# Patient Record
Sex: FEMALE | Race: WHITE | HISPANIC OR LATINO | Employment: OTHER | URBAN - METROPOLITAN AREA
[De-identification: names, ages, dates, MRNs, and addresses within clinical notes are randomized per-mention and may not be internally consistent; named-entity substitution may affect disease eponyms.]

---

## 2019-11-08 ENCOUNTER — OFFICE VISIT (OUTPATIENT)
Dept: CARDIOLOGY | Facility: MEDICAL CENTER | Age: 84
End: 2019-11-08
Payer: MEDICARE

## 2019-11-08 VITALS
HEIGHT: 60 IN | WEIGHT: 142.75 LBS | HEART RATE: 50 BPM | SYSTOLIC BLOOD PRESSURE: 140 MMHG | DIASTOLIC BLOOD PRESSURE: 80 MMHG | BODY MASS INDEX: 28.03 KG/M2 | OXYGEN SATURATION: 94 %

## 2019-11-08 DIAGNOSIS — R07.2 PRECORDIAL PAIN: ICD-10-CM

## 2019-11-08 DIAGNOSIS — R01.1 UNDIAGNOSED CARDIAC MURMURS: ICD-10-CM

## 2019-11-08 DIAGNOSIS — I10 HYPERTENSION, UNSPECIFIED TYPE: ICD-10-CM

## 2019-11-08 LAB — EKG IMPRESSION: NORMAL

## 2019-11-08 PROCEDURE — 99214 OFFICE O/P EST MOD 30 MIN: CPT | Performed by: INTERNAL MEDICINE

## 2019-11-08 PROCEDURE — 93000 ELECTROCARDIOGRAM COMPLETE: CPT | Performed by: INTERNAL MEDICINE

## 2019-11-08 NOTE — PROGRESS NOTES
CARDIOLOGY NEW PATIENT CONSULTATION    PCP: Clayton Kohli M.D.    1. Precordial pain  Suggestive of arrhythmic versus noncardiac etiology  -ZIO Patch    2. Undiagnosed cardiac murmurs  Likely aortic sclerosis  -Echocardiogram    3. Hypertension, unspecified type  Poorly controlled, she is not a good historian.  I advised tracking the blood pressure daily and following up with her primary care doctor in the coming weeks.      Follow up with Tino Marcial M.D. to be determined after testing is complete       Chief Complaint   Patient presents with   • Hypertension       History: Sushma Bowden is a 84 y.o. female with a past medical history of hypertension presenting for consultation regarding chest pain.  She gets discomfort in the chest which will last several minutes at a time before subsiding.  Symptoms occur approximately once per week.  They occur at both rest and with exertion though they are not reliable with exertion.  Her symptoms seem more bothersome in the morning.  She does describe herself is a very active 84-year-old woman and dissipates and many household chores including cleaning and cooking all without reliable reproduction of symptoms.  She monitors her blood pressure at home but does not know the average readings, her son keeps track of these.  She believes she is taking enalapril but does not know the dosage.  She does not get orthopnea or near syncope.  She did have edema in the summer months but this has resolved.    ROS:  All other systems reviewed and negative except as per the HPI    PE:  /80 (BP Location: Left arm, Patient Position: Sitting, BP Cuff Size: Adult)   Pulse (!) 50   Ht 1.524 m (5')   Wt 64.8 kg (142 lb 12 oz)   SpO2 94%   BMI 27.88 kg/m²   GEN: Well appearing  HEENT: Symmetric face. Anicteric sclerae. Moist mucus membranes  NECK: No JVD. No lymphadenopathy  CARDIAC: Normal PMI, regular, normal S1, S2.  1 out of 6 systolic ejection murmur at the upper right sternal  border  VASCULATURE: Normal carotid amplitude without bruit.   RESP: Clear to auscultation bilaterally  ABD: Soft, non-tender, non-distended  EXT: No edema, no clubbing or cyanosis  SKIN: Warm and dry  NEURO: No gross deficits  PSYCH: Appropriate affect, participates in conversation    Past Medical History:   Diagnosis Date   • Hyperlipidemia    • Hypertension      History reviewed. No pertinent surgical history.  Allergies   Allergen Reactions   • Penicillins      Outpatient Encounter Medications as of 11/8/2019   Medication Sig Dispense Refill   • aspirin (ASA) 81 MG CHEW Take 1 Tab by mouth every day. 100 Tab 11   • enalapril (VASOTEC) 10 MG Tab Take 1 Tab by mouth every day. 30 Tab 0   • tramadol-acetaminophen (ULTRACET) 37.5-325 MG per tablet Take 1 Tab by mouth every 6 hours as needed for Mild Pain. 60 Tab 1   • Linaclotide 145 MCG CAPS Take 1 Cap by mouth every day. 30 Cap 5   • simvastatin (ZOCOR) 40 MG TABS Take 1 Tab by mouth every evening. 30 Tab 1   • docusate calcium (SURFAK) 240 MG CAPS Take 1 Cap by mouth every day. 60 Cap 0   • [DISCONTINUED] lisinopril (PRINIVIL) 10 MG TABS Take 1 Tab by mouth every day. 30 Tab 11   • omeprazole (PRILOSEC) 20 MG CPDR Take 1 Cap by mouth every day. for gastritis 30 Cap 11     No facility-administered encounter medications on file as of 11/8/2019.      History reviewed. No pertinent family history.  Social History     Socioeconomic History   • Marital status:      Spouse name: Not on file   • Number of children: Not on file   • Years of education: Not on file   • Highest education level: Not on file   Occupational History   • Not on file   Social Needs   • Financial resource strain: Not on file   • Food insecurity:     Worry: Not on file     Inability: Not on file   • Transportation needs:     Medical: Not on file     Non-medical: Not on file   Tobacco Use   • Smoking status: Never Smoker   • Smokeless tobacco: Never Used   Substance and Sexual Activity   •  Alcohol use: No   • Drug use: No   • Sexual activity: Not on file   Lifestyle   • Physical activity:     Days per week: Not on file     Minutes per session: Not on file   • Stress: Not on file   Relationships   • Social connections:     Talks on phone: Not on file     Gets together: Not on file     Attends Christianity service: Not on file     Active member of club or organization: Not on file     Attends meetings of clubs or organizations: Not on file     Relationship status: Not on file   • Intimate partner violence:     Fear of current or ex partner: Not on file     Emotionally abused: Not on file     Physically abused: Not on file     Forced sexual activity: Not on file   Other Topics Concern   • Not on file   Social History Narrative   • Not on file       Studies  Lab Results   Component Value Date/Time    CHOLSTRLTOT 145 10/14/2013 04:01 AM    LDL 80 10/14/2013 04:01 AM    HDL 52 10/14/2013 04:01 AM    TRIGLYCERIDE 64 10/14/2013 04:01 AM       Lab Results   Component Value Date/Time    SODIUM 141 07/28/2014 09:06 AM    POTASSIUM 3.9 07/28/2014 09:06 AM    CHLORIDE 104 07/28/2014 09:06 AM    CO2 30 07/28/2014 09:06 AM    GLUCOSE 87 07/28/2014 09:06 AM    BUN 13 07/28/2014 09:06 AM    CREATININE 0.72 07/28/2014 09:06 AM     Lab Results   Component Value Date/Time    ALKPHOSPHAT 98 05/10/2014 12:16 AM    ASTSGOT 24 05/10/2014 12:16 AM    ALTSGPT 17 05/10/2014 12:16 AM    TBILIRUBIN 0.7 05/10/2014 12:16 AM        For this encounter I directly reviewed ECG tracings and medical records I agree with the interpretations in the electronic health record

## 2019-11-21 ENCOUNTER — OFFICE VISIT (OUTPATIENT)
Dept: NEUROLOGY | Facility: MEDICAL CENTER | Age: 84
End: 2019-11-21
Payer: MEDICARE

## 2019-11-21 VITALS
OXYGEN SATURATION: 94 % | HEART RATE: 59 BPM | WEIGHT: 142.8 LBS | RESPIRATION RATE: 14 BRPM | DIASTOLIC BLOOD PRESSURE: 78 MMHG | TEMPERATURE: 97.2 F | HEIGHT: 60 IN | BODY MASS INDEX: 28.03 KG/M2 | SYSTOLIC BLOOD PRESSURE: 144 MMHG

## 2019-11-21 DIAGNOSIS — I63.9 CEREBROVASCULAR ACCIDENT (CVA), UNSPECIFIED MECHANISM (HCC): ICD-10-CM

## 2019-11-21 PROCEDURE — 99202 OFFICE O/P NEW SF 15 MIN: CPT

## 2019-11-21 RX ORDER — TIZANIDINE HYDROCHLORIDE 2 MG/1
2 CAPSULE, GELATIN COATED ORAL
Qty: 30 CAP | Refills: 3 | Status: ON HOLD | OUTPATIENT
Start: 2019-11-21 | End: 2020-11-16

## 2019-11-21 RX ORDER — ATENOLOL 50 MG/1
50 TABLET ORAL DAILY
Status: ON HOLD | COMMUNITY
End: 2020-11-16

## 2019-11-21 RX ORDER — ATORVASTATIN CALCIUM 20 MG/1
40 TABLET, FILM COATED ORAL NIGHTLY
COMMUNITY
End: 2020-11-12

## 2019-12-01 DIAGNOSIS — I63.9 CEREBROVASCULAR ACCIDENT (CVA), UNSPECIFIED MECHANISM (HCC): ICD-10-CM

## 2019-12-01 DIAGNOSIS — R01.1 UNDIAGNOSED CARDIAC MURMURS: ICD-10-CM

## 2019-12-01 DIAGNOSIS — I10 HYPERTENSION, UNSPECIFIED TYPE: ICD-10-CM

## 2019-12-01 DIAGNOSIS — R07.2 PRECORDIAL PAIN: ICD-10-CM

## 2019-12-01 NOTE — PROGRESS NOTES
Neurological Consultation     Referred by Caitlyn LANCE for abnormal MRI brain with prior strokes and memory loss.    HPI  84 yo female with past medical history of hypertension,hyperlipidemia presents with her daughter after recently being discharged from the hospital for altered mental status and chest pain.  She is here for abnormal MRI brain and to discuss further management.  Her memory is not very good and she has trouble recalling recent events.  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  Had numerous TIAs with difficulty speaking,confusion and shaking of her left leg.  Her MRI brain revealed moderate microvascular changes bilaterally and lacunar infarcts in brainstem and thalamus which were chronic. She was treated for dehydration too and had improved with mentation and overall.        Review of Systems   Constitutional: Negative.    HENT: Negative.    Eyes: Negative.    Respiratory: Negative.    Cardiovascular: Negative.    Gastrointestinal: Negative.    Musculoskeletal: Positive for neck pain.   Skin: Negative.    Neurological: Negative.    Endo/Heme/Allergies: Negative.    Psychiatric/Behavioral: Negative for memory loss.     Past Medical History:   Diagnosis Date   • Hyperlipidemia    • Hypertension    No past surgical history on file.     No family history on file.     Social History     Socioeconomic History   • Marital status:      Spouse name: Not on file   • Number of children: Not on file   • Years of education: Not on file   • Highest education level: Not on file   Occupational History   • Not on file   Social Needs   • Financial resource strain: Not on file   • Food insecurity     Worry: Not on file     Inability: Not on file   • Transportation needs     Medical: Not on file     Non-medical: Not on file   Tobacco Use   • Smoking status: Never Smoker   • Smokeless tobacco: Never Used   Substance and Sexual Activity   • Alcohol use: No   • Drug use: No   • Sexual  activity: Not on file   Lifestyle   • Physical activity     Days per week: Not on file     Minutes per session: Not on file   • Stress: Not on file   Relationships   • Social connections     Talks on phone: Not on file     Gets together: Not on file     Attends Faith service: Not on file     Active member of club or organization: Not on file     Attends meetings of clubs or organizations: Not on file     Relationship status: Not on file   • Intimate partner violence     Fear of current or ex partner: Not on file     Emotionally abused: Not on file     Physically abused: Not on file     Forced sexual activity: Not on file   Other Topics Concern   • Not on file   Social History Narrative   • Not on file     GEN: Well appearing  HEENT: Symmetric face. Anicteric sclerae. Moist mucus membranes  NECK: No JVD. No lymphadenopathy  CARDIAC: Normal PMI, regular, normal S1, S2.  1 out of 6 systolic ejection murmur at the upper right sternal border  VASCULATURE: Normal carotid amplitude without bruit.   RESP: Clear to auscultation bilaterally  ABD: Soft, non-tender, non-distended  EXT: No edema, no clubbing or cyanosis  SKIN: Warm and dry  NEURO: No gross deficits  PSYCH: Appropriate affect, participates in conversation    Neurological exam   Awake and alert  Slovenian speaking daughter translates and she speaks english to a certain extent  Cn 2-12 intact   Speech fluent   Memory MOCA 24/30   Motor normal   Sensory normal dtrs 2+ symm  No ataxia   Gait slightly slower  Can ambulate independently  Turns well  Tone normal  No tremor     Lab Results   Component Value Date/Time    SODIUM 141 07/28/2014 09:06 AM    POTASSIUM 3.9 07/28/2014 09:06 AM    CHLORIDE 104 07/28/2014 09:06 AM    CO2 30 07/28/2014 09:06 AM    GLUCOSE 87 07/28/2014 09:06 AM    BUN 13 07/28/2014 09:06 AM    CREATININE 0.72 07/28/2014 09:06 AM      Lab Results   Component Value Date/Time    WBC 6.0 05/10/2014 12:16 AM    RBC 4.33 05/10/2014 12:16 AM     HEMOGLOBIN 14.9 05/10/2014 12:16 AM    HEMATOCRIT 43.0 05/10/2014 12:16 AM    MCV 99.3 (H) 05/10/2014 12:16 AM    MCH 34.4 (H) 05/10/2014 12:16 AM    MCHC 34.6 05/10/2014 12:16 AM    MPV 9.5 05/10/2014 12:16 AM    NEUTSPOLYS 68.0 05/10/2014 12:16 AM    LYMPHOCYTES 20.9 (L) 05/10/2014 12:16 AM    MONOCYTES 8.9 05/10/2014 12:16 AM    EOSINOPHILS 1.9 05/10/2014 12:16 AM    BASOPHILS 0.4 05/10/2014 12:16 AM        .imaging reviewed personally     FINDINGS: There is no acute infarct. There is no acute or chronic parenchymal hemorrhage. Nonspecific T2 hyperintensities are noted in the subcortical and periventricular white matter and brainstem. There is chronic lacunar infarcts in the brainstem and right thalamus. There is no intra-axial mass.     The ventricles, cortical sulci and the basal cisterns appear prominent consistent with mild cerebral atrophy. There is no sellar or juxtasellar mass. There is no extra-axial fluid collection, hemorrhage or mass. The visualized flow voids of the cerebral vasculature appear normal.  There is no large lesion identified in the expected course of the intracranial portions of the cranial nerves.   The skull bones appear normal. The extracranial soft tissue including orbits appear grossly normal. There are tiny mucus retention cyst in the left maxillary sinus.       Impression        1.  No acute abnormality.   2.  Moderate chronic microvascular ischemic disease.   3.  Lacunar infarcts in the brainstem and right thalamus.   4.  Moderate cerebral atrophy.   5.  There has been no significant interval change.      Current Outpatient Medications on File Prior to Visit   Medication Sig Dispense Refill   • atorvastatin (LIPITOR) 20 MG Tab Take 40 mg by mouth every evening.     • atenolol (TENORMIN) 50 MG Tab Take 50 mg by mouth every day.     • aspirin (ASA) 81 MG CHEW Take 1 Tab by mouth every day. 100 Tab 11   • omeprazole (PRILOSEC) 20 MG CPDR Take 1 Cap by mouth every day. for gastritis  30 Cap 11   • enalapril (VASOTEC) 10 MG Tab Take 1 Tab by mouth every day. 30 Tab 0   • tramadol-acetaminophen (ULTRACET) 37.5-325 MG per tablet Take 1 Tab by mouth every 6 hours as needed for Mild Pain. 60 Tab 1   • Linaclotide 145 MCG CAPS Take 1 Cap by mouth every day. 30 Cap 5   • simvastatin (ZOCOR) 40 MG TABS Take 1 Tab by mouth every evening. 30 Tab 1   • docusate calcium (SURFAK) 240 MG CAPS Take 1 Cap by mouth every day. 60 Cap 0     No current facility-administered medications on file prior to visit.      Impression and plan  86 yo female with chronic lacunar infarcts, vascular dementia presenting to [facility] care.  Strokes likely 2/2 NTH HL however other etiology including small emboli or artery to artery can not be excluded.  Plan  MRA head and neck   Tizanidine prn for cervicalgia limit to 1/2 tab (2mg) 1-2 times per week if any SE given her age I told daughter to discontinue  TTE with bubble   Telemetry   A1C, tsh, lipid panel and B12 and fo;late labs   BP goals < 130/90  A1C < 6.5  Neuropsychological testing   C/w asa and simvastatin   Manage stroke risk factors with PCP

## 2019-12-05 ENCOUNTER — HOSPITAL ENCOUNTER (OUTPATIENT)
Dept: CARDIOLOGY | Facility: MEDICAL CENTER | Age: 84
End: 2019-12-05
Attending: INTERNAL MEDICINE
Payer: MEDICARE

## 2019-12-05 ENCOUNTER — TELEPHONE (OUTPATIENT)
Dept: CARDIOLOGY | Facility: MEDICAL CENTER | Age: 84
End: 2019-12-05

## 2019-12-05 DIAGNOSIS — I47.19 ATRIAL TACHYCARDIA: ICD-10-CM

## 2019-12-05 DIAGNOSIS — I10 HYPERTENSION, UNSPECIFIED TYPE: ICD-10-CM

## 2019-12-05 DIAGNOSIS — R07.2 PRECORDIAL PAIN: ICD-10-CM

## 2019-12-05 DIAGNOSIS — R01.1 UNDIAGNOSED CARDIAC MURMURS: ICD-10-CM

## 2019-12-05 LAB
LV EJECT FRACT  99904: 60
LV EJECT FRACT MOD 2C 99903: 65.59
LV EJECT FRACT MOD 4C 99902: 57.56
LV EJECT FRACT MOD BP 99901: 63.71

## 2019-12-05 PROCEDURE — 93306 TTE W/DOPPLER COMPLETE: CPT

## 2019-12-05 PROCEDURE — 93306 TTE W/DOPPLER COMPLETE: CPT | Mod: 26 | Performed by: INTERNAL MEDICINE

## 2019-12-06 ENCOUNTER — TELEPHONE (OUTPATIENT)
Dept: CARDIOLOGY | Facility: MEDICAL CENTER | Age: 84
End: 2019-12-06

## 2019-12-06 NOTE — TELEPHONE ENCOUNTER
ANDREAS Brito RN @ [facility] called for medication clarification. #[phone]

## 2019-12-07 NOTE — TELEPHONE ENCOUNTER
JAVIERM with Alisha at [phone] requesting call back to discuss her med clarification questions.

## 2019-12-10 NOTE — TELEPHONE ENCOUNTER
Catarina returned call. She would like pt's most recent med list faxed to her at [phone]. Faxed med list from 11/8/19 [facility] to her at this number. Receipt confirmed.

## 2019-12-26 PROCEDURE — 0296T PR EXT ECG > 48HR TO 21 DAY RCRD W/CONECT INTL RCRD: CPT | Performed by: INTERNAL MEDICINE

## 2019-12-26 PROCEDURE — 0298T PR EXT ECG > 48HR TO 21 DAY REVIEW AND INTERPRETATN: CPT | Performed by: INTERNAL MEDICINE

## 2019-12-31 ENCOUNTER — TELEPHONE (OUTPATIENT)
Dept: CARDIOLOGY | Facility: MEDICAL CENTER | Age: 84
End: 2019-12-31

## 2019-12-31 DIAGNOSIS — R01.1 UNDIAGNOSED CARDIAC MURMURS: ICD-10-CM

## 2019-12-31 NOTE — TELEPHONE ENCOUNTER
Result Notes for ZIO PATCH MONITOR   Notes recorded by Tino Marcial M.D. on 12/28/2019 at 10:28 AM PST  ZioPatch is overall reassuring. No major arrhythmias identified. Plan follow up with myself in one year with echo at that time (already requested). She should call back if symptoms become more bothersome     LVM with pt at [phone] notifying of reassuring Zio results and recommendations to complete echo and f/u appt in 1 year. Echo order placed and mailed to pt as reminder to schedule in a year's time. Encouraged her to call back with any questions.

## 2020-06-18 ASSESSMENT — ENCOUNTER SYMPTOMS
RESPIRATORY NEGATIVE: 1
EYES NEGATIVE: 1
MEMORY LOSS: 0
NEUROLOGICAL NEGATIVE: 1
NECK PAIN: 1
CARDIOVASCULAR NEGATIVE: 1
CONSTITUTIONAL NEGATIVE: 1
GASTROINTESTINAL NEGATIVE: 1

## 2020-11-12 ENCOUNTER — HOSPITAL ENCOUNTER (INPATIENT)
Facility: MEDICAL CENTER | Age: 85
LOS: 3 days | DRG: 064 | End: 2020-11-16
Attending: EMERGENCY MEDICINE | Admitting: FAMILY MEDICINE
Payer: MEDICARE

## 2020-11-12 ENCOUNTER — APPOINTMENT (OUTPATIENT)
Dept: RADIOLOGY | Facility: MEDICAL CENTER | Age: 85
DRG: 064 | End: 2020-11-12
Attending: EMERGENCY MEDICINE
Payer: MEDICARE

## 2020-11-12 DIAGNOSIS — R41.0 CONFUSION: ICD-10-CM

## 2020-11-12 DIAGNOSIS — N39.0 ACUTE UTI: ICD-10-CM

## 2020-11-12 DIAGNOSIS — I63.9 CEREBROVASCULAR ACCIDENT (CVA), UNSPECIFIED MECHANISM (HCC): ICD-10-CM

## 2020-11-12 DIAGNOSIS — I63.9 CEREBRAL INFARCTION, UNSPECIFIED MECHANISM (HCC): ICD-10-CM

## 2020-11-12 PROBLEM — N30.00 ACUTE CYSTITIS WITHOUT HEMATURIA: Status: ACTIVE | Noted: 2020-11-12

## 2020-11-12 LAB
ALBUMIN SERPL BCP-MCNC: 4 G/DL (ref 3.2–4.9)
ALBUMIN/GLOB SERPL: 1.4 G/DL
ALP SERPL-CCNC: 97 U/L (ref 30–99)
ALT SERPL-CCNC: 13 U/L (ref 2–50)
AMORPH CRY #/AREA URNS HPF: PRESENT /HPF
ANION GAP SERPL CALC-SCNC: 12 MMOL/L (ref 7–16)
APPEARANCE UR: ABNORMAL
AST SERPL-CCNC: 20 U/L (ref 12–45)
BACTERIA #/AREA URNS HPF: ABNORMAL /HPF
BASOPHILS # BLD AUTO: 0.4 % (ref 0–1.8)
BASOPHILS # BLD: 0.03 K/UL (ref 0–0.12)
BILIRUB SERPL-MCNC: 0.7 MG/DL (ref 0.1–1.5)
BILIRUB UR QL STRIP.AUTO: NEGATIVE
BLOOD CULTURE HOLD CXBCH: NORMAL
BUN SERPL-MCNC: 15 MG/DL (ref 8–22)
CALCIUM SERPL-MCNC: 9.3 MG/DL (ref 8.5–10.5)
CHLORIDE SERPL-SCNC: 101 MMOL/L (ref 96–112)
CO2 SERPL-SCNC: 25 MMOL/L (ref 20–33)
COLOR UR: YELLOW
COVID ORDER STATUS COVID19: NORMAL
CREAT SERPL-MCNC: 0.51 MG/DL (ref 0.5–1.4)
EKG IMPRESSION: NORMAL
EOSINOPHIL # BLD AUTO: 0.07 K/UL (ref 0–0.51)
EOSINOPHIL NFR BLD: 1 % (ref 0–6.9)
EPI CELLS #/AREA URNS HPF: NEGATIVE /HPF
ERYTHROCYTE [DISTWIDTH] IN BLOOD BY AUTOMATED COUNT: 49.1 FL (ref 35.9–50)
EST. AVERAGE GLUCOSE BLD GHB EST-MCNC: 105 MG/DL
GLOBULIN SER CALC-MCNC: 2.8 G/DL (ref 1.9–3.5)
GLUCOSE SERPL-MCNC: 91 MG/DL (ref 65–99)
GLUCOSE UR STRIP.AUTO-MCNC: NEGATIVE MG/DL
HBA1C MFR BLD: 5.3 % (ref 0–5.6)
HCT VFR BLD AUTO: 42 % (ref 37–47)
HGB BLD-MCNC: 13.8 G/DL (ref 12–16)
HYALINE CASTS #/AREA URNS LPF: ABNORMAL /LPF
IMM GRANULOCYTES # BLD AUTO: 0.05 K/UL (ref 0–0.11)
IMM GRANULOCYTES NFR BLD AUTO: 0.7 % (ref 0–0.9)
KETONES UR STRIP.AUTO-MCNC: 15 MG/DL
LEUKOCYTE ESTERASE UR QL STRIP.AUTO: ABNORMAL
LYMPHOCYTES # BLD AUTO: 1.3 K/UL (ref 1–4.8)
LYMPHOCYTES NFR BLD: 18.3 % (ref 22–41)
MCH RBC QN AUTO: 33.5 PG (ref 27–33)
MCHC RBC AUTO-ENTMCNC: 32.9 G/DL (ref 33.6–35)
MCV RBC AUTO: 101.9 FL (ref 81.4–97.8)
MICRO URNS: ABNORMAL
MONOCYTES # BLD AUTO: 0.59 K/UL (ref 0–0.85)
MONOCYTES NFR BLD AUTO: 8.3 % (ref 0–13.4)
NEUTROPHILS # BLD AUTO: 5.08 K/UL (ref 2–7.15)
NEUTROPHILS NFR BLD: 71.3 % (ref 44–72)
NITRITE UR QL STRIP.AUTO: NEGATIVE
NRBC # BLD AUTO: 0 K/UL
NRBC BLD-RTO: 0 /100 WBC
PH UR STRIP.AUTO: 8.5 [PH] (ref 5–8)
PLATELET # BLD AUTO: 186 K/UL (ref 164–446)
PMV BLD AUTO: 12.2 FL (ref 9–12.9)
POTASSIUM SERPL-SCNC: 3.6 MMOL/L (ref 3.6–5.5)
PROT SERPL-MCNC: 6.8 G/DL (ref 6–8.2)
PROT UR QL STRIP: NEGATIVE MG/DL
RBC # BLD AUTO: 4.12 M/UL (ref 4.2–5.4)
RBC # URNS HPF: ABNORMAL /HPF
RBC UR QL AUTO: ABNORMAL
SODIUM SERPL-SCNC: 138 MMOL/L (ref 135–145)
SP GR UR STRIP.AUTO: 1.01
TROPONIN T SERPL-MCNC: 17 NG/L (ref 6–19)
TROPONIN T SERPL-MCNC: 18 NG/L (ref 6–19)
TSH SERPL DL<=0.005 MIU/L-ACNC: 2.3 UIU/ML (ref 0.38–5.33)
UROBILINOGEN UR STRIP.AUTO-MCNC: 0.2 MG/DL
WBC # BLD AUTO: 7.1 K/UL (ref 4.8–10.8)
WBC #/AREA URNS HPF: ABNORMAL /HPF

## 2020-11-12 PROCEDURE — 96367 TX/PROPH/DG ADDL SEQ IV INF: CPT

## 2020-11-12 PROCEDURE — 700117 HCHG RX CONTRAST REV CODE 255: Performed by: EMERGENCY MEDICINE

## 2020-11-12 PROCEDURE — G0378 HOSPITAL OBSERVATION PER HR: HCPCS

## 2020-11-12 PROCEDURE — 81001 URINALYSIS AUTO W/SCOPE: CPT

## 2020-11-12 PROCEDURE — 70496 CT ANGIOGRAPHY HEAD: CPT

## 2020-11-12 PROCEDURE — 99218 PR INITIAL OBSERVATION CARE,LEVL I: CPT | Performed by: FAMILY MEDICINE

## 2020-11-12 PROCEDURE — 87086 URINE CULTURE/COLONY COUNT: CPT

## 2020-11-12 PROCEDURE — 700105 HCHG RX REV CODE 258: Performed by: EMERGENCY MEDICINE

## 2020-11-12 PROCEDURE — 84443 ASSAY THYROID STIM HORMONE: CPT

## 2020-11-12 PROCEDURE — 80053 COMPREHEN METABOLIC PANEL: CPT

## 2020-11-12 PROCEDURE — 87040 BLOOD CULTURE FOR BACTERIA: CPT

## 2020-11-12 PROCEDURE — 93005 ELECTROCARDIOGRAM TRACING: CPT | Performed by: EMERGENCY MEDICINE

## 2020-11-12 PROCEDURE — 700111 HCHG RX REV CODE 636 W/ 250 OVERRIDE (IP): Performed by: EMERGENCY MEDICINE

## 2020-11-12 PROCEDURE — 70498 CT ANGIOGRAPHY NECK: CPT

## 2020-11-12 PROCEDURE — 70450 CT HEAD/BRAIN W/O DYE: CPT

## 2020-11-12 PROCEDURE — U0003 INFECTIOUS AGENT DETECTION BY NUCLEIC ACID (DNA OR RNA); SEVERE ACUTE RESPIRATORY SYNDROME CORONAVIRUS 2 (SARS-COV-2) (CORONAVIRUS DISEASE [COVID-19]), AMPLIFIED PROBE TECHNIQUE, MAKING USE OF HIGH THROUGHPUT TECHNOLOGIES AS DESCRIBED BY CMS-2020-01-R: HCPCS

## 2020-11-12 PROCEDURE — 83036 HEMOGLOBIN GLYCOSYLATED A1C: CPT

## 2020-11-12 PROCEDURE — 36415 COLL VENOUS BLD VENIPUNCTURE: CPT

## 2020-11-12 PROCEDURE — 96365 THER/PROPH/DIAG IV INF INIT: CPT

## 2020-11-12 PROCEDURE — 99285 EMERGENCY DEPT VISIT HI MDM: CPT

## 2020-11-12 PROCEDURE — 85025 COMPLETE CBC W/AUTO DIFF WBC: CPT

## 2020-11-12 PROCEDURE — 700105 HCHG RX REV CODE 258: Performed by: FAMILY MEDICINE

## 2020-11-12 PROCEDURE — 99215 OFFICE O/P EST HI 40 MIN: CPT | Performed by: PSYCHIATRY & NEUROLOGY

## 2020-11-12 PROCEDURE — 84484 ASSAY OF TROPONIN QUANT: CPT

## 2020-11-12 RX ORDER — SODIUM CHLORIDE 9 MG/ML
INJECTION, SOLUTION INTRAVENOUS CONTINUOUS
Status: ACTIVE | OUTPATIENT
Start: 2020-11-12 | End: 2020-11-13

## 2020-11-12 RX ORDER — CLOPIDOGREL BISULFATE 75 MG/1
75 TABLET ORAL DAILY
Status: DISCONTINUED | OUTPATIENT
Start: 2020-11-12 | End: 2020-11-16 | Stop reason: HOSPADM

## 2020-11-12 RX ORDER — SODIUM CHLORIDE, SODIUM LACTATE, POTASSIUM CHLORIDE, CALCIUM CHLORIDE 600; 310; 30; 20 MG/100ML; MG/100ML; MG/100ML; MG/100ML
1000 INJECTION, SOLUTION INTRAVENOUS ONCE
Status: COMPLETED | OUTPATIENT
Start: 2020-11-12 | End: 2020-11-12

## 2020-11-12 RX ORDER — ESCITALOPRAM OXALATE 10 MG/1
10 TABLET ORAL DAILY
Status: DISCONTINUED | OUTPATIENT
Start: 2020-11-13 | End: 2020-11-16 | Stop reason: HOSPADM

## 2020-11-12 RX ORDER — ESCITALOPRAM OXALATE 10 MG/1
10 TABLET ORAL DAILY
Status: ON HOLD | COMMUNITY
End: 2020-12-04

## 2020-11-12 RX ORDER — BISACODYL 10 MG
10 SUPPOSITORY, RECTAL RECTAL
Status: DISCONTINUED | OUTPATIENT
Start: 2020-11-12 | End: 2020-11-16 | Stop reason: HOSPADM

## 2020-11-12 RX ORDER — ONDANSETRON 4 MG/1
4 TABLET, ORALLY DISINTEGRATING ORAL EVERY 4 HOURS PRN
Status: DISCONTINUED | OUTPATIENT
Start: 2020-11-12 | End: 2020-11-16 | Stop reason: HOSPADM

## 2020-11-12 RX ORDER — ASPIRIN 325 MG
325 TABLET ORAL DAILY
Status: DISCONTINUED | OUTPATIENT
Start: 2020-11-12 | End: 2020-11-12

## 2020-11-12 RX ORDER — ONDANSETRON 2 MG/ML
4 INJECTION INTRAMUSCULAR; INTRAVENOUS EVERY 4 HOURS PRN
Status: DISCONTINUED | OUTPATIENT
Start: 2020-11-12 | End: 2020-11-16 | Stop reason: HOSPADM

## 2020-11-12 RX ORDER — AMOXICILLIN 250 MG
2 CAPSULE ORAL 2 TIMES DAILY
Status: DISCONTINUED | OUTPATIENT
Start: 2020-11-12 | End: 2020-11-16 | Stop reason: HOSPADM

## 2020-11-12 RX ORDER — ASPIRIN 300 MG/1
300 SUPPOSITORY RECTAL DAILY
Status: DISCONTINUED | OUTPATIENT
Start: 2020-11-12 | End: 2020-11-12

## 2020-11-12 RX ORDER — ATORVASTATIN CALCIUM 40 MG/1
40 TABLET, FILM COATED ORAL EVERY EVENING
Status: DISCONTINUED | OUTPATIENT
Start: 2020-11-12 | End: 2020-11-16 | Stop reason: HOSPADM

## 2020-11-12 RX ORDER — HYDRALAZINE HYDROCHLORIDE 25 MG/1
25 TABLET, FILM COATED ORAL EVERY 8 HOURS PRN
Status: DISCONTINUED | OUTPATIENT
Start: 2020-11-12 | End: 2020-11-16 | Stop reason: HOSPADM

## 2020-11-12 RX ORDER — HYDRALAZINE HYDROCHLORIDE 20 MG/ML
5 INJECTION INTRAMUSCULAR; INTRAVENOUS
Status: DISCONTINUED | OUTPATIENT
Start: 2020-11-12 | End: 2020-11-12

## 2020-11-12 RX ORDER — ACETAMINOPHEN 500 MG
500-1000 TABLET ORAL 2 TIMES DAILY PRN
Status: ON HOLD | COMMUNITY
End: 2020-11-24

## 2020-11-12 RX ORDER — ENALAPRIL MALEATE 20 MG/1
20 TABLET ORAL DAILY
Status: ON HOLD | COMMUNITY
End: 2020-11-16

## 2020-11-12 RX ORDER — GABAPENTIN 300 MG/1
300 CAPSULE ORAL
Status: DISCONTINUED | OUTPATIENT
Start: 2020-11-12 | End: 2020-11-16 | Stop reason: HOSPADM

## 2020-11-12 RX ORDER — GABAPENTIN 300 MG/1
300 CAPSULE ORAL
Status: ON HOLD | COMMUNITY
End: 2020-12-04

## 2020-11-12 RX ORDER — POLYETHYLENE GLYCOL 3350 17 G/17G
1 POWDER, FOR SOLUTION ORAL
Status: DISCONTINUED | OUTPATIENT
Start: 2020-11-12 | End: 2020-11-16 | Stop reason: HOSPADM

## 2020-11-12 RX ORDER — ASPIRIN 81 MG/1
324 TABLET, CHEWABLE ORAL DAILY
Status: DISCONTINUED | OUTPATIENT
Start: 2020-11-12 | End: 2020-11-12

## 2020-11-12 RX ORDER — LABETALOL HYDROCHLORIDE 5 MG/ML
10 INJECTION, SOLUTION INTRAVENOUS EVERY 4 HOURS PRN
Status: DISCONTINUED | OUTPATIENT
Start: 2020-11-12 | End: 2020-11-16 | Stop reason: HOSPADM

## 2020-11-12 RX ADMIN — SODIUM CHLORIDE: 9 INJECTION, SOLUTION INTRAVENOUS at 19:05

## 2020-11-12 RX ADMIN — SODIUM CHLORIDE, POTASSIUM CHLORIDE, SODIUM LACTATE AND CALCIUM CHLORIDE 1000 ML: 600; 310; 30; 20 INJECTION, SOLUTION INTRAVENOUS at 15:25

## 2020-11-12 RX ADMIN — IOHEXOL 100 ML: 350 INJECTION, SOLUTION INTRAVENOUS at 15:21

## 2020-11-12 RX ADMIN — CEFTRIAXONE SODIUM 1 G: 1 INJECTION, POWDER, FOR SOLUTION INTRAMUSCULAR; INTRAVENOUS at 16:17

## 2020-11-12 ASSESSMENT — ENCOUNTER SYMPTOMS
CHILLS: 0
SEIZURES: 0
SENSORY CHANGE: 0
MEMORY LOSS: 0
TINGLING: 0
MYALGIAS: 0
NECK PAIN: 0
SHORTNESS OF BREATH: 0
LOSS OF CONSCIOUSNESS: 0
HEADACHES: 0
TREMORS: 0
VOMITING: 0
BLURRED VISION: 0
WEAKNESS: 0
NAUSEA: 0
FOCAL WEAKNESS: 0
FALLS: 0
DOUBLE VISION: 0
PHOTOPHOBIA: 0
DIZZINESS: 0
BACK PAIN: 0
SPEECH CHANGE: 0
FEVER: 0

## 2020-11-12 NOTE — ED TRIAGE NOTES
Pt family member came in and noticed that pt won't say complete sentences and was confused. Pt nephew went in to check on pt d/t she didn't come out this morning like she normally does.  Pt was just sitting on her bed.  Pt unable to follow much direction.  Pt Bulgarian speaking.  Pt reports pain when asked were was able to get pain on left side of head and face.  Pt not following direction for neuro assessment.  Pt SEWELL.  Charge RN informed of pt.  Pt to [facility].

## 2020-11-12 NOTE — ED TRIAGE NOTES
".  Chief Complaint   Patient presents with   • Altered Mental Status   • Headache   Agree with triage assessment.  Per family, Pt \" usually she talks a lot, asks us questions, today a change. \"  Equal , weak push/pulls.  No facial droop noted.  No chest pain.  + headache.  Family member states \" we were here years ago for the same thing. \"     "

## 2020-11-12 NOTE — ED NOTES
Pharmacy Medication Reconciliation      Medication reconciliation updated and complete per rx bottles at bedside. Reviewed rx bottles and returned to pt family at bedside  Allergies have been verified and updated   No oral ABX within the last 14 days  Patient home pharmacy:[facility]

## 2020-11-12 NOTE — ED PROVIDER NOTES
ED Provider Note    This patient was cared for during the COVID-19 pandemic. History and physical exam may be limited/truncated by the inherent challenges of PPE and the need to decrease staff exposure to novel coronavirus. Some aspects of disease management may be different to protect staff and help slow the spread of disease. I verified that, if possible, the patient was wearing a mask and I was wearing appropriate PPE every time I encountered the patient.       CHIEF COMPLAINT  Chief Complaint   Patient presents with   • Altered Mental Status   • Headache       HPI  Sushma Bowden is a 85 y.o. female who presents with altered mental status.  She is here with her granddaughter.  Her granddaughter is providing most of the history.  The patient lives with her son the granddaughter's uncle he said that normally the patient wakes up before him and is awake and doing about her business by the time he wakes up.  This morning he went into her room because she was not awake or outside in the house and found her sitting on the side of the bed very confused.  She has had this happen before and been in the hospital many years ago for this.  She was totally normal yesterday.  Her granddaughter says usually her doctors appointments she is very alert and talkative and telling the doctor what is going on and that is not happening today.  Patient has been reporting a headache and is from rubbing the left side of her head.      REVIEW OF SYSTEMS  positive for altered mental status, negative for fevers chills abdominal pain chest pain. All other systems are negative.     PAST MEDICAL HISTORY   has a past medical history of Hyperlipidemia and Hypertension.    SOCIAL HISTORY  Social History     Tobacco Use   • Smoking status: Never Smoker   • Smokeless tobacco: Never Used   Substance and Sexual Activity   • Alcohol use: No   • Drug use: No   • Sexual activity: Not on file       SURGICAL HISTORY  patient denies any surgical  "history    CURRENT MEDICATIONS  Home Medications     Reviewed by Darlin Lao (Pharmacy Tech) on 11/12/20 at 1529  Med List Status: Complete   Medication Last Dose Status   acetaminophen (TYLENOL) 500 MG Tab PRN Active   aspirin (ASA) 81 MG CHEW 11/11/2020 Active   atenolol (TENORMIN) 50 MG Tab 11/12/2020 Active   docusate calcium (SURFAK) 240 MG CAPS Not Taking Active   enalapril (VASOTEC) 10 MG Tab 11/11/2020 Active   enalapril (VASOTEC) 20 MG tablet 11/12/2020 Active   escitalopram (LEXAPRO) 10 MG Tab 11/11/2020 Active   gabapentin (NEURONTIN) 300 MG Cap 11/11/2020 Active   Linaclotide 145 MCG CAPS Not Taking Active   magnesium oxide (MAG-OX) 400 (241.3 Mg) MG Tab tablet Not Taking Active   omeprazole (PRILOSEC) 20 MG CPDR Not Taking Active   simvastatin (ZOCOR) 40 MG TABS Not Taking Active   tizanidine (ZANAFLEX) 2 MG capsule Not Taking Active   tramadol-acetaminophen (ULTRACET) 37.5-325 MG per tablet Not Taking Active                ALLERGIES  Allergies   Allergen Reactions   • Penicillins Unspecified     Unknown reaction  Tolerates cephalosporins       PHYSICAL EXAM  VITAL SIGNS: BP (!) 194/85   Pulse 67   Temp 36.8 °C (98.2 °F) (Temporal)   Resp (!) 22   Ht 1.499 m (4' 11\")   Wt 62.8 kg (138 lb 7.2 oz)   SpO2 95%   BMI 27.96 kg/m² .  Constitutional: Alert in no apparent distress.  HENT: No signs of trauma, Bilateral external ears normal, Nose normal.   Eyes: Pupils are equal and reactive, Conjunctiva normal, Non-icteric.   Neck: Normal range of motion, No tenderness, Supple, No stridor.   Cardiovascular: Regular rate and rhythm, no murmurs.   Thorax & Lungs: Normal breath sounds, No respiratory distress, No wheezing, No chest tenderness.   Abdomen: Bowel sounds normal, Soft, No tenderness, No masses, No peritoneal signs.  Skin: Warm, Dry, No erythema, No rash.   Musculoskeletal:  no major deformities noted.   Neurologic: Alert,  No focal deficits noted.  Cranial nerves appear intact moving all " extremities without difficulty.  Unable to answer questions and say what year this is or where she is  Psychiatric: Affect normal      DIAGNOSTIC STUDIES / PROCEDURES      EKG  Results for orders placed or performed during the hospital encounter of 20   EKG (NOW)   Result Value Ref Range    Report       [facility] Emergency Dept.    Test Date:  2020  Pt Name:    FABIANA LEMON               Department: ER  MRN:        [id number]                      Room:        09  Gender:     Female                       Technician: [id number]  :        [id number]                   Requested By:ELSA GOEL  Order #:    [id number]                    Reading MD: ELSA GOEL    Measurements  Intervals                                Axis  Rate:       59                           P:          20  MA:         196                          QRS:        -36  QRSD:       94                           T:          29  QT:         464  QTc:        460    Interpretive Statements  SINUS RHYTHM  MULTIPLE ATRIAL PREMATURE COMPLEXES  LEFT AXIS DEVIATION  LVH WITH SECONDARY REPOLARIZATION ABNORMALITY  Compared to ECG 2019 15:14:18  Atrial premature complex(es) now present  Early repolarization now present  Sinus bradycardia no longer present  Impression: Sinus rhythm with multiple PACs in verted T waves lead III  unchanged  from previous no evidence of acute ischemia.  Electronically Signed On 2020 15:19:04 PST by ELSA GOEL           LABS  Labs Reviewed   CBC WITH DIFFERENTIAL - Abnormal; Notable for the following components:       Result Value    RBC 4.12 (*)     .9 (*)     MCH 33.5 (*)     MCHC 32.9 (*)     Lymphocytes 18.30 (*)     All other components within normal limits   URINALYSIS,CULTURE IF INDICATED - Abnormal; Notable for the following components:    Character Turbid (*)     Ph 8.5 (*)     Ketones 15 (*)     Leukocyte Esterase Large (*)     Occult Blood Trace (*)     All other  "components within normal limits    Narrative:     Indication for culture:->Acute unexplained altered mental  status ONLY after ruling out other recognized cause   URINE MICROSCOPIC (W/UA) - Abnormal; Notable for the following components:    WBC Packed (*)     Bacteria Moderate (*)     All other components within normal limits    Narrative:     Indication for culture:->Acute unexplained altered mental  status ONLY after ruling out other recognized cause   COMP METABOLIC PANEL   TROPONIN   ESTIMATED GFR   COVID/SARS COV-2    Narrative:     Is patient being admitted?->Yes  Does this patient meet criteria for Rush/Cepheid per [facility]  Inpatient Workflow? (See workflow link below)->No  Expected turn around time?->Routine (In-House PCR up to 24  hours)  Is this the patients First SARS CoV-2 test?->Yes  Is this patient employed in healthcare?->No  Is the patient symptomatic as defined by the CDC?->No  Is the patient hospitalized?->Yes  Is the patient in the ICU?->No  Is the patient a resident in a congregate care setting?->No  Is the patient pregnant?->No   URINE CULTURE(NEW)    Narrative:     Indication for culture:->Acute unexplained altered mental  status ONLY after ruling out other recognized cause   HEMOGLOBIN A1C   SARS-COV-2, PCR (IN-HOUSE)    Narrative:     Is patient being admitted?->Yes  Does this patient meet criteria for Rush/Cepheid per [facility]  Inpatient Workflow? (See workflow link below)->No  Expected turn around time?->Routine (In-House PCR up to 24  hours)  Is this the patients First SARS CoV-2 test?->Yes  Is this patient employed in healthcare?->No  Is the patient symptomatic as defined by the CDC?->No  Is the patient hospitalized?->Yes  Is the patient in the ICU?->No  Is the patient a resident in a congregate care setting?->No  Is the patient pregnant?->No   BLOOD CULTURE    Narrative:     Per Hospital Policy: Only change Specimen Src: to \"Line\" if  specified by physician order.   TSH   BLOOD CULTURE,HOLD "   TROPONIN   URINE CULTURE(NEW)       RADIOLOGY  CT-CTA HEAD WITH & W/O-POST PROCESS   Final Result      No occlusion or hemodynamically significant stenosis of the intracranial arteries.      CT-CTA NECK WITH & W/O-POST PROCESSING   Final Result      1.  No high-grade stenosis, large vessel occlusion, aneurysm or dissection.   2.  Enlarged, nodular right thyroid lobe relative to the left. This can be followed with outpatient ultrasound.      CT-HEAD W/O   Final Result      1.  Hypodensity in the posterior left cerebral hemisphere suggesting a moderate acute infarct.   2.  No acute intracranial hemorrhage.   3.  Chronic microvascular ischemic type changes and multiple chronic lacunar infarcts.      These findings were discussed with ELSA GOEL on 11/12/2020 2:04 PM.      EC-ECHOCARDIOGRAM COMPLETE W/O CONT    (Results Pending)   MR-BRAIN-W/O    (Results Pending)       Medical records reviewed for continuity of care.  Records from last November were reviewed she has a history of hypertension hyperlipidemia she had been admitted to the hospital for altered mental status and chest pain.  She had an abnormal MRI she had lacunar infarcts    Differential Diagnosis includes but is not limited to: CVA, UTI, electrolyte abnormality, intracranial bleed    COURSE & MEDICAL DECISION MAKING  Pertinent Labs & Imaging studies reviewed. (See chart for details)    This is an 85-year-old female that presents with altered mental status.  She is not localizing on exam and therefore I do not initially start the stroke protocol.  However I did obtain head CT which showed evidence of an acute infarct.  Therefore the CTAs were then performed.    There does not appear to be any large vessel occlusion.  She does have evidence of UTI as well on urinalysis and she will be given antibiotics for this.    I did speak with Dr. Nash, neurology will consult.  Patient will be hospitalized.  I spoke with Dr. Joe hospitalist who agrees to  consult for hospitalization.  Patient will be hospitalized in guarded condition.      FINAL IMPRESSION  1. Confusion     2. Acute UTI     3. Cerebrovascular accident (CVA), unspecified mechanism (HCC)         2.   3.    This dictation has been creating using voice recognition software. The accuracy of the dictation is limited the abilities of the software.  I expect there may be some errors of grammar and possibly content. I made every attempt to manually correct the errors within my dictation. However errors related to this voice recognition software may still exist and should be interpreted within the appropriate context.      The note accurately reflects work and decisions made by me.  Rhonda Mcdaniel M.D.  11/12/2020  6:52 PM

## 2020-11-12 NOTE — ED NOTES
Pt urinated a specimen was collected and sent to lab, pt clean and changed. She and family are aware of further plan of care.

## 2020-11-13 ENCOUNTER — APPOINTMENT (OUTPATIENT)
Dept: CARDIOLOGY | Facility: MEDICAL CENTER | Age: 85
DRG: 064 | End: 2020-11-13
Attending: FAMILY MEDICINE
Payer: MEDICARE

## 2020-11-13 LAB
ALBUMIN SERPL BCP-MCNC: 3.8 G/DL (ref 3.2–4.9)
ALBUMIN/GLOB SERPL: 1.5 G/DL
ALP SERPL-CCNC: 93 U/L (ref 30–99)
ALT SERPL-CCNC: 13 U/L (ref 2–50)
ANION GAP SERPL CALC-SCNC: 11 MMOL/L (ref 7–16)
ANION GAP SERPL CALC-SCNC: 15 MMOL/L (ref 7–16)
AST SERPL-CCNC: 17 U/L (ref 12–45)
BASOPHILS # BLD AUTO: 0.4 % (ref 0–1.8)
BASOPHILS # BLD: 0.03 K/UL (ref 0–0.12)
BILIRUB SERPL-MCNC: 0.6 MG/DL (ref 0.1–1.5)
BUN SERPL-MCNC: 8 MG/DL (ref 8–22)
BUN SERPL-MCNC: 9 MG/DL (ref 8–22)
CALCIUM SERPL-MCNC: 9 MG/DL (ref 8.5–10.5)
CALCIUM SERPL-MCNC: 9.4 MG/DL (ref 8.5–10.5)
CHLORIDE SERPL-SCNC: 102 MMOL/L (ref 96–112)
CHLORIDE SERPL-SCNC: 99 MMOL/L (ref 96–112)
CHOLEST SERPL-MCNC: 165 MG/DL (ref 100–199)
CO2 SERPL-SCNC: 22 MMOL/L (ref 20–33)
CO2 SERPL-SCNC: 23 MMOL/L (ref 20–33)
CREAT SERPL-MCNC: 0.47 MG/DL (ref 0.5–1.4)
CREAT SERPL-MCNC: 0.5 MG/DL (ref 0.5–1.4)
EOSINOPHIL # BLD AUTO: 0.05 K/UL (ref 0–0.51)
EOSINOPHIL NFR BLD: 0.7 % (ref 0–6.9)
ERYTHROCYTE [DISTWIDTH] IN BLOOD BY AUTOMATED COUNT: 49.1 FL (ref 35.9–50)
GLOBULIN SER CALC-MCNC: 2.6 G/DL (ref 1.9–3.5)
GLUCOSE SERPL-MCNC: 95 MG/DL (ref 65–99)
GLUCOSE SERPL-MCNC: 98 MG/DL (ref 65–99)
HCT VFR BLD AUTO: 42.3 % (ref 37–47)
HDLC SERPL-MCNC: 65 MG/DL
HGB BLD-MCNC: 14.2 G/DL (ref 12–16)
IMM GRANULOCYTES # BLD AUTO: 0.04 K/UL (ref 0–0.11)
IMM GRANULOCYTES NFR BLD AUTO: 0.6 % (ref 0–0.9)
LDLC SERPL CALC-MCNC: 88 MG/DL
LV EJECT FRACT  99904: 65
LV EJECT FRACT MOD 2C 99903: 60.36
LV EJECT FRACT MOD 4C 99902: 72.19
LV EJECT FRACT MOD BP 99901: 66.99
LYMPHOCYTES # BLD AUTO: 1.2 K/UL (ref 1–4.8)
LYMPHOCYTES NFR BLD: 17.3 % (ref 22–41)
MAGNESIUM SERPL-MCNC: 1.9 MG/DL (ref 1.5–2.5)
MCH RBC QN AUTO: 34.2 PG (ref 27–33)
MCHC RBC AUTO-ENTMCNC: 33.6 G/DL (ref 33.6–35)
MCV RBC AUTO: 101.9 FL (ref 81.4–97.8)
MONOCYTES # BLD AUTO: 0.6 K/UL (ref 0–0.85)
MONOCYTES NFR BLD AUTO: 8.6 % (ref 0–13.4)
NEUTROPHILS # BLD AUTO: 5.02 K/UL (ref 2–7.15)
NEUTROPHILS NFR BLD: 72.4 % (ref 44–72)
NRBC # BLD AUTO: 0 K/UL
NRBC BLD-RTO: 0 /100 WBC
PLATELET # BLD AUTO: 171 K/UL (ref 164–446)
PMV BLD AUTO: 11.4 FL (ref 9–12.9)
POTASSIUM SERPL-SCNC: 2.8 MMOL/L (ref 3.6–5.5)
POTASSIUM SERPL-SCNC: 3.7 MMOL/L (ref 3.6–5.5)
PROT SERPL-MCNC: 6.4 G/DL (ref 6–8.2)
RBC # BLD AUTO: 4.15 M/UL (ref 4.2–5.4)
SARS-COV-2 RNA RESP QL NAA+PROBE: NOTDETECTED
SODIUM SERPL-SCNC: 136 MMOL/L (ref 135–145)
SODIUM SERPL-SCNC: 136 MMOL/L (ref 135–145)
SPECIMEN SOURCE: NORMAL
TRIGL SERPL-MCNC: 62 MG/DL (ref 0–149)
WBC # BLD AUTO: 6.9 K/UL (ref 4.8–10.8)

## 2020-11-13 PROCEDURE — 700111 HCHG RX REV CODE 636 W/ 250 OVERRIDE (IP): Performed by: HOSPITALIST

## 2020-11-13 PROCEDURE — 83735 ASSAY OF MAGNESIUM: CPT

## 2020-11-13 PROCEDURE — 97165 OT EVAL LOW COMPLEX 30 MIN: CPT

## 2020-11-13 PROCEDURE — 93306 TTE W/DOPPLER COMPLETE: CPT | Mod: 26 | Performed by: INTERNAL MEDICINE

## 2020-11-13 PROCEDURE — 93306 TTE W/DOPPLER COMPLETE: CPT

## 2020-11-13 PROCEDURE — 80048 BASIC METABOLIC PNL TOTAL CA: CPT

## 2020-11-13 PROCEDURE — A9270 NON-COVERED ITEM OR SERVICE: HCPCS | Performed by: FAMILY MEDICINE

## 2020-11-13 PROCEDURE — 700102 HCHG RX REV CODE 250 W/ 637 OVERRIDE(OP): Performed by: HOSPITALIST

## 2020-11-13 PROCEDURE — A9270 NON-COVERED ITEM OR SERVICE: HCPCS | Performed by: HOSPITALIST

## 2020-11-13 PROCEDURE — 700105 HCHG RX REV CODE 258: Performed by: FAMILY MEDICINE

## 2020-11-13 PROCEDURE — 770020 HCHG ROOM/CARE - TELE (206)

## 2020-11-13 PROCEDURE — 700111 HCHG RX REV CODE 636 W/ 250 OVERRIDE (IP): Performed by: FAMILY MEDICINE

## 2020-11-13 PROCEDURE — 36415 COLL VENOUS BLD VENIPUNCTURE: CPT

## 2020-11-13 PROCEDURE — 80053 COMPREHEN METABOLIC PANEL: CPT

## 2020-11-13 PROCEDURE — 99233 SBSQ HOSP IP/OBS HIGH 50: CPT | Performed by: HOSPITALIST

## 2020-11-13 PROCEDURE — 99233 SBSQ HOSP IP/OBS HIGH 50: CPT | Performed by: PSYCHIATRY & NEUROLOGY

## 2020-11-13 PROCEDURE — 80061 LIPID PANEL: CPT

## 2020-11-13 PROCEDURE — 97161 PT EVAL LOW COMPLEX 20 MIN: CPT

## 2020-11-13 PROCEDURE — 85025 COMPLETE CBC W/AUTO DIFF WBC: CPT

## 2020-11-13 PROCEDURE — 700102 HCHG RX REV CODE 250 W/ 637 OVERRIDE(OP): Performed by: FAMILY MEDICINE

## 2020-11-13 PROCEDURE — 92610 EVALUATE SWALLOWING FUNCTION: CPT

## 2020-11-13 RX ORDER — POTASSIUM CHLORIDE 7.45 MG/ML
10 INJECTION INTRAVENOUS
Status: COMPLETED | OUTPATIENT
Start: 2020-11-13 | End: 2020-11-13

## 2020-11-13 RX ORDER — POTASSIUM CHLORIDE 20 MEQ/1
40 TABLET, EXTENDED RELEASE ORAL ONCE
Status: COMPLETED | OUTPATIENT
Start: 2020-11-13 | End: 2020-11-13

## 2020-11-13 RX ADMIN — POTASSIUM CHLORIDE 40 MEQ: 1500 TABLET, EXTENDED RELEASE ORAL at 16:13

## 2020-11-13 RX ADMIN — GABAPENTIN 300 MG: 300 CAPSULE ORAL at 20:10

## 2020-11-13 RX ADMIN — CEFTRIAXONE SODIUM 1 G: 1 INJECTION, POWDER, FOR SOLUTION INTRAMUSCULAR; INTRAVENOUS at 04:34

## 2020-11-13 RX ADMIN — POTASSIUM CHLORIDE 10 MEQ: 7.46 INJECTION, SOLUTION INTRAVENOUS at 11:53

## 2020-11-13 RX ADMIN — POTASSIUM CHLORIDE 10 MEQ: 7.46 INJECTION, SOLUTION INTRAVENOUS at 10:40

## 2020-11-13 RX ADMIN — ATORVASTATIN CALCIUM 40 MG: 40 TABLET, FILM COATED ORAL at 16:12

## 2020-11-13 RX ADMIN — POTASSIUM CHLORIDE 10 MEQ: 7.46 INJECTION, SOLUTION INTRAVENOUS at 08:36

## 2020-11-13 RX ADMIN — DOCUSATE SODIUM 50 MG AND SENNOSIDES 8.6 MG 2 TABLET: 8.6; 5 TABLET, FILM COATED ORAL at 16:13

## 2020-11-13 RX ADMIN — POTASSIUM CHLORIDE 10 MEQ: 7.46 INJECTION, SOLUTION INTRAVENOUS at 13:58

## 2020-11-13 ASSESSMENT — COGNITIVE AND FUNCTIONAL STATUS - GENERAL
MOVING FROM LYING ON BACK TO SITTING ON SIDE OF FLAT BED: A LITTLE
MOBILITY SCORE: 19
SUGGESTED CMS G CODE MODIFIER MOBILITY: CK
CLIMB 3 TO 5 STEPS WITH RAILING: A LITTLE
WALKING IN HOSPITAL ROOM: A LITTLE
DRESSING REGULAR LOWER BODY CLOTHING: A LITTLE
TOILETING: A LITTLE
DAILY ACTIVITIY SCORE: 20
SUGGESTED CMS G CODE MODIFIER DAILY ACTIVITY: CJ
STANDING UP FROM CHAIR USING ARMS: A LITTLE
HELP NEEDED FOR BATHING: A LITTLE
PERSONAL GROOMING: A LITTLE
MOVING TO AND FROM BED TO CHAIR: A LITTLE

## 2020-11-13 ASSESSMENT — GAIT ASSESSMENTS
ASSISTIVE DEVICE: HAND HELD ASSIST;SINGLE POINT CANE
DISTANCE (FEET): 150
DEVIATION: BRADYKINETIC
GAIT LEVEL OF ASSIST: MINIMAL ASSIST

## 2020-11-13 ASSESSMENT — ACTIVITIES OF DAILY LIVING (ADL): TOILETING: INDEPENDENT

## 2020-11-13 NOTE — CONSULTS
Referring Physician: Dr. Rhonda Mcdaniel    Referral Reason: Stroke    HPI:  Ms. Sushma Bowden is a 85 y.o. right-handed  female with history of hypertension and hyperlipidemia who was brought to emergency room for evaluation of alteration of mental status.  She is here with her granddaughter who translated as patient does not speak English.  Patient has hard time following commands even in [place].  According to her grandson daughter she was last seen normal the night before and this morning her son went to check on her and she was sitting on the side of her bed very confused.  Apparently this happened once several years ago for which she was hospitalized.  Usually she is very independent and active.  She underwent a brain CT which revealed hypodensities in posterior left parietal lobe concerning for subacute infarct.  She underwent CT angiogram of the head and neck which did not reveal acute abnormalities.      ROS:   Review of system is difficult to obtain as patient appears to have significant receptive and partial expressive aphasia.  Per granddaughter:  Review of Systems   Constitutional: Negative for chills, fever and malaise/fatigue.   HENT: Negative for hearing loss and tinnitus.    Eyes: Negative for blurred vision, double vision and photophobia.   Respiratory: Negative for shortness of breath.    Cardiovascular: Negative for chest pain.   Gastrointestinal: Negative for nausea and vomiting.   Genitourinary: Negative for hematuria.   Musculoskeletal: Negative for back pain, falls, myalgias and neck pain.   Skin: Negative for rash.   Neurological: Negative for dizziness, tingling, tremors, sensory change, speech change, focal weakness, seizures, loss of consciousness, weakness and headaches.   Psychiatric/Behavioral: Negative for memory loss.       Past Medical History:   Past Medical History:   Diagnosis Date   • Hyperlipidemia    • Hypertension        Past Surgical History: History reviewed. No  pertinent surgical history.    Social History:   Social History     Socioeconomic History   • Marital status:      Spouse name: Not on file   • Number of children: Not on file   • Years of education: Not on file   • Highest education level: Not on file   Occupational History   • Not on file   Social Needs   • Financial resource strain: Not on file   • Food insecurity     Worry: Not on file     Inability: Not on file   • Transportation needs     Medical: Not on file     Non-medical: Not on file   Tobacco Use   • Smoking status: Never Smoker   • Smokeless tobacco: Never Used   Substance and Sexual Activity   • Alcohol use: No   • Drug use: No   • Sexual activity: Not on file   Lifestyle   • Physical activity     Days per week: Not on file     Minutes per session: Not on file   • Stress: Not on file   Relationships   • Social connections     Talks on phone: Not on file     Gets together: Not on file     Attends Sikh service: Not on file     Active member of club or organization: Not on file     Attends meetings of clubs or organizations: Not on file     Relationship status: Not on file   • Intimate partner violence     Fear of current or ex partner: Not on file     Emotionally abused: Not on file     Physically abused: Not on file     Forced sexual activity: Not on file   Other Topics Concern   • Not on file   Social History Narrative   • Not on file       Family Hx: No family history on file.    Current Medications:   Current Facility-Administered Medications   Medication Dose Route Frequency Provider Last Rate Last Admin   • Pharmacy consult request - Allow for permissive hypertension: SBP up to 220 mmHg/DBP up to 120 mmHg x 48 hours   Other PHARMACY TO DOSE Brenda Saleem M.D.       • labetalol (NORMODYNE/TRANDATE) injection 10 mg  10 mg Intravenous Q4HRS PRN Brenda Saleem M.D.       • atorvastatin (LIPITOR) tablet 40 mg  40 mg Oral Q EVENING Brenda Saleem M.D.       • aspirin (ASA) tablet  325 mg  325 mg Oral DAILY Brenda Saleem M.D.        Or   • aspirin (ASA) chewable tab 324 mg  324 mg Oral DAILY Brenda Saleem M.D.        Or   • aspirin (ASA) suppository 300 mg  300 mg Rectal DAILY Brenda Saleem M.D.       • senna-docusate (PERICOLACE or SENOKOT S) 8.6-50 MG per tablet 2 Tab  2 Tab Oral BID Brenda Saleem M.D.        And   • polyethylene glycol/lytes (MIRALAX) PACKET 1 Packet  1 Packet Oral QDAY PRN Brenda Saleem M.D.        And   • magnesium hydroxide (MILK OF MAGNESIA) suspension 30 mL  30 mL Oral QDAY PRN Brenda Saleem M.D.        And   • bisacodyl (DULCOLAX) suppository 10 mg  10 mg Rectal QDAY PRN Brenda Saleem M.D.       • NS infusion   Intravenous Continuous Brenda Saleem M.D.       • ondansetron (ZOFRAN) syringe/vial injection 4 mg  4 mg Intravenous Q4HRS PRN Brenda Saleem M.D.       • ondansetron (ZOFRAN ODT) dispertab 4 mg  4 mg Oral Q4HRS PRN Brenda Saleem M.D.       • [START ON 11/13/2020] cefTRIAXone (ROCEPHIN) 1 g in  mL IVPB  1 g Intravenous Q24HRS Brenda Saleem M.D.       • [START ON 11/13/2020] escitalopram (Lexapro) tablet 10 mg  10 mg Oral DAILY Brenda Saleem M.D.       • gabapentin (NEURONTIN) capsule 300 mg  300 mg Oral QHS Brenda Saleem M.D.       • hydrALAZINE (APRESOLINE) tablet 25 mg  25 mg Oral Q8HRS PRN Brenda Saleme M.D.         Current Outpatient Medications   Medication Sig Dispense Refill   • gabapentin (NEURONTIN) 300 MG Cap Take 300 mg by mouth every bedtime.     • acetaminophen (TYLENOL) 500 MG Tab Take 500-1,000 mg by mouth 2 times a day as needed for Moderate Pain.     • enalapril (VASOTEC) 20 MG tablet Take 20 mg by mouth every day.     • escitalopram (LEXAPRO) 10 MG Tab Take 10 mg by mouth every day.     • atenolol (TENORMIN) 50 MG Tab Take 50 mg by mouth every day.     • magnesium oxide (MAG-OX) 400 (241.3 Mg) MG Tab tablet Take 1 Tab by mouth every day. (Patient not taking: Reported on  "11/12/2020) 90 Tab 3   • tizanidine (ZANAFLEX) 2 MG capsule Take 1 Cap by mouth 1 time daily as needed. (Patient not taking: Reported on 11/12/2020) 30 Cap 3   • enalapril (VASOTEC) 10 MG Tab Take 1 Tab by mouth every day. (Patient not taking: Reported on 11/12/2020) 30 Tab 0   • tramadol-acetaminophen (ULTRACET) 37.5-325 MG per tablet Take 1 Tab by mouth every 6 hours as needed for Mild Pain. (Patient not taking: Reported on 11/12/2020) 60 Tab 1   • Linaclotide 145 MCG CAPS Take 1 Cap by mouth every day. (Patient not taking: Reported on 11/12/2020) 30 Cap 5   • simvastatin (ZOCOR) 40 MG TABS Take 1 Tab by mouth every evening. (Patient not taking: Reported on 11/12/2020) 30 Tab 1   • docusate calcium (SURFAK) 240 MG CAPS Take 1 Cap by mouth every day. (Patient not taking: Reported on 11/12/2020) 60 Cap 0   • aspirin (ASA) 81 MG CHEW Take 1 Tab by mouth every day. 100 Tab 11   • omeprazole (PRILOSEC) 20 MG CPDR Take 1 Cap by mouth every day. for gastritis (Patient not taking: Reported on 11/12/2020) 30 Cap 11       Allergies:   Allergies   Allergen Reactions   • Penicillins Unspecified     Unknown reaction  Tolerates cephalosporins       Physical Exam:   Vitals:    11/12/20 1214 11/12/20 1250 11/12/20 1531 11/12/20 1614   BP: (!) 195/80  (!) 178/83 (!) 194/85   Pulse: (!) 54  65 67   Resp: 16  18 (!) 22   Temp: 36.8 °C (98.2 °F)      TempSrc: Temporal      SpO2: 98%  97% 95%   Weight:  62.8 kg (138 lb 7.2 oz)     Height: 1.499 m (4' 11\")          Physical Exam   GENERAL:  Lying in the hospital bed in no apparent distress.  Head: Normocephalic and atraumatic.   Eyes: Pupils are equal, round, and reactive to light. EOM are normal.   Cardiovascular: Normal rate and regular rhythm.    Pulmonary/Chest: Breath sounds normal.   Abdominal: Soft. Bowel sounds are normal. He exhibits no distension. There is no tenderness.   Skin: Skin is warm and dry. No rash noted. No erythema.  Neuro Exam  MENTAL STATUS:  Awake, alert, but " appears very confused.  She cannot tell us her name or where she is or why she is here but appear to have significant receptive aphasia..  Speech with significant receptive and partial expressive aphasia.  CRANIAL NERVES:  PERRL, EOMI with no nystagmus, face is symmetric, facial sensation is intact, tongue is in the midline, palate is symmetric. Hearing is intact to finger rub bilaterally. Shoulder shrugs are normal.  MOTOR:  Motor examination: She moves all 4 extremity but due to lack of cooperation and following commands heart to assess individual muscle strength.  SENSATION: Cannot be tested.  REFLEXES:  2+ and symmetric, toes are downgoing bilaterally  COORDINATION:  Normal finger to nose testing she was not able to perform heel to shin testing.  GAIT:  Deferred       NIH Stroke Scale:    1a. Level of Consciousness (Alert, drowsy, etc): 0= Alert    1b. LOC Questions (Month, age): 2= Incorrect    1c. LOC Commands (Open/close eyes make fist/let go): 1= Obeys one correctly    2.   Best Gaze (Eyes open - patient follows examiner's finger on face): 0= Normal    3.   Visual Fields (introduce visual stimulus/threat to patient's field quadrants): 1= Partial Hemiania    4.   Facial Paresis (Show teeth, raise eyebrows and squeeze eyes shut): 0= Normal     5a. Motor Arm - Left (Elevate arm to 90 degrees if patient is sitting, 45 degrees if  supine): 0= No drift    5b. Motor Arm - Right (Elevate arm to 90 degrees if patient is sitting, 45 degrees if supine): 0= No drift    6a. Motor Leg - Left (Elevate leg 30 degrees with patient supine): 0= No drift    6b. Motor Leg - Right  (Elevate leg 30 degrees with patient supine): 0= No drift    7.   Limb Ataxia (Finger-nose, heel down shin): 0= No ataxia    8.   Sensory (Pin prick to face, arm, trunk and leg - compare side to side): 0= Normal    9.  Best Language (Name item, describe a picture and read sentences): 2= Severe aphasia    10. Dysarthria (Evaluate speech clarity by  patient repeating listed words): 0= Normal articulation    11. Extinction and Inattention (Use information from prior testing to identify neglect or  double simultaneous stimuli testing): 0= No neglect    Total NIH Score: 6     Modified Nydia Scale (MRS): 3 = Moderate disability; requires some help, but able to walk without assistance      Labs:  Recent Labs     11/12/20  1313   WBC 7.1   RBC 4.12*   HEMOGLOBIN 13.8   HEMATOCRIT 42.0   .9*   MCH 33.5*   MCHC 32.9*   RDW 49.1   PLATELETCT 186   MPV 12.2     Recent Labs     11/12/20  1313   SODIUM 138   POTASSIUM 3.6   CHLORIDE 101   CO2 25   GLUCOSE 91   BUN 15   CREATININE 0.51   CALCIUM 9.3                     Recent Labs     11/12/20  1313   SODIUM 138   POTASSIUM 3.6   CHLORIDE 101   CO2 25   GLUCOSE 91   BUN 15     Recent Labs     11/12/20  1313   SODIUM 138   POTASSIUM 3.6   CHLORIDE 101   CO2 25   BUN 15   CREATININE 0.51   CALCIUM 9.3         No results found for this or any previous visit.      Imaging reviewed:    CT-CTA HEAD WITH & W/O-POST PROCESS   Final Result      No occlusion or hemodynamically significant stenosis of the intracranial arteries.      CT-CTA NECK WITH & W/O-POST PROCESSING   Final Result      1.  No high-grade stenosis, large vessel occlusion, aneurysm or dissection.   2.  Enlarged, nodular right thyroid lobe relative to the left. This can be followed with outpatient ultrasound.      CT-HEAD W/O   Final Result      1.  Hypodensity in the posterior left cerebral hemisphere suggesting a moderate acute infarct.   2.  No acute intracranial hemorrhage.   3.  Chronic microvascular ischemic type changes and multiple chronic lacunar infarcts.      These findings were discussed with ELSA GOEL on 11/12/2020 2:04 PM.      EC-ECHOCARDIOGRAM COMPLETE W/O CONT    (Results Pending)   MR-BRAIN-W/O    (Results Pending)          Assessment/Plan:  85 y.o. female with history of hypertension and hyperlipidemia who was brought to emergency room  for evaluation of confusion and alteration of mental status.  It appears she has mixed aphasia but more prominent receptive aphasia.  She has evidence of left middle cerebral artery infarct on CT.  She is not at a candidate for administration of TPA as she was last seen normal the night before.  She is not a thrombectomy candidate as there is no evidence of large vessel occlusion.  She will be admitted for complete stroke work-up to include brain MRI, echocardiogram with bubble study and lipid profile.  Obtain TSH and hemoglobin A1c.  She was on aspirin at home prior to this event, will suggest to DC aspirin and start Plavix 75 mg daily.  She will continue with statin with a goal of LDL less than 70.  Allow permissive blood pressure for the first 48 hours.  She will be evaluated by physical therapy, occupational therapy and speech therapy.

## 2020-11-13 NOTE — H&P
Hospital Medicine History & Physical Note    Date of Service  11/12/2020    Primary Care Physician  Clayton Kohli M.D.    Consultants  Neurology    Code Status  Full Code    Chief Complaint  Chief Complaint   Patient presents with   • Altered Mental Status   • Headache       History of Presenting Illness  85 y.o. female who presented 11/12/2020 with altered mental status and dysarthria.  History, review of systems, and physical exam are all limited due to altered mental status of the patient.  Information were obtained from medical records.  Per records patient lives with her family.  On usual basis she is active and independent.  On the morning of admission day family noted that patient is confused and have mild dysarthria.  She was brought to ER for further evaluation.  In ER noted to be hypertensive.  UA was positive for UTI.  Head CT showed hypodensity in the posterior left cerebral hemisphere suggesting moderate acute infarct.  CTA head and neck were unremarkable.  Patient was out of window for TPA.  No large vessel occlusion seen on CTA.  Neurology consulted.  Recommended stroke work-up.  Has been afebrile.  No known COVID-19 positive exposure.    Review of Systems  Review of Systems   Unable to perform ROS: Mental acuity       Past Medical History   has a past medical history of Hyperlipidemia and Hypertension.    Surgical History   has no past surgical history on file.     Family History  family history is not on file.     Social History   reports that she has never smoked. She has never used smokeless tobacco. She reports that she does not drink alcohol or use drugs.    Allergies  Allergies   Allergen Reactions   • Penicillins Unspecified     Unknown reaction  Tolerates cephalosporins       Medications  Prior to Admission Medications   Prescriptions Last Dose Informant Patient Reported? Taking?   Linaclotide 145 MCG CAPS Not Taking at Unknown time Rx Bottle (For Med Information) No No   Sig: Take 1 Cap by  mouth every day.   Patient not taking: Reported on 11/12/2020   acetaminophen (TYLENOL) 500 MG Tab PRN at PRN Rx Bottle (For Med Information) Yes Yes   Sig: Take 500-1,000 mg by mouth 2 times a day as needed for Moderate Pain.   aspirin (ASA) 81 MG CHEW 11/11/2020 at AM Rx Bottle (For Med Information) No No   Sig: Take 1 Tab by mouth every day.   atenolol (TENORMIN) 50 MG Tab 11/12/2020 at AM Rx Bottle (For Med Information) Yes No   Sig: Take 50 mg by mouth every day.   docusate calcium (SURFAK) 240 MG CAPS Not Taking at Unknown time Rx Bottle (For Med Information) No No   Sig: Take 1 Cap by mouth every day.   Patient not taking: Reported on 11/12/2020   enalapril (VASOTEC) 10 MG Tab 11/11/2020 at AM Rx Bottle (For Med Information) No No   Sig: Take 1 Tab by mouth every day.   Patient not taking: Reported on 11/12/2020   enalapril (VASOTEC) 20 MG tablet 11/12/2020 at AM Rx Bottle (For Med Information) Yes Yes   Sig: Take 20 mg by mouth every day.   escitalopram (LEXAPRO) 10 MG Tab 11/11/2020 at AM Rx Bottle (For Med Information) Yes Yes   Sig: Take 10 mg by mouth every day.   gabapentin (NEURONTIN) 300 MG Cap 11/11/2020 at HS Rx Bottle (For Med Information) Yes Yes   Sig: Take 300 mg by mouth every bedtime.   magnesium oxide (MAG-OX) 400 (241.3 Mg) MG Tab tablet Not Taking at Unknown time Rx Bottle (For Med Information) No No   Sig: Take 1 Tab by mouth every day.   Patient not taking: Reported on 11/12/2020   omeprazole (PRILOSEC) 20 MG CPDR Not Taking at Unknown time Rx Bottle (For Med Information) No No   Sig: Take 1 Cap by mouth every day. for gastritis   Patient not taking: Reported on 11/12/2020   simvastatin (ZOCOR) 40 MG TABS Not Taking at Unknown time Rx Bottle (For Med Information) No No   Sig: Take 1 Tab by mouth every evening.   Patient not taking: Reported on 11/12/2020   tizanidine (ZANAFLEX) 2 MG capsule Not Taking at Unknown time Rx Bottle (For Med Information) No No   Sig: Take 1 Cap by mouth 1 time  daily as needed.   Patient not taking: Reported on 11/12/2020   tramadol-acetaminophen (ULTRACET) 37.5-325 MG per tablet Not Taking at Unknown time Rx Bottle (For Med Information) No No   Sig: Take 1 Tab by mouth every 6 hours as needed for Mild Pain.   Patient not taking: Reported on 11/12/2020      Facility-Administered Medications: None       Physical Exam  Temp:  [36.8 °C (98.2 °F)] 36.8 °C (98.2 °F)  Pulse:  [54-67] 67  Resp:  [16-22] 22  BP: (178-195)/(80-85) 194/85  SpO2:  [95 %-98 %] 95 %    Physical Exam  Constitutional:       General: She is not in acute distress.  HENT:      Head: Normocephalic and atraumatic.      Mouth/Throat:      Mouth: Mucous membranes are dry.   Eyes:      Extraocular Movements: Extraocular movements intact.   Cardiovascular:      Rate and Rhythm: Normal rate and regular rhythm.      Heart sounds: No friction rub. No gallop.    Pulmonary:      Effort: Pulmonary effort is normal. No respiratory distress.   Abdominal:      General: Abdomen is flat. There is no distension.      Palpations: Abdomen is soft.   Musculoskeletal:      Right lower leg: No edema.      Left lower leg: No edema.   Skin:     General: Skin is dry.      Coloration: Skin is not jaundiced or pale.   Neurological:      General: No focal deficit present.      Mental Status: She is alert.      Comments: Mild dysarthria    Psychiatric:      Comments: Unable to assess.         Laboratory:  Recent Labs     11/12/20  1313   WBC 7.1   RBC 4.12*   HEMOGLOBIN 13.8   HEMATOCRIT 42.0   .9*   MCH 33.5*   MCHC 32.9*   RDW 49.1   PLATELETCT 186   MPV 12.2     Recent Labs     11/12/20  1313   SODIUM 138   POTASSIUM 3.6   CHLORIDE 101   CO2 25   GLUCOSE 91   BUN 15   CREATININE 0.51   CALCIUM 9.3     Recent Labs     11/12/20  1313   ALTSGPT 13   ASTSGOT 20   ALKPHOSPHAT 97   TBILIRUBIN 0.7   GLUCOSE 91         No results for input(s): NTPROBNP in the last 72 hours.      Recent Labs     11/12/20  1313   TROPONINT 18        Imaging:  CT-CTA HEAD WITH & W/O-POST PROCESS   Final Result      No occlusion or hemodynamically significant stenosis of the intracranial arteries.      CT-CTA NECK WITH & W/O-POST PROCESSING   Final Result      1.  No high-grade stenosis, large vessel occlusion, aneurysm or dissection.   2.  Enlarged, nodular right thyroid lobe relative to the left. This can be followed with outpatient ultrasound.      CT-HEAD W/O   Final Result      1.  Hypodensity in the posterior left cerebral hemisphere suggesting a moderate acute infarct.   2.  No acute intracranial hemorrhage.   3.  Chronic microvascular ischemic type changes and multiple chronic lacunar infarcts.      These findings were discussed with ELSA GOEL on 11/12/2020 2:04 PM.      EC-ECHOCARDIOGRAM COMPLETE W/O CONT    (Results Pending)   MR-BRAIN-W/O    (Results Pending)         Assessment/Plan:  I anticipate this patient is appropriate for observation status at this time.    Cerebral infarction (HCC)- (present on admission)  Assessment & Plan  CT scan of the head showed hypodensity in the posterior left cerebral hemisphere suggesting a moderate acute infarction.  Not candidate for TPA as patient is out of window.  Neck candidate for thrombectomy as no large vessel occlusion noted on CTA head and neck.  Neurology consulted.  Pending MRI of the brain.  Echocardiogram with bubble study.  Check TSH.  Check hemoglobin A1c.  Neurology recommending to switch aspirin to Plavix as patient was already on aspirin at home.  PT/OT/SLP evaluation.  Allow permissive hypertension for 24 to 48 hours.    Acute cystitis without hematuria- (present on admission)  Assessment & Plan  Started on Rocephin.  Follow-up with urine culture results.    Altered mental state- (present on admission)  Assessment & Plan  Altered mental status and mild receptive aphasia.  There is concern of acute left cerebral hemisphere infarct on CT scan.  Evidence of UTI.  Altered mental status is  likely combination of the above.  Started on antibiotics for UTI.  Stroke work-up is pending.

## 2020-11-13 NOTE — PROGRESS NOTES
Monitor room notified RN of pt experiencing 4 beats of VT. VSS. Pt asymptomatic. MD updated, no new orders.

## 2020-11-13 NOTE — CARE PLAN
Problem: Communication  Goal: The ability to communicate needs accurately and effectively will improve  Outcome: NOT MET     Problem: Safety  Goal: Will remain free from injury  Outcome: PROGRESSING AS EXPECTED

## 2020-11-13 NOTE — ASSESSMENT & PLAN NOTE
CT scan of the head showed hypodensity in the posterior left cerebral hemisphere suggesting a moderate acute infarction.  Not candidate for TPA and thrombectomy as no large vessel occlusion noted on CTA head and neck.  Neurology following  , will continue Plavix plus statin   MRI of brain showed small area of hemorrhagic conversion, neurology okay to continue Plavix plus statin.   --- Echo showed LVH, AAA of 4.2 cm   --Glycol normal lipid panel normal  OT/physical recommend postacute, will place referral to PMR and SNF

## 2020-11-13 NOTE — PROGRESS NOTES
Hospital Medicine Daily Progress Note    Date of Service  11/13/2020    Chief Complaint  85 y.o. female admitted 11/12/2020 with possible parietal lobe CVA, UTI    Hospital Course  84 y/o U[place] speaking female w possible CVA. Not a TPA candidate. No LVO. MRI and echo pending. U/A c/w infection. Started on Rocephin. A1C 5.3. COVID pending.    Interval Problem Update  No acute events overnight  Tmax 98.2, p58, 18, 95% on RA,   Trops neg x 2  Echo w severe LVH - mod aortic valve insuff; 4.2 cm ascending aorta; no change compared to images from 2019  MRI B pending  Expressive aphasia    Consultants/Specialty  Neuro    Code Status  Full Code    Disposition  TBD    Review of Systems  Review of Systems   Unable to perform ROS: Medical condition        Physical Exam  Temp:  [36.6 °C (97.8 °F)-36.8 °C (98.2 °F)] 36.6 °C (97.9 °F)  Pulse:  [54-67] 58  Resp:  [16-22] 18  BP: (104-195)/(70-85) 104/70  SpO2:  [93 %-98 %] 95 %    Physical Exam  Vitals signs and nursing note reviewed.   Constitutional:       General: She is not in acute distress.  HENT:      Head: Normocephalic and atraumatic.      Nose: Nose normal.   Eyes:      Pupils: Pupils are equal, round, and reactive to light.   Neck:      Musculoskeletal: Neck supple.   Cardiovascular:      Rate and Rhythm: Normal rate and regular rhythm.      Heart sounds: Normal heart sounds.   Pulmonary:      Effort: Pulmonary effort is normal.      Breath sounds: Normal breath sounds.   Abdominal:      General: There is no distension.      Palpations: Abdomen is soft.   Neurological:      Mental Status: She is alert.      Comments: Expressive aphasia  Follows x 4    Psychiatric:         Mood and Affect: Mood normal.         Fluids  No intake or output data in the 24 hours ending 11/13/20 0637    Laboratory  Recent Labs     11/12/20  1313   WBC 7.1   RBC 4.12*   HEMOGLOBIN 13.8   HEMATOCRIT 42.0   .9*   MCH 33.5*   MCHC 32.9*   RDW 49.1   PLATELETCT 186   MPV 12.2      Recent Labs     11/12/20  1313   SODIUM 138   POTASSIUM 3.6   CHLORIDE 101   CO2 25   GLUCOSE 91   BUN 15   CREATININE 0.51   CALCIUM 9.3                   Imaging  EC-ECHOCARDIOGRAM COMPLETE W/O CONT   Final Result      CT-CTA HEAD WITH & W/O-POST PROCESS   Final Result      No occlusion or hemodynamically significant stenosis of the intracranial arteries.      CT-CTA NECK WITH & W/O-POST PROCESSING   Final Result      1.  No high-grade stenosis, large vessel occlusion, aneurysm or dissection.   2.  Enlarged, nodular right thyroid lobe relative to the left. This can be followed with outpatient ultrasound.      CT-HEAD W/O   Final Result      1.  Hypodensity in the posterior left cerebral hemisphere suggesting a moderate acute infarct.   2.  No acute intracranial hemorrhage.   3.  Chronic microvascular ischemic type changes and multiple chronic lacunar infarcts.      These findings were discussed with ELSA GOEL on 11/12/2020 2:04 PM.      MR-BRAIN-W/O    (Results Pending)        Assessment/Plan  Cerebral infarction (HCC)- (present on admission)  Assessment & Plan  CT scan of the head showed hypodensity in the posterior left cerebral hemisphere suggesting a moderate acute infarction.  Not candidate for TPA as patient is out of window.  Neck candidate for thrombectomy as no large vessel occlusion noted on CTA head and neck.  Neurology consulted.  Pending MRI of the brain.  Echocardiogram with bubble study.  TSH ok  hemoglobin A1c ok  Neurology recommending to switch aspirin to Plavix as patient was already on aspirin at home.  PT/OT/SLP evaluation.  Allow permissive hypertension for 24 to 48 hours.  Plavix and Lipitor    Acute cystitis without hematuria- (present on admission)  Assessment & Plan  Started on Rocephin.  Follow-up with urine culture results.    Altered mental state- (present on admission)  Assessment & Plan  Altered mental status and mild receptive aphasia.  There is concern of acute left  cerebral hemisphere infarct on CT scan.  Evidence of UTI.  Altered mental status is likely combination of the above.  Started on antibiotics for UTI.  Stroke work-up is pending.         VTE prophylaxis: SCDs

## 2020-11-13 NOTE — HOSPITAL COURSE
This is 85 year old female with pmhx of HNT/DLD was brought to ER with with AMS/Dysarthia  on 11/12/2020.  Upon presentation,CT head showed hypodensity in the posterior left cerebral hemisphere suggesting moderate acute infarct. Neurology consulted and underwent CTA head and neck didn't show any large vessel occlusion; so not a candidate for Thrombectomy.  She is noted to have aphasia, so difficult hx     Also she isnt a candidate of TPA, continue asa and statin  ---pending MRI brain , echo  reviewed , PT/OT/Speech  ---neuro ckeck , lipid panel , glyco at 5.3 , tsh  -- hypokalemia  at 2.8; replete and monitor , mag : ok  -- Macrocytosis: obtain vitamin b12, folic aid  -- LVH: 2/2 htn   --AAA of 4.2 cm    Cystitis : pending urine cx , blood cx, procal; start abx

## 2020-11-13 NOTE — PROGRESS NOTES
Monitor summary: SB-SR with rare PVCs, PACs, and 4 beats of VT, NV 0.16, QRS 0.08, QT 0.40, HR 40s-60s per strip from monitor room.

## 2020-11-13 NOTE — ASSESSMENT & PLAN NOTE
Altered mental status and mild receptive aphasia.  There is concern of acute left cerebral hemisphere infarct on CT scan.  Evidence of UTI.=, On Rocephin; will wait for urine culture

## 2020-11-13 NOTE — CARE PLAN
Problem: Safety  Goal: Will remain free from falls  Outcome: PROGRESSING AS EXPECTED  Pt educated on importance of utilizing call light when needing assistance r/t deficits from stroke. Call light and belongings within reach. Bed locked in lowest position. Bed alarm on. Fall precautions in place.       Problem: Venous Thromboembolism (VTW)/Deep Vein Thrombosis (DVT) Prevention:  Goal: Patient will participate in Venous Thrombosis (VTE)/Deep Vein Thrombosis (DVT)Prevention Measures  Outcome: PROGRESSING AS EXPECTED  Pt educated on importance of SCDs/ROM to prevent DVT/VTEs and stroke. SCDs in place. Pt ambulates occasionally and participates in ROM.

## 2020-11-13 NOTE — PROGRESS NOTES
NEUROLOGY PROGRESS NOTE      BACKGROUND:    85 y.o. female was admitted on 11/12/2020 12:50 PM for AMS (altered mental status)  Stroke (cerebrum) (HCC).      SUBJECTIVE:   She is more alert and communicative today.  She has no new complaints.  Continue with mixed but more prominent receptive aphasia.    VITALS:  Vitals:    11/13/20 0000 11/13/20 0400 11/13/20 0800 11/13/20 1200   BP: 139/72 104/70 (!) 172/60 (!) 177/79   Pulse: 67 (!) 58 (!) 58 (!) 56   Resp: 17 18 16 16   Temp: 36.7 °C (98 °F) 36.6 °C (97.9 °F) 37.2 °C (99 °F) 36.2 °C (97.1 °F)   TempSrc: Temporal Temporal Temporal Temporal   SpO2: 93% 95% 95% 95%   Weight:       Height:           NEUROLOGICAL EXAM:   MENTAL STATUS:  Awake, alert, but appears confused.  She appear to have significant receptive aphasia..  Speech with significant receptive and partial expressive aphasia.  CRANIAL NERVES:  PERRL, EOMI with no nystagmus, face is symmetric, facial sensation is intact, tongue is in the midline, palate is symmetric. Hearing is intact to finger rub bilaterally. Shoulder shrugs are normal.  MOTOR:  Motor examination: She moves all 4 extremity but due to lack of cooperation and following commands heart to assess individual muscle strength.  SENSATION: Cannot be tested.  REFLEXES:  2+ and symmetric, toes are downgoing bilaterally  COORDINATION:  Normal finger to nose testing she was not able to perform heel to shin testing.  GAIT:  Deferred     OBJECTIVE:    NEUROIMAGING:    EC-ECHOCARDIOGRAM COMPLETE W/O CONT   Final Result      CT-CTA HEAD WITH & W/O-POST PROCESS   Final Result      No occlusion or hemodynamically significant stenosis of the intracranial arteries.      CT-CTA NECK WITH & W/O-POST PROCESSING   Final Result      1.  No high-grade stenosis, large vessel occlusion, aneurysm or dissection.   2.  Enlarged, nodular right thyroid lobe relative to the left. This can be followed with outpatient ultrasound.      CT-HEAD W/O   Final Result      1.   Hypodensity in the posterior left cerebral hemisphere suggesting a moderate acute infarct.   2.  No acute intracranial hemorrhage.   3.  Chronic microvascular ischemic type changes and multiple chronic lacunar infarcts.      These findings were discussed with ELSA GOEL on 11/12/2020 2:04 PM.      MR-BRAIN-W/O    (Results Pending)       MEDICATIONS:  Current Facility-Administered Medications   Medication Dose Route Frequency Provider Last Rate Last Admin   • potassium chloride SA (Kdur) tablet 40 mEq  40 mEq Oral Once Frandy Pike M.D.       • potassium chloride (KCL) ivpb 10 mEq  10 mEq Intravenous Q HOUR Frandy Pike M.D. 100 mL/hr at 11/13/20 1358 10 mEq at 11/13/20 1358   • Pharmacy consult request - Allow for permissive hypertension: SBP up to 220 mmHg/DBP up to 120 mmHg x 48 hours   Other PHARMACY TO DOSE Brenda Saleem M.D.       • labetalol (NORMODYNE/TRANDATE) injection 10 mg  10 mg Intravenous Q4HRS PRN Brenda Saleem M.D.       • atorvastatin (LIPITOR) tablet 40 mg  40 mg Oral Q EVENING Brenda Saleem M.D.   Stopped at 11/12/20 1800   • senna-docusate (PERICOLACE or SENOKOT S) 8.6-50 MG per tablet 2 Tab  2 Tab Oral BID Brenda Saleem M.D.   Stopped at 11/12/20 1800    And   • polyethylene glycol/lytes (MIRALAX) PACKET 1 Packet  1 Packet Oral QDAY PRN Brenda Saleem M.D.        And   • magnesium hydroxide (MILK OF MAGNESIA) suspension 30 mL  30 mL Oral QDAY PRN Brenda Saleem M.D.        And   • bisacodyl (DULCOLAX) suppository 10 mg  10 mg Rectal QDAY PRN Brenda Saleem M.D.       • NS infusion   Intravenous Continuous Brenda Saleem M.D. 50 mL/hr at 11/12/20 1905 New Bag at 11/12/20 1905   • ondansetron (ZOFRAN) syringe/vial injection 4 mg  4 mg Intravenous Q4HRS PRN Brenda Saleem M.D.       • ondansetron (ZOFRAN ODT) dispertab 4 mg  4 mg Oral Q4HRS PRN Brenda Saleem M.D.       • cefTRIAXone (ROCEPHIN) 1 g in  mL IVPB  1 g Intravenous Q24HRS Brenda  GRACE Saleem   Stopped at 11/13/20 0504   • escitalopram (Lexapro) tablet 10 mg  10 mg Oral DAILY Brenda Saleem M.D.   Stopped at 11/13/20 0600   • gabapentin (NEURONTIN) capsule 300 mg  300 mg Oral QHS Brenda Saleem M.D.   Stopped at 11/12/20 2100   • hydrALAZINE (APRESOLINE) tablet 25 mg  25 mg Oral Q8HRS PRN Brenda Saleem M.D.       • clopidogrel (PLAVIX) tablet 75 mg  75 mg Oral DAILY Brenda Saleem M.D.   Stopped at 11/12/20 1745       LABS:      Recent Labs     11/12/20  1313 11/13/20  0626   WBC 7.1 6.9   RBC 4.12* 4.15*   HEMOGLOBIN 13.8 14.2   HEMATOCRIT 42.0 42.3   .9* 101.9*   MCH 33.5* 34.2*   MCHC 32.9* 33.6   RDW 49.1 49.1   PLATELETCT 186 171   MPV 12.2 11.4     Recent Labs     11/12/20  1313 11/13/20  0626 11/13/20  1358   SODIUM 138 136 136   POTASSIUM 3.6 2.8* 3.7   CHLORIDE 101 102 99   CO2 25 23 22   GLUCOSE 91 98 95   BUN 15 9 8     INR   Date Value Ref Range Status   05/09/2014 1.05 0.87 - 1.13 Final     Comment:     INR - Non-therapeutic Reference Range: 0.87-1.13  INR - Therapeutic Reference Range: 2.0-4.0     No results found for: POCINR  Lab Results   Component Value Date/Time    CREATININE 0.50 11/13/2020 1358     Lab Results   Component Value Date/Time    IFAFRICA >60 11/13/2020 1358    IFNOTAFR >60 11/13/2020 1358         ASSESSMENT AND PLAN:  85 y.o. female with history of hypertension and hyperlipidemia who was brought to emergency room for evaluation of confusion and alteration of mental status.  It appears she has mixed aphasia but more prominent receptive aphasia.  She has evidence of left middle cerebral artery infarct on CT.  She was not a candidate for administration of TPA as she was last seen normal the night before.  She was on aspirin at home prior to this event, aspirin was DC'd, she will continue with Plavix 75 mg daily.  She will continue with statin with a goal of LDL less than 70.    Blood pressure can be normalized at this time.  She will  continue with physical therapy, occupational therapy and speech therapy.  Her stroke work-up has been completed, echo is unremarkable with ejection fraction of 65%, LDL is 88.  I will sign off, please call me if there is any question.

## 2020-11-14 ENCOUNTER — APPOINTMENT (OUTPATIENT)
Dept: RADIOLOGY | Facility: MEDICAL CENTER | Age: 85
DRG: 064 | End: 2020-11-14
Attending: FAMILY MEDICINE
Payer: MEDICARE

## 2020-11-14 PROBLEM — D75.89 MACROCYTOSIS: Status: ACTIVE | Noted: 2020-11-14

## 2020-11-14 PROBLEM — E87.6 HYPOKALEMIA: Status: ACTIVE | Noted: 2020-11-14

## 2020-11-14 LAB
ALBUMIN SERPL BCP-MCNC: 3.8 G/DL (ref 3.2–4.9)
BACTERIA UR CULT: NORMAL
BASOPHILS # BLD AUTO: 0.4 % (ref 0–1.8)
BASOPHILS # BLD: 0.03 K/UL (ref 0–0.12)
BUN SERPL-MCNC: 10 MG/DL (ref 8–22)
CALCIUM SERPL-MCNC: 9.4 MG/DL (ref 8.5–10.5)
CHLORIDE SERPL-SCNC: 103 MMOL/L (ref 96–112)
CO2 SERPL-SCNC: 23 MMOL/L (ref 20–33)
CREAT SERPL-MCNC: 0.5 MG/DL (ref 0.5–1.4)
EOSINOPHIL # BLD AUTO: 0.27 K/UL (ref 0–0.51)
EOSINOPHIL NFR BLD: 4 % (ref 0–6.9)
ERYTHROCYTE [DISTWIDTH] IN BLOOD BY AUTOMATED COUNT: 49 FL (ref 35.9–50)
GLUCOSE SERPL-MCNC: 98 MG/DL (ref 65–99)
HCT VFR BLD AUTO: 41.5 % (ref 37–47)
HGB BLD-MCNC: 14.1 G/DL (ref 12–16)
IMM GRANULOCYTES # BLD AUTO: 0.03 K/UL (ref 0–0.11)
IMM GRANULOCYTES NFR BLD AUTO: 0.4 % (ref 0–0.9)
LYMPHOCYTES # BLD AUTO: 1.36 K/UL (ref 1–4.8)
LYMPHOCYTES NFR BLD: 20.2 % (ref 22–41)
MCH RBC QN AUTO: 34.5 PG (ref 27–33)
MCHC RBC AUTO-ENTMCNC: 34 G/DL (ref 33.6–35)
MCV RBC AUTO: 101.5 FL (ref 81.4–97.8)
MONOCYTES # BLD AUTO: 0.75 K/UL (ref 0–0.85)
MONOCYTES NFR BLD AUTO: 11.2 % (ref 0–13.4)
NEUTROPHILS # BLD AUTO: 4.28 K/UL (ref 2–7.15)
NEUTROPHILS NFR BLD: 63.8 % (ref 44–72)
NRBC # BLD AUTO: 0 K/UL
NRBC BLD-RTO: 0 /100 WBC
PHOSPHATE SERPL-MCNC: 2.8 MG/DL (ref 2.5–4.5)
PLATELET # BLD AUTO: 166 K/UL (ref 164–446)
PMV BLD AUTO: 11.3 FL (ref 9–12.9)
POTASSIUM SERPL-SCNC: 3.9 MMOL/L (ref 3.6–5.5)
RBC # BLD AUTO: 4.09 M/UL (ref 4.2–5.4)
SIGNIFICANT IND 70042: NORMAL
SITE SITE: NORMAL
SODIUM SERPL-SCNC: 138 MMOL/L (ref 135–145)
SOURCE SOURCE: NORMAL
WBC # BLD AUTO: 6.7 K/UL (ref 4.8–10.8)

## 2020-11-14 PROCEDURE — 85025 COMPLETE CBC W/AUTO DIFF WBC: CPT

## 2020-11-14 PROCEDURE — 70551 MRI BRAIN STEM W/O DYE: CPT

## 2020-11-14 PROCEDURE — 36415 COLL VENOUS BLD VENIPUNCTURE: CPT

## 2020-11-14 PROCEDURE — 700111 HCHG RX REV CODE 636 W/ 250 OVERRIDE (IP): Performed by: FAMILY MEDICINE

## 2020-11-14 PROCEDURE — 700105 HCHG RX REV CODE 258: Performed by: FAMILY MEDICINE

## 2020-11-14 PROCEDURE — 96366 THER/PROPH/DIAG IV INF ADDON: CPT

## 2020-11-14 PROCEDURE — 770020 HCHG ROOM/CARE - TELE (206)

## 2020-11-14 PROCEDURE — 700102 HCHG RX REV CODE 250 W/ 637 OVERRIDE(OP): Performed by: FAMILY MEDICINE

## 2020-11-14 PROCEDURE — A9270 NON-COVERED ITEM OR SERVICE: HCPCS | Performed by: FAMILY MEDICINE

## 2020-11-14 PROCEDURE — 99233 SBSQ HOSP IP/OBS HIGH 50: CPT | Performed by: HOSPITALIST

## 2020-11-14 PROCEDURE — 96367 TX/PROPH/DG ADDL SEQ IV INF: CPT

## 2020-11-14 PROCEDURE — 80069 RENAL FUNCTION PANEL: CPT

## 2020-11-14 RX ADMIN — ESCITALOPRAM OXALATE 10 MG: 10 TABLET ORAL at 05:32

## 2020-11-14 RX ADMIN — DOCUSATE SODIUM 50 MG AND SENNOSIDES 8.6 MG 2 TABLET: 8.6; 5 TABLET, FILM COATED ORAL at 16:51

## 2020-11-14 RX ADMIN — CLOPIDOGREL BISULFATE 75 MG: 75 TABLET ORAL at 05:32

## 2020-11-14 RX ADMIN — GABAPENTIN 300 MG: 300 CAPSULE ORAL at 21:45

## 2020-11-14 RX ADMIN — ATORVASTATIN CALCIUM 40 MG: 40 TABLET, FILM COATED ORAL at 16:51

## 2020-11-14 RX ADMIN — DOCUSATE SODIUM 50 MG AND SENNOSIDES 8.6 MG 2 TABLET: 8.6; 5 TABLET, FILM COATED ORAL at 05:32

## 2020-11-14 RX ADMIN — CEFTRIAXONE SODIUM 1 G: 1 INJECTION, POWDER, FOR SOLUTION INTRAMUSCULAR; INTRAVENOUS at 07:03

## 2020-11-14 ASSESSMENT — FIBROSIS 4 INDEX: FIB4 SCORE: 2.41

## 2020-11-14 NOTE — THERAPY
Speech Language Pathology   Clinical Swallow Evaluation     Patient Name: Sushma Bowden  AGE:  85 y.o., SEX:  female  Medical Record #: [id number]  Today's Date: 11/13/2020     Precautions  Precautions: (P) Fall Risk, Swallow Precautions ( See Comments)  Comments: (P) hx dementia and probable acute L CVA    Assessment    Patient is 85 y.o. female with a diagnosis of acute moderate L CVA per CT. Pending MRI. No chest xray results in MRI. Per hist in EMR, hist of dementia with pt able to care for herself. Pt not responding to language line but with improved Kenyan responses with CNA speaking Kenyan. Per CNA, improved speech today compared to last HS. Pt with mild tremor of her tongue and mandible. No marked facial asymmetry but pt not following oral motor directives given in Kenyan. Pt assessed with ice chips, sips of NTL from the cup, applesauce, and yogurt. Pt declined pudding. Pt with variable behavior and understanding during eval. Possible nausea per CNA alternating with pt reaching for food and feeding self, alternating with pt with minimal acceptance of puree and closing her lips and oral cavity when offered PO. Pt triggering swallows in approx 1-2 seconds. No coughing during intake. Clear sounding voice in Kenyan. SLP collaborated with the CNA and RN to begin LQ3/MT2 with 1-1 and use of posted and recommended swallow strategies. .Hold intake with difficulty.  Additional factors influencing patient status/progress: h/o dementia, variable behavior and compreh during swallow eval.     Plan  LQ3/MT2 with 1-1.   Recommend Speech Therapy 5 times per week until therapy goals are met for the following treatments:  Dysphagia Training.    Discharge Recommendations: (P) Recommend post-acute placement for additional speech therapy services prior to discharge home       11/13/20 3915   Oral Motor Eval    Is Patient Able to Complete Oral Motor Eval No   Barriers To Oral Feeding   Barriers To Oral Feeding Impaired  "Cognition / Attention   Cervical Ausculatation Non-Deviant   Pre-Feeding Oral Stimulation Trial Inconsistent Responses   Laryngeal Function   Voice Quality Within Functional Limits   Volutional Cough Within Functional Limits   Excursion Upon Swallow Complete   Max Phonation Time (Seconds) UTT   Oral Food Presentation   Ice Chips Within Functional Limits   Single Swallow Mildly Thick (2) - (Nectar Thick)  Within Functional Limits   Liquidised (3) Within Functional Limits   Pureed (4) Within Functional Limits   Dysphagia Strategies / Recommendations   Strategies / Interventions Recommended (Yes / No) Yes   Compensatory Strategies Head of Bed 90 Degrees During Eating / Drinking;Strict 1:1 Feeding;Reduce Bolus Size to 1/2 Teaspoon;Reduce Bolus Size to 1 Teaspoon;Single Sips / Bites;Alternate Solids / Liquids   Diet / Liquid Recommendation Mildly Thick (2) - (Nectar Thick);Liquidised (3) - (Nectar Thick Full Liquid)   Medication Administration  Crush all Medications in Puree   Dysphagia Rating   Nutritional Liquid Intake Rating Scale Thickened beverages (mildly thick unless otherwise specified)   Nutritional Food Intake Rating Scale Total oral diet of a single consistency   Patient / Family Goals   Patient / Family Goal #1 \"eat\" in Algerian   Goal #1 Outcome Progressing as expected   Short Term Goals   Short Term Goal # 1 pt will consume LQ3/MT2 with 1-1 superv and use of posted and recommended swallow strategies.    Goal Outcome # 1 Goal not met   Session Information   Priority 4  (5x,acuteLCVA-ckMRI,dementia,LQ3/MT2-ck cristal,?upgrade)         "

## 2020-11-14 NOTE — THERAPY
"Occupational Therapy   Initial Evaluation     Patient Name: Sushma Bowden  Age:  85 y.o., Sex:  female  Medical Record #: [id number]  Today's Date: 11/13/2020     Precautions  Precautions: Fall Risk, Swallow Precautions ( See Comments)  Comments: hx dementia and probable acute L CVA    Assessment  Patient is 85 y.o. female who presents to acute due to AMS, found to have L MCA infarct on imaging. Attempted to use  line, however pt much more receptive to Italian speaking CNA. Pt had 1 trinidad LOB when coming out of bathroom requiring light physical assist to correct. Pt required CGA/SBA for most BADLs, however would be unsafe to return home w/out 24/7 assist at this time.     Plan    Recommend Occupational Therapy 3 times per week until therapy goals are met for the following treatments:  Adaptive Equipment, Neuro Re-Education / Balance, Self Care/Activities of Daily Living, Therapeutic Activities and Therapeutic Exercises.    DC Equipment Recommendations: (P) Unable to determine at this time  Discharge Recommendations: (P) Recommend post-acute placement for additional occupational therapy services prior to discharge home(or home w/ HH and 24/7 SPV)     Subjective    \"Who, me?\"     Objective       11/13/20 1502   Total Time Spent   Total Time Spent (Mins) 30   Charge Group   OT Evaluation OT Evaluation Low   Initial Contact Note    Initial Contact Note Order Received and Verified, Occupational Therapy Evaluation in Progress with Full Report to Follow.   Prior Living Situation   Prior Services None;Home-Independent   Housing / Facility 1 Story House   Equipment Owned Front-Wheel Walker;Single Point Cane   Lives with - Patient's Self Care Capacity Adult Children   Comments Per PT son reports pt lives with dtr who works during the day and pt is home alone. Family is aware pt will require greater support and SPV upon DC home.    Prior Level of ADL Function   Self Feeding Independent   Grooming / Hygiene Independent "   Bathing Unable To Determine At This Time   Dressing Independent   Toileting Independent   Prior Level of IADL Function   Comments dtr assists w/ IADLs   Precautions   Precautions Fall Risk;Swallow Precautions ( See Comments)   Comments hx of dementia and probably acute L CVA   Vitals   O2 (LPM) 0   O2 Delivery Device None - Room Air   Pain 0 - 10 Group   Therapist Pain Assessment Post Activity Pain Same as Prior to Activity;Nurse Notified  (no c/o pain during session )   Cognition    Cognition / Consciousness WDL   Level of Consciousness Alert   Comments attempted to utilize IPad  ([employer] ID#[id number]); however, pt more receptive to CNA translating for therapist   Active ROM Upper Body   Comments WFL   Strength Upper Body   Comments WFL   Coordination Upper Body   Comments WFL   Balance Assessment   Sitting Balance (Static) Fair +   Sitting Balance (Dynamic) Fair +   Standing Balance (Static) Fair   Standing Balance (Dynamic) Fair   Weight Shift Sitting Fair   Weight Shift Standing Fair   Comments w/ SPC and HHA, 1 trinidad LOB in bathroom, required light assist to correct   Bed Mobility    Supine to Sit Supervised   Sit to Supine   (NT in chair post )   Scooting Supervised   ADL Assessment   Grooming Standing  (cleaned dentures , washed hands, SBA )   Lower Body Dressing Supervision  (doffed/donned socks)   Toileting   (CGA)   How much help from another person does the patient currently need...   Putting on and taking off regular lower body clothing? 3   Bathing (including washing, rinsing, and drying)? 3   Toileting, which includes using a toilet, bedpan, or urinal? 3   Putting on and taking off regular upper body clothing? 4   Taking care of personal grooming such as brushing teeth? 3   Eating meals? 4   6 Clicks Daily Activity Score 20   Functional Mobility   Sit to Stand   (CGA)   Bed, Chair, Wheelchair Transfer Minimal Assist   Mobility within room and bathroom w/ SPC   Activity Tolerance   Sitting in  Chair 10+ min (up post)   Sitting Edge of Bed 6 min   Standing 8 min   Patient / Family Goals   Patient / Family Goal #1 To go home   Short Term Goals   Short Term Goal # 1 Pt will demo standing grooming w/ SPV   Short Term Goal # 2 Pt will demo toilet txf w/ SPV   Short Term Goal # 3 Pt will demo full body dressing w/ SPV.   Short Term Goal # 4 Pt will demo toilet hygiene w/ SPV   Education Group   Role of Occupational Therapist Patient Response Patient;Acceptance;Explanation;Demonstration;Verbal Demonstration   Problem List   Problem List Decreased Active Daily Living Skills;Decreased Homemaking Skills;Decreased Functional Mobility;Decreased Activity Tolerance;Safety Awareness Deficits / Cognition;Impaired Postural Control / Balance   Anticipated Discharge Equipment and Recommendations   DC Equipment Recommendations Unable to determine at this time   Discharge Recommendations Recommend post-acute placement for additional occupational therapy services prior to discharge home  (or home w/ HH and 24/7 SPV)   Interdisciplinary Plan of Care Collaboration   IDT Collaboration with  Nursing;Physical Therapist;Certified Nursing Assistant   Patient Position at End of Therapy Seated;Chair Alarm On;Call Light within Reach;Tray Table within Reach;Phone within Reach   Collaboration Comments report given   Session Information   Date / Session Number  11/13, 1 (1/3, 11/19)   Priority 2

## 2020-11-14 NOTE — PROGRESS NOTES
Monitor summary: SB-SR with no ectopy, CT 0.18, QRS 0.08, QT 0.34, HR 50s-70s per strip from monitor room.

## 2020-11-14 NOTE — CARE PLAN
Problem: Safety  Goal: Will remain free from falls  Outcome: PROGRESSING AS EXPECTED  Pt educated on importance of utilizing call light when needing assistance r/t deficits from stroke. Call light and belongings within reach. Bed locked in lowest position. Bed alarm on. Fall precautions in place.     Problem: Venous Thromboembolism (VTW)/Deep Vein Thrombosis (DVT) Prevention:  Goal: Patient will participate in Venous Thrombosis (VTE)/Deep Vein Thrombosis (DVT)Prevention Measures  Outcome: PROGRESSING AS EXPECTED  Pt educated on importance of SCDs/ROM to prevent DVT/VTEs and stroke. Pt verbalizes understanding. SCDs in place. Pt ambulates occasionally and participates in ROM.

## 2020-11-14 NOTE — THERAPY
Physical Therapy   Initial Evaluation     Patient Name: Sushma Bowden  Age:  85 y.o., Sex:  female  Medical Record #: [id number]  Today's Date: 11/13/2020     Precautions: Fall Risk    Assessment  Patient is 85 y.o. female admitted with AMS. Pt found to have L MCA infarct on imaging. Pt presents to physical therapy with very slight impairments in functional transfers, balance and gait. Pt very fearful of falling and required HHA and SPC support during ambulation. Pt may do well with FWW, son reports they have one family will need to remind pt to use FWW vs. SPC. Per son, goal is for pt to have greater support during the day when dtr whom pt lives with is at work. PT to follow while in house.       Plan    Recommend Physical Therapy 3 times per week until therapy goals are met for the following treatments:  Equipment, Gait Training, Neuro Re-Education / Balance, Self Care/Home Evaluation, Stair Training, Therapeutic Activities and Therapeutic Exercises       Discharge Recommendations: Recommend home health for continued physical therapy services (to reduce risk of future falls or readmission). Family may benefit from caregiver resources           11/13/20 1503   Precautions   Precautions Fall Risk   Comments hx of dementia   Vitals   O2 Delivery Device None - Room Air   Pain 0 - 10 Group   Therapist Pain Assessment Nurse Notified;0   Prior Living Situation   Prior Services Home-Independent   Housing / Facility 1 Story House   Steps Into Home 2  (-3 without railing)   Steps In Home 0   Equipment Owned Front-Wheel Walker;Single Point Cane   Lives with - Patient's Self Care Capacity Adult Children   Comments Spoke with Son Evan who reports pt lives with her Dtr who works during the day so pt is home alone. Family is aware pt will need greater support and supervision upon DC home. Has experienced episodes of the same symptoms in the past   Prior Level of Functional Mobility   Bed Mobility Independent   Transfer Status  Independent   Ambulation Independent   Distance Ambulation (Feet)   (household to limited community)   Assistive Devices Used Single Point Cane   Stairs Independent   Cognition    Level of Consciousness Alert   Comments attempted to utilize IPad  ([employer] ID#[id number]); however, pt more receptive to CNA translating for therapist   Active ROM Lower Body    Active ROM Lower Body  WDL   Strength Lower Body   Lower Body Strength  WDL   Comments adequate for functional mobility, generalized deconditioning likely due to age   Balance Assessment   Sitting Balance (Static) Fair +   Sitting Balance (Dynamic) Fair +   Standing Balance (Static) Fair   Standing Balance (Dynamic) Fair   Weight Shift Sitting Fair   Weight Shift Standing Fair   Comments w/ SPC and HHA   Gait Analysis   Gait Level Of Assist Minimal Assist   Assistive Device Hand Held Assist;Single Point Cane   Distance (Feet) 150   # of Times Distance was Traveled 1   Deviation Bradykinetic   # of Stairs Climbed 0   Weight Bearing Status no restrictions   Comments light HHA for security as pt reports she is very fearful of falling   Bed Mobility    Supine to Sit Supervised   Sit to Supine   (seated in chair at end of session)   Scooting Supervised   Functional Mobility   Sit to Stand Minimal Assist   Bed, Chair, Wheelchair Transfer Minimal Assist   Transfer Method Stand Pivot   Short Term Goals    Short Term Goal # 1 pt will perform sit <> stand and functional transfers with LRAD and SPV in 6 visits   Short Term Goal # 2 pt will ambulate > 150 ft with LRAD and SPV in 6 visits   Short Term Goal # 3 pt will negotiate 2-3 steps with LRAD and SPV to access home environment in 6 visits   Problem List    Problems Impaired Transfers;Impaired Ambulation;Safety Awareness Deficits / Cognition   Anticipated Discharge Equipment and Recommendations   Discharge Recommendations Recommend home health for continued physical therapy services  (to reduce risk of future falls or  readmission)

## 2020-11-14 NOTE — PROGRESS NOTES
Brain MRI is done which revealed:  Small to moderate-sized area of acute infarct with hemorrhagic transformation in the left parietal lobe.  The hemorrhagic transformation is minimal and is okay to continue with Plavix for secondary stroke prevention.  Please call me if there is any question.

## 2020-11-14 NOTE — CARE PLAN
Problem: Acute Care of the Stroke Patient  Goal: Optimal Outcome for the Stroke patient  Outcome: PROGRESSING AS EXPECTED  Note: Patient is A&Ox2. NIHSS complete with nightshift RN. MRI completed.      Problem: Safety  Goal: Free from accidental injury  Outcome: PROGRESSING AS EXPECTED  Note: Up with one person assist and cane. Patient  does not call appropriately.

## 2020-11-15 LAB
ANION GAP SERPL CALC-SCNC: 10 MMOL/L (ref 7–16)
BUN SERPL-MCNC: 12 MG/DL (ref 8–22)
CALCIUM SERPL-MCNC: 9.3 MG/DL (ref 8.5–10.5)
CHLORIDE SERPL-SCNC: 106 MMOL/L (ref 96–112)
CO2 SERPL-SCNC: 24 MMOL/L (ref 20–33)
CREAT SERPL-MCNC: 0.62 MG/DL (ref 0.5–1.4)
FERRITIN SERPL-MCNC: 164 NG/ML (ref 10–291)
GLUCOSE SERPL-MCNC: 104 MG/DL (ref 65–99)
IRON SATN MFR SERPL: 38 % (ref 15–55)
IRON SERPL-MCNC: 96 UG/DL (ref 40–170)
POTASSIUM SERPL-SCNC: 3.6 MMOL/L (ref 3.6–5.5)
SODIUM SERPL-SCNC: 140 MMOL/L (ref 135–145)
TIBC SERPL-MCNC: 250 UG/DL (ref 250–450)
UIBC SERPL-MCNC: 154 UG/DL (ref 110–370)
VIT B12 SERPL-MCNC: 2395 PG/ML (ref 211–911)

## 2020-11-15 PROCEDURE — 700111 HCHG RX REV CODE 636 W/ 250 OVERRIDE (IP): Performed by: FAMILY MEDICINE

## 2020-11-15 PROCEDURE — A9270 NON-COVERED ITEM OR SERVICE: HCPCS | Performed by: FAMILY MEDICINE

## 2020-11-15 PROCEDURE — 700102 HCHG RX REV CODE 250 W/ 637 OVERRIDE(OP): Performed by: FAMILY MEDICINE

## 2020-11-15 PROCEDURE — 700105 HCHG RX REV CODE 258: Performed by: FAMILY MEDICINE

## 2020-11-15 PROCEDURE — 770020 HCHG ROOM/CARE - TELE (206)

## 2020-11-15 PROCEDURE — 82728 ASSAY OF FERRITIN: CPT

## 2020-11-15 PROCEDURE — 82607 VITAMIN B-12: CPT

## 2020-11-15 PROCEDURE — 83540 ASSAY OF IRON: CPT

## 2020-11-15 PROCEDURE — 99232 SBSQ HOSP IP/OBS MODERATE 35: CPT | Performed by: HOSPITALIST

## 2020-11-15 PROCEDURE — 36415 COLL VENOUS BLD VENIPUNCTURE: CPT

## 2020-11-15 PROCEDURE — 83550 IRON BINDING TEST: CPT

## 2020-11-15 PROCEDURE — 80048 BASIC METABOLIC PNL TOTAL CA: CPT

## 2020-11-15 RX ADMIN — ATORVASTATIN CALCIUM 40 MG: 40 TABLET, FILM COATED ORAL at 17:03

## 2020-11-15 RX ADMIN — DOCUSATE SODIUM 50 MG AND SENNOSIDES 8.6 MG 2 TABLET: 8.6; 5 TABLET, FILM COATED ORAL at 04:38

## 2020-11-15 RX ADMIN — CLOPIDOGREL BISULFATE 75 MG: 75 TABLET ORAL at 04:38

## 2020-11-15 RX ADMIN — GABAPENTIN 300 MG: 300 CAPSULE ORAL at 20:14

## 2020-11-15 RX ADMIN — CEFTRIAXONE SODIUM 1 G: 1 INJECTION, POWDER, FOR SOLUTION INTRAMUSCULAR; INTRAVENOUS at 04:38

## 2020-11-15 RX ADMIN — ESCITALOPRAM OXALATE 10 MG: 10 TABLET ORAL at 04:38

## 2020-11-15 ASSESSMENT — PAIN DESCRIPTION - PAIN TYPE: TYPE: ACUTE PAIN

## 2020-11-15 NOTE — DISCHARGE PLANNING
Received Choice form at 1480  Agency/Facility Name: [facility]  Referral sent per Choice form @ 4291

## 2020-11-15 NOTE — DISCHARGE PLANNING
Anticipated Discharge Disposition:  home w/ home health    Action:   0908: left VM for son Evan [phone] to discuss SNF choice. Called other son Heaven [phone], no VM box available to leave VM. Pt w/ dementia and Latvian speaking. Per chart review, pt lives w/ daughter who works during day so pt home alone while daughter at work.  1024: spoke w/ son Evan. Son states he's aware pt not safe to be alone at home so he's trying to get help from his brothers and sisters and he states he proposed plan of asking his sister who lives w/ pt to quit her job so she can stay home w/ pt 24/7 and rest of siblings will help cover expenses. Discussed SNF choice w/ son. Son agreeable to blanket SNF choice. Faxed blanket choice to [facility]  1512: received call from [facility]. Son states that they don't want pt to go to SNF and that family would like to take pt home. Discussed home health w/ son and agreeable. Home health choice faxed to [facility] ([facility] 2[facility])    Barriers to Discharge: TBD    Plan: TBD

## 2020-11-15 NOTE — DISCHARGE PLANNING
Received Choice form at 1721  Agency/Facility Name: [facility]  Referral sent per Choice form @ 9649

## 2020-11-15 NOTE — FACE TO FACE
Face to Face Supporting Documentation - Home Health    The encounter with this patient was in whole or in part the primary reason for home health admission.    Date of encounter:   Patient:                    MRN:                       YOB: 2020  Sushma Bowden  [id number]  11/28/1934     Home health to see patient for:  Skilled Nursing care for assessment, interventions & education    Skilled need for:  Medication Management medication management    Skilled nursing interventions to include:  Comment: medication management    Homebound status evidenced by:  Need the aid of supportive devices such as crutches, canes, wheelchairs or walkers. Leaving home requires a considerable and taxing effort. There is a normal inability to leave the home.    Community Physician to provide follow up care: Clayton Kohli M.D.     Optional Interventions? No      I certify the face to face encounter for this home health care referral meets the CMS requirements and the encounter/clinical assessment with the patient was, in whole, or in part, for the medical condition(s) listed above, which is the primary reason for home health care. Based on my clinical findings: the service(s) are medically necessary, support the need for home health care, and the homebound criteria are met.  I certify that this patient has had a face to face encounter by myself.  Frandy Pike M.D. - NPI: [id number]

## 2020-11-15 NOTE — PROGRESS NOTES
Monitor summary: SR 67-71, SD 0.12, QRS 0.08, QT 0.40,  with rare PVCs and occasional to frequent PACs per strip from monitor room.

## 2020-11-15 NOTE — CARE PLAN
Problem: Acute Care of the Stroke Patient  Goal: Optimal Outcome for the Stroke patient  Outcome: PROGRESSING SLOWER THAN EXPECTED  Note: Patient is confused.      Problem: Safety  Goal: Free from accidental injury  Outcome: PROGRESSING AS EXPECTED  Note: Up with cane with SBA.

## 2020-11-15 NOTE — PROGRESS NOTES
Monitor summary: SR 63-75, MN 0.16, QRS 0.10, QT 0.40, with rare PVCs per strip from monitor room.

## 2020-11-15 NOTE — PROGRESS NOTES
Hospital Medicine Daily Progress Note    Date of Service  11/14/2020    Chief Complaint  85 y.o. female admitted 11/12/2020 with possible parietal lobe CVA, UTI    Hospital Course  This is 85 year old female with pmhx of HNT/DLD was brought to ER with with AMS/Dysarthia  on 11/12/2020.  Upon presentation,CT head showed hypodensity in the posterior left cerebral hemisphere suggesting moderate acute infarct. Neurology consulted and underwent CTA head and neck didn't show any large vessel occlusion; so not a candidate for Thrombectomy.  She is noted to have aphasia, so difficult to obtain hx. MRI of brain showed Small to moderate-sized area of acute infarct with hemorrhagic transformation in the left parietal lobe. Neurology stated OK to continue plavix  Since hemorrhagic conversion is minimal.  OT/Spech recs post-acute , PT recs home with HH    Patient noted to have expressive aphasia, will go to SNF referral.  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  Her hemoglobin A1c at 5.3, TSH normal, lipid panel normal.. She is noted to have microcytosis of 101.9, TSH at 2.3, will obtain vitamin B12.    Hypokalemia 2.  A. tach, replete and monitor.    Interval Problem Update  No acute events overnight, sitting in chair, denies any complaint.  MRI of brain showed moderate sized area of acute infarct with hemorrhagic transformation in the left parietal lobe, OT/speech recommended postacute, SNF referral will be placed    Consultants/Specialty  Neuro    Code Status  Full Code    Disposition  TBD    Review of Systems  Review of Systems   Unable to perform ROS: Medical condition        Physical Exam  Temp:  [36.1 °C (96.9 °F)-36.9 °C (98.4 °F)] 36.6 °C (97.8 °F)  Pulse:  [60-78] 65  Resp:  [16-18] 16  BP: (122-177)/(48-95) 151/48  SpO2:  [94 %-98 %] 95 %    Physical Exam  Vitals signs and nursing note reviewed.   Constitutional:       General: She is not in acute distress.     Appearance: Normal appearance.    HENT:      Head: Normocephalic and atraumatic.      Nose: Nose normal.   Eyes:      Extraocular Movements: Extraocular movements intact.      Pupils: Pupils are equal, round, and reactive to light.   Neck:      Musculoskeletal: Neck supple.   Cardiovascular:      Rate and Rhythm: Normal rate and regular rhythm.      Heart sounds: Normal heart sounds.   Pulmonary:      Effort: Pulmonary effort is normal.      Breath sounds: Normal breath sounds.   Abdominal:      General: There is no distension.      Palpations: Abdomen is soft.   Musculoskeletal:      Right lower leg: No edema.      Left lower leg: No edema.   Skin:     General: Skin is warm.   Neurological:      Mental Status: She is alert.      Cranial Nerves: No cranial nerve deficit.      Motor: No weakness.      Comments: Expressive aphasia  Follows x 4    Psychiatric:         Mood and Affect: Mood normal.         Fluids    Intake/Output Summary (Last 24 hours) at 11/14/2020 2300  Last data filed at 11/14/2020 1000  Gross per 24 hour   Intake 240 ml   Output --   Net 240 ml       Laboratory  Recent Labs     11/12/20  1313 11/13/20  0626 11/14/20  0327   WBC 7.1 6.9 6.7   RBC 4.12* 4.15* 4.09*   HEMOGLOBIN 13.8 14.2 14.1   HEMATOCRIT 42.0 42.3 41.5   .9* 101.9* 101.5*   MCH 33.5* 34.2* 34.5*   MCHC 32.9* 33.6 34.0   RDW 49.1 49.1 49.0   PLATELETCT 186 171 166   MPV 12.2 11.4 11.3     Recent Labs     11/13/20  0626 11/13/20  1358 11/14/20  0327   SODIUM 136 136 138   POTASSIUM 2.8* 3.7 3.9   CHLORIDE 102 99 103   CO2 23 22 23   GLUCOSE 98 95 98   BUN 9 8 10   CREATININE 0.47* 0.50 0.50   CALCIUM 9.0 9.4 9.4             Recent Labs     11/13/20  0626   TRIGLYCERIDE 62   HDL 65   LDL 88       Imaging  MR-BRAIN-W/O   Final Result      1.  Small to moderate-sized area of acute infarct with hemorrhagic transformation in the left parietal lobe. This is new since the previous MRI dated 05/04/2014.   2.  Multifocal chronic lacunar infarcts in the brainstem,  right thalamus and left cerebellum.   3.  Mild cerebral volume loss.   4.  Mild chronic microvascular ischemic disease.      EC-ECHOCARDIOGRAM COMPLETE W/O CONT   Final Result      CT-CTA HEAD WITH & W/O-POST PROCESS   Final Result      No occlusion or hemodynamically significant stenosis of the intracranial arteries.      CT-CTA NECK WITH & W/O-POST PROCESSING   Final Result      1.  No high-grade stenosis, large vessel occlusion, aneurysm or dissection.   2.  Enlarged, nodular right thyroid lobe relative to the left. This can be followed with outpatient ultrasound.      CT-HEAD W/O   Final Result      1.  Hypodensity in the posterior left cerebral hemisphere suggesting a moderate acute infarct.   2.  No acute intracranial hemorrhage.   3.  Chronic microvascular ischemic type changes and multiple chronic lacunar infarcts.      These findings were discussed with ELSA GOEL on 11/12/2020 2:04 PM.           Assessment/Plan  Cerebral infarction (HCC)- (present on admission)  Assessment & Plan  CT scan of the head showed hypodensity in the posterior left cerebral hemisphere suggesting a moderate acute infarction.  Not candidate for TPA and thrombectomy as no large vessel occlusion noted on CTA head and neck.  Neurology following  , will continue Plavix plus statin   MRI of brain showed small area of hemorrhagic conversion, neurology okay to continue Plavix plus statin.   --- Echo showed LVH, AAA of 4.2 cm   --Glycol normal lipid panel normal  OT/physical recommend postacute, will place referral to PMR and SNF    Macrocytosis  Assessment & Plan  TSH normal, after vitamin B12 and iron panel.    Hypokalemia  Assessment & Plan  We will repeat and monitor, magnesium normal    Acute cystitis without hematuria- (present on admission)  Assessment & Plan  Started on Rocephin.  Follow-up with urine culture results.    Altered mental state- (present on admission)  Assessment & Plan  Altered mental status and mild receptive  aphasia.  There is concern of acute left cerebral hemisphere infarct on CT scan.  Evidence of UTI.=, On Rocephin; will wait for urine culture         VTE prophylaxis: SCDs

## 2020-11-16 VITALS
BODY MASS INDEX: 27.64 KG/M2 | WEIGHT: 137.13 LBS | TEMPERATURE: 97.9 F | OXYGEN SATURATION: 94 % | RESPIRATION RATE: 16 BRPM | HEART RATE: 81 BPM | SYSTOLIC BLOOD PRESSURE: 123 MMHG | HEIGHT: 59 IN | DIASTOLIC BLOOD PRESSURE: 59 MMHG

## 2020-11-16 PROBLEM — N30.00 ACUTE CYSTITIS WITHOUT HEMATURIA: Status: RESOLVED | Noted: 2020-11-12 | Resolved: 2020-11-16

## 2020-11-16 PROBLEM — E87.6 HYPOKALEMIA: Status: RESOLVED | Noted: 2020-11-14 | Resolved: 2020-11-16

## 2020-11-16 LAB
ALBUMIN SERPL BCP-MCNC: 3.5 G/DL (ref 3.2–4.9)
BASOPHILS # BLD AUTO: 0.6 % (ref 0–1.8)
BASOPHILS # BLD: 0.04 K/UL (ref 0–0.12)
BUN SERPL-MCNC: 14 MG/DL (ref 8–22)
CALCIUM SERPL-MCNC: 8.9 MG/DL (ref 8.5–10.5)
CHLORIDE SERPL-SCNC: 105 MMOL/L (ref 96–112)
CO2 SERPL-SCNC: 26 MMOL/L (ref 20–33)
CREAT SERPL-MCNC: 0.56 MG/DL (ref 0.5–1.4)
EOSINOPHIL # BLD AUTO: 0.29 K/UL (ref 0–0.51)
EOSINOPHIL NFR BLD: 4.3 % (ref 0–6.9)
ERYTHROCYTE [DISTWIDTH] IN BLOOD BY AUTOMATED COUNT: 50.1 FL (ref 35.9–50)
GLUCOSE SERPL-MCNC: 104 MG/DL (ref 65–99)
HCT VFR BLD AUTO: 41.7 % (ref 37–47)
HGB BLD-MCNC: 13.7 G/DL (ref 12–16)
IMM GRANULOCYTES # BLD AUTO: 0.06 K/UL (ref 0–0.11)
IMM GRANULOCYTES NFR BLD AUTO: 0.9 % (ref 0–0.9)
LYMPHOCYTES # BLD AUTO: 1.08 K/UL (ref 1–4.8)
LYMPHOCYTES NFR BLD: 15.9 % (ref 22–41)
MAGNESIUM SERPL-MCNC: 1.9 MG/DL (ref 1.5–2.5)
MCH RBC QN AUTO: 34.2 PG (ref 27–33)
MCHC RBC AUTO-ENTMCNC: 32.9 G/DL (ref 33.6–35)
MCV RBC AUTO: 104 FL (ref 81.4–97.8)
MONOCYTES # BLD AUTO: 0.69 K/UL (ref 0–0.85)
MONOCYTES NFR BLD AUTO: 10.1 % (ref 0–13.4)
NEUTROPHILS # BLD AUTO: 4.65 K/UL (ref 2–7.15)
NEUTROPHILS NFR BLD: 68.2 % (ref 44–72)
NRBC # BLD AUTO: 0 K/UL
NRBC BLD-RTO: 0 /100 WBC
PHOSPHATE SERPL-MCNC: 3.4 MG/DL (ref 2.5–4.5)
PLATELET # BLD AUTO: 153 K/UL (ref 164–446)
PMV BLD AUTO: 11.4 FL (ref 9–12.9)
POTASSIUM SERPL-SCNC: 3.6 MMOL/L (ref 3.6–5.5)
RBC # BLD AUTO: 4.01 M/UL (ref 4.2–5.4)
SODIUM SERPL-SCNC: 139 MMOL/L (ref 135–145)
WBC # BLD AUTO: 6.8 K/UL (ref 4.8–10.8)

## 2020-11-16 PROCEDURE — 90471 IMMUNIZATION ADMIN: CPT

## 2020-11-16 PROCEDURE — 3E02340 INTRODUCTION OF INFLUENZA VACCINE INTO MUSCLE, PERCUTANEOUS APPROACH: ICD-10-PCS | Performed by: HOSPITALIST

## 2020-11-16 PROCEDURE — 90662 IIV NO PRSV INCREASED AG IM: CPT | Performed by: HOSPITALIST

## 2020-11-16 PROCEDURE — 36415 COLL VENOUS BLD VENIPUNCTURE: CPT

## 2020-11-16 PROCEDURE — 99239 HOSP IP/OBS DSCHRG MGMT >30: CPT | Performed by: HOSPITALIST

## 2020-11-16 PROCEDURE — 85025 COMPLETE CBC W/AUTO DIFF WBC: CPT

## 2020-11-16 PROCEDURE — 80069 RENAL FUNCTION PANEL: CPT

## 2020-11-16 PROCEDURE — 83735 ASSAY OF MAGNESIUM: CPT

## 2020-11-16 PROCEDURE — 700105 HCHG RX REV CODE 258: Performed by: FAMILY MEDICINE

## 2020-11-16 PROCEDURE — 700111 HCHG RX REV CODE 636 W/ 250 OVERRIDE (IP): Performed by: HOSPITALIST

## 2020-11-16 PROCEDURE — A9270 NON-COVERED ITEM OR SERVICE: HCPCS | Performed by: FAMILY MEDICINE

## 2020-11-16 PROCEDURE — 700111 HCHG RX REV CODE 636 W/ 250 OVERRIDE (IP): Performed by: FAMILY MEDICINE

## 2020-11-16 PROCEDURE — 700102 HCHG RX REV CODE 250 W/ 637 OVERRIDE(OP): Performed by: FAMILY MEDICINE

## 2020-11-16 RX ORDER — CLOPIDOGREL BISULFATE 75 MG/1
75 TABLET ORAL DAILY
Qty: 30 TAB | Refills: 0 | Status: SHIPPED | OUTPATIENT
Start: 2020-11-17 | End: 2020-11-16 | Stop reason: SDUPTHER

## 2020-11-16 RX ORDER — TRAMADOL HYDROCHLORIDE 50 MG/1
50 TABLET ORAL EVERY 8 HOURS PRN
Qty: 9 TAB | Refills: 0 | Status: SHIPPED | OUTPATIENT
Start: 2020-11-16 | End: 2020-11-19

## 2020-11-16 RX ORDER — CLOPIDOGREL BISULFATE 75 MG/1
75 TABLET ORAL DAILY
Qty: 21 TAB | Refills: 0 | Status: ON HOLD | OUTPATIENT
Start: 2020-11-17 | End: 2020-12-04

## 2020-11-16 RX ORDER — ATORVASTATIN CALCIUM 40 MG/1
40 TABLET, FILM COATED ORAL EVERY EVENING
Qty: 30 TAB | Refills: 0 | Status: ON HOLD | OUTPATIENT
Start: 2020-11-16 | End: 2020-12-04

## 2020-11-16 RX ADMIN — DOCUSATE SODIUM 50 MG AND SENNOSIDES 8.6 MG 2 TABLET: 8.6; 5 TABLET, FILM COATED ORAL at 05:03

## 2020-11-16 RX ADMIN — CEFTRIAXONE SODIUM 1 G: 1 INJECTION, POWDER, FOR SOLUTION INTRAMUSCULAR; INTRAVENOUS at 05:03

## 2020-11-16 RX ADMIN — ATORVASTATIN CALCIUM 40 MG: 40 TABLET, FILM COATED ORAL at 18:30

## 2020-11-16 RX ADMIN — INFLUENZA A VIRUS A/MICHIGAN/45/2015 X-275 (H1N1) ANTIGEN (FORMALDEHYDE INACTIVATED), INFLUENZA A VIRUS A/SINGAPORE/INFIMH-16-0019/2016 IVR-186 (H3N2) ANTIGEN (FORMALDEHYDE INACTIVATED), INFLUENZA B VIRUS B/PHUKET/3073/2013 ANTIGEN (FORMALDEHYDE INACTIVATED), AND INFLUENZA B VIRUS B/MARYLAND/15/2016 BX-69A ANTIGEN (FORMALDEHYDE INACTIVATED) 0.7 ML: 60; 60; 60; 60 INJECTION, SUSPENSION INTRAMUSCULAR at 18:31

## 2020-11-16 RX ADMIN — CLOPIDOGREL BISULFATE 75 MG: 75 TABLET ORAL at 05:03

## 2020-11-16 RX ADMIN — ESCITALOPRAM OXALATE 10 MG: 10 TABLET ORAL at 05:03

## 2020-11-16 ASSESSMENT — PAIN DESCRIPTION - PAIN TYPE: TYPE: ACUTE PAIN

## 2020-11-16 NOTE — PROGRESS NOTES
Hospital Medicine Daily Progress Note    Date of Service  11/15/2020    Chief Complaint  85 y.o. female admitted 11/12/2020 with possible parietal lobe CVA, UTI    Hospital Course  This is 85 year old female with pmhx of HNT/DLD was brought to ER with with AMS/Dysarthia  on 11/12/2020.  Upon presentation,CT head showed hypodensity in the posterior left cerebral hemisphere suggesting moderate acute infarct. Neurology consulted and underwent CTA head and neck didn't show any large vessel occlusion; so not a candidate for Thrombectomy.  She is noted to have aphasia, so difficult to obtain hx. MRI of brain showed Small to moderate-sized area of acute infarct with hemorrhagic transformation in the left parietal lobe. Neurology stated OK to continue plavix  Since hemorrhagic conversion is minimal.  OT/Spech recs post-acute , PT recs home with HH    Patient noted to have expressive aphasia, will go to SNF referral.  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  Her hemoglobin A1c at 5.3, TSH normal, lipid panel normal.. She is noted to have microcytosis of 101.9, TSH at 2.3, will obtain vitamin B12.    Hypokalemia 2.  A. tach, replete and monitor.    Interval Problem Update  No acute events overnight, sitting in chair, denies any complaint.  MRI of brain showed moderate sized area of acute infarct with hemorrhagic transformation in the left parietal lobe, OT/speech recommended postacute, SNF referral will be placed    11/15: No  Acute events overnight, laying in bed, MRI brain showed Small to moderate-sized area of acute infarct with hemorrhagic transformation in the left parietal lobe. Neurology stated ok to continue plavix for secondary stroke prevention.  SNF referral has been placed   Plan of care has been discussed with the patient family     Consultants/Specialty  Neuro    Code Status  Full Code    Disposition  TBD    Review of Systems  Review of Systems   Unable to perform ROS: Medical condition         Physical Exam  Temp:  [36.1 °C (96.9 °F)-36.7 °C (98 °F)] 36.3 °C (97.3 °F)  Pulse:  [64-93] 93  Resp:  [16-18] 18  BP: (113-147)/(62-78) 131/77  SpO2:  [91 %-95 %] 93 %    Physical Exam  Vitals signs and nursing note reviewed.   Constitutional:       General: She is not in acute distress.     Appearance: Normal appearance.   HENT:      Head: Normocephalic and atraumatic.      Nose: Nose normal.   Eyes:      Extraocular Movements: Extraocular movements intact.      Pupils: Pupils are equal, round, and reactive to light.   Neck:      Musculoskeletal: Neck supple.   Cardiovascular:      Rate and Rhythm: Normal rate and regular rhythm.      Heart sounds: Normal heart sounds.   Pulmonary:      Effort: Pulmonary effort is normal.      Breath sounds: Normal breath sounds.   Abdominal:      General: Abdomen is flat. There is no distension.      Palpations: Abdomen is soft.   Musculoskeletal:      Right lower leg: No edema.      Left lower leg: No edema.   Skin:     General: Skin is warm.   Neurological:      Mental Status: She is alert.      Cranial Nerves: No cranial nerve deficit.      Motor: No weakness.      Comments: Expressive aphasia  Follows x 4    Psychiatric:         Mood and Affect: Mood normal.         Fluids    Intake/Output Summary (Last 24 hours) at 11/15/2020 2207  Last data filed at 11/15/2020 1400  Gross per 24 hour   Intake 720 ml   Output --   Net 720 ml       Laboratory  Recent Labs     11/13/20  0626 11/14/20 0327   WBC 6.9 6.7   RBC 4.15* 4.09*   HEMOGLOBIN 14.2 14.1   HEMATOCRIT 42.3 41.5   .9* 101.5*   MCH 34.2* 34.5*   MCHC 33.6 34.0   RDW 49.1 49.0   PLATELETCT 171 166   MPV 11.4 11.3     Recent Labs     11/13/20  1358 11/14/20  0327 11/15/20  0512   SODIUM 136 138 140   POTASSIUM 3.7 3.9 3.6   CHLORIDE 99 103 106   CO2 22 23 24   GLUCOSE 95 98 104*   BUN 8 10 12   CREATININE 0.50 0.50 0.62   CALCIUM 9.4 9.4 9.3             Recent Labs     11/13/20  0626   TRIGLYCERIDE 62   HDL 65    LDL 88       Imaging  MR-BRAIN-W/O   Final Result      1.  Small to moderate-sized area of acute infarct with hemorrhagic transformation in the left parietal lobe. This is new since the previous MRI dated 05/04/2014.   2.  Multifocal chronic lacunar infarcts in the brainstem, right thalamus and left cerebellum.   3.  Mild cerebral volume loss.   4.  Mild chronic microvascular ischemic disease.      EC-ECHOCARDIOGRAM COMPLETE W/O CONT   Final Result      CT-CTA HEAD WITH & W/O-POST PROCESS   Final Result      No occlusion or hemodynamically significant stenosis of the intracranial arteries.      CT-CTA NECK WITH & W/O-POST PROCESSING   Final Result      1.  No high-grade stenosis, large vessel occlusion, aneurysm or dissection.   2.  Enlarged, nodular right thyroid lobe relative to the left. This can be followed with outpatient ultrasound.      CT-HEAD W/O   Final Result      1.  Hypodensity in the posterior left cerebral hemisphere suggesting a moderate acute infarct.   2.  No acute intracranial hemorrhage.   3.  Chronic microvascular ischemic type changes and multiple chronic lacunar infarcts.      These findings were discussed with ELSA GOEL on 11/12/2020 2:04 PM.           Assessment/Plan  Cerebral infarction (HCC)- (present on admission)  Assessment & Plan  CT scan of the head showed hypodensity in the posterior left cerebral hemisphere suggesting a moderate acute infarction.  Not candidate for TPA and thrombectomy as no large vessel occlusion noted on CTA head and neck.  Neurology following  , will continue Plavix plus statin   MRI of brain showed small area of hemorrhagic conversion, neurology okay to continue Plavix plus statin.   --- Echo showed LVH, AAA of 4.2 cm   --Glycol normal lipid panel normal  OT/physical recommend postacute, will place referral to PMR and SNF    Macrocytosis  Assessment & Plan  TSH normal,  Normal vitamin b12     Hypokalemia  Assessment & Plan  We will repeat and monitor,  magnesium  At 1.9; monitor     Acute cystitis without hematuria- (present on admission)  Assessment & Plan  Started on Rocephin.  Follow-up with urine culture results.    Altered mental state- (present on admission)  Assessment & Plan  Altered mental status and mild receptive aphasia.  There is concern of acute left cerebral hemisphere infarct on CT scan.  Evidence of UTI.=, On Rocephin; will wait for urine culture         VTE prophylaxis: SCDs    I have performed a physical exam and reviewed and updated ROS and Plan today (11/15/2020). In review of yesterday's note (11/14/2020), there are no changes except as documented above.

## 2020-11-16 NOTE — CARE PLAN
Problem: Safety  Goal: Free from accidental injury  Outcome: PROGRESSING AS EXPECTED     Problem: Risk for Impaired Mobility--Activity Intolerance  Goal: Mobilize and/or Transfer Safely with Maximum [place]  Outcome: PROGRESSING AS EXPECTED  Note: Mobilizing pt to chair for meals, pt ambulating with steady shuffling gait to bathroom with cane from home and using rails

## 2020-11-16 NOTE — PROGRESS NOTES
Monitor summary: SR 60-94, WA 0.16, QRS 0.08, QT 0.40, with rare PVCs and rare PACs and HR up to 180s per strip from monitor room.

## 2020-11-16 NOTE — DISCHARGE PLANNING
Care Transition Team Discharge Planning    Anticipated Discharge Disposition: d/c home w/ hh per family request    Action: Lsw spoke to SNF liaison and they understand pt's son has stated on the 15th of this month he wants pt to come home w/ hh. He is getting family to be home w/ pt.    LSw spoke to BS RN and MD there is a descrpeancy as to which d/c plan has been decided.    Lsw left VM w/ son Evan. He called later and indicated he wants pt to d/c home w/ HH. The family is sure pt will  Not understand if d/josy to snf and become very depressed thinking they left her there. They have secured their sister to stay w/ pt 24/7. They are going to pay sister her work wages to stay w/ pt while pt is at home.    HH choices are to be f/u w/.    Lsw sent text to BS RN, MD, and [facility] to request f/u w/ HH choices.          Barriers to Discharge: acceptance to HH hopefully tomorrow when the offices for admissions and insurance are back open    Plan: f/u w/ ROSALES, MD, BS RN etc.

## 2020-11-16 NOTE — PROGRESS NOTES
Received report from day staff. Assumed pt care. Patient is Ukrainian speaking only. Interpretor services used to assess patient, son is at bedside. Patient is AOX2, disoriented to time and event. POC discussed. Tele monitor in place. Call light within reach, bed in lowest position, and personal items accessible.

## 2020-11-16 NOTE — DISCHARGE PLANNING
Care Transition Team Discharge Planning    Anticipated Discharge Disposition: d/c home w/ hh vs to snf for short term rehab    Action: Lsw spoke to meidcal team and Md indicates pt's son Evan wants pt to d/c to SNF. Although note from RN  DEBBIE also yesterday spoke to jhon Gordon and he stated at that time home w/ hh only.    Lsw called Evan and left VM requesting f/u about d/cp HH vs SNF. Lsw explained we would need the information ASAP. Pt can d/c home w/ HH today if family is able to take care of her when HH is not in the home. Lsw further indicated via VM the   hh agency will only be in the home 3-4x per week for 30-45 mins at a time.      Lsw sent text to md to update as above. Left VM not able to speak w/ jhon Gordon directly. LSw waiting for his answer via phone.    Lsw updated MD as above via text.         Barriers to Discharge: TBD    Plan: f/u w/ family, medical team, etc.

## 2020-11-16 NOTE — CARE PLAN
Problem: Communication  Goal: The ability to communicate needs accurately and effectively will improve  Outcome: PROGRESSING AS EXPECTED   Patient is Nigerian speaking only, interpretor services used. Patient hard of hearing and struggled communicating even with the interpretor. Son at bedside was able to assist with interpreting for assessment. Patient demonstrated the use of the call light, calls appropriately.    Problem: Safety  Goal: Will remain free from injury  Outcome: PROGRESSING AS EXPECTED  Goal: Will remain free from falls  Outcome: PROGRESSING AS EXPECTED   Fall/Safety precautions in place. Bed in lowest/locked position. Bed alarm on.

## 2020-11-17 LAB
BACTERIA BLD CULT: NORMAL
SIGNIFICANT IND 70042: NORMAL
SITE SITE: NORMAL
SOURCE SOURCE: NORMAL

## 2020-11-17 NOTE — PROGRESS NOTES
Discharge instructions provided to pt and son Evan at bedside. Discussed new medications, discussed follow up appointments, all questions answered. Provided influenza vaccine with VIS. Pt discharged via wheelchair by this RN to Evan's car for Evan to drive pt to his sister's house. Evan confirmed he and other siblings will be able to provide 24/7 supervision. All belongings with pt, including cane.

## 2020-11-17 NOTE — DISCHARGE PLANNING
Per response in Epic, [facility] accepted pt.    Per response in Epic, [facility] have accepted pt.

## 2020-11-17 NOTE — DISCHARGE PLANNING
Care Transition Team Discharge Planning    Anticipated Discharge Disposition: d/c home w/ son and has been accepted [facility]  [facility]    Action: lsw spoke to [facility]ca who states the hh agency did accept pt      Lsw spoke to MD who wants pt to d/c today.    lsw spoke to son vicky who will come p/u pt now    Lsw was unable to update BS RN as her voalte is no on line.     Lsw sent voalte text to md indicating as above.    Barriers to Discharge: none    Plan: pt d/cing home tonight and per [facility] has been accepted [facility]

## 2020-11-17 NOTE — DISCHARGE INSTRUCTIONS
"Discharge Instructions    Discharged to home by car with relative. Discharged via wheelchair, hospital escort: Yes.  Special equipment needed: Not Applicable    Be sure to schedule a follow-up appointment with your primary care doctor or any specialists as instructed.     Discharge Plan:   Diet Plan: Discussed  Activity Level: Discussed  Confirmed Follow up Appointment: Patient to Call and Schedule Appointment  Confirmed Symptoms Management: Discussed  Medication Reconciliation Updated: Yes    I understand that a diet low in cholesterol, fat, and sodium is recommended for good health. Unless I have been given specific instructions below for another diet, I accept this instruction as my diet prescription.   Other diet: regular    Special Instructions:     Stroke/CVA/TIA/Hemorrhagic Ischemia Discharge Instructions  You have had a stroke. Your risk factors have been identified as follows:  Age - Over 55  Ethnicity - -Americans and Hispanics are at a higher risk  Previous TIAs or \"mini strokes\"  It is important that you reduce your risk factors to avoid another stroke in the future. Here are some general guidelines to follow:  · Eat healthy - avoid food high in fat.  · Get regular exercise.  · Maintain a healthy weight.  · Avoid smoking.  · Avoid alcohol and illegal drug use.  · Take your medications as directed.  For more information regarding risk factors, refer to pages 17-19 in your Stroke Patient Education Guide. Stroke Education Guide was given to patient.    Warning signs of a stroke include (which can also be found on page 3 of your Stroke Patient Education Guide):  · Sudden numbness of weakness of the face, arm or leg (especially on one side of the body).  · Sudden confusion, trouble speaking or understanding.  · Sudden trouble seeing in one or both eyes.  · Sudden trouble walking, dizziness, loss of balance or coordination.  · Sudden severe headache with no known cause.  It is very important to get " treatment quickly when a stroke occurs. If you experience any of the above warning signs, call [phone] immediately.     Some patients who have had a stroke will be going home on a blood thinner medication called Warfarin (Coumadin).  This medication requires very close monitoring and follow up.  This follow up can be provided by either your Primary Care Physician or by [facility] Outpatient Anticoagulation Service.  The Outpatient Anticoagulation Service is located at the [facility] at [facility] ([facility]).  If you do not know when your follow up appointment is scheduled, call [phone] to verify your appointment time.      · Is patient discharged on Warfarin / Coumadin?   No     Depression / Suicide Risk    As you are discharged from this [facility], it is important to learn how to keep safe from harming yourself.    Recognize the warning signs:  · Abrupt changes in personality, positive or negative- including increase in energy   · Giving away possessions  · Change in eating patterns- significant weight changes-  positive or negative  · Change in sleeping patterns- unable to sleep or sleeping all the time   · Unwillingness or inability to communicate  · Depression  · Unusual sadness, discouragement and loneliness  · Talk of wanting to die  · Neglect of personal appearance   · Rebelliousness- reckless behavior  · Withdrawal from people/activities they love  · Confusion- inability to concentrate     If you or a loved one observes any of these behaviors or has concerns about self-harm, here's what you can do:  · Talk about it- your feelings and reasons for harming yourself  · Remove any means that you might use to hurt yourself (examples: pills, rope, extension cords, firearm)  · Get professional help from the community (Mental Health, Substance Abuse, psychological counseling)  · Do not be alone:Call your Safe Contact- someone whom you trust who will  be there for you.  · Call your local CRISIS HOTLINE 163-7549 or 389-576-3892  · Call your local Children's Mobile Crisis Response Team [place] (765) 715-6297 or www.Evergram  · Call the toll free National Suicide Prevention Hotlines   · National Suicide Prevention Lifeline 000-182-XPLR (5956)  · National TrueLens Line Network 800-SUICIDE (961-1983)      Prevención del accidente cerebrovascular  Stroke Prevention  Algunas afecciones médicas y elecciones en el estilo de alma delia pueden conducir a un mayor riesgo de tener un accidente cerebrovascular. Realizando cambios, por ejemplo, en la alimentación y en el estilo de alma delia, se puede ayudar a prevenir un accidente cerebrovascular.  ¿Qué cambios en la alimentación se pueden realizar?    · Consuma alimentos saludables.  ? Elija alimentos que roland ricos en fibra. Estos incluyen los siguientes:  § Frutas frescas.  § Verduras frescas.  § Cereales integrales.  ? Coma 5 o más porciones de frutas y verduras cada día. Trate de que la mitad del plato de cada comida sea de frutas y verduras.  ? Elija alimentos con proteínas magras. Estos incluyen los siguientes:  § Russo de carne con poca grasa (magros).  § Colin sin piel.  § Pescado.  § Tofu.  § Frijoles.  § Cassia secos.  ? Consuma productos lácteos descremados.  ? Evite alimentos que:  § Tengan un alto contenido de sal (sodio).  § Tengan grasas saturadas.  § Tengan grasas trans.  § Tengan colesterol.  § Roland procesados.  § Roland precocinados.  · Siga las recomendaciones nutricionales que le dé el médico. Estos tratamientos pueden incluir:  ? Reducir la cantidad de calorías que ingiere cada día.  ? Limitar la cantidad de shakir que ingiere cada día a 1,500 miligramos (mg).  ? Usar solo grasas saludables para cocinar. Estos incluyen los siguientes:  § Aceite de newman.  § Aceite de canola.  § Aceite de girasol.  ? Contar cuántos carbohidratos ingiere cada día.  ¿Qué cambios en el estilo de alma delia se pueden realizar?  · Intente  mantener un peso saludable. Consulte a downs médico para saber cuál debería ser downs peso adecuado.  · Sharan por lo menos 30 minutos de actividad física moderada 5 días por semana. [place] puede incluir lo siguiente:  ? Caminar rápidamente.  ? Andar en bicicleta.  ? Natación.  · No consuma ningún producto que contenga nicotina o tabaco. [place] incluye cigarrillos y cigarrillos electrónicos. Si necesita ayuda para dejar de fumar, consulte al médico. Evite estar en lugares donde hay humo de tabaco.  · Limite cuánto alcohol henok a no más de 1 medida por día si es judy y no está embarazada, y 2 medidas por día si es hombre. Xin medida equivale a 12 oz de cerveza, 5 oz de vino o 1½ oz de bebidas alcohólicas de silvia graduación.  · No consuma drogas.  · Evite charlie píldoras anticonceptivas. Hable con downs médico acerca de los riesgos de charlie píldoras anticonceptivas si:  ? Es mayor de 35 años.  ? Fuma.  ? Tiene migrañas.  ? Alguna vez montgomery tenido un coágulo de ebony.  ¿Qué otros cambios se pueden realizar?  · Controle downs colesterol.  ? Es importante que siga xin dieta saludable.  ? Si el colesterol no puede controlarse con la dieta, es posible que también deba charlie medicamentos. [place] los medicamentos vitaliy le indicó el médico.  · Controle downs diabetes.  ? Es importante que siga xin dieta saludable y sharan ejercicio con regularidad.  ? Si downs nivel de azúcar en la ebony no puede controlarse mediante dieta y ejercicio, es posible que deba charlie medicamentos. [place] los medicamentos vitaliy le indicó el médico.  · Controle la presión arterial silvia (hipertensión).  ? Intente mantener downs presión arterial por debajo de 130/80. [place] puede ayudarle a disminuir downs riesgo de accidente cerebrovascular.  ? Es importante que siga xin dieta saludable y sharan ejercicio con regularidad.  ? Si downs presión arterial no puede controlarse mediante dieta y ejercicio, es posible que deba charlie medicamentos. [place] los medicamentos vitaliy le indicó el  médico.  ? Pregúntele al médico si debería controlar downs presión arterial en downs casa.  ? Hágase controlar la presión arterial todos los años. [place] aunque downs presión arterial sea normal.  · Consulte a downs médico sobre la necesidad de bora evaluación para determinar si tiene un trastorno del sueño. Algunos signos pueden ser:  ? Roncar mucho.  ? Mucho cansancio.  · Use los medicamentos de venta alec y los recetados solamente vitaliy se lo haya indicado el médico. Estos pueden incluir aspirina o medicamentos que licuan la ebony (antiagregantes plaquetarios o anticoagulantes).  · Asegúrese de tener controlada cualquier otra afección médica que tenga.  Dónde buscar más información  · American Stroke Association (Asociación Americana de Accidente Cerebrovascular): www.strokeassociation.org  · National Stroke Association (Asociación Nacional de Accidente Cerebrovascular): www.stroke.org  Solicite ayuda inmediatamente si:  · Tiene síntomas de un accidente cerebrovascular. “BE FAST” es bora manera fácil de recordar las principales señales de advertencia:  ? B - Balance (equilibrio). Los signos son mareos, dificultad repentina para caminar o pérdida del equilibrio.  ? E - Eyes (ojos). Los signos son dificultad para srikanth o un cambio repentino en la visión.  ? F - Face (jeremy). Los signos son debilidad repentina o pérdida de sensación en el jeremy, o el jeremy o el párpado que se caen hacia un lado.  ? A - Arms (brazos). Los signos son debilidad o pérdida de la sensibilidad en un brazo. [place] sucede de repente y generalmente en un lado del cuerpo.  ? S - Speech (habla). Los signos son dificultad para hablar, hablar arrastrando las palabras o dificultad para comprender lo que la gente dice.  ? T - Time (tiempo). Es tiempo de llamar al servicio de emergencias. Anote la hora a la que comenzaron los síntomas.  · Presenta otros signos de accidente cerebrovascular, vitaliy los siguientes:  ? Dolor de candace repentino y muy intenso sin causa  aparente.  ? Malestar estomacal (náuseas).  ? Vómitos.  ? Movimientos espasmódicos que no puede controlar (convulsiones).  Estos síntomas pueden representar un problema grave que constituye bora emergencia. No espere a srikanth si los síntomas desaparecen. Solicite atención médica de inmediato. Comuníquese con el servicio de emergencias de downs localidad (911 en los [place]). No conduzca por mark propios medios hasta el hospital.  Resumen  · Un accidente cerebrovascular se puede prevenir con bora alimentación saludable, haciendo ejercicio, no fumando, bebiendo menos alcohol y recibiendo tratamiento para otros problemas de jane, vitaliy la diabetes, la presión arterial silvia o el colesterol alto.  · No consuma ningún producto que contenga nicotina o tabaco, vitaliy cigarrillos y cigarrillos electrónicos.  · Obtenga ayuda de inmediato si tiene signos o síntomas de un accidente cerebrovascular.  Esta información no tiene vitaliy fin reemplazar el consejo del médico. Asegúrese de hacerle al médico cualquier pregunta que tenga.  Document Released: 06/18/2013 Document Revised: 03/12/2020 Document Reviewed: 03/12/2020  Elsevier Patient Education © 2020 [employer]vier Inc.

## 2020-11-22 ENCOUNTER — APPOINTMENT (OUTPATIENT)
Dept: RADIOLOGY | Facility: MEDICAL CENTER | Age: 85
DRG: 065 | End: 2020-11-22
Attending: EMERGENCY MEDICINE
Payer: MEDICARE

## 2020-11-22 ENCOUNTER — HOSPITAL ENCOUNTER (INPATIENT)
Facility: MEDICAL CENTER | Age: 85
LOS: 1 days | DRG: 065 | End: 2020-11-24
Attending: EMERGENCY MEDICINE | Admitting: STUDENT IN AN ORGANIZED HEALTH CARE EDUCATION/TRAINING PROGRAM
Payer: MEDICARE

## 2020-11-22 ENCOUNTER — APPOINTMENT (OUTPATIENT)
Dept: RADIOLOGY | Facility: MEDICAL CENTER | Age: 85
DRG: 065 | End: 2020-11-22
Payer: MEDICARE

## 2020-11-22 DIAGNOSIS — I63.9 CEREBRAL INFARCTION, UNSPECIFIED MECHANISM (HCC): ICD-10-CM

## 2020-11-22 DIAGNOSIS — R53.81 DEBILITY: ICD-10-CM

## 2020-11-22 DIAGNOSIS — R41.82 ALTERED MENTAL STATUS, UNSPECIFIED ALTERED MENTAL STATUS TYPE: ICD-10-CM

## 2020-11-22 LAB
ALBUMIN SERPL BCP-MCNC: 3.6 G/DL (ref 3.2–4.9)
ALBUMIN/GLOB SERPL: 1 G/DL
ALP SERPL-CCNC: 97 U/L (ref 30–99)
ALT SERPL-CCNC: 26 U/L (ref 2–50)
AMPHET UR QL SCN: NEGATIVE
ANION GAP SERPL CALC-SCNC: 12 MMOL/L (ref 7–16)
APPEARANCE UR: CLEAR
AST SERPL-CCNC: 36 U/L (ref 12–45)
BACTERIA #/AREA URNS HPF: NEGATIVE /HPF
BARBITURATES UR QL SCN: NEGATIVE
BASOPHILS # BLD AUTO: 0.4 % (ref 0–1.8)
BASOPHILS # BLD: 0.04 K/UL (ref 0–0.12)
BENZODIAZ UR QL SCN: NEGATIVE
BILIRUB SERPL-MCNC: 0.9 MG/DL (ref 0.1–1.5)
BILIRUB UR QL STRIP.AUTO: NEGATIVE
BUN SERPL-MCNC: 9 MG/DL (ref 8–22)
BZE UR QL SCN: NEGATIVE
CALCIUM SERPL-MCNC: 9.4 MG/DL (ref 8.5–10.5)
CANNABINOIDS UR QL SCN: POSITIVE
CHLORIDE SERPL-SCNC: 98 MMOL/L (ref 96–112)
CO2 SERPL-SCNC: 26 MMOL/L (ref 20–33)
COLOR UR: YELLOW
COVID ORDER STATUS COVID19: NORMAL
CREAT SERPL-MCNC: 0.56 MG/DL (ref 0.5–1.4)
EOSINOPHIL # BLD AUTO: 0.01 K/UL (ref 0–0.51)
EOSINOPHIL NFR BLD: 0.1 % (ref 0–6.9)
EPI CELLS #/AREA URNS HPF: NEGATIVE /HPF
ERYTHROCYTE [DISTWIDTH] IN BLOOD BY AUTOMATED COUNT: 46.8 FL (ref 35.9–50)
ETHANOL BLD-MCNC: <10.1 MG/DL (ref 0–10)
FLUAV RNA SPEC QL NAA+PROBE: NEGATIVE
FLUBV RNA SPEC QL NAA+PROBE: NEGATIVE
GLOBULIN SER CALC-MCNC: 3.6 G/DL (ref 1.9–3.5)
GLUCOSE BLD-MCNC: 128 MG/DL (ref 65–99)
GLUCOSE SERPL-MCNC: 123 MG/DL (ref 65–99)
GLUCOSE UR STRIP.AUTO-MCNC: NEGATIVE MG/DL
HCG SERPL QL: NEGATIVE
HCT VFR BLD AUTO: 41.9 % (ref 37–47)
HGB BLD-MCNC: 14 G/DL (ref 12–16)
HYALINE CASTS #/AREA URNS LPF: ABNORMAL /LPF
IMM GRANULOCYTES # BLD AUTO: 0.07 K/UL (ref 0–0.11)
IMM GRANULOCYTES NFR BLD AUTO: 0.6 % (ref 0–0.9)
KETONES UR STRIP.AUTO-MCNC: 15 MG/DL
LEUKOCYTE ESTERASE UR QL STRIP.AUTO: NEGATIVE
LYMPHOCYTES # BLD AUTO: 0.73 K/UL (ref 1–4.8)
LYMPHOCYTES NFR BLD: 6.6 % (ref 22–41)
MCH RBC QN AUTO: 34.1 PG (ref 27–33)
MCHC RBC AUTO-ENTMCNC: 33.4 G/DL (ref 33.6–35)
MCV RBC AUTO: 102.2 FL (ref 81.4–97.8)
METHADONE UR QL SCN: NEGATIVE
MICRO URNS: ABNORMAL
MONOCYTES # BLD AUTO: 1.45 K/UL (ref 0–0.85)
MONOCYTES NFR BLD AUTO: 13.2 % (ref 0–13.4)
NEUTROPHILS # BLD AUTO: 8.69 K/UL (ref 2–7.15)
NEUTROPHILS NFR BLD: 79.1 % (ref 44–72)
NITRITE UR QL STRIP.AUTO: NEGATIVE
NRBC # BLD AUTO: 0 K/UL
NRBC BLD-RTO: 0 /100 WBC
OPIATES UR QL SCN: NEGATIVE
OXYCODONE UR QL SCN: NEGATIVE
PCP UR QL SCN: NEGATIVE
PH UR STRIP.AUTO: 6.5 [PH] (ref 5–8)
PLATELET # BLD AUTO: 251 K/UL (ref 164–446)
PMV BLD AUTO: 10.6 FL (ref 9–12.9)
POTASSIUM SERPL-SCNC: 3.6 MMOL/L (ref 3.6–5.5)
PROPOXYPH UR QL SCN: NEGATIVE
PROT SERPL-MCNC: 7.2 G/DL (ref 6–8.2)
PROT UR QL STRIP: NEGATIVE MG/DL
RBC # BLD AUTO: 4.1 M/UL (ref 4.2–5.4)
RBC # URNS HPF: ABNORMAL /HPF
RBC UR QL AUTO: ABNORMAL
RSV RNA SPEC QL NAA+PROBE: NEGATIVE
SARS-COV-2 RNA RESP QL NAA+PROBE: NOTDETECTED
SODIUM SERPL-SCNC: 136 MMOL/L (ref 135–145)
SP GR UR STRIP.AUTO: 1.02
SPECIMEN SOURCE: NORMAL
UROBILINOGEN UR STRIP.AUTO-MCNC: 1 MG/DL
WBC # BLD AUTO: 11 K/UL (ref 4.8–10.8)
WBC #/AREA URNS HPF: ABNORMAL /HPF

## 2020-11-22 PROCEDURE — 70450 CT HEAD/BRAIN W/O DYE: CPT

## 2020-11-22 PROCEDURE — 93005 ELECTROCARDIOGRAM TRACING: CPT | Performed by: EMERGENCY MEDICINE

## 2020-11-22 PROCEDURE — 99285 EMERGENCY DEPT VISIT HI MDM: CPT

## 2020-11-22 PROCEDURE — 83036 HEMOGLOBIN GLYCOSYLATED A1C: CPT

## 2020-11-22 PROCEDURE — 85025 COMPLETE CBC W/AUTO DIFF WBC: CPT

## 2020-11-22 PROCEDURE — 71045 X-RAY EXAM CHEST 1 VIEW: CPT

## 2020-11-22 PROCEDURE — 82962 GLUCOSE BLOOD TEST: CPT

## 2020-11-22 PROCEDURE — 0241U HCHG SARS-COV-2 COVID-19 NFCT DS RESP RNA 4 TRGT MIC: CPT

## 2020-11-22 PROCEDURE — 84703 CHORIONIC GONADOTROPIN ASSAY: CPT

## 2020-11-22 PROCEDURE — 81001 URINALYSIS AUTO W/SCOPE: CPT

## 2020-11-22 PROCEDURE — 83735 ASSAY OF MAGNESIUM: CPT

## 2020-11-22 PROCEDURE — 93005 ELECTROCARDIOGRAM TRACING: CPT

## 2020-11-22 PROCEDURE — 80307 DRUG TEST PRSMV CHEM ANLYZR: CPT

## 2020-11-22 PROCEDURE — 80053 COMPREHEN METABOLIC PANEL: CPT

## 2020-11-22 ASSESSMENT — FIBROSIS 4 INDEX: FIB4 SCORE: 2.62

## 2020-11-22 NOTE — DISCHARGE SUMMARY
Discharge Summary    CHIEF COMPLAINT ON ADMISSION  Chief Complaint   Patient presents with   • Altered Mental Status   • Headache       Reason for Admission  CONFUSED     Admission Date  11/12/2020    CODE STATUS  Prior    HPI & HOSPITAL COURSE  This 85-year-old female with a past medical history significant for hypertension, lipidemia was brought in ER on 11/12/2020 with a complaint of dysarthria.  CT head showed hypodensity in the posterior left cerebellar mass persisting moderate acute infarct. CTA head and neck didn't show any large vessel occlusion; so not a candidate for Thrombectomy.  She is noted to have aphasia, so difficult to obtain hx because of dysarthia.  Neurology consulted, not a TPA candidate.MRI of brain showed Small to moderate-sized area of acute infarct with hemorrhagic transformation in the left parietal lobe. Neurology recs OK to continue plavix  since hemorrhagic conversion is minimal.   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  Her hemoglobin A1c at 5.3, TSH normal, lipid panel normal.. She is noted to have microcytosis of 101.9, TSH at 2.3, vitamin B12 2395.    PT/OT has evaluated the patient, recommended postacute rehab versus home with home health with 24-hour care; discussed with the patient family including daughter, patient son Evan; patient/family decided to go home with home health.  She does have 24/7 care. Patient continues to remain dysarthric.  She otherwise did not have any focal weakness.    She received 3 days of antibiotics for the treatment of urinary tract infection.  She has complaining of mild flank pain, provided tramadol at the time of discharge.  She is recommended to follow-up with primary care physician and neurology as an outpatient.      Therefore, she is discharged in good and stable condition to home with close outpatient follow-up.    The patient met 2-midnight criteria for an inpatient stay at the time of discharge.    Discharge  Date  11/16/2020    FOLLOW UP ITEMS POST DISCHARGE  Clayton Kohli M.D.  [facility]       DISCHARGE DIAGNOSES  Active Problems:    Cerebral infarction (HCC) POA: Yes      Overview: Diagnois update 10/1/2016    Macrocytosis POA: Unknown  Resolved Problems:    Altered mental state POA: Yes    Acute cystitis without hematuria POA: Yes    Hypokalemia POA: Unknown      FOLLOW UP  No future appointments.  Clayton Kohli M.D.  [address] [facility] [address]  [phone]    Schedule an appointment as soon as possible for a visit in 1 week      [facility]  [address], [address]  [facility] [id number] [phone]  Call  Make an appointment as soon as possible    Clayton Kohli M.D.  [address]  [facility] [id number] [phone]            MEDICATIONS ON DISCHARGE     Medication List      START taking these medications      Instructions   atorvastatin 40 MG Tabs  Commonly known as: LIPITOR   Take 1 Tab by mouth every evening for 30 days.  Dose: 40 mg     clopidogrel 75 MG Tabs  Commonly known as: PLAVIX   Doctor's comments: Please take only for 21 days  Take 1 Tab by mouth every day for 21 days.  Dose: 75 mg        CONTINUE taking these medications      Instructions   acetaminophen 500 MG Tabs  Commonly known as: TYLENOL   Take 500-1,000 mg by mouth 2 times a day as needed for Moderate Pain.  Dose: 500-1,000 mg     docusate calcium 240 MG Caps  Commonly known as: SURFAK   Take 1 Cap by mouth every day.  Dose: 240 mg     escitalopram 10 MG Tabs  Commonly known as: Lexapro   Take 10 mg by mouth every day.  Dose: 10 mg     gabapentin 300 MG Caps  Commonly known as: NEURONTIN   Take 300 mg by mouth every bedtime.  Dose: 300 mg     magnesium oxide 400 MG Tabs tablet  Commonly known as: MAG-OX   Take 1 Tab by mouth every day.  Dose: 400 mg     omeprazole 20 MG delayed-release capsule  Commonly known as: PRILOSEC   Take 1 Cap by mouth every day. for gastritis  Dose: 20 mg         STOP taking these medications    aspirin 81 MG Chew chewable tablet  Commonly known as: ASA     atenolol 50 MG Tabs  Commonly known as: TENORMIN     enalapril 10 MG Tabs  Commonly known as: VASOTEC     enalapril 20 MG tablet  Commonly known as: VASOTEC     linaCLOtide 145 MCG Caps     simvastatin 40 MG Tabs  Commonly known as: ZOCOR     tizanidine 2 MG capsule  Commonly known as: ZANAFLEX     tramadol-acetaminophen 37.5-325 MG per tablet  Commonly known as: ULTRACET        ASK your doctor about these medications      Instructions   traMADol 50 MG Tabs  Commonly known as: ULTRAM  Ask about: Should I take this medication?   Take 1 Tab by mouth every 8 hours as needed for Severe Pain for up to 3 days.  Dose: 50 mg            Allergies  Allergies   Allergen Reactions   • Penicillins Unspecified     Unknown reaction  Tolerates cephalosporins       DIET  No orders of the defined types were placed in this encounter.      ACTIVITY  As tolerated.  Weight bearing as tolerated    CONSULTATIONS  Neurology    PROCEDURES  none    LABORATORY  Lab Results   Component Value Date    SODIUM 139 11/16/2020    POTASSIUM 3.6 11/16/2020    CHLORIDE 105 11/16/2020    CO2 26 11/16/2020    GLUCOSE 104 (H) 11/16/2020    BUN 14 11/16/2020    CREATININE 0.56 11/16/2020        Lab Results   Component Value Date    WBC 6.8 11/16/2020    HEMOGLOBIN 13.7 11/16/2020    HEMATOCRIT 41.7 11/16/2020    PLATELETCT 153 (L) 11/16/2020        Total time of the discharge process exceeds 35 minutes.    I went to the bedside, evaluate the patient, reviewed extensive data.  Patient is alert and oriented, Turkish-speaking, does have dysarthria.  She does not have any focal neurological deficit.  Explained the plan of care with the patient's son Evan in detail.

## 2020-11-23 PROBLEM — R53.81 DEBILITY: Status: ACTIVE | Noted: 2020-11-23

## 2020-11-23 PROBLEM — E86.0 DEHYDRATION: Status: ACTIVE | Noted: 2020-11-23

## 2020-11-23 PROBLEM — G93.40 ACUTE ENCEPHALOPATHY: Status: ACTIVE | Noted: 2020-11-23

## 2020-11-23 PROBLEM — R41.82 AMS (ALTERED MENTAL STATUS): Status: ACTIVE | Noted: 2020-11-23

## 2020-11-23 LAB
BLOOD CULTURE HOLD CXBCH: NORMAL
BLOOD CULTURE HOLD CXBCH: NORMAL
EKG IMPRESSION: NORMAL
EST. AVERAGE GLUCOSE BLD GHB EST-MCNC: 100 MG/DL
FOLATE SERPL-MCNC: 6.5 NG/ML
HBA1C MFR BLD: 5.1 % (ref 0–5.6)
MAGNESIUM SERPL-MCNC: 1.9 MG/DL (ref 1.5–2.5)
T4 FREE SERPL-MCNC: 1.37 NG/DL (ref 0.93–1.7)
TSH SERPL DL<=0.005 MIU/L-ACNC: 2.29 UIU/ML (ref 0.38–5.33)

## 2020-11-23 PROCEDURE — 700111 HCHG RX REV CODE 636 W/ 250 OVERRIDE (IP): Performed by: STUDENT IN AN ORGANIZED HEALTH CARE EDUCATION/TRAINING PROGRAM

## 2020-11-23 PROCEDURE — 700105 HCHG RX REV CODE 258: Performed by: STUDENT IN AN ORGANIZED HEALTH CARE EDUCATION/TRAINING PROGRAM

## 2020-11-23 PROCEDURE — 84443 ASSAY THYROID STIM HORMONE: CPT

## 2020-11-23 PROCEDURE — 92610 EVALUATE SWALLOWING FUNCTION: CPT

## 2020-11-23 PROCEDURE — 99218 PR INITIAL OBSERVATION CARE,LEVL I: CPT | Performed by: STUDENT IN AN ORGANIZED HEALTH CARE EDUCATION/TRAINING PROGRAM

## 2020-11-23 PROCEDURE — A9270 NON-COVERED ITEM OR SERVICE: HCPCS | Performed by: STUDENT IN AN ORGANIZED HEALTH CARE EDUCATION/TRAINING PROGRAM

## 2020-11-23 PROCEDURE — 700105 HCHG RX REV CODE 258: Performed by: EMERGENCY MEDICINE

## 2020-11-23 PROCEDURE — 36415 COLL VENOUS BLD VENIPUNCTURE: CPT

## 2020-11-23 PROCEDURE — 82746 ASSAY OF FOLIC ACID SERUM: CPT

## 2020-11-23 PROCEDURE — 84425 ASSAY OF VITAMIN B-1: CPT

## 2020-11-23 PROCEDURE — 84439 ASSAY OF FREE THYROXINE: CPT

## 2020-11-23 PROCEDURE — 700102 HCHG RX REV CODE 250 W/ 637 OVERRIDE(OP): Performed by: STUDENT IN AN ORGANIZED HEALTH CARE EDUCATION/TRAINING PROGRAM

## 2020-11-23 PROCEDURE — 770020 HCHG ROOM/CARE - TELE (206)

## 2020-11-23 RX ORDER — BISACODYL 10 MG
10 SUPPOSITORY, RECTAL RECTAL
Status: DISCONTINUED | OUTPATIENT
Start: 2020-11-23 | End: 2020-11-24 | Stop reason: HOSPADM

## 2020-11-23 RX ORDER — AMOXICILLIN 250 MG
2 CAPSULE ORAL 2 TIMES DAILY
Status: DISCONTINUED | OUTPATIENT
Start: 2020-11-23 | End: 2020-11-24 | Stop reason: HOSPADM

## 2020-11-23 RX ORDER — ACETAMINOPHEN 325 MG/1
650 TABLET ORAL EVERY 6 HOURS PRN
Status: DISCONTINUED | OUTPATIENT
Start: 2020-11-23 | End: 2020-11-23

## 2020-11-23 RX ORDER — ONDANSETRON 2 MG/ML
4 INJECTION INTRAMUSCULAR; INTRAVENOUS EVERY 4 HOURS PRN
Status: DISCONTINUED | OUTPATIENT
Start: 2020-11-23 | End: 2020-11-24 | Stop reason: HOSPADM

## 2020-11-23 RX ORDER — ESCITALOPRAM OXALATE 10 MG/1
10 TABLET ORAL DAILY
Status: DISCONTINUED | OUTPATIENT
Start: 2020-11-23 | End: 2020-11-24 | Stop reason: HOSPADM

## 2020-11-23 RX ORDER — SODIUM CHLORIDE, SODIUM LACTATE, POTASSIUM CHLORIDE, CALCIUM CHLORIDE 600; 310; 30; 20 MG/100ML; MG/100ML; MG/100ML; MG/100ML
INJECTION, SOLUTION INTRAVENOUS CONTINUOUS
Status: DISCONTINUED | OUTPATIENT
Start: 2020-11-23 | End: 2020-11-24 | Stop reason: HOSPADM

## 2020-11-23 RX ORDER — ACETAMINOPHEN 500 MG
1000 TABLET ORAL 3 TIMES DAILY
Status: DISCONTINUED | OUTPATIENT
Start: 2020-11-23 | End: 2020-11-24 | Stop reason: HOSPADM

## 2020-11-23 RX ORDER — ATORVASTATIN CALCIUM 40 MG/1
40 TABLET, FILM COATED ORAL EVERY EVENING
Status: DISCONTINUED | OUTPATIENT
Start: 2020-11-23 | End: 2020-11-24 | Stop reason: HOSPADM

## 2020-11-23 RX ORDER — SODIUM CHLORIDE 9 MG/ML
500 INJECTION, SOLUTION INTRAVENOUS ONCE
Status: COMPLETED | OUTPATIENT
Start: 2020-11-23 | End: 2020-11-23

## 2020-11-23 RX ORDER — CLOPIDOGREL BISULFATE 75 MG/1
75 TABLET ORAL DAILY
Status: DISCONTINUED | OUTPATIENT
Start: 2020-11-23 | End: 2020-11-24 | Stop reason: HOSPADM

## 2020-11-23 RX ORDER — POLYETHYLENE GLYCOL 3350 17 G/17G
1 POWDER, FOR SOLUTION ORAL
Status: DISCONTINUED | OUTPATIENT
Start: 2020-11-23 | End: 2020-11-24 | Stop reason: HOSPADM

## 2020-11-23 RX ORDER — ONDANSETRON 4 MG/1
4 TABLET, ORALLY DISINTEGRATING ORAL EVERY 4 HOURS PRN
Status: DISCONTINUED | OUTPATIENT
Start: 2020-11-23 | End: 2020-11-24 | Stop reason: HOSPADM

## 2020-11-23 RX ADMIN — SODIUM CHLORIDE, POTASSIUM CHLORIDE, SODIUM LACTATE AND CALCIUM CHLORIDE 1 ML: 600; 310; 30; 20 INJECTION, SOLUTION INTRAVENOUS at 05:13

## 2020-11-23 RX ADMIN — ATORVASTATIN CALCIUM 40 MG: 40 TABLET, FILM COATED ORAL at 18:07

## 2020-11-23 RX ADMIN — ACETAMINOPHEN 1000 MG: 500 TABLET ORAL at 18:07

## 2020-11-23 RX ADMIN — SODIUM CHLORIDE 500 ML: 9 INJECTION, SOLUTION INTRAVENOUS at 00:41

## 2020-11-23 RX ADMIN — ACETAMINOPHEN 650 MG: 325 TABLET, FILM COATED ORAL at 02:24

## 2020-11-23 RX ADMIN — THIAMINE HYDROCHLORIDE 300 MG: 100 INJECTION, SOLUTION INTRAMUSCULAR; INTRAVENOUS at 14:20

## 2020-11-23 RX ADMIN — DOCUSATE SODIUM 50 MG AND SENNOSIDES 8.6 MG 2 TABLET: 8.6; 5 TABLET, FILM COATED ORAL at 18:07

## 2020-11-23 ASSESSMENT — COGNITIVE AND FUNCTIONAL STATUS - GENERAL
CLIMB 3 TO 5 STEPS WITH RAILING: A LOT
WALKING IN HOSPITAL ROOM: A LOT
MOBILITY SCORE: 14
PERSONAL GROOMING: A LITTLE
SUGGESTED CMS G CODE MODIFIER MOBILITY: CL
DRESSING REGULAR UPPER BODY CLOTHING: A LOT
DRESSING REGULAR LOWER BODY CLOTHING: A LOT
SUGGESTED CMS G CODE MODIFIER DAILY ACTIVITY: CK
TOILETING: A LOT
DAILY ACTIVITIY SCORE: 15
MOVING TO AND FROM BED TO CHAIR: A LOT
HELP NEEDED FOR BATHING: A LOT
STANDING UP FROM CHAIR USING ARMS: A LOT
MOVING FROM LYING ON BACK TO SITTING ON SIDE OF FLAT BED: A LOT

## 2020-11-23 ASSESSMENT — ENCOUNTER SYMPTOMS: HEADACHES: 1

## 2020-11-23 ASSESSMENT — FIBROSIS 4 INDEX
FIB4 SCORE: 2.39
FIB4 SCORE: 2.39

## 2020-11-23 ASSESSMENT — PAIN DESCRIPTION - PAIN TYPE: TYPE: ACUTE PAIN

## 2020-11-23 NOTE — ASSESSMENT & PLAN NOTE
Tylenol 1 g 3 times daily  Given tramadol by daughter, improvement in headache, AMS following tramadol.  Tramadol also not great choice for headache, will avoid as it could make headache worse and also precipitate delirium  Cannabinoid + UDS, daughter and granddaughter deny use; have attempted to reach family to discuss, unsuccessful 11/23, will continue efforts

## 2020-11-23 NOTE — THERAPY
Occupational Therapy Contact Note    Attempted OT evaluation. In house  present and interpreting. Pt refusing OOB activity, confused, attempted to explain role of therapy. Will attempt later as able.    Makenzie Ndiaye, OTR/L

## 2020-11-23 NOTE — DISCHARGE PLANNING
Anticipated Discharge Disposition: TBD    Action: Patient discussed in IDT rounds, she is pending evals from PT/OT as she previously refused.     Barriers to Discharge: PT/OT evals, medical clearance    Plan: Case coordination to f/u with treatment team for discharge planning needs

## 2020-11-23 NOTE — H&P
Hospital Medicine History & Physical Note    Date of Service  11/23/2020    Primary Care Physician  Clayton Kohli M.D.    Consultants  None     Code Status  Full Code    Chief Complaint  Chief Complaint   Patient presents with   • ALOC     onset last night at 2300, worsening since 0800 this morning. Disoriented to place.   • Shoulder Pain     R   • Headache   • Hypoxemia     89% SPO2 RA   • Possible Stroke   • Body Aches     .History of Presenting Illness  Sushma Bowden is a 85 y.o. female with a past medical hx of debility, CVA 11 days ago, who presents with a chief complaint of altered mental status, headache, and body aches that started yesterday around 5:30 PM.   Patient currently using a walker at home.  Prior to the stroke she was able to take care of herself independently.  Yesterday, she began to complain of a headache on the left side of her head and her daughter gave her tramadol.  This seemed to improve the headache but she has experienced intermittent confusion as well.  This morning, the headache had returned and she remained confused.  She began to complain of pain down the entire left side of her body and family was unable to even put her pajamas on because her leg hurt so much.  Granddaughter called EMS and they noted that she was hypoxic to 89% on room air.  She does not require oxygen at baseline at home.  Family denies any fevers at home, chest pain, shortness of breath, cough, nausea, vomiting.  Patient receives CT head in the ED that shows no acute intracranial changes.  COVID test is negative.    Daughter states that patient has not had anything po since 5 pm today but has had a normal appetite today.   Patient is admitted to the hospital service for observation and MRI is ordered.  Granddaughter states that patient did not recognize family members today at home however improves to AAA x2 to self and location in the emergency department, and is conversant and aware with family members during  physical exam by me.    Review of Systems  Review of Systems   Neurological:        AMS, headaches for past 24 hours, did not recognize her family members this evening    AAAX2 to self and location, family states that she is at her baseline    ONLY [place] SPEAKING   All other systems reviewed and are negative.      Past Medical History   has a past medical history of Hyperlipidemia and Hypertension.    Surgical History   has no past surgical history on file.     Family History  family history is not on file.     Social History   reports that she has never smoked. She has never used smokeless tobacco. She reports that she does not drink alcohol or use drugs.    Allergies  Allergies   Allergen Reactions   • Penicillins Unspecified     Unknown reaction  Tolerates cephalosporins       Medications  Prior to Admission Medications   Prescriptions Last Dose Informant Patient Reported? Taking?   acetaminophen (TYLENOL) 500 MG Tab  Rx Bottle (For Med Information) Yes No   Sig: Take 500-1,000 mg by mouth 2 times a day as needed for Moderate Pain.   atorvastatin (LIPITOR) 40 MG Tab   No No   Sig: Take 1 Tab by mouth every evening for 30 days.   clopidogrel (PLAVIX) 75 MG Tab   No No   Sig: Take 1 Tab by mouth every day for 21 days.   docusate calcium (SURFAK) 240 MG CAPS  Rx Bottle (For Med Information) No No   Sig: Take 1 Cap by mouth every day.   Patient not taking: Reported on 11/12/2020   escitalopram (LEXAPRO) 10 MG Tab  Rx Bottle (For Med Information) Yes No   Sig: Take 10 mg by mouth every day.   gabapentin (NEURONTIN) 300 MG Cap  Rx Bottle (For Med Information) Yes No   Sig: Take 300 mg by mouth every bedtime.   magnesium oxide (MAG-OX) 400 (241.3 Mg) MG Tab tablet  Rx Bottle (For Med Information) No No   Sig: Take 1 Tab by mouth every day.   Patient not taking: Reported on 11/12/2020   omeprazole (PRILOSEC) 20 MG CPDR  Rx Bottle (For Med Information) No No   Sig: Take 1 Cap by mouth every day. for gastritis   Patient  not taking: Reported on 11/12/2020      Facility-Administered Medications: None       Physical Exam  Temp:  [36 °C (96.8 °F)-36.8 °C (98.3 °F)] 36 °C (96.8 °F)  Pulse:  [60-71] 69  Resp:  [15-21] 18  BP: (133-190)/() 133/79  SpO2:  [89 %-98 %] 94 %    Physical Exam  Vitals signs and nursing note reviewed.   Constitutional:       General: She is not in acute distress.     Appearance: She is not toxic-appearing.      Comments: Frail elderly female, seen resting comfortably with daughter and granddaughter at bedside   HENT:      Head: Normocephalic and atraumatic.      Nose: Nose normal. No congestion.      Mouth/Throat:      Mouth: Mucous membranes are dry.      Pharynx: Oropharynx is clear. No oropharyngeal exudate or posterior oropharyngeal erythema.   Eyes:      Extraocular Movements: Extraocular movements intact.      Conjunctiva/sclera: Conjunctivae normal.      Pupils: Pupils are equal, round, and reactive to light.   Neck:      Musculoskeletal: Normal range of motion and neck supple.   Cardiovascular:      Rate and Rhythm: Normal rate and regular rhythm.      Pulses: Normal pulses.      Heart sounds: Normal heart sounds. No murmur. No gallop.    Pulmonary:      Effort: Respiratory distress (at 2NC 98% oxygenation) present.      Breath sounds: No stridor. No wheezing, rhonchi or rales.   Abdominal:      General: Abdomen is flat. Bowel sounds are normal. There is no distension.      Palpations: Abdomen is soft. There is no mass.      Tenderness: There is no abdominal tenderness. There is no guarding.   Musculoskeletal: Normal range of motion.      Right lower leg: No edema.      Left lower leg: No edema.   Skin:     General: Skin is warm and dry.      Capillary Refill: Capillary refill takes less than 2 seconds.      Findings: No lesion or rash.   Neurological:      Mental Status: She is alert.      Comments: AAAX2 to self and location.  This is baseline per family.    Ambulates with walker since  stroke  Needs assistance with ADL's  Daughter blends her diet prior to meals, swallow study is ordered.     Psychiatric:         Mood and Affect: Mood normal.         Behavior: Behavior normal.         Laboratory:  Recent Labs     11/22/20  2100   WBC 11.0*   RBC 4.10*   HEMOGLOBIN 14.0   HEMATOCRIT 41.9   .2*   MCH 34.1*   MCHC 33.4*   RDW 46.8   PLATELETCT 251   MPV 10.6     Recent Labs     11/22/20  2100   SODIUM 136   POTASSIUM 3.6   CHLORIDE 98   CO2 26   GLUCOSE 123*   BUN 9   CREATININE 0.56   CALCIUM 9.4     Recent Labs     11/22/20  2100   ALTSGPT 26   ASTSGOT 36   ALKPHOSPHAT 97   TBILIRUBIN 0.9   GLUCOSE 123*         No results for input(s): NTPROBNP in the last 72 hours.      No results for input(s): TROPONINT in the last 72 hours.    Imaging:  CT-HEAD W/O   Final Result      1. No CT evidence of acute infarct, hemorrhage or mass.   2. Moderate global parenchymal atrophy. Chronic small vessel ischemic changes.      DX-CHEST-PORTABLE (1 VIEW)   Final Result         1. Bibasilar atelectasis/scarring. No focal consolidation or pleural effusions.   2. Tortuous aorta with prominent mediastinum, which may be due to an ascending aortic aneurysm. This can be followed with a CTA chest.      MR-BRAIN-W/O    (Results Pending)         Assessment/Plan:  I anticipate this patient is appropriate for observation status at this time.    Cerebral infarction (HCC)- (present on admission)  Assessment & Plan  Recent hx of CVA, possible TIA   New onset AMS, waxing and waning  Swallow study and precautions ordered  Fall and seizure precautions in place  MRI ordered  Neuro checks ordered        Headache- (present on admission)  Assessment & Plan  Tylenol 650 prn  Given tramadol by daughter, improvement in headache, AMS following  Cannabinoid + UDS, daughter and granddaughter deny use        Debility- (present on admission)  Assessment & Plan  Previously ambulating and needing no assistance with ADL's  Large deficits  following stroke 11 days prior, currently dependent on family for all ADL's  Consider SW consult  HH ordered upon previous admission    AMS (altered mental status)- (present on admission)  Assessment & Plan  Waxing and waning AMS  CT head:No CT evidence of acute infarct, hemorrhage or mass. 2. Moderate global parenchymal atrophy. Chronic small vessel ischemic changes  AAAX2 to self and location, family states this is patient's baseline  MRI brain ordered  Ischemic stroke 11 days prior

## 2020-11-23 NOTE — PROGRESS NOTES
Spoke to son, Evan  Patient lives with his sister    Prior to stroke he denies she had major mentation problems. Reports occasional forgetfulness prior to stroke but nothing major. Prior to most recent stroke she was pretty much entirely independent in ADLs. After recent discharge she was not able to recognize grandchildren, had difficulty word finding and understanding conversation. After previous CVAs she had recovered most function after several weeks despite initial deficits.     Denies mother ever drank or used cannabis, has no idea how/why mother was positive for cannabis unless sister something to her.   She has chronic back pain for many years but does not use narcotics.    Son is now open minded to SNF    Kel Jennings M.D.

## 2020-11-23 NOTE — THERAPY
"Speech Language Pathology   Initial Assessment     Patient Name: Sushma Bowden  AGE:  85 y.o., SEX:  female  Medical Record #: [id number]  Today's Date: 11/23/2020     Precautions  Precautions: Fall Risk, Swallow Precautions ( See Comments)    Assessment    Patient is an 86 y/o Irish speaking F admitted 11/22/20 with AMS, headache, body aches, recent CVA 11 days ago. PMhx includes HLD, HTN. CXR 11/22: \"Bibasilar atelectasis/scarring. No focal consolidation or pleural effusions.\" CT Head was negative for acute abnormality. MRI 11/14: \"1.  Small to moderate-sized area of acute infarct with hemorrhagic transformation in the left parietal lobe. 2.  Multifocal chronic lacunar infarcts in the brainstem, right thalamus and left cerebellum.\" Pt was on a LQ3/MT2 diet during previous admission as of 11/12/20.     Clinical swallow evaluation was completed at bedside with [facility] Cert[facility]  present to translate in Irish. Pt was A&O to self, cooperative but confused. Oral mech exam was only partially completed d/t confusion. Pt noted to have slight R facial droop, edentulous oral cavity w/ dentures in place. Pt was presented with ice chips, thins via tsp/cup, mildly thick liquids via tsp/cup, liquidized, pureed and soft/bite sized solids. Pt demo'd prolonged mastication w/ soft/bite solids and oral holding w/ delayed swallow response. No s/sx of aspiration occurred with PO intake. Recommend initiating a diet of Minced and Moist Solids (MM5) and Thin Liquids (TN0) with direct meal supervision/feeding A as needed d/t impaired cognition.     Plan    Minced and Moist Solids (MM5) and Thin Liquids (TN0) with direct meal supervision/feeding A d/t impaired cognition.     Recommend Speech Therapy 3 times per week until therapy goals are met for the following treatments:  Dysphagia Training and Patient / Family / Caregiver Education.    Discharge Recommendations: Recommend post-acute placement for additional speech therapy " services prior to discharge home    Subjective    Pt was alert, confused, cooperative.      Objective       11/23/20 1227   Oral Motor Eval    Is Patient Able to Complete Oral Motor Eval Yes but Impaired  (partially completed d/t confusion)   Labial Function   Labial Structure At Rest Minimal  (slight R facial droop)   Frown, Pucker Within Functional Limits   Lingual Function   Lingual Structure At Rest Within Functional Limits   Lingual Protrude Within Functional Limits   Lingual Retract Within Functional Limits   Elevate In Mouth Within Functional Limits   Lateralization Minimal Right;Minimal Left   Lick Lips (Circular) Minimal   Jaw   Jaw Structure At Rest Within Functional Limits   Bite ([place]) Within Functional Limits   Chew (Rotary) Within Functional Limits   Velar Function   Velar Structure At Rest Within Functional Limits   Laryngeal Function   Voice Quality Within Functional Limits   Volutional Cough Within Functional Limits   Excursion Upon Swallow Weak    Oral Food Presentation   Ice Chips Within Functional Limits   Single Swallow Thin (0) Within Functional Limits   Liquidised (3) Within Functional Limits   Pureed (4) Within Functional Limits   Soft & Bite-Sized (6) - (Dysphagia III) Minimal  (prolonged mastication)   Dysphagia Strategies / Recommendations   Compensatory Strategies Head of Bed 90 Degrees During Eating / Drinking;Single Sips / Bites;Direct Supervision During Meals;No Straws;Other (Comment)  (minimize distractions)   Diet / Liquid Recommendation Minced & Moist (5) - (Dysphagia II);Thin (0)   Medication Administration  Float Whole with Puree;Crush all Medications in Puree  (per pt preference)   Therapy Interventions Dysphagia Therapy By Speech Language Pathologist;One To One Supervision With Nursing   Short Term Goals   Short Term Goal # 1 Patient will consume meals of MM5/TN0 with no s/sx of aspiration given 1:1 superivision/feeding A.

## 2020-11-23 NOTE — ED NOTES
Pt aox1, skin pink warm and dry, airway patent, rr even and unlabored, nad noted. No new complaints. Pt transports with SHEILA Caldera to floor on cardiac monitor in stable condition.

## 2020-11-23 NOTE — ED PROVIDER NOTES
ED Provider Note    CHIEF COMPLAINT  Chief Complaint   Patient presents with   • ALOC     onset last night at 2300, worsening since 0800 this morning. Disoriented to place.   • Shoulder Pain     R   • Headache   • Hypoxemia     89% SPO2 RA   • Possible Stroke   • Body Aches       HPI  Sushma Bowden is a 85 y.o. female who presents with a chief complaint of altered mental status, headache, and body aches that started yesterday around 5:30 PM.  Patient was recently admitted to this facility for stroke and after discharge she required significant assistance with her activities of daily living including even standing up.  Prior to the stroke she was able to take care of herself independently.  Yesterday, she began to complain of a headache on the left side of her head and her daughter gave her tramadol.  This seemed to improve the headache but she was confused as well.  This morning, the headache had returned and she remained confused.  She began to complain of pain down the entire left side of her body and family was unable to even put her pajamas on because her leg hurt so much.  Granddaughter called EMS and they noted that she was hypoxic to 89% on room air.  She does not require oxygen at baseline.  Family denies any fevers at home, chest pain, shortness of breath, cough, nausea, vomiting.  She is not been exposed to COVID-19 family is aware of.    REVIEW OF SYSTEMS  See HPI for further details.  Altered mental status.  Body aches.  Headache.  Hypoxemia.  All other systems are negative.     PAST MEDICAL HISTORY   has a past medical history of Hyperlipidemia and Hypertension.    SOCIAL HISTORY  Social History     Tobacco Use   • Smoking status: Never Smoker   • Smokeless tobacco: Never Used   Substance and Sexual Activity   • Alcohol use: No   • Drug use: No   • Sexual activity: Not on file       SURGICAL HISTORY  patient denies any surgical history    CURRENT MEDICATIONS  Home Medications    **Home medications have  not yet been reviewed for this encounter**         ALLERGIES  Allergies   Allergen Reactions   • Penicillins Unspecified     Unknown reaction  Tolerates cephalosporins       PHYSICAL EXAM  VITAL SIGNS: BP (!) 189/76   Pulse 71   Temp 36.8 °C (98.3 °F) (Temporal)   Resp 20   Ht 1.524 m (5')   Wt 68.9 kg (152 lb)   SpO2 96%   BMI 29.69 kg/m²    Pulse ox interpretation: I interpret this pulse ox as normal.  Constitutional: Awake, alert, chronically ill-appearing elderly woman lying in bed in no acute distress.  HENT: No signs of trauma, Bilateral external ears normal, Nose normal.  Moist mucous membranes.  Eyes: Pupils are equal and reactive, Conjunctiva normal, Non-icteric.   Neck: Normal range of motion, No tenderness, Supple, No stridor.   Lymphatic: No lymphadenopathy noted.   Cardiovascular: Regular rate and rhythm, no murmurs. Pulses symmetrical.  Thorax & Lungs: Normal breath sounds, No respiratory distress, No wheezing, No chest tenderness.   Abdomen: Bowel sounds normal, Soft, No tenderness, No masses, No pulsatile masses. No peritoneal signs.  Skin: Warm, Dry, No erythema, No rash.   Back: Normal alignment.  Extremities: Intact distal pulses, No edema, No tenderness, No cyanosis.  Musculoskeletal: No tenderness to palpation or major deformities noted.   Neurologic: Alert, confused.  Psychiatric: Pleasant and cooperative.     DIAGNOSTIC STUDIES / PROCEDURES    LABS  Results for orders placed or performed during the hospital encounter of 11/22/20   CBC WITH DIFFERENTIAL   Result Value Ref Range    WBC 11.0 (H) 4.8 - 10.8 K/uL    RBC 4.10 (L) 4.20 - 5.40 M/uL    Hemoglobin 14.0 12.0 - 16.0 g/dL    Hematocrit 41.9 37.0 - 47.0 %    .2 (H) 81.4 - 97.8 fL    MCH 34.1 (H) 27.0 - 33.0 pg    MCHC 33.4 (L) 33.6 - 35.0 g/dL    RDW 46.8 35.9 - 50.0 fL    Platelet Count 251 164 - 446 K/uL    MPV 10.6 9.0 - 12.9 fL    Neutrophils-Polys 79.10 (H) 44.00 - 72.00 %    Lymphocytes 6.60 (L) 22.00 - 41.00 %     Monocytes 13.20 0.00 - 13.40 %    Eosinophils 0.10 0.00 - 6.90 %    Basophils 0.40 0.00 - 1.80 %    Immature Granulocytes 0.60 0.00 - 0.90 %    Nucleated RBC 0.00 /100 WBC    Neutrophils (Absolute) 8.69 (H) 2.00 - 7.15 K/uL    Lymphs (Absolute) 0.73 (L) 1.00 - 4.80 K/uL    Monos (Absolute) 1.45 (H) 0.00 - 0.85 K/uL    Eos (Absolute) 0.01 0.00 - 0.51 K/uL    Baso (Absolute) 0.04 0.00 - 0.12 K/uL    Immature Granulocytes (abs) 0.07 0.00 - 0.11 K/uL    NRBC (Absolute) 0.00 K/uL   COMP METABOLIC PANEL   Result Value Ref Range    Sodium 136 135 - 145 mmol/L    Potassium 3.6 3.6 - 5.5 mmol/L    Chloride 98 96 - 112 mmol/L    Co2 26 20 - 33 mmol/L    Anion Gap 12.0 7.0 - 16.0    Glucose 123 (H) 65 - 99 mg/dL    Bun 9 8 - 22 mg/dL    Creatinine 0.56 0.50 - 1.40 mg/dL    Calcium 9.4 8.5 - 10.5 mg/dL    AST(SGOT) 36 12 - 45 U/L    ALT(SGPT) 26 2 - 50 U/L    Alkaline Phosphatase 97 30 - 99 U/L    Total Bilirubin 0.9 0.1 - 1.5 mg/dL    Albumin 3.6 3.2 - 4.9 g/dL    Total Protein 7.2 6.0 - 8.2 g/dL    Globulin 3.6 (H) 1.9 - 3.5 g/dL    A-G Ratio 1.0 g/dL   HCG QUAL SERUM   Result Value Ref Range    Beta-Hcg Qualitative Serum Negative Negative   DIAGNOSTIC ALCOHOL   Result Value Ref Range    Diagnostic Alcohol <10.1 0.0 - 10.0 mg/dL   URINE DRUG SCREEN (TRIAGE)   Result Value Ref Range    Amphetamines Urine Negative Negative    Barbiturates Negative Negative    Benzodiazepines Negative Negative    Cocaine Metabolite Negative Negative    Methadone Negative Negative    Opiates Negative Negative    Oxycodone Negative Negative    Phencyclidine -Pcp Negative Negative    Propoxyphene Negative Negative    Cannabinoid Metab Positive (A) Negative   URINALYSIS    Specimen: Urine, Cath   Result Value Ref Range    Color Yellow     Character Clear     Specific Gravity 1.018 <1.035    Ph 6.5 5.0 - 8.0    Glucose Negative Negative mg/dL    Ketones 15 (A) Negative mg/dL    Protein Negative Negative mg/dL    Bilirubin Negative Negative     Urobilinogen, Urine 1.0 Negative    Nitrite Negative Negative    Leukocyte Esterase Negative Negative    Occult Blood Trace (A) Negative    Micro Urine Req Microscopic    COVID/SARS CoV-2 PCR    Specimen: Nasopharyngeal; Respirate   Result Value Ref Range    COVID Order Status Received    CoV-2, Flu A/B, And RSV by PCR   Result Value Ref Range    Influenza virus A RNA Negative Negative    Influenza virus B, PCR Negative Negative    RSV, PCR Negative Negative    SARS-CoV-2 by PCR NotDetected     SARS-CoV-2 Source Nasal Swab    ESTIMATED GFR   Result Value Ref Range    GFR If African American >60 >60 mL/min/1.73 m 2    GFR If Non African American >60 >60 mL/min/1.73 m 2   URINE MICROSCOPIC (W/UA)   Result Value Ref Range    WBC 0-2 /hpf    RBC 5-10 (A) /hpf    Bacteria Negative None /hpf    Epithelial Cells Negative /hpf    Hyaline Cast 0-2 /lpf   Blood Culture,Hold   Result Value Ref Range    Blood Culture Hold Collected    Blood Culture,Hold   Result Value Ref Range    Blood Culture Hold Collected    ACCU-CHEK GLUCOSE   Result Value Ref Range    Glucose - Accu-Ck 128 (H) 65 - 99 mg/dL   EKG   Result Value Ref Range    Report       [facility]    Test Date:  2020  Pt Name:    FABIAAN LEMON               Department: ER  MRN:        [id number]                      Room:       RD 03  Gender:     Female                       Technician: [id number]  :        [id number]                   Requested By:ER TRIAGE PROTOCOL  Order #:    [id number]                    Reading MD: Ramana Garcia MD    Measurements  Intervals                                Axis  Rate:       65                           P:          -14  CA:         168                          QRS:        -34  QRSD:       100                          T:          11  QT:         432  QTc:        450    Interpretive Statements  SINUS RHYTHM  LEFT AXIS DEVIATION  LVH WITH SECONDARY REPOLARIZATION ABNORMALITY  BASELINE WANDER IN LEAD(S)  V6  Compared to ECG 11/12/2020 13:59:10  Atrial premature complex(es) no longer present  Electronically Signed On 11- 1:06:52 PST by Ramana Garcia MD           RADIOLOGY  CT head  Chest x-ray    COURSE & MEDICAL DECISION MAKING  Pertinent Labs & Imaging studies reviewed. (See chart for details)  Records obtained and reviewed: Patient was admitted to this facility 11/12/2020 for altered mental status and headache which was found to be due to a hypodensity in the posterior left cerebellum suggesting acute infarct.  No large vessel occlusion so patient was not a candidate for thrombectomy.  She had aphasia and dysarthria so it was difficult to obtain a history.  She was not a TPA candidate per neurology who was consulted.  An MRI showed a moderate sized area of acute infarct with hemorrhagic transformation in the left parietal lobe.  Neurology recommended continuing Plavix since the hemorrhagic conversion is minimal.  Echo revealed LVH likely secondary to hypertension.  She received 3 days of antibiotics for urinary tract infection.  She was provided tramadol for mild left flank pain at discharge.    This is an 85-year-old female with a recent stroke who is here with altered mental status.  Differential diagnosis includes, but is not limited to, stroke, TIA, infection, hypoglycemia, COVID-19.    Arrives hypoxic on room air and requiring 2 L supplemental oxygen.  No known fevers at home and here she is hypertensive but afebrile with otherwise normal vital signs.  She is confused with no focal neurologic deficits.    Point-of-care blood glucose is 128.    CBC reveals leukocytosis to 11 with a neutrophilic predominance.  Metabolic panel reveals hyperglycemia without any other acute abnormality.  Diagnostic alcohol is negative.  Urinalysis suggests dehydration with ketones but does not demonstrate infection.  She is Covid and influenza negative.    Chest x-ray is without acute abnormality.  She does have a prominent  mediastinum as noted on the x-ray which is consistent with her history of an ascending aortic aneurysm of 4.2 cm.  CT head is without evidence for acute infarct, hemorrhage, or mass.    Patient was given a small bolus of IV fluids for dehydration.  She will require hospitalization for additional work-up and management.  This was discussed with the hospitalist and the patient was admitted in guarded condition.    HYDRATION  HYDRATION: Based on the patient's presentation of Dehydration the patient was given IV fluids. IV Hydration was used because oral hydration was not adequate alone. Upon recheck following hydration, the patient was Improved.    FINAL IMPRESSION  1.  Altered mental status    Electronically signed by: Ramana Garcia M.D., 11/22/2020 9:18 PM

## 2020-11-23 NOTE — ASSESSMENT & PLAN NOTE
Waxing and waning AMS  Unclear baseline mentation, currently only oriented to self.   UDS positive for cannabinoids, possible she was given something at home that is producing her altered mental status, according to chart review patient and family have denied drugs, will continue to attempt to discuss with family.   Had recent ischemic stroke, imaging shows evidence of previous infarcts and chronic microvascular ischemic changes.  Have attempted to reach family to discuss her baseline unsuccessful so far 11/23, will continue efforts.  Suspect there is component of dementia, patient will likely need placement  CT head negative for acute changes  -MRI brain pending  -SNF referral  -Neurology consult  -Avoid medications that may further altered mental status  -Check thyroid, folate (had recent normal B12)  -Check thiamine level, empiric replacement    Delirium prevention interventions:  -do not disturb the patient (vitals or lab draws) between the hours of 10 PM and 6 AM except as necessary   -ideally the patient should not sleep during the day and we should avoid day time naps.   -up in chair for meals  -ambulate at least three times daily, as able  -watch for constipation  -timed voiding - ask patient if they would like to go to the bathroom q 2-3 hours, except during the do not disturb hours.   -remove all unnecessary lines (central lines, peripheral IVs, feeding tubes, haq catheters)  -minimize polypharmacy, if possible, medications should not be dosed during sleep hours

## 2020-11-23 NOTE — ASSESSMENT & PLAN NOTE
Likely secondary due to decreased oral intake  Ketones in urine  Gentle IV fluid rehydration  Swallow eval

## 2020-11-23 NOTE — CARE PLAN
Problem: Communication  Goal: The ability to communicate needs accurately and effectively will improve  Outcome: PROGRESSING SLOWER THAN EXPECTED  Intervention: [place] patient and significant other/support system to call light to alert staff of needs  Flowsheets (Taken 11/23/2020 1221)  Oriented to::   All of the Following : Location of Bathroom, Visiting Policy, Unit Routine, Call Light and Bedside Controls, Bedside Rail Policy, Smoking Policy, Rights and Responsibilities, Bedside Report, and Patient Education Notebook   Bedside Rail Policy   Location of bathroom   Smoking Policy   Visiting Policy   Patient Rights and Responsibilities   Unit Routine   Patient Education Notebook   Call Light & Bedside Controls   Bedside Report  Note: Patient A&O x 1, no learning evidence.      Problem: Safety  Goal: Will remain free from falls  Outcome: PROGRESSING AS EXPECTED  Intervention: Implement fall precautions  Flowsheets  Taken 11/23/2020 1221  Bed Alarm: Yes - Alarm On  IV Pole on Same Side of Bed as Bathroom: Yes  Bedrails: Bedrails Closest to Bathroom Down  Chair/Bed Strip Alarm: Yes - Alarm On  Taken 11/23/2020 0742  Environmental Precautions:   Treaded Slipper Socks on Patient   Personal Belongings, Wastebasket, Call Bell etc. in Easy Reach   Transferred to Stronger Side   Report Given to Other Health Care Providers Regarding Fall Risk   Bed in Low Position   Communication Sign for Patients & Families   Mobility Assessed & Appropriate Sign Placed

## 2020-11-23 NOTE — ED TRIAGE NOTES
Chief Complaint   Patient presents with   • ALOC     onset last night at 2300, worsening since 0800 this morning. Disoriented to place.   • Shoulder Pain     R   • Headache   • Hypoxemia     89% SPO2 RA   • Possible Stroke   • Body Aches     Patient bib EMS from home.  PTA PIV placed, NS 250ml.    Patient Czech speaking,  used; patient disoriented to place.  Family at patient side, reports CVA on 11/12/2020 - TPA administered.     Chart up for ERP.

## 2020-11-23 NOTE — ASSESSMENT & PLAN NOTE
Previously ambulating and needing no assistance with ADL's  Large deficits following stroke 11 days prior, currently dependent on family for all ADL's  Consider SW consult  PT/OT  HH ordered upon previous admission, the patient will likely need SNF referral

## 2020-11-23 NOTE — PROGRESS NOTES
Bedside report received. Pt A&Ox1. Call light and belongings within reach. Bed locked and in lowest position. Alarm and fall precautions in place.

## 2020-11-23 NOTE — DISCHARGE PLANNING
[facility] Transitional [facility] Coordination     Referral from:  Dr. Harrell    Facesheet indicates: MCR/PAPA FFS    Potential Rehab Diagnosis: CVA?    Chart review indicates patient may have on going medical management and may have therapy needs to possibly meet inpatient rehab facility criteria with the goal of returning to community.    D/C support: TBD     Physiatry consultation pended per protocol.      CVA?  W/U & TX pending.  Currently refusing to work with TX.  Waiting on additional information to determine appropriateness for acute inpatient rehabilitation. Will continue to follow.     Last Covid test date: 11-22-20 negative    Thank you for the referral.

## 2020-11-23 NOTE — PROGRESS NOTES
Pt received from ED red 3. Assumed patient care. Tele monitor in place, monitor room notified. VSS. Patient Gambian speaking only,  services used. Safety/Fall precautions in place. Bed in lowest/locked position. Bed alarm on. Call light within reach.

## 2020-11-23 NOTE — ASSESSMENT & PLAN NOTE
Recent hx of CVA, possible TIA or extension of recent infarct  New onset AMS, waxing and waning  Swallow study and precautions ordered  Fall and seizure precautions in place  PT/OT  MRI pending  Neuro checks   Neurology consult  SNF referral  Continue statin, antiplatelet

## 2020-11-23 NOTE — PROGRESS NOTES
Patient Zambian speaking only, does not communicate well with interpretor. Does not follow commands. A&Ox self. Can not do bedside swallow. Notified hospitalist MD, keep patient strict NPO until formal swallow eval.

## 2020-11-23 NOTE — THERAPY
Missed Therapy     Patient Name: Sushma Bowden  Age:  85 y.o., Sex:  female  Medical Record #: [id number]  Today's Date: 11/23/2020    Attempted PT evaluation. Pt with AMS and was not receptive to education regarding role of PT and refused to sit EOB or participate in OOB activity. In-house Bhutanese RN  present throughout. Will re-attempt as able/appropriate.

## 2020-11-23 NOTE — PROGRESS NOTES
Daily Progress Note:     Date of Service: 11/23/2020  Primary Team: [facility] IM Red Team    Attending: MALI Briscoe M.D.   Senior Resident: Kel Jennings M.D.     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  Presented to ED early morning 11/23 with reports of altered level of consciousness since the previous night and complaint of headache.    Subjective:    Admitted overnight, stable since admission.  VSS AF  With the assistance of a tele- the patient was interviewed by the medical team.  She does not complain of any specific pain, though at times alludes to a headache or pain in her leg, she appears oriented only to self, unable to describe where she is at    Consultants/Specialty:  Neurology    Review of Systems:    Review of Systems   Unable to perform ROS: Mental status change       Objective Data:   Physical Exam:   Vitals:   Temp:  [36 °C (96.8 °F)-36.8 °C (98.3 °F)] 36.4 °C (97.6 °F)  Pulse:  [60-71] 62  Resp:  [15-21] 18  BP: (133-190)/() 134/56  SpO2:  [89 %-99 %] 93 %   CREATE MACRO BE SURE THAT THIS IS PERTINENT TO YOUR PATIENT!  Physical Exam  Constitutional:       General: She is not in acute distress.     Appearance: She is normal weight.   HENT:      Head: Normocephalic and atraumatic.      Nose: Nose normal.      Mouth/Throat:      Mouth: Mucous membranes are dry.      Pharynx: Oropharynx is clear.   Eyes:      Extraocular Movements: Extraocular movements intact.      Pupils: Pupils are equal, round, and reactive to light.   Cardiovascular:      Rate and Rhythm: Normal rate and regular rhythm.   Pulmonary:      Effort: Pulmonary effort is normal.      Breath sounds: Normal breath sounds.   Abdominal:      General: Abdomen is flat.      Palpations: Abdomen is soft.   Musculoskeletal:          General: No swelling.   Skin:     General: Skin is warm and dry.   Neurological:      Mental Status: She is alert.      Comments: Neurological exam limited by patient's cooperation  Oriented to self only  Able to move all 4 extremities, difficulty in assessing strength  Patellar reflexes intact               Labs:   Results for FABIANA LEMON (MRN [id number]) as of 11/23/2020 12:23   Ref. Range 11/22/2020 21:00   WBC Latest Ref Range: 4.8 - 10.8 K/uL 11.0 (H)   RBC Latest Ref Range: 4.20 - 5.40 M/uL 4.10 (L)   Hemoglobin Latest Ref Range: 12.0 - 16.0 g/dL 14.0   Hematocrit Latest Ref Range: 37.0 - 47.0 % 41.9   MCV Latest Ref Range: 81.4 - 97.8 fL 102.2 (H)   MCH Latest Ref Range: 27.0 - 33.0 pg 34.1 (H)   MCHC Latest Ref Range: 33.6 - 35.0 g/dL 33.4 (L)   RDW Latest Ref Range: 35.9 - 50.0 fL 46.8   Platelet Count Latest Ref Range: 164 - 446 K/uL 251   MPV Latest Ref Range: 9.0 - 12.9 fL 10.6   Neutrophils-Polys Latest Ref Range: 44.00 - 72.00 % 79.10 (H)   Neutrophils (Absolute) Latest Ref Range: 2.00 - 7.15 K/uL 8.69 (H)   Lymphocytes Latest Ref Range: 22.00 - 41.00 % 6.60 (L)   Lymphs (Absolute) Latest Ref Range: 1.00 - 4.80 K/uL 0.73 (L)   Monocytes Latest Ref Range: 0.00 - 13.40 % 13.20   Monos (Absolute) Latest Ref Range: 0.00 - 0.85 K/uL 1.45 (H)   Eosinophils Latest Ref Range: 0.00 - 6.90 % 0.10   Eos (Absolute) Latest Ref Range: 0.00 - 0.51 K/uL 0.01   Basophils Latest Ref Range: 0.00 - 1.80 % 0.40   Baso (Absolute) Latest Ref Range: 0.00 - 0.12 K/uL 0.04   Immature Granulocytes Latest Ref Range: 0.00 - 0.90 % 0.60   Immature Granulocytes (abs) Latest Ref Range: 0.00 - 0.11 K/uL 0.07   Nucleated RBC Latest Units: /100 WBC 0.00   NRBC (Absolute) Latest Units: K/uL 0.00   Sodium Latest Ref Range: 135 - 145 mmol/L 136   Potassium Latest Ref Range: 3.6 - 5.5 mmol/L 3.6   Chloride Latest Ref Range: 96 - 112 mmol/L 98   Co2 Latest Ref Range: 20 - 33 mmol/L 26   Anion Gap Latest Ref Range: 7.0 - 16.0  12.0    Glucose Latest Ref Range: 65 - 99 mg/dL 123 (H)   Bun Latest Ref Range: 8 - 22 mg/dL 9   Creatinine Latest Ref Range: 0.50 - 1.40 mg/dL 0.56   GFR If  Latest Ref Range: >60 mL/min/1.73 m 2 >60   GFR If Non  Latest Ref Range: >60 mL/min/1.73 m 2 >60   Calcium Latest Ref Range: 8.5 - 10.5 mg/dL 9.4   AST(SGOT) Latest Ref Range: 12 - 45 U/L 36   ALT(SGPT) Latest Ref Range: 2 - 50 U/L 26   Alkaline Phosphatase Latest Ref Range: 30 - 99 U/L 97   Total Bilirubin Latest Ref Range: 0.1 - 1.5 mg/dL 0.9   Albumin Latest Ref Range: 3.2 - 4.9 g/dL 3.6   Total Protein Latest Ref Range: 6.0 - 8.2 g/dL 7.2   Globulin Latest Ref Range: 1.9 - 3.5 g/dL 3.6 (H)   A-G Ratio Latest Units: g/dL 1.0   Magnesium Latest Ref Range: 1.5 - 2.5 mg/dL 1.9   Results for FABIANA LEMON (MRN [id number]) as of 11/23/2020 12:23   Ref. Range 11/22/2020 21:10 11/22/2020 22:46   Barbiturates Latest Ref Range: Negative   Negative   Benzodiazepines Latest Ref Range: Negative   Negative   Cannabinoid Metab Latest Ref Range: Negative   Positive (A)   Cocaine Metabolite Latest Ref Range: Negative   Negative   Methadone Latest Ref Range: Negative   Negative   Opiates Latest Ref Range: Negative   Negative   Phencyclidine -Pcp Latest Ref Range: Negative   Negative   Propoxyphene Latest Ref Range: Negative   Negative   Oxycodone Latest Ref Range: Negative   Negative   Amphetamines Urine Latest Ref Range: Negative   Negative   Urobilinogen, Urine Latest Ref Range: Negative   1.0   Color Unknown  Yellow   Character Unknown  Clear   Specific Gravity Latest Ref Range: <1.035   1.018   Ph Latest Ref Range: 5.0 - 8.0   6.5   Glucose Latest Ref Range: Negative mg/dL  Negative   Ketones Latest Ref Range: Negative mg/dL  15 (A)   Bilirubin Latest Ref Range: Negative   Negative   Occult Blood Latest Ref Range: Negative   Trace (A)   Protein Latest Ref Range: Negative mg/dL  Negative   Nitrite Latest Ref Range: Negative   Negative    Leukocyte Esterase Latest Ref Range: Negative   Negative   Micro Urine Req Unknown  Microscopic   WBC Latest Units: /hpf  0-2   RBC Latest Units: /hpf  5-10 (A)   Epithelial Cells Latest Units: /hpf  Negative   Bacteria Latest Ref Range: None /hpf  Negative   Hyaline Cast Latest Units: /lpf  0-2   COVID Order Status Unknown Received    Influenza virus A RNA Latest Ref Range: Negative  Negative    Influenza virus B, PCR Latest Ref Range: Negative  Negative    RSV, PCR Latest Ref Range: Negative  Negative    SARS-CoV-2 by PCR Unknown NotDetected    SARS-CoV-2 Source Unknown Nasal Swab          Imaging:     CT-HEAD W/O   Final Result      1. No CT evidence of acute infarct, hemorrhage or mass.   2. Moderate global parenchymal atrophy. Chronic small vessel ischemic changes.      DX-CHEST-PORTABLE (1 VIEW)   Final Result         1. Bibasilar atelectasis/scarring. No focal consolidation or pleural effusions.   2. Tortuous aorta with prominent mediastinum, which may be due to an ascending aortic aneurysm. This can be followed with a CTA chest.      MR-BRAIN-W/O    (Results Pending)        ASSESSMENT AND PLAN:    Acute encephalopathy- (present on admission)  Assessment & Plan  Waxing and waning AMS  Unclear baseline mentation, currently only oriented to self.   UDS positive for cannabinoids, possible she was given something at home that is producing her altered mental status, according to chart review patient and family have denied drugs, will continue to attempt to discuss with family.   Had recent ischemic stroke, imaging shows evidence of previous infarcts and chronic microvascular ischemic changes.  Have attempted to reach family to discuss her baseline unsuccessful so far 11/23, will continue efforts.  Suspect there is component of dementia, patient will likely need placement  CT head negative for acute changes  -MRI brain pending  -SNF referral  -Neurology consult  -Avoid medications that may further altered mental  status  -Check thyroid, folate (had recent normal B12)  -Check thiamine level, empiric replacement    Delirium prevention interventions:  -do not disturb the patient (vitals or lab draws) between the hours of 10 PM and 6 AM except as necessary   -ideally the patient should not sleep during the day and we should avoid day time naps.   -up in chair for meals  -ambulate at least three times daily, as able  -watch for constipation  -timed voiding - ask patient if they would like to go to the bathroom q 2-3 hours, except during the do not disturb hours.   -remove all unnecessary lines (central lines, peripheral IVs, feeding tubes, haq catheters)  -minimize polypharmacy, if possible, medications should not be dosed during sleep hours          Cerebral infarction (HCC)- (present on admission)  Assessment & Plan  Recent hx of CVA, possible TIA or extension of recent infarct  New onset AMS, waxing and waning  Swallow study and precautions ordered  Fall and seizure precautions in place  PT/OT  MRI pending  Neuro checks   Neurology consult  SNF referral  Continue statin, antiplatelet        Headache- (present on admission)  Assessment & Plan  Tylenol 1 g 3 times daily  Given tramadol by daughter, improvement in headache, AMS following tramadol.  Tramadol also not great choice for headache, will avoid as it could make headache worse and also precipitate delirium  Cannabinoid + UDS, daughter and granddaughter deny use; have attempted to reach family to discuss, unsuccessful 11/23, will continue efforts        Dehydration  Assessment & Plan  Likely secondary due to decreased oral intake  Ketones in urine  Gentle IV fluid rehydration  Swallow eval    Debility- (present on admission)  Assessment & Plan  Previously ambulating and needing no assistance with ADL's  Large deficits following stroke 11 days prior, currently dependent on family for all ADL's  Consider SW consult  PT/OT  HH ordered upon previous admission, the patient  will likely need SNF referral    Macrocytosis- (present on admission)  Assessment & Plan    Recent normal B12  Check folate      DISPOSITION: inpatient    Kel Jennings M.D.

## 2020-11-24 ENCOUNTER — HOSPITAL ENCOUNTER (INPATIENT)
Facility: REHABILITATION | Age: 85
LOS: 12 days | DRG: 057 | End: 2020-12-06
Attending: PHYSICAL MEDICINE & REHABILITATION | Admitting: PHYSICAL MEDICINE & REHABILITATION
Payer: MEDICARE

## 2020-11-24 ENCOUNTER — APPOINTMENT (OUTPATIENT)
Dept: RADIOLOGY | Facility: MEDICAL CENTER | Age: 85
DRG: 065 | End: 2020-11-24
Attending: STUDENT IN AN ORGANIZED HEALTH CARE EDUCATION/TRAINING PROGRAM
Payer: MEDICARE

## 2020-11-24 VITALS
WEIGHT: 138.23 LBS | DIASTOLIC BLOOD PRESSURE: 61 MMHG | BODY MASS INDEX: 27.14 KG/M2 | HEIGHT: 60 IN | RESPIRATION RATE: 16 BRPM | TEMPERATURE: 98.1 F | OXYGEN SATURATION: 100 % | HEART RATE: 67 BPM | SYSTOLIC BLOOD PRESSURE: 153 MMHG

## 2020-11-24 PROBLEM — R33.9 URINARY RETENTION: Status: ACTIVE | Noted: 2020-11-24

## 2020-11-24 PROBLEM — I10 ESSENTIAL HYPERTENSION: Status: ACTIVE | Noted: 2020-11-24

## 2020-11-24 PROBLEM — R32 URINARY INCONTINENCE: Status: ACTIVE | Noted: 2020-11-24

## 2020-11-24 PROBLEM — I63.9 STROKE (CEREBRUM) (HCC): Status: ACTIVE | Noted: 2020-11-24

## 2020-11-24 PROBLEM — D64.9 ANEMIA: Status: ACTIVE | Noted: 2020-11-24

## 2020-11-24 PROBLEM — F01.50 VASCULAR DEMENTIA WITHOUT BEHAVIORAL DISTURBANCE (HCC): Status: ACTIVE | Noted: 2020-11-24

## 2020-11-24 PROBLEM — F32.A DEPRESSION: Status: ACTIVE | Noted: 2020-11-24

## 2020-11-24 LAB
ALBUMIN SERPL BCP-MCNC: 2.9 G/DL (ref 3.2–4.9)
ALBUMIN/GLOB SERPL: 1.1 G/DL
ALP SERPL-CCNC: 73 U/L (ref 30–99)
ALT SERPL-CCNC: 20 U/L (ref 2–50)
ANION GAP SERPL CALC-SCNC: 6 MMOL/L (ref 7–16)
AST SERPL-CCNC: 29 U/L (ref 12–45)
BASOPHILS # BLD AUTO: 0.6 % (ref 0–1.8)
BASOPHILS # BLD: 0.04 K/UL (ref 0–0.12)
BILIRUB SERPL-MCNC: 0.5 MG/DL (ref 0.1–1.5)
BUN SERPL-MCNC: 8 MG/DL (ref 8–22)
CALCIUM SERPL-MCNC: 9 MG/DL (ref 8.5–10.5)
CHLORIDE SERPL-SCNC: 105 MMOL/L (ref 96–112)
CHOLEST SERPL-MCNC: 84 MG/DL (ref 100–199)
CO2 SERPL-SCNC: 27 MMOL/L (ref 20–33)
CREAT SERPL-MCNC: 0.41 MG/DL (ref 0.5–1.4)
EOSINOPHIL # BLD AUTO: 0.24 K/UL (ref 0–0.51)
EOSINOPHIL NFR BLD: 3.4 % (ref 0–6.9)
ERYTHROCYTE [DISTWIDTH] IN BLOOD BY AUTOMATED COUNT: 47.8 FL (ref 35.9–50)
GLOBULIN SER CALC-MCNC: 2.7 G/DL (ref 1.9–3.5)
GLUCOSE SERPL-MCNC: 110 MG/DL (ref 65–99)
HCT VFR BLD AUTO: 35.7 % (ref 37–47)
HDLC SERPL-MCNC: 38 MG/DL
HGB BLD-MCNC: 11.8 G/DL (ref 12–16)
IMM GRANULOCYTES # BLD AUTO: 0.08 K/UL (ref 0–0.11)
IMM GRANULOCYTES NFR BLD AUTO: 1.1 % (ref 0–0.9)
LDLC SERPL CALC-MCNC: 36 MG/DL
LYMPHOCYTES # BLD AUTO: 0.89 K/UL (ref 1–4.8)
LYMPHOCYTES NFR BLD: 12.4 % (ref 22–41)
MCH RBC QN AUTO: 33.4 PG (ref 27–33)
MCHC RBC AUTO-ENTMCNC: 33.1 G/DL (ref 33.6–35)
MCV RBC AUTO: 101.1 FL (ref 81.4–97.8)
MONOCYTES # BLD AUTO: 0.85 K/UL (ref 0–0.85)
MONOCYTES NFR BLD AUTO: 11.9 % (ref 0–13.4)
NEUTROPHILS # BLD AUTO: 5.05 K/UL (ref 2–7.15)
NEUTROPHILS NFR BLD: 70.6 % (ref 44–72)
NRBC # BLD AUTO: 0 K/UL
NRBC BLD-RTO: 0 /100 WBC
PLATELET # BLD AUTO: 269 K/UL (ref 164–446)
PMV BLD AUTO: 10.8 FL (ref 9–12.9)
POTASSIUM SERPL-SCNC: 3.4 MMOL/L (ref 3.6–5.5)
PROT SERPL-MCNC: 5.6 G/DL (ref 6–8.2)
RBC # BLD AUTO: 3.53 M/UL (ref 4.2–5.4)
SODIUM SERPL-SCNC: 138 MMOL/L (ref 135–145)
TRIGL SERPL-MCNC: 48 MG/DL (ref 0–149)
WBC # BLD AUTO: 7.2 K/UL (ref 4.8–10.8)

## 2020-11-24 PROCEDURE — 700102 HCHG RX REV CODE 250 W/ 637 OVERRIDE(OP): Performed by: PHYSICAL MEDICINE & REHABILITATION

## 2020-11-24 PROCEDURE — A9270 NON-COVERED ITEM OR SERVICE: HCPCS | Performed by: PHYSICAL MEDICINE & REHABILITATION

## 2020-11-24 PROCEDURE — 700102 HCHG RX REV CODE 250 W/ 637 OVERRIDE(OP): Performed by: STUDENT IN AN ORGANIZED HEALTH CARE EDUCATION/TRAINING PROGRAM

## 2020-11-24 PROCEDURE — A9270 NON-COVERED ITEM OR SERVICE: HCPCS | Performed by: STUDENT IN AN ORGANIZED HEALTH CARE EDUCATION/TRAINING PROGRAM

## 2020-11-24 PROCEDURE — 700111 HCHG RX REV CODE 636 W/ 250 OVERRIDE (IP): Performed by: PHYSICAL MEDICINE & REHABILITATION

## 2020-11-24 PROCEDURE — 70551 MRI BRAIN STEM W/O DYE: CPT

## 2020-11-24 PROCEDURE — 99223 1ST HOSP IP/OBS HIGH 75: CPT | Mod: AI | Performed by: PHYSICAL MEDICINE & REHABILITATION

## 2020-11-24 PROCEDURE — 97165 OT EVAL LOW COMPLEX 30 MIN: CPT

## 2020-11-24 PROCEDURE — 700105 HCHG RX REV CODE 258: Performed by: STUDENT IN AN ORGANIZED HEALTH CARE EDUCATION/TRAINING PROGRAM

## 2020-11-24 PROCEDURE — 700111 HCHG RX REV CODE 636 W/ 250 OVERRIDE (IP): Performed by: STUDENT IN AN ORGANIZED HEALTH CARE EDUCATION/TRAINING PROGRAM

## 2020-11-24 PROCEDURE — 97161 PT EVAL LOW COMPLEX 20 MIN: CPT

## 2020-11-24 PROCEDURE — 80061 LIPID PANEL: CPT

## 2020-11-24 PROCEDURE — 770010 HCHG ROOM/CARE - REHAB SEMI PRIVAT*

## 2020-11-24 PROCEDURE — 36415 COLL VENOUS BLD VENIPUNCTURE: CPT

## 2020-11-24 PROCEDURE — 99239 HOSP IP/OBS DSCHRG MGMT >30: CPT | Mod: GC | Performed by: HOSPITALIST

## 2020-11-24 PROCEDURE — 85025 COMPLETE CBC W/AUTO DIFF WBC: CPT

## 2020-11-24 PROCEDURE — 80053 COMPREHEN METABOLIC PANEL: CPT

## 2020-11-24 PROCEDURE — 94760 N-INVAS EAR/PLS OXIMETRY 1: CPT

## 2020-11-24 RX ORDER — POTASSIUM CHLORIDE 20 MEQ/1
40 TABLET, EXTENDED RELEASE ORAL 2 TIMES DAILY
Status: CANCELLED | OUTPATIENT
Start: 2020-11-24 | End: 2020-11-25

## 2020-11-24 RX ORDER — ACETAMINOPHEN 500 MG
1000 TABLET ORAL 3 TIMES DAILY
Status: CANCELLED | OUTPATIENT
Start: 2020-11-24

## 2020-11-24 RX ORDER — CLOPIDOGREL BISULFATE 75 MG/1
75 TABLET ORAL DAILY
Status: CANCELLED | OUTPATIENT
Start: 2020-11-25

## 2020-11-24 RX ORDER — ACETAMINOPHEN 500 MG
1000 TABLET ORAL 3 TIMES DAILY
Status: DISCONTINUED | OUTPATIENT
Start: 2020-11-24 | End: 2020-12-02

## 2020-11-24 RX ORDER — ATORVASTATIN CALCIUM 40 MG/1
40 TABLET, FILM COATED ORAL EVERY EVENING
Status: DISCONTINUED | OUTPATIENT
Start: 2020-11-24 | End: 2020-12-06 | Stop reason: HOSPADM

## 2020-11-24 RX ORDER — ALUMINA, MAGNESIA, AND SIMETHICONE 2400; 2400; 240 MG/30ML; MG/30ML; MG/30ML
20 SUSPENSION ORAL
Status: DISCONTINUED | OUTPATIENT
Start: 2020-11-24 | End: 2020-12-06 | Stop reason: HOSPADM

## 2020-11-24 RX ORDER — ONDANSETRON 2 MG/ML
4 INJECTION INTRAMUSCULAR; INTRAVENOUS 4 TIMES DAILY PRN
Status: DISCONTINUED | OUTPATIENT
Start: 2020-11-24 | End: 2020-12-06 | Stop reason: HOSPADM

## 2020-11-24 RX ORDER — ECHINACEA PURPUREA EXTRACT 125 MG
2 TABLET ORAL PRN
Status: DISCONTINUED | OUTPATIENT
Start: 2020-11-24 | End: 2020-12-06 | Stop reason: HOSPADM

## 2020-11-24 RX ORDER — AMOXICILLIN 250 MG
2 CAPSULE ORAL 2 TIMES DAILY
Status: DISCONTINUED | OUTPATIENT
Start: 2020-11-24 | End: 2020-12-06 | Stop reason: HOSPADM

## 2020-11-24 RX ORDER — BISACODYL 10 MG
10 SUPPOSITORY, RECTAL RECTAL
Status: CANCELLED | OUTPATIENT
Start: 2020-11-24

## 2020-11-24 RX ORDER — POTASSIUM CHLORIDE 20 MEQ/1
40 TABLET, EXTENDED RELEASE ORAL 2 TIMES DAILY
Status: COMPLETED | OUTPATIENT
Start: 2020-11-24 | End: 2020-11-24

## 2020-11-24 RX ORDER — AMOXICILLIN 250 MG
2 CAPSULE ORAL 2 TIMES DAILY
Status: CANCELLED | OUTPATIENT
Start: 2020-11-24

## 2020-11-24 RX ORDER — TRAZODONE HYDROCHLORIDE 50 MG/1
50 TABLET ORAL
Status: DISCONTINUED | OUTPATIENT
Start: 2020-11-24 | End: 2020-12-06 | Stop reason: HOSPADM

## 2020-11-24 RX ORDER — ENEMA 19; 7 G/133ML; G/133ML
1 ENEMA RECTAL
Status: DISCONTINUED | OUTPATIENT
Start: 2020-11-24 | End: 2020-12-06 | Stop reason: HOSPADM

## 2020-11-24 RX ORDER — BISACODYL 10 MG
10 SUPPOSITORY, RECTAL RECTAL
Status: DISCONTINUED | OUTPATIENT
Start: 2020-11-24 | End: 2020-12-06 | Stop reason: HOSPADM

## 2020-11-24 RX ORDER — POLYETHYLENE GLYCOL 3350 17 G/17G
1 POWDER, FOR SOLUTION ORAL
Status: DISCONTINUED | OUTPATIENT
Start: 2020-11-24 | End: 2020-12-06 | Stop reason: HOSPADM

## 2020-11-24 RX ORDER — LACTULOSE 20 G/30ML
30 SOLUTION ORAL
Status: DISCONTINUED | OUTPATIENT
Start: 2020-11-24 | End: 2020-12-06 | Stop reason: HOSPADM

## 2020-11-24 RX ORDER — POLYVINYL ALCOHOL 14 MG/ML
1 SOLUTION/ DROPS OPHTHALMIC PRN
Status: DISCONTINUED | OUTPATIENT
Start: 2020-11-24 | End: 2020-12-06 | Stop reason: HOSPADM

## 2020-11-24 RX ORDER — ACETAMINOPHEN 500 MG
1000 TABLET ORAL 3 TIMES DAILY
Qty: 30 TAB | Refills: 0 | Status: ON HOLD | OUTPATIENT
Start: 2020-11-24 | End: 2020-12-04

## 2020-11-24 RX ORDER — POTASSIUM CHLORIDE 20 MEQ/1
40 TABLET, EXTENDED RELEASE ORAL 2 TIMES DAILY
Status: DISCONTINUED | OUTPATIENT
Start: 2020-11-24 | End: 2020-11-24 | Stop reason: HOSPADM

## 2020-11-24 RX ORDER — ACETAMINOPHEN 325 MG/1
650 TABLET ORAL EVERY 4 HOURS PRN
Status: DISCONTINUED | OUTPATIENT
Start: 2020-11-24 | End: 2020-12-06 | Stop reason: HOSPADM

## 2020-11-24 RX ORDER — HYDROXYZINE HYDROCHLORIDE 25 MG/1
50 TABLET, FILM COATED ORAL EVERY 6 HOURS PRN
Status: DISCONTINUED | OUTPATIENT
Start: 2020-11-24 | End: 2020-12-06 | Stop reason: HOSPADM

## 2020-11-24 RX ORDER — POLYETHYLENE GLYCOL 3350 17 G/17G
1 POWDER, FOR SOLUTION ORAL
Status: CANCELLED | OUTPATIENT
Start: 2020-11-24

## 2020-11-24 RX ORDER — ESCITALOPRAM OXALATE 10 MG/1
10 TABLET ORAL DAILY
Status: CANCELLED | OUTPATIENT
Start: 2020-11-25

## 2020-11-24 RX ORDER — ESCITALOPRAM OXALATE 10 MG/1
10 TABLET ORAL DAILY
Status: DISCONTINUED | OUTPATIENT
Start: 2020-11-25 | End: 2020-12-06 | Stop reason: HOSPADM

## 2020-11-24 RX ORDER — ONDANSETRON 4 MG/1
4 TABLET, ORALLY DISINTEGRATING ORAL 4 TIMES DAILY PRN
Status: DISCONTINUED | OUTPATIENT
Start: 2020-11-24 | End: 2020-12-06 | Stop reason: HOSPADM

## 2020-11-24 RX ORDER — CLOPIDOGREL BISULFATE 75 MG/1
75 TABLET ORAL DAILY
Status: DISCONTINUED | OUTPATIENT
Start: 2020-11-25 | End: 2020-12-06 | Stop reason: HOSPADM

## 2020-11-24 RX ORDER — LANOLIN ALCOHOL/MO/W.PET/CERES
3 CREAM (GRAM) TOPICAL NIGHTLY PRN
Status: DISCONTINUED | OUTPATIENT
Start: 2020-11-24 | End: 2020-11-27

## 2020-11-24 RX ORDER — HYDRALAZINE HYDROCHLORIDE 10 MG/1
10 TABLET, FILM COATED ORAL EVERY 8 HOURS PRN
Status: DISCONTINUED | OUTPATIENT
Start: 2020-11-24 | End: 2020-12-06 | Stop reason: HOSPADM

## 2020-11-24 RX ORDER — ATORVASTATIN CALCIUM 40 MG/1
40 TABLET, FILM COATED ORAL EVERY EVENING
Status: CANCELLED | OUTPATIENT
Start: 2020-11-24

## 2020-11-24 RX ADMIN — DOCUSATE SODIUM 50 MG AND SENNOSIDES 8.6 MG 2 TABLET: 8.6; 5 TABLET, FILM COATED ORAL at 05:14

## 2020-11-24 RX ADMIN — CLOPIDOGREL BISULFATE 75 MG: 75 TABLET ORAL at 05:14

## 2020-11-24 RX ADMIN — ACETAMINOPHEN 1000 MG: 500 TABLET ORAL at 02:21

## 2020-11-24 RX ADMIN — ATORVASTATIN CALCIUM 40 MG: 40 TABLET, FILM COATED ORAL at 20:24

## 2020-11-24 RX ADMIN — POTASSIUM CHLORIDE 40 MEQ: 1500 TABLET, EXTENDED RELEASE ORAL at 08:38

## 2020-11-24 RX ADMIN — ACETAMINOPHEN 1000 MG: 500 TABLET, FILM COATED ORAL at 20:24

## 2020-11-24 RX ADMIN — ACETAMINOPHEN 1000 MG: 500 TABLET ORAL at 12:00

## 2020-11-24 RX ADMIN — POTASSIUM CHLORIDE 40 MEQ: 1500 TABLET, EXTENDED RELEASE ORAL at 20:24

## 2020-11-24 RX ADMIN — ENOXAPARIN SODIUM 40 MG: 40 INJECTION SUBCUTANEOUS at 20:24

## 2020-11-24 RX ADMIN — THIAMINE HYDROCHLORIDE 300 MG: 100 INJECTION, SOLUTION INTRAMUSCULAR; INTRAVENOUS at 05:13

## 2020-11-24 RX ADMIN — DOCUSATE SODIUM 50 MG AND SENNOSIDES 8.6 MG 2 TABLET: 8.6; 5 TABLET, FILM COATED ORAL at 20:24

## 2020-11-24 RX ADMIN — SODIUM CHLORIDE, POTASSIUM CHLORIDE, SODIUM LACTATE AND CALCIUM CHLORIDE: 600; 310; 30; 20 INJECTION, SOLUTION INTRAVENOUS at 05:14

## 2020-11-24 RX ADMIN — ESCITALOPRAM OXALATE 10 MG: 10 TABLET ORAL at 05:14

## 2020-11-24 ASSESSMENT — COGNITIVE AND FUNCTIONAL STATUS - GENERAL
PERSONAL GROOMING: A LITTLE
MOVING FROM LYING ON BACK TO SITTING ON SIDE OF FLAT BED: UNABLE
HELP NEEDED FOR BATHING: A LOT
CLIMB 3 TO 5 STEPS WITH RAILING: A LOT
DRESSING REGULAR LOWER BODY CLOTHING: A LOT
STANDING UP FROM CHAIR USING ARMS: A LITTLE
DAILY ACTIVITIY SCORE: 15
DRESSING REGULAR UPPER BODY CLOTHING: A LITTLE
WALKING IN HOSPITAL ROOM: A LITTLE
TOILETING: A LOT
SUGGESTED CMS G CODE MODIFIER MOBILITY: CK
MOVING TO AND FROM BED TO CHAIR: A LITTLE
MOBILITY SCORE: 16
SUGGESTED CMS G CODE MODIFIER DAILY ACTIVITY: CK
EATING MEALS: A LITTLE

## 2020-11-24 ASSESSMENT — PATIENT HEALTH QUESTIONNAIRE - PHQ9
2. FEELING DOWN, DEPRESSED, IRRITABLE, OR HOPELESS: NOT AT ALL
1. LITTLE INTEREST OR PLEASURE IN DOING THINGS: NOT AT ALL
2. FEELING DOWN, DEPRESSED, IRRITABLE, OR HOPELESS: NOT AT ALL
1. LITTLE INTEREST OR PLEASURE IN DOING THINGS: NOT AT ALL
SUM OF ALL RESPONSES TO PHQ9 QUESTIONS 1 AND 2: 0
SUM OF ALL RESPONSES TO PHQ9 QUESTIONS 1 AND 2: 0

## 2020-11-24 ASSESSMENT — GAIT ASSESSMENTS
GAIT LEVEL OF ASSIST: MINIMAL ASSIST
DEVIATION: DECREASED HEEL STRIKE;DECREASED TOE OFF;BRADYKINETIC;DECREASED BASE OF SUPPORT
ASSISTIVE DEVICE: FRONT WHEEL WALKER
DISTANCE (FEET): 20

## 2020-11-24 ASSESSMENT — LIFESTYLE VARIABLES: EVER_SMOKED: NEVER

## 2020-11-24 ASSESSMENT — ACTIVITIES OF DAILY LIVING (ADL): TOILETING: REQUIRES ASSIST

## 2020-11-24 ASSESSMENT — FIBROSIS 4 INDEX: FIB4 SCORE: 2.05

## 2020-11-24 ASSESSMENT — PAIN DESCRIPTION - PAIN TYPE: TYPE: CHRONIC PAIN

## 2020-11-24 NOTE — DISCHARGE PLANNING
Anticipated Disposition:  [facility]    Action:   -> Chart review completed.       1-DC today to [facility] at 1:30 pm, COBRA handed to bedside RN. This CM called Evan son with transfer info, left him VM and Return call number.           Barriers to Discharge:   None      Plan:  DC today

## 2020-11-24 NOTE — PROGRESS NOTES
Daily Progress Note:     Date of Service: 11/24/2020  Primary Team: [facility] IM Red Team    Attending: MALI Briscoe M.D.   Senior Resident: Kel Jennings M.D.     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  Presented to ED early morning 11/23 with reports of altered level of consciousness since the previous night and complaint of headache.    Subjective:  No acute events overnight  MRI brain still pending  Neurology to see today    Patient still complaining of headache, denies other new complaints    Consultants/Specialty:  Neurology    Review of Systems:    Review of Systems   Unable to perform ROS: Mental status change       Objective Data:   Physical Exam:   Vitals:   Temp:  [35.9 °C (96.6 °F)-36.7 °C (98.1 °F)] 36.7 °C (98.1 °F)  Pulse:  [62-67] 67  Resp:  [16-18] 16  BP: (123-160)/(56-70) 153/61  SpO2:  [92 %-100 %] 100 %   CREATE MACRO BE SURE THAT THIS IS PERTINENT TO YOUR PATIENT!  Physical Exam  Constitutional:       General: She is not in acute distress.     Appearance: She is normal weight.   HENT:      Head: Normocephalic and atraumatic.      Nose: Nose normal.      Mouth/Throat:      Mouth: Mucous membranes are dry.      Pharynx: Oropharynx is clear.   Eyes:      Extraocular Movements: Extraocular movements intact.      Pupils: Pupils are equal, round, and reactive to light.   Cardiovascular:      Rate and Rhythm: Normal rate and regular rhythm.   Pulmonary:      Effort: Pulmonary effort is normal.      Breath sounds: Normal breath sounds.   Abdominal:      General: Abdomen is flat.      Palpations: Abdomen is soft.   Musculoskeletal:         General: No swelling.   Skin:     General: Skin is warm and dry.   Neurological:      Mental Status: She is alert.      Comments: Neurological exam limited by  patient's cooperation  Oriented to self only  Able to move all 4 extremities, difficulty in assessing strength  Patellar reflexes intact               Labs:   Results for FABIANA LEMON (MRN [id number]) as of 11/23/2020 12:23   Ref. Range 11/22/2020 21:00   WBC Latest Ref Range: 4.8 - 10.8 K/uL 11.0 (H)   RBC Latest Ref Range: 4.20 - 5.40 M/uL 4.10 (L)   Hemoglobin Latest Ref Range: 12.0 - 16.0 g/dL 14.0   Hematocrit Latest Ref Range: 37.0 - 47.0 % 41.9   MCV Latest Ref Range: 81.4 - 97.8 fL 102.2 (H)   MCH Latest Ref Range: 27.0 - 33.0 pg 34.1 (H)   MCHC Latest Ref Range: 33.6 - 35.0 g/dL 33.4 (L)   RDW Latest Ref Range: 35.9 - 50.0 fL 46.8   Platelet Count Latest Ref Range: 164 - 446 K/uL 251   MPV Latest Ref Range: 9.0 - 12.9 fL 10.6   Neutrophils-Polys Latest Ref Range: 44.00 - 72.00 % 79.10 (H)   Neutrophils (Absolute) Latest Ref Range: 2.00 - 7.15 K/uL 8.69 (H)   Lymphocytes Latest Ref Range: 22.00 - 41.00 % 6.60 (L)   Lymphs (Absolute) Latest Ref Range: 1.00 - 4.80 K/uL 0.73 (L)   Monocytes Latest Ref Range: 0.00 - 13.40 % 13.20   Monos (Absolute) Latest Ref Range: 0.00 - 0.85 K/uL 1.45 (H)   Eosinophils Latest Ref Range: 0.00 - 6.90 % 0.10   Eos (Absolute) Latest Ref Range: 0.00 - 0.51 K/uL 0.01   Basophils Latest Ref Range: 0.00 - 1.80 % 0.40   Baso (Absolute) Latest Ref Range: 0.00 - 0.12 K/uL 0.04   Immature Granulocytes Latest Ref Range: 0.00 - 0.90 % 0.60   Immature Granulocytes (abs) Latest Ref Range: 0.00 - 0.11 K/uL 0.07   Nucleated RBC Latest Units: /100 WBC 0.00   NRBC (Absolute) Latest Units: K/uL 0.00   Sodium Latest Ref Range: 135 - 145 mmol/L 136   Potassium Latest Ref Range: 3.6 - 5.5 mmol/L 3.6   Chloride Latest Ref Range: 96 - 112 mmol/L 98   Co2 Latest Ref Range: 20 - 33 mmol/L 26   Anion Gap Latest Ref Range: 7.0 - 16.0  12.0   Glucose Latest Ref Range: 65 - 99 mg/dL 123 (H)   Bun Latest Ref Range: 8 - 22 mg/dL 9   Creatinine Latest Ref Range: 0.50 - 1.40 mg/dL 0.56   GFR If African American  Latest Ref Range: >60 mL/min/1.73 m 2 >60   GFR If Non  Latest Ref Range: >60 mL/min/1.73 m 2 >60   Calcium Latest Ref Range: 8.5 - 10.5 mg/dL 9.4   AST(SGOT) Latest Ref Range: 12 - 45 U/L 36   ALT(SGPT) Latest Ref Range: 2 - 50 U/L 26   Alkaline Phosphatase Latest Ref Range: 30 - 99 U/L 97   Total Bilirubin Latest Ref Range: 0.1 - 1.5 mg/dL 0.9   Albumin Latest Ref Range: 3.2 - 4.9 g/dL 3.6   Total Protein Latest Ref Range: 6.0 - 8.2 g/dL 7.2   Globulin Latest Ref Range: 1.9 - 3.5 g/dL 3.6 (H)   A-G Ratio Latest Units: g/dL 1.0   Magnesium Latest Ref Range: 1.5 - 2.5 mg/dL 1.9   Results for FABIANA LEMON (MRN [id number]) as of 11/23/2020 12:23   Ref. Range 11/22/2020 21:10 11/22/2020 22:46   Barbiturates Latest Ref Range: Negative   Negative   Benzodiazepines Latest Ref Range: Negative   Negative   Cannabinoid Metab Latest Ref Range: Negative   Positive (A)   Cocaine Metabolite Latest Ref Range: Negative   Negative   Methadone Latest Ref Range: Negative   Negative   Opiates Latest Ref Range: Negative   Negative   Phencyclidine -Pcp Latest Ref Range: Negative   Negative   Propoxyphene Latest Ref Range: Negative   Negative   Oxycodone Latest Ref Range: Negative   Negative   Amphetamines Urine Latest Ref Range: Negative   Negative   Urobilinogen, Urine Latest Ref Range: Negative   1.0   Color Unknown  Yellow   Character Unknown  Clear   Specific Gravity Latest Ref Range: <1.035   1.018   Ph Latest Ref Range: 5.0 - 8.0   6.5   Glucose Latest Ref Range: Negative mg/dL  Negative   Ketones Latest Ref Range: Negative mg/dL  15 (A)   Bilirubin Latest Ref Range: Negative   Negative   Occult Blood Latest Ref Range: Negative   Trace (A)   Protein Latest Ref Range: Negative mg/dL  Negative   Nitrite Latest Ref Range: Negative   Negative   Leukocyte Esterase Latest Ref Range: Negative   Negative   Micro Urine Req Unknown  Microscopic   WBC Latest Units: /hpf  0-2   RBC Latest Units: /hpf  5-10 (A)   Epithelial  Cells Latest Units: /hpf  Negative   Bacteria Latest Ref Range: None /hpf  Negative   Hyaline Cast Latest Units: /lpf  0-2   COVID Order Status Unknown Received    Influenza virus A RNA Latest Ref Range: Negative  Negative    Influenza virus B, PCR Latest Ref Range: Negative  Negative    RSV, PCR Latest Ref Range: Negative  Negative    SARS-CoV-2 by PCR Unknown NotDetected    SARS-CoV-2 Source Unknown Nasal Swab          Imaging:     CT-HEAD W/O   Final Result      1. No CT evidence of acute infarct, hemorrhage or mass.   2. Moderate global parenchymal atrophy. Chronic small vessel ischemic changes.      DX-CHEST-PORTABLE (1 VIEW)   Final Result         1. Bibasilar atelectasis/scarring. No focal consolidation or pleural effusions.   2. Tortuous aorta with prominent mediastinum, which may be due to an ascending aortic aneurysm. This can be followed with a CTA chest.      MR-BRAIN-W/O    (Results Pending)        ASSESSMENT AND PLAN:    Acute encephalopathy- (present on admission)  Assessment & Plan  Waxing and waning AMS  Unclear baseline mentation, currently only oriented to self.   UDS positive for cannabinoids, possible she was given something at home that is producing her altered mental status, according to chart review patient and family have denied drugs, will continue to attempt to discuss with family.   Had recent ischemic stroke, imaging shows evidence of previous infarcts and chronic microvascular ischemic changes.  Have attempted to reach family to discuss her baseline unsuccessful so far 11/23, will continue efforts.  Suspect there is component of dementia, patient will likely need placement  CT head negative for acute changes  -MRI brain pending  -SNF referral  -Neurology consult  -Avoid medications that may further altered mental status  -Check thyroid, folate (had recent normal B12)  -Check thiamine level, empiric replacement    Delirium prevention interventions:  -do not disturb the patient (vitals or  lab draws) between the hours of 10 PM and 6 AM except as necessary   -ideally the patient should not sleep during the day and we should avoid day time naps.   -up in chair for meals  -ambulate at least three times daily, as able  -watch for constipation  -timed voiding - ask patient if they would like to go to the bathroom q 2-3 hours, except during the do not disturb hours.   -remove all unnecessary lines (central lines, peripheral IVs, feeding tubes, haq catheters)  -minimize polypharmacy, if possible, medications should not be dosed during sleep hours          Cerebral infarction (HCC)- (present on admission)  Assessment & Plan  Recent hx of CVA, possible TIA or extension of recent infarct  New onset AMS, waxing and waning  Swallow study and precautions ordered  Fall and seizure precautions in place  PT/OT  MRI pending  Neuro checks   Neurology consult  SNF referral  Continue statin, antiplatelet        Headache- (present on admission)  Assessment & Plan  Tylenol 1 g 3 times daily  Given tramadol by daughter, improvement in headache, AMS following tramadol.  Tramadol also not great choice for headache, will avoid as it could make headache worse and also precipitate delirium  Cannabinoid + UDS, daughter and granddaughter deny use; have attempted to reach family to discuss, unsuccessful 11/23, will continue efforts        Dehydration  Assessment & Plan  Likely secondary due to decreased oral intake  Ketones in urine  Gentle IV fluid rehydration  Swallow eval    Debility- (present on admission)  Assessment & Plan  Previously ambulating and needing no assistance with ADL's  Large deficits following stroke 11 days prior, currently dependent on family for all ADL's  Consider SW consult  PT/OT  HH ordered upon previous admission, the patient will likely need SNF referral    Macrocytosis- (present on admission)  Assessment & Plan    Recent normal B12  Check folate      DISPOSITION: inpatient    Kel Jennings,  M.D.

## 2020-11-24 NOTE — DISCHARGE PLANNING
Follow up for rehab spoke with son Evan he is agreeable to transfer to [facility] Evan says that his sister is able to provider 24/7 support for their mother as she returns to her home. Single story 2 little steps to enter. Evan is aware of visitor restriction and current Covid policy.

## 2020-11-24 NOTE — CARE PLAN
Problem: Skin Integrity  Goal: Risk for impaired skin integrity will decrease  Intervention: Assess and monitor skin integrity, appearance and/or temperature  Note: 2 RN skin check performed with [place], patient skin in general is clean and intact. Patient no LDAs open, photo taken of face only. Patient has bruising bilateral arms.

## 2020-11-24 NOTE — PROGRESS NOTES
1500 Pt arrived at [facility] from [facility] via w/c. Dr Brady to follow for diagnosis of CVA/ encephalopathy. Initial assessment initiated. Pt oriented to room and facility routine and safety measures; pt education binder provided and discussed. Pt A/O x 1, incontinent of bowel and bladder. Mod assist for transfers. All wounds photographed and documented; photos uploaded to . Pt denies pain or discomfort at this time. Pt positioned for comfort in bed. Call light within reach, safety measures in place. Will continue to monitor.

## 2020-11-24 NOTE — PROGRESS NOTES
Bedside report received. Patient A&OX 1. POC discussed with patient with patient verbalizing understanding. Call light and belongings in reach. Bed locked and alarm in position. Fall precautions in place.

## 2020-11-24 NOTE — DISCHARGE INSTRUCTIONS
Discharge Instructions    Discharged to other by medical transportation with escort. Discharged via wheelchair, hospital escort: Yes.  Special equipment needed: Not Applicable    Be sure to schedule a follow-up appointment with your primary care doctor or any specialists as instructed.     Discharge Plan:   Diet Plan: Discussed  Activity Level: Discussed  Confirmed Follow up Appointment: No (Comments)(Patient going to [facility])  Medication Reconciliation Updated: Yes  Influenza Vaccine Indication: Not indicated: Previously immunized this influenza season and > 8 years of age    I understand that a diet low in cholesterol, fat, and sodium is recommended for good health. Unless I have been given specific instructions below for another diet, I accept this instruction as my diet prescription.   Other diet: Minced and moist     Special Instructions: None    · Is patient discharged on Warfarin / Coumadin?   No   Altered Mental Status  Altered mental status most often refers to an abnormal change in your responsiveness and awareness. It can affect your speech, thought, mobility, memory, attention span, or alertness. It can range from slight confusion to complete unresponsiveness (coma). Altered mental status can be a sign of a serious underlying medical condition. Rapid evaluation and medical treatment is necessary for patients having an altered mental status.  CAUSES   · Low blood sugar (hypoglycemia) or diabetes.  · Severe loss of body fluids (dehydration) or a body salt (electrolyte) imbalance.  · A stroke or other neurologic problem, such as dementia or delirium.  · A head injury or tumor.  · A drug or alcohol overdose.  · Exposure to toxins or poisons.  · Depression, anxiety, and stress.  · A low oxygen level (hypoxia).  · An infection.  · Blood loss.  · Twitching or shaking (seizure).  · Heart problems, such as heart attack or heart rhythm problems (arrhythmias).  · A body temperature that is too low or too high  (hypothermia or hyperthermia).  DIAGNOSIS   A diagnosis is based on your history, symptoms, physical and neurologic examinations, and diagnostic tests. Diagnostic tests may include:  · Measurement of your blood pressure, pulse, breathing, and oxygen levels (vital signs).  · Blood tests.  · Urine tests.  · X-ray exams.  · A computerized magnetic scan (magnetic resonance imaging, MRI).  · A computerized X-ray scan (computed tomography, CT scan).  TREATMENT   Treatment will depend on the cause. Treatment may include:  · Management of an underlying medical or mental health condition.  · Critical care or support in the hospital.  HOME CARE INSTRUCTIONS   · Only take over-the-counter or prescription medicines for pain, discomfort, or fever as directed by your caregiver.  · Manage underlying conditions as directed by your caregiver.  · Eat a healthy, well-balanced diet to maintain strength.  · Join a support group or prevention program to cope with the condition or trauma that caused the altered mental status. Ask your caregiver to help choose a program that works for you.  · Follow up with your caregiver for further examination, therapy, or testing as directed.  SEEK MEDICAL CARE IF:   · You feel unwell or have chills.  · You or your family notice a change in your behavior or your alertness.  · You have trouble following your caregiver's treatment plan.  · You have questions or concerns.  SEEK IMMEDIATE MEDICAL CARE IF:   · You have a rapid heartbeat or have chest pain.  · You have difficulty breathing.  · You have a fever.  · You have a headache with a stiff neck.  · You cough up blood.  · You have blood in your urine or stool.  · You have severe agitation or confusion.  MAKE SURE YOU:   · Understand these instructions.  · Will watch your condition.  · Will get help right away if you are not doing well or get worse.     This information is not intended to replace advice given to you by your health care provider. Make  sure you discuss any questions you have with your health care provider.     Document Released: 06/07/2011 Document Revised: 03/11/2013 Document Reviewed: 02/11/2016  Elsevier Interactive Patient Education ©2016 [employer]    Alteración del estado mental   (Altered Mental Status)  La alteración del estado mental hace referencia a un cambio anormal en la respuesta y el estado de conciencia. Puede afectar el habla, el pensamiento, la movilidad, la memoria, la capacidad de concentración o el estado de alerta. Varía desde un estado leve de confusión hasta la falta de respuesta total (coma). Puede ser un signo de bora enfermedad subyacente grave. La evaluación y el tratamiento médico precoz son necesarios en los pacientes con alteración del estado mental.   CAUSAS   · Bajo nivel de azúcar en ebony (hipoglucemia) o diabetes.  · Pérdida importante de líquido corporal (deshidratación) o un desequilibrio de sales (electrolitos) en el organismo.  · Ictus u otro problema neurológico vitaliy demencia o delirio.  · Lesión o tumor en la candace.  · Sobredosis de drogas o alcohol.  · Exposición a toxinas o venenos.  · Depresión, ansiedad y estrés.  · Un nivel bajo de oxígeno (hipoxia).  · Bora infección.  · Pérdida de ebony.  · Sacudidas o temblores (convulsiones).  · Problemas cardíacos vitaliy infarto o problemas del ritmo cardíaco (arritmias).  · La temperatura corporal demasiado baja o demasiado silvia (hipotermia o hipertermia).  DIAGNÓSTICO   El diagnóstico se basa en la historia, los síntomas, el examen físico y neurológico y estudios de diagnóstico. Las estudios diagnósticos pueden ser:   · Medición de la presión arterial, el pulso, la respiración y los niveles de oxígeno (signos vitales).  · Análisis de ebony.  · Análisis de orina.  · Radiografías.  · Exploración magnética computarizada (resonancia magnética, IRM)  · Exploración de mt X computarizada (tomografía computada, TC)  TRATAMIENTO   El tratamiento dependerá de la  causa. Puede incluir:   · Control de bora enfermedad médica o mental subyacente.  · Tratamiento en cuidados intensivos o de apoyo en el hospital.  INSTRUCCIONES PARA EL CUIDADO EN EL HOGAR   · Solo tome medicamentos de venta alec o recetados para el dolor, malestar o fiebre, según las indicaciones del médico.  · Controle las enfermedades subyacentes según las indicaciones de downs médico.  · Consuma bora dieta gino, bee balanceada para mantener la fuerza.  · Únase a un srinivas de apoyo o a un programa de prevención para enfrentar la enfermedad o el traumatismo que causó la alteración del estado mental. [place] a downs médico que lo ayude a elegir un programa que sea de utilidad para usted.  · Concurra a las visitas de control con el médico para realizar pruebas adicionales, o recibir vacunas, según las indicaciones.  SOLICITE ATENCIÓN MÉDICA SI:   · No se siente bee o tiene escalofríos.  · Usted o downs bladimir notan un cambio en downs comportamiento o en downs estado de alerta.  · Tiene problemas para seguir el plan de tratamiento que le ha indicado el médico.  · Tiene preguntas o preocupaciones.  SOLICITE ATENCIÓN MÉDICA DE INMEDIATO SI:   · Tiene el ritmo cardíaco acelerado o siente dolor en el pecho.  · Tiene dificultad para respirar.  · Tiene fiebre.  · Siente dolor de candace o rigidez en el den.  · Tose y escupe ebony.  · Observa ebony en la orina.  · Se siente muy agitado o confundido.  ASEGÚRESE DE QUE:   · Comprende estas instrucciones.  · Controlará downs enfermedad.  · Solicitará ayuda de inmediato si no mejora o si empeora.     Esta información no tiene vitaliy fin reemplazar el consejo del médico. Asegúrese de hacerle al médico cualquier pregunta que tenga.     Document Released: 06/18/2013  [employer] Interactive Patient Education ©2016 [employer]    Delirium  Delirium is a state of mental confusion. It comes on quickly and causes significant changes in a person's thinking and behavior.  People with delirium usually  have trouble paying attention to what is going on or knowing where they are. They may become very withdrawn or very emotional and unable to sit still. They may even see or feel things that are not there (hallucinations). Delirium is a sign of a serious underlying medical condition.  What are the causes?  Delirium occurs when something suddenly affects the signals that the brain sends out. Brain signals can be affected by anything that puts severe stress on the body and brain and causes brain chemicals to be out of balance. The most common causes of delirium include:  · Infections. These may be bacterial, viral, fungal, or protozoal.  · Medicines. These include many over-the-counter and prescription medicines.  · Recreational drugs.  · Substance withdrawal. This occurs with sudden discontinuation of alcohol, certain medicines, or recreational drugs.  · Surgery and anesthesia.  · Sudden vascular events, such as stroke and brain hemorrhage.  · Other brain disorders, such as migraines, tumors, seizures, and physical head trauma.  · Metabolic disorders, such as kidney or liver failure.  · Low blood oxygen (anoxia). This may occur with lung disease, cardiac arrest, or carbon monoxide poisoning.  · Hormone imbalances (endocrinopathies), such as an overactive thyroid (hyperthyroidism) or underactive thyroid (hypothyroidism).  · Vitamin deficiencies.  What increases the risk?  The following factors may make someone more likely to develop this condition.  · Being a child.  · Being an older person.  · Living alone.  · Having vision loss or hearing loss.  · Having an existing brain disease, such as dementia.  · Having long-lasting (chronic) medical conditions, such as heart disease.  · Being hospitalized for long periods of time.  What are the signs or symptoms?  Delirium starts with a sudden change in a person's thinking or behavior. Symptoms include:  · Not being able to stay awake (drowsiness) or pay attention.  · Being  confused about places, time, and people.  · Forgetfulness.  · Having extreme energy levels. These may be low or high.  · Changes in sleep patterns.  · Extreme mood swings, such as sudden anger or anxiety.  · Focusing on things or ideas that are not important.  · Rambling and senseless talking.  · Difficulty speaking, understanding speech, or both.  · Hallucinations.  · Tremor or unsteady gait.  Symptoms come and go (fluctuate) over time, and they are often worse at the end of the day.  How is this diagnosed?  People with delirium may not realize that they have the condition. Often, a family member or health care provider is the first person to notice the changes. This condition may be diagnosed based on a physical exam, health history, and tests.  · The health care provider will obtain a detailed history. This may include questions about:  ? Current symptoms.  ? Medical issues.  ? Medicines.  ? Recreational drug use.  · The health care provider will perform a mental status examination by:  ? Asking questions to check for confusion.  ? Watching for abnormal behavior.  · The health care provider may also order lab tests or additional studies to determine the cause of the delirium.  How is this treated?  Treatment of delirium depends on the cause and severity. Delirium usually goes away within days or weeks of treating the underlying cause. In the meantime, do not leave the person alone because he or she may accidentally cause self-harm. This condition may be treated with supportive care, such as:  · Increased light during the day and decreased light at night.  · Low noise level.  · Uninterrupted sleep.  · A regular daily schedule.  · Clocks and calendars to help with orientation.  · Familiar objects, including the person's pictures and clothing.  · Frequent visits from familiar family and friends.  · A healthy diet.  · Gentle exercise.  In more severe cases of delirium, medicine may be prescribed to help the person  keep calm and think more clearly.  Follow these instructions at home:  · Continue supportive care as told by a health care provider.  · Over-the-counter and prescription medicines should be taken only as told by a health care provider.  · Ask a health care provider before using herbs or supplements.  · Do not use alcohol or recreational drugs.  · Keep all follow-up visits as told by a health care provider. This is important.  Contact a health care provider if:  · Symptoms do not get better or they become worse.  · New symptoms of delirium develop.  · Caring for the person at home does not seem safe.  · Eating, drinking, or communicating stops.  · There are side effects of medicines, such as changes in sleep patterns, dizziness, weight gain, restlessness, movement changes, or tremors.  Get help right away if:  · Serious thoughts occur about self-harm or about hurting others.  · There are serious side effects of medicine, such as:  ? Swelling of the face, lips, tongue, or throat.  ? Fever, confusion, muscle spasms, or seizures.  Summary  · Delirium is a state of mental confusion. It comes on quickly and causes significant changes in a person's thinking and behavior.  · Delirium is a sign of a serious underlying medical condition.  · Certain medical conditions or a long hospital stay may increase the risk of developing delirium.  · Treatment of delirium involves treating the underlying cause and providing supportive treatments, such as a calm and familiar environment.  This information is not intended to replace advice given to you by your health care provider. Make sure you discuss any questions you have with your health care provider.  Document Released: 09/11/2013 Document Revised: 08/08/2019 Document Reviewed: 08/08/2019  Elsevier Patient Education © 2020 Elsevier Inc.    Delirio  Delirium  El delirio es un estado de confusión mental. Se manifiesta rápidamente y causa cambios significativos en el pensamiento y en  el comportamiento de bora persona.  Las personas que tienen delirio suelen presentar problemas para prestar atención a lo que sucede o para saber dónde se encuentran. Pueden volverse muy introvertidas o ponerse muy emotivas, y ser incapaces de permanecer quietas. Incluso pueden srikanth u oír cosas que no existen (alucinaciones). El delirio es un signo de bora enfermedad preexistente grave.  ¿Cuáles son las causas?  El delirio se manifiesta cuando algo afecta repentinamente las señales que envía el cerebro. Las señales cerebrales pueden verse afectadas por cualquier factor que genere un alto trista de estrés para el cuerpo y el cerebro, y que cause el desequilibrio de las sustancias químicas del cerebro. Las causas de delirio más frecuentes incluyen lo siguiente:  · Infecciones. Pueden ser bacterianas, virales, fúngicas o protozoicas.  · Medicamentos. Estos incluyen muchos medicamentos recetados y de venta alec.  · Drogas.  · Abstinencia relacionada con el consumo de sustancias. [place] ocurre con la suspensión repentina del consumo de alcohol, determinados medicamentos o drogas.  · Cirugía y anestesia.  · Episodios vasculares repentinos, vitaliy accidente cerebrovascular y hemorragia cerebral.  · Otros trastornos cerebrales, vitaliy migrañas, tumores, convulsiones y traumatismo físico en la candace.  · Trastornos metabólicos, vitaliy insuficiencia renal o hepática.  · Bajo nivel de oxígeno en la ebony (anoxia). [place] puede presentarse cuando hay enfermedad pulmonar, paro cardíaco o intoxicación por monóxido de carbono.  · Desequilibrios hormonales (endocrinopatías), vitaliy bora tiroides hiperactiva (hipertiroidismo) o hipoactiva (hipotiroidismo).  · Déficit de vitaminas.  ¿Qué incrementa el riesgo?  Los siguientes factores pueden hacer que alguien sea más propenso a contraer esta afección:  · Ser un martine.  · Ser bora persona de edad avanzada.  · Vivir juwan.  · Tener pérdida de la visión o pérdida de la audición.  · Tener bora enfermedad  cerebral existente, vitaliy demencia.  · Tener enfermedades de larga duración (crónicas), vitaliy cardiopatía coronaria.  · Estar hospitalizado por largos períodos.  ¿Cuáles son los signos o los síntomas?  El delirio comienza con un cambio repentino en el pensamiento o en el comportamiento de bora persona. Algunos de los síntomas son los siguientes:  · Imposibilidad de permanecer despierto (somnolencia) o de prestar atención.  · Confusión respecto de los lugares, el tiempo y las personas.  · Olvidos.  · Niveles extremos de energía. Estos niveles pueden ser bajos o altos.  · Cambios en los patrones de sueño.  · Cambios extremos en el estado de ánimo, vitaliy enojo o ansiedad repentinos.  · Poner el foco en cosas o ideas que carecen de importancia.  · Discurso incoherente y carente de sentido.  · Dificultad para hablar, comprender el lenguaje o ambas cosas.  · Alucinaciones.  · Temblores o marcha inestable.  Los síntomas aparecen y desaparecen (fluctúan) con el tiempo, y suelen empeorar al final del día.  ¿Cómo se diagnostica?  Es posible que las personas que tienen delirio no se den cuenta de que padecen la afección. A menudo, un miembro de la bladimir o un médico son los primeros en notar los cambios. Esta afección se puede diagnosticar en función de un examen físico, mark antecedentes médicos y algunos estudios.  · El médico obtendrá antecedentes detallados. [place] puede incluir preguntas acerca de lo siguiente:  ? Síntomas actuales.  ? Problemas médicos.  ? Medicamentos.  ? Consumo de drogas.  · El médico realizará un examen del estado mental para lo cual:  ? Hará preguntas para detectar signos de confusión.  ? Observará si el comportamiento es anormal.  · El médico puede también puede solicitar análisis de laboratorio u otros estudios para determinar la causa del delirio.  ¿Cómo se trata?  El tratamiento del delirio depende de la causa y de la gravedad. Generalmente, el delirio desaparece después de algunos días o algunas  semanas de tratar la causa preexistente. Mientras tanto, no se debe dejar juwan a la persona, ya que puede dañarse a sí misma de manera accidental. Esta afección se puede tratar con atención de apoyo, vitaliy:  · Aumento kaci jonh el día y disminución de esta jonh la noche.  · Bajo nivel de ruido.  · Sueño ininterrumpido.  · Xin rutina diaria habitual.  · Relojes y calendarios para ayudar con la orientación.  · Objetos familiares, entre ellos, fotografías y prendas de la persona.  · Visitas frecuentes de familiares y amigos.  · Xin dieta saludable.  · Ejercicios suaves.  Cuando los casos de delirio son más graves, se pueden recetar medicamentos para ayudar a que la persona esté tranquila y piense con más claridad.  Siga estas indicaciones en downs casa:  · Continúe la atención de apoyo vitaliy lo haya indicado el médico.  · Los medicamentos de venta alec y los recetados deben tomarse solamente vitaliy lo haya indicado el médico.  · Consulte al médico antes de usar hierbas o suplementos.  · No consuma alcohol ni use drogas.  · Concurra a todas las visitas de control vitaliy se lo haya indicado el médico. [place] es importante.  Comuníquese con un médico si:  · Los síntomas no mejoran o empeoran.  · Aparecen nuevos síntomas de delirio.  · Cuidar a la persona en la casa no parece seguro.  · La persona yamila de comer, beber o comunicarse.  · Hay efectos secundarios de los medicamentos, vitaliy cambios en los patrones de sueño, mareos, aumento de peso, inquietud, cambios en los movimientos o temblores.  Solicite ayuda inmediatamente si:  · Aparecen pensamientos graves de autolesionarse o lesionar a otras personas.  · Los medicamentos causan efectos secundarios graves, por ejemplo:  ? Hinchazón del rosto, los labios, la lengua o la garganta.  ? Fiebre, confusión, espasmos musculares o convulsiones.  Resumen  · El delirio es un estado de confusión mental. Se manifiesta rápidamente y causa cambios significativos en el pensamiento y en  el comportamiento de bora persona.  · El delirio es un signo de bora enfermedad preexistente grave.  · Ciertas enfermedades o bora hospitalización prolongada pueden aumentar el riesgo de presentar delirio.  · El tratamiento del delirio implica tratar la causa subyacente y proporcionar tratamientos de apoyo, vitaliy un entorno tranquilo y familiar.  Esta información no tiene vitaliy fin reemplazar el consejo del médico. Asegúrese de hacerle al médico cualquier pregunta que tenga.  Document Released: 09/11/2013 Document Revised: 09/17/2019 Document Reviewed: 09/17/2019  Elsevier Patient Education © 2020 [employer]    Depression / Suicide Risk    As you are discharged from this [facility] facility, it is important to learn how to keep safe from harming yourself.    Recognize the warning signs:  · Abrupt changes in personality, positive or negative- including increase in energy   · Giving away possessions  · Change in eating patterns- significant weight changes-  positive or negative  · Change in sleeping patterns- unable to sleep or sleeping all the time   · Unwillingness or inability to communicate  · Depression  · Unusual sadness, discouragement and loneliness  · Talk of wanting to die  · Neglect of personal appearance   · Rebelliousness- reckless behavior  · Withdrawal from people/activities they love  · Confusion- inability to concentrate     If you or a loved one observes any of these behaviors or has concerns about self-harm, here's what you can do:  · Talk about it- your feelings and reasons for harming yourself  · Remove any means that you might use to hurt yourself (examples: pills, rope, extension cords, firearm)  · Get professional help from the community (Mental Health, Substance Abuse, psychological counseling)  · Do not be alone:Call your Safe Contact- someone whom you trust who will be there for you.  · Call your local CRISIS HOTLINE 078-2176 or 157-383-2111  · Call your local Children's Mobile Crisis  Response Team [facility] [phone]) [phone] or [email or web address]  · Call the toll free National Suicide Prevention Hotlines   · National Suicide Prevention Lifeline 574-201-REOE (5564)  · National Hope Line Network 800-SUICIDE (246-4506)

## 2020-11-24 NOTE — PROGRESS NOTES
Monitor Summary  Rhythm: SB/SR  Rate: 53-77  Ectopy: Rare PAC's, PVC's.   .20 / .10 / .40    12 hour cc.

## 2020-11-24 NOTE — THERAPY
Occupational Therapy   Initial Evaluation     Patient Name: Sushma Bowden  Age:  85 y.o., Sex:  female  Medical Record #: [id number]  Today's Date: 11/24/2020     Precautions  Precautions: Fall Risk, Swallow Precautions ( See Comments)  Comments: recent L CVA 11/12    Assessment  Patient is 85 y.o. female with a diagnosis of AMS, recent L MCA CVA in which family took her home instead of having her go to rehab facility.  Additional factors influencing patient status / progress: weakness, fatigue, impaired balance, impaired cognition.      Plan    Recommend Occupational Therapy 3 times per week until therapy goals are met for the following treatments:  Adaptive Equipment, Cognitive Skill Development, Neuro Re-Education / Balance, Self Care/Activities of Daily Living, Therapeutic Activities and Therapeutic Exercises.    DC Equipment Recommendations: Unable to determine at this time  Discharge Recommendations: Recommend post-acute placement for additional occupational therapy services prior to discharge home     Subjective    Pleasant and cooperative, intermittent confusion     Objective       11/24/20 0931   Prior Living Situation   Prior Services Continuous (24 Hour) Care Giving Family   Housing / Facility 1 Story House   Equipment Owned Front-Wheel Walker;Single Point Cane   Lives with - Patient's Self Care Capacity Adult Children   Comments Pt has been living with family who have been providing support, per last admit it was recommended to go to post acute placement but family wanted to take her home. now they are having a difficult time caring for her.   Prior Level of ADL Function   Self Feeding Independent   Grooming / Hygiene Independent   Bathing Requires Assist   Dressing Requires Assist   Toileting Requires Assist   Cognition    Cognition / Consciousness X   Speech/ Communication Delayed Responses   Level of Consciousness Alert   Attention Impaired   Comments pleasant and cooperative, intermittent confusion,  utilized inhouse     Balance Assessment   Sitting Balance (Static) Fair   Sitting Balance (Dynamic) Fair   Standing Balance (Static) Poor +   Standing Balance (Dynamic) Poor +   Weight Shift Sitting Fair   Weight Shift Standing Poor   Comments w/ fww   Bed Mobility    Supine to Sit Supervised   Sit to Supine Supervised   Scooting Supervised   ADL Assessment   Grooming Minimal Assist;Standing  (oral care, wash hands, wash face)   Lower Body Dressing Supervision  (don/doff socks)   Toileting Moderate Assist  (steadying assist for pericare)   Functional Mobility   Sit to Stand Minimal Assist   Toilet Transfers Moderate Assist   Mobility EOB>BR>BTB   Comments w/ fww, VC/TC for walker safety   Short Term Goals   Short Term Goal # 1 pt will demo toilet txf with supv   Short Term Goal # 2 pt will dress full LB with supv   Short Term Goal # 3 pt will demo toileting w/ supv

## 2020-11-24 NOTE — CARE PLAN
Problem: Communication  Goal: The ability to communicate needs accurately and effectively will improve  Outcome: PROGRESSING SLOWER THAN EXPECTED     Problem: Safety  Goal: Will remain free from injury  Outcome: PROGRESSING AS EXPECTED  Goal: Will remain free from falls  Outcome: PROGRESSING AS EXPECTED     Problem: Infection  Goal: Will remain free from infection  Outcome: PROGRESSING AS EXPECTED     Problem: Skin Integrity  Goal: Risk for impaired skin integrity will decrease  Outcome: PROGRESSING AS EXPECTED

## 2020-11-24 NOTE — PROGRESS NOTES
Assumed care of patient at bedside report from dayshift RN. Updated on POC. Patient currently A & O x 1; on RA; up two assist; without complaints of acute pain. Call light within reach. Whiteboard updated. Fall precautions in place. Bed locked and in lowest position. All questions answered. No other needs indicated at this time.

## 2020-11-24 NOTE — THERAPY
Physical Therapy   Initial Evaluation     Patient Name: Sushma Bowden  Age:  85 y.o., Sex:  female  Medical Record #: [id number]  Today's Date: 11/24/2020     Precautions: Fall Risk, Swallow Precautions    Assessment  Patient is an 85 y.o. female presenting with AMS. Pt was recently DC'd from hospital with new dx of L CVA. Family had requested to take pt home, is now having difficult time caring for her. Pt seen for PT evaluation at this time. In-house  present during session. Pt presents with intermittent confusion but was agreeable to participating. Pt ambulated in room with FWW and min A, did demo some R lateral LOB requiring assist to correct. Ambulates with decr R foot clearance and RLE step-through; likely residual from CVA. At this time pt appears at a functional level appropriate for postacute placement to maximize safety and functional independence prior to return home. Will follow.     Plan  Recommend Physical Therapy 3 times per week until therapy goals are met for the following treatments:  Bed Mobility, Gait Training, Neuro Re-Education / Balance, Self Care/Home Evaluation, Stair Training, Therapeutic Activities and Therapeutic Exercises  DC Equipment Recommendations: Unable to determine at this time  Discharge Recommendations: Recommend post-acute placement for additional physical therapy services prior to discharge home          11/24/20 0932   Prior Living Situation   Housing / Facility 1 Story House   Steps Into Home 2   Steps In Home 0   Rail None   Equipment Owned Front-Wheel Walker;Single Point Cane   Lives with - Patient's Self Care Capacity Adult Children   Comments pt lives dtr who does work. per last admission, family was aware they would need to incr SPV at home - unknown if pt had 24/7 at [place]   Prior Level of Functional Mobility   Comments pt was independent prior to admit earlier this month with CVA. since then, likely pt has required assistance. pt reports she uses a  walker at home, but then speaks of losing her cane   Cognition    Comments pt oriented to being in hospital, but with intermittent confusion and nonsensical speech during tx   Balance Assessment   Sitting Balance (Static) Fair   Sitting Balance (Dynamic) Fair -   Standing Balance (Static) Fair -   Standing Balance (Dynamic) Poor +   Weight Shift Sitting Fair   Weight Shift Standing Fair   Comments standing with FWW, did have a few instances of LOB while ambulating   Gait Analysis   Gait Level Of Assist Minimal Assist   Assistive Device Front Wheel Walker   Distance (Feet) 20   # of Times Distance Traveled 2   Deviation Decreased Heel Strike;Decreased Toe Off;Bradykinetic;Decreased Base Of Support  (decr R foot clearance, decr RLE step-through)   Weight Bearing Status no restrictions   Comments some LOB to the R requiring assist to correct. assist for FWW management, cues for step-through   Bed Mobility    Supine to Sit Supervised   Sit to Supine Minimal Assist (at RLE)   Scooting Supervised   Functional Mobility   Sit to Stand Minimal Assist   Toilet Transfers Moderate Assist   Short Term Goals    Short Term Goal # 1 pt will perform all functional xfrs with SPV in 6 visits for improved independence   Short Term Goal # 2 pt will ambulate 150ft with FWW and SPV in 6 visits to access home environment   Short Term Goal # 3 pt will negotiate 2 steps with SPV in 6 visits to access home

## 2020-11-24 NOTE — PROGRESS NOTES
Patient discharged to [facility] with [facility]. All personal belongings collected. IV access removed. Monitor removed, monitor room notified. Discharge instructions discussed. Medications reviewed; patient confused. Patient escroted off unit via [facility] without incident. Called patients jhon Gordon to notify, no answer voicemail left. Report given to Florin [facility].

## 2020-11-24 NOTE — PREADMISSION SCREENING NOTE
Pre-Admission Screening Form    Patient Information:   Name: Sushma Bowden     MRN: [id number]       : 1934      Age: 85 y.o.   Gender: female      Race:  or  [3]  White [7]       Marital Status:  [5]  Family Contact: Evan Blanco Caro, Alifonso        Relationship: Son [15]  Son [15]  Home Phone: [phone]           Cell Phone:     Advanced Directives: None  Code Status:  FULL  Current Attending Provider: MALI Briscoe M.D.  Referring Physician: Dr. Hrarell     Physiatrist Consult: Dr. Brady        Referral Date: 2020  Primary Payor Source:  MEDICARE  Secondary Payor Source:  MEDICAID FFS    Medical Information:   Date of Admission to Acute Care [facility]  Room Number: T823/01  Rehabilitation Diagnosis: 0001.2 - Stroke: Right Body Involvement (Left Brain)  Immunization History   Administered Date(s) Administered   • Influenza Vaccine Adult HD 2020   • Pneumococcal polysaccharide vaccine (PPSV-23) 10/15/2013   • ZZZINFLUENZA TIV (IM) 10/15/2013   • Zoster Vaccine Recombinant (RZV) (SHINGRIX) 2019, 10/23/2019     Allergies   Allergen Reactions   • Penicillins Unspecified     Unknown reaction  Tolerates cephalosporins     Past Medical History:   Diagnosis Date   • Hyperlipidemia    • Hypertension      History reviewed. No pertinent surgical history.    History Leading to Admission, Conditions that Caused the Need for Rehab (CMS):     Natasha Harrell M.D.   Physician   [facility]   H&P   Signed   Date of Service:  2020  1:50 AM                    []Hide copied text    []Janel for details  Hospital Medicine History & Physical Note     Date of Service  2020     Primary Care Physician  Clayton Kohli M.D.     Consultants  None      Code Status  Full Code     Chief Complaint       Chief Complaint   Patient presents with   • ALOC       onset last night at 2300, worsening since 0800 this morning. Disoriented to place.   •  Shoulder Pain       R   • Headache   • Hypoxemia       89% SPO2 RA   • Possible Stroke   • Body Aches      .History of Presenting Illness  Sushma Bowden is a 85 y.o. female with a past medical hx of debility, CVA 11 days ago, who presents with a chief complaint of altered mental status, headache, and body aches that started yesterday around 5:30 PM.   Patient currently using a walker at home.  Prior to the stroke she was able to take care of herself independently.  Yesterday, she began to complain of a headache on the left side of her head and her daughter gave her tramadol.  This seemed to improve the headache but she has experienced intermittent confusion as well.  This morning, the headache had returned and she remained confused.  She began to complain of pain down the entire left side of her body and family was unable to even put her pajamas on because her leg hurt so much.  Granddaughter called EMS and they noted that she was hypoxic to 89% on room air.  She does not require oxygen at baseline at home.  Family denies any fevers at home, chest pain, shortness of breath, cough, nausea, vomiting.  Patient receives CT head in the ED that shows no acute intracranial changes.  COVID test is negative.    Daughter states that patient has not had anything po since 5 pm today but has had a normal appetite today.   Patient is admitted to the hospital service for observation and MRI is ordered.  Granddaughter states that patient did not recognize family members today at home however improves to AAA x2 to self and location in the emergency department, and is conversant and aware with family members during physical exam by me.           anticipate this patient is appropriate for observation status at this time.     Cerebral infarction (HCC)- (present on admission)  Assessment & Plan  Recent hx of CVA, possible TIA   New onset AMS, waxing and waning  Swallow study and precautions ordered  Fall and seizure precautions in  place  MRI ordered  Neuro checks ordered           Headache- (present on admission)  Assessment & Plan  Tylenol 650 prn  Given tramadol by daughter, improvement in headache, AMS following  Cannabinoid + UDS, daughter and granddaughter deny use           Debility- (present on admission)  Assessment & Plan  Previously ambulating and needing no assistance with ADL's  Large deficits following stroke 11 days prior, currently dependent on family for all ADL's  Consider SW consult  HH ordered upon previous admission     AMS (altered mental status)- (present on admission)  Assessment & Plan  Waxing and waning AMS  CT head:No CT evidence of acute infarct, hemorrhage or mass. 2. Moderate global parenchymal atrophy. Chronic small vessel ischemic changes  AAAX2 to self and location, family states this is patient's baseline  MRI brain ordered  Ischemic stroke 11 days prior                       Co-morbidities: as listed above and below   Potential Risk - Complications: Aphasia, Cognitive Impairment, Contractures, Deep Vein Thrombosis, Dysphagia, Incontinence, Malnutrition, Paralysis, Perceptual Impairment and Pneumonia  Level of Risk: High    Ongoing Medical Management Needed (Medical/Nursing Needs):   Patient Active Problem List    Diagnosis Date Noted   • Acute encephalopathy 11/23/2020     Priority: High   • Headache 10/13/2013     Priority: High   • Delirium 10/13/2013     Priority: High   • Cerebral infarction (HCC) 10/13/2013     Priority: High   • Debility 11/23/2020   • Dehydration 11/23/2020   • Macrocytosis 11/14/2020       Current Vital Signs:   Temperature: 36.7 °C (98.1 °F) Pulse: 67 Respiration: 16 Blood Pressure : 153/61  Weight: 62.7 kg (138 lb 3.7 oz) Height: 152.4 cm (5')  Pulse Oximetry: 100 % O2 (LPM): 0      Completed Laboratory Reports:  Recent Labs     11/22/20  2100 11/22/20  2105 11/24/20  0258   WBC 11.0*  --  7.2   HEMOGLOBIN 14.0  --  11.8*   HEMATOCRIT 41.9  --  35.7*   PLATELETCT 251  --  269    SODIUM 136  --  138   POTASSIUM 3.6  --  3.4*   BUN 9  --  8   CREATININE 0.56  --  0.41*   ALBUMIN 3.6  --  2.9*   GLUCOSE 123*  --  110*   POCGLUCOSE  --  128*  --        MR-BRAIN-W/O  Order: [id number]  Status:  Final result   Visible to patient:  No (inaccessible in MyChart) Next appt:  None  Details    Reading Physician Reading Date Result Priority   Darren Wilson M.D.  [phone] 11/14/2020 Routine      Narrative & Impression        11/14/2020 8:06 AM     HISTORY/REASON FOR EXAM:  Stroke.        TECHNIQUE/EXAM DESCRIPTION:  MRI of the brain without contrast.     T1 sagittal, T2 fast spin-echo axial, T1 coronal, FLAIR coronal, diffusion-weighted and apparent diffusion coefficient (ADC map) axial images were obtained of the whole brain.     The study was performed on a SDI Signa 1.5 Kerri MRI scanner.     COMPARISON:  None.     FINDINGS: There is moderate to small area of acute infarct in the left parietal lobe. Hemorrhage is noted within the infarct. Nonspecific T2 hyperintensities are noted in the subcortical and periventricular white matter. Moderate cerebral volume loss is   seen. There is no intra-axial space-occupying lesion. Brainstem gliosis is seen. There are chronic lacunar infarcts in the right thalamus, brainstem and left cerebellum.     The visualized flow voids of the cerebral vasculature are unremarkable.  There is no large lesion identified in the expected course of the intracranial portions of the cranial nerves.        The skull bones are unremarkable. The paranasal sinuses are clear. The extracranial soft tissue including orbits appear grossly normal.        IMPRESSION:     1.  Small to moderate-sized area of acute infarct with hemorrhagic transformation in the left parietal lobe. This is new since the previous MRI dated 05/04/2014.  2.  Multifocal chronic lacunar infarcts in the brainstem, right thalamus and left cerebellum.  3.  Mild cerebral volume loss.  4.  Mild chronic  microvascular ischemic disease.             Last Resulted: 11/14/20 09:35            MR-BRAIN-W/O  Order: [id number]  Status:  Final result   Visible to patient:  No (not released) Next appt:  None  Details    Reading Physician Reading Date Result Priority   Jesus Hansen M.D.  [phone] 11/24/2020 Routine      Narrative & Impression        11/24/2020 9:51 AM     HISTORY/REASON FOR EXAM:  Altered mental status.  Headaches, recent stroke.     TECHNIQUE/EXAM DESCRIPTION:  MRI of the brain without contrast.     T1 sagittal, T2 fast spin-echo axial, T1 coronal, FLAIR coronal, diffusion-weighted and apparent diffusion coefficient (ADC map) axial images were obtained of the whole brain.     The study was performed on a Visualead Signa 1.5 Kerri MRI scanner.     COMPARISON:  MRI brain 11/14/2020     FINDINGS:     BRAIN: There has been appropriate interval evolution of left occipital infarct with developing encephalomalacia and white matter change. There is no hemorrhage.     There is underlying moderate to severe cerebral volume loss and white matter change. This includes the subcortical white matter, periventricular white matter, esvin, and midbrain.     CALVARIA: The calvaria are normal in appearance.     MASTOIDS:: The mastoid air cells are clear.     PARANASAL sinuses: The paranasal sinuses are clear     PITUITARY gland: The pituitary gland appears within normal limits.     The ventricular system is normal in size and position.     FLOW signal The arterial flow signal is within normal limits.     DURAL venous sinuses: The dural venous sinuses are patent.     ORBITS: There are changes consistent with previous cataract surgery.     EXTRA-axial spaces There no abnormal extra-axial fluid collection present.     IMPRESSION:     1.  No acute intracranial abnormality     2.  Interval appropriate evolution of left occipital infarct     3.  Severe cerebral white matter change and there is volume loss                       Additional Labs: Not Applicable    Prior Living Situation:   Housing / Facility: 1 Story House  Steps Into Home: 2  Steps In Home: 0  Lives with - Patient's Self Care Capacity: Adult Children  Equipment Owned: Front-Wheel Walker, Single Point Cane    Prior Level of Function / Living Situation:   Physical Therapy: Prior Services: Continuous (24 Hour) Care Giving Family  Housing / Facility: 1 Story House  Steps Into Home: 2  Steps In Home: 0  Rail: None  Equipment Owned: Front-Wheel Walker, Single Point Cane  Lives with - Patient's Self Care Capacity: Adult Children     Current Level of Function:   Gait Level Of Assist: Minimal Assist  Assistive Device: Front Wheel Walker  Distance (Feet): 20  Deviation: Decreased Heel Strike, Decreased Toe Off, Bradykinetic, Decreased Base Of Support(decr R foot clearance, decr RLE step-through)  Weight Bearing Status: no restrictions  Supine to Sit: Supervised  Sit to Supine: Minimal Assist(at RLE)  Scooting: Supervised  Sit to Stand: Minimal Assist  Toilet Transfers: Moderate Assist  Sitting Edge of Bed: 12 min EOB + toilet  Standin min  Occupational Therapy:   Self Feeding: Independent  Grooming / Hygiene: Independent  Bathing: Requires Assist  Dressing: Requires Assist  Toileting: Requires Assist  Prior Services: Continuous (24 Hour) Care Giving Family  Housing / Facility: [address] [facility]  Occupation (Pre-Hospital Vocational): Not Employed  Current Level of Function:   Lower Body Dressing: Supervision(don/doff socks)  Toileting: Moderate Assist(steadying assist for pericare)  Speech Language Pathology:   Problem List: Dysphagia, Cognitive-Linguistic Deficits  Diet / Liquid Recommendation: Minced & Moist (5) - (Dysphagia II), Thin (0)  Rehabilitation Prognosis/Potential: Good  Estimated Length of Stay: 14-21 days    Nursing:   Orientation : Disoriented to Event, Disoriented to Place, Disoriented to Time  Continent    Scope/Intensity of Services  Recommended:  Physical Therapy: 1 hr / day  5 days / week. Therapeutic Interventions Required: Maximize Endurance, Mobility, Strength and Safety  Occupational Therapy: 1 hr / day 5 days / week. Therapeutic Interventions Required: Maximize Self Care, ADLs, IADLs and Energy Conservation  Speech & Language Pathology: 1 hr / day 5 days / week. Therapeutic Interventions Required: Maximize Cognition, Swallowing and Safety  Rehabilitation Nursin/7. Therapeutic Interventions Required: Monitor Vital Signs, Intake and Output, Labs, Safety, Aspiration Risk and Family Training  Rehabilitation Physician: 3 - 5 days / week. Therapeutic Interventions Required: Medical Management  Respiratory Care: evaluate . Therapeutic Interventions Required: Pulmonary Toileting and Aspiration Risk  Dietician: consult . Therapeutic Interventions Required: nutritional need     She requires 24-hour rehabilitation nursing to manage bowel and bladder function, skin care, nutrition and fluid intake, pulmonary hygiene, pain control, safety, medication management and patient/family goals. In addition, rehabilitation nursing will reiterate and reinforce therapy skills and equipment use, including ADLs, as well as provide education to the patient and family. Sushma Pena is willing to participate in and is able to tolerate the proposed plan of care.    Rehabilitation Goals and Plan (Expected frequency & duration of treatment in the IRF):   Return to the Community, Modified Independent Level of Care and Minimal Assist Level of Care  Anticipated Date of Rehabilitation Admission: 2020  Patient/Family oriented IRF level of care/facility/plan: Yes  Patient/Family willing to participate in IRF care/facility/plan: Yes  Patient able to tolerate IRF level of care proposed: Yes  Patient has potential to benefit IRF level of care proposed: Yes  Comments: Not Applicable    Special Needs or Precautions - Medical Necessity:  Safety Concerns/Precautions:   Fall Risk / High Risk for Falls, Balance and Cognition  Current Medications:    Current Facility-Administered Medications Ordered in Epic   Medication Dose Route Frequency Provider Last Rate Last Admin   • potassium chloride SA (Kdur) tablet 40 mEq  40 mEq Oral BID Kel Jennings M.D.   40 mEq at 11/24/20 0838   • atorvastatin (LIPITOR) tablet 40 mg  40 mg Oral Q EVENING Natasha Harrell M.D.   40 mg at 11/23/20 1807   • clopidogrel (PLAVIX) tablet 75 mg  75 mg Oral DAILY Natasha Harrell M.D.   75 mg at 11/24/20 0514   • escitalopram (Lexapro) tablet 10 mg  10 mg Oral DAILY Natasha Harrell M.D.   10 mg at 11/24/20 0514   • Pharmacy consult request - Allow for permissive hypertension: SBP up to 220 mmHg/DBP up to 120 mmHg x 48 hours   Other PHARMACY TO DOSE Natasha Harrell M.D.       • senna-docusate (PERICOLACE or SENOKOT S) 8.6-50 MG per tablet 2 Tab  2 Tab Oral BID Natasha Harrell M.D.   2 Tab at 11/24/20 0514    And   • polyethylene glycol/lytes (MIRALAX) PACKET 1 Packet  1 Packet Oral QDAY PRN Natasha Harrell M.D.        And   • magnesium hydroxide (MILK OF MAGNESIA) suspension 30 mL  30 mL Oral QDAY PRN Natasha Harrell M.D.        And   • bisacodyl (DULCOLAX) suppository 10 mg  10 mg Rectal QDAY PRN Natasha Harrell M.D.       • ondansetron (ZOFRAN) syringe/vial injection 4 mg  4 mg Intravenous Q4HRS PRN Natasha Harrell M.D.       • ondansetron (ZOFRAN ODT) dispertab 4 mg  4 mg Oral Q4HRS PRN Natasha Harrell M.D.       • lactated ringers infusion   Intravenous Continuous Natasha Harrell M.D. 75 mL/hr at 11/24/20 0514 New Bag at 11/24/20 0514   • acetaminophen (TYLENOL) tablet 1,000 mg  1,000 mg Oral TID Kel Jennings M.D.   1,000 mg at 11/24/20 1200   • thiamine (B-1) 300 mg in 5% dextrose 100 mL IVPB  300 mg Intravenous DAILY Kel Jennings M.D. 200 mL/hr at 11/24/20 0513 300 mg at 11/24/20 0513     No current Epic-ordered outpatient medications on file.     Diet:   DIET ORDERS  (From admission to next 24h)     Start     Ordered    11/23/20 1234  Diet Order Diet: Level 5 - Minced and Moist; Liquid level: Level 0 - Thin; Tray Modifications (optional): SLP - 1:1 Supervision by Nursing, SLP - Deliver to Nursing Station  ALL MEALS     Question Answer Comment   Diet: Level 5 - Minced and Moist    Liquid level Level 0 - Thin    Tray Modifications (optional) SLP - 1:1 Supervision by Nursing    Tray Modifications (optional) SLP - Deliver to Nursing Station        11/23/20 1234                Anticipated Discharge Destination / Patient/Family Goal:  Destination: Home with Assistance Support System: Family   Anticipated home health services: OT, PT, SLP, Nursing and Aide  Previously used HH service/ provider: Not Applicable  Anticipated DME Needs: Walker  Outpatient Services: OT, PT and SLP  Alternative resources to address additional identified needs:   Follow up for rehab spoke with son Evan he is agreeable to transfer to [facility] Evan says that his sister is able to provider 24/7 support for their mother as she returns to her home. Single story 2 little steps to enter. Evan is aware of visitor restriction and current Covid policy  Pre-Screen Completed: 11/24/2020 12:05 PM Denny Canas

## 2020-11-24 NOTE — CARE PLAN
Problem: Safety  Goal: Will remain free from falls  Description: Patient is alert to self, oriented to unit and unit routine, patient oriented to safety precautions that are in place and how to call for assistance when needed. Patient has call light close by, non-skid socks in place, alarms on bed and w/c. Frequently rounded on to make sure needs are being met. Will continue to educate as needed.  Note: Patient is alert to self, oriented to unit and unit routine, patient oriented to safety precautions that are in place and how to call for assistance when needed. Patient has call light close by, non-skid socks in place, alarms on bed and w/c. Frequently rounded on to make sure needs are being met. Will continue to educate as needed.

## 2020-11-24 NOTE — DISCHARGE PLANNING
approved transfer to [facility] Dr. Brady is accepting report calls to [phone] transport to  at 1330. SW notified

## 2020-11-24 NOTE — DISCHARGE SUMMARY
Discharge Summary    Date of Admission: 11/22/2020  Date of Discharge: 11/24/20   Discharging Attending: MALI Briscoe M.D.   Discharging Senior Resident: Kel Jennings M.D.      CHIEF COMPLAINT ON ADMISSION  Chief Complaint   Patient presents with   • ALOC     onset last night at 2300, worsening since 0800 this morning. Disoriented to place.   • Shoulder Pain     R   • Headache   • Hypoxemia     89% SPO2 RA   • Possible Stroke   • Body Aches       Reason for Admission  Acute encephalopathy/altered mental status, recent CVA    Admission Date  11/22/2020    CODE STATUS  Full Code    HPI & HOSPITAL COURSE  Sushma Bowden is a 85 y.o. woman with history of previous CVA and most recently admission for CVA of the left parietal lobe 11/12.  She did not receive TPA, though MRI did show small area of hemorrhagic conversion so she will be on antiplatelet monotherapy with Plavix.  She was discharged 11/16 with home health as family preferred having her home although recommendation was for postacute care with physical therapy, Occupational Therapy and speech therapy.  She was brought back to the emergency department 11/23 with reports of altered level of consciousness since the previous night as well as complaint of headache.  Repeat MRI brain was ordered on admission and interval appropriate changes of the previously identified infarct without any other acute changes.  She was evaluated by physical therapy, Occupational Therapy and speech therapy and recommendations were made for postacute care, with acute rehab for CVA being the most preferable disposition.  Her altered level of consciousness was likely multifactorial as she was given tramadol for headache which could likely contribute to delirium, she was positive for cannabinoids (though patient and family deny any ingestion of drugs or alcohol), also likely compounding of underlying cognitive impairment by her recent CVA.  Discussed her current condition with  her son, Evan, and he agreed that she would benefit best from rehab as opposed to home.  He also corroborated that since her most recent CVA she has had difficulty understanding conversations, remembering the names of family members, and finding words.  She is medically stable to be transferred to acute rehab.      The patient met 2-midnight criteria for an inpatient stay at the time of discharge.    Time spent on discharge: 45 minutes    Discharge Date  11/24/20     FOLLOW UP ITEMS POST DISCHARGE  MRI brain results    DISCHARGE DIAGNOSES  Active Problems:    Headache POA: Yes      Overview: ICD-10 transition    Cerebral infarction (HCC) POA: Yes      Overview: Diagnois update 10/1/2016    Acute encephalopathy POA: Yes    Macrocytosis POA: Yes    Debility POA: Yes    Dehydration POA: Unknown  Resolved Problems:    * No resolved hospital problems. *      FOLLOW UP  No future appointments.  No follow-up provider specified.    MEDICATIONS ON DISCHARGE     Medication List      CHANGE how you take these medications      Instructions   acetaminophen 500 MG Tabs  What changed:   · how much to take  · when to take this  · reasons to take this  Commonly known as: TYLENOL   Take 2 Tabs by mouth 3 times a day.  Dose: 1,000 mg        CONTINUE taking these medications      Instructions   atorvastatin 40 MG Tabs  Commonly known as: LIPITOR   Take 1 Tab by mouth every evening for 30 days.  Dose: 40 mg     clopidogrel 75 MG Tabs  Commonly known as: PLAVIX   Doctor's comments: Please take only for 21 days  Take 1 Tab by mouth every day for 21 days.  Dose: 75 mg     docusate calcium 240 MG Caps  Commonly known as: SURFAK   Take 1 Cap by mouth every day.  Dose: 240 mg     escitalopram 10 MG Tabs  Commonly known as: Lexapro   Take 10 mg by mouth every day.  Dose: 10 mg     gabapentin 300 MG Caps  Commonly known as: NEURONTIN   Take 300 mg by mouth every bedtime.  Dose: 300 mg     magnesium oxide 400 MG Tabs tablet  Commonly known as:  MAG-OX   Take 1 Tab by mouth every day.  Dose: 400 mg     omeprazole 20 MG delayed-release capsule  Commonly known as: PRILOSEC   Take 1 Cap by mouth every day. for gastritis  Dose: 20 mg            Allergies  Allergies   Allergen Reactions   • Penicillins Unspecified     Unknown reaction  Tolerates cephalosporins       DIET  Orders Placed This Encounter   Procedures   • Diet Order Diet: Level 5 - Minced and Moist; Liquid level: Level 0 - Thin; Tray Modifications (optional): SLP - 1:1 Supervision by Nursing, SLP - Deliver to Nursing Station     Standing Status:   Standing     Number of Occurrences:   1     Order Specific Question:   Diet:     Answer:   Level 5 - Minced and Moist [24]     Order Specific Question:   Liquid level     Answer:   Level 0 - Thin     Order Specific Question:   Tray Modifications (optional)     Answer:   SLP - 1:1 Supervision by Nursing     Order Specific Question:   Tray Modifications (optional)     Answer:   SLP - Deliver to Nursing Station       ACTIVITY  As tolerated.  Weight bearing as tolerated      Kel Jennings M.D.

## 2020-11-25 PROCEDURE — 97162 PT EVAL MOD COMPLEX 30 MIN: CPT

## 2020-11-25 PROCEDURE — 92610 EVALUATE SWALLOWING FUNCTION: CPT

## 2020-11-25 PROCEDURE — 770010 HCHG ROOM/CARE - REHAB SEMI PRIVAT*

## 2020-11-25 PROCEDURE — 97166 OT EVAL MOD COMPLEX 45 MIN: CPT

## 2020-11-25 PROCEDURE — 700102 HCHG RX REV CODE 250 W/ 637 OVERRIDE(OP): Performed by: PHYSICAL MEDICINE & REHABILITATION

## 2020-11-25 PROCEDURE — 92523 SPEECH SOUND LANG COMPREHEN: CPT

## 2020-11-25 PROCEDURE — 99233 SBSQ HOSP IP/OBS HIGH 50: CPT | Performed by: PHYSICAL MEDICINE & REHABILITATION

## 2020-11-25 PROCEDURE — 97535 SELF CARE MNGMENT TRAINING: CPT

## 2020-11-25 PROCEDURE — 97530 THERAPEUTIC ACTIVITIES: CPT

## 2020-11-25 PROCEDURE — A9270 NON-COVERED ITEM OR SERVICE: HCPCS | Performed by: PHYSICAL MEDICINE & REHABILITATION

## 2020-11-25 RX ORDER — QUETIAPINE FUMARATE 25 MG/1
25 TABLET, FILM COATED ORAL 3 TIMES DAILY PRN
Status: DISCONTINUED | OUTPATIENT
Start: 2020-11-25 | End: 2020-12-06 | Stop reason: HOSPADM

## 2020-11-25 RX ORDER — LANOLIN ALCOHOL/MO/W.PET/CERES
3 CREAM (GRAM) TOPICAL NIGHTLY PRN
Status: DISCONTINUED | OUTPATIENT
Start: 2020-11-25 | End: 2020-12-06 | Stop reason: HOSPADM

## 2020-11-25 RX ADMIN — ESCITALOPRAM OXALATE 10 MG: 10 TABLET, FILM COATED ORAL at 08:20

## 2020-11-25 RX ADMIN — HYDROXYZINE HYDROCHLORIDE 50 MG: 25 TABLET, FILM COATED ORAL at 16:33

## 2020-11-25 RX ADMIN — CLOPIDOGREL BISULFATE 75 MG: 75 TABLET ORAL at 08:20

## 2020-11-25 RX ADMIN — DOCUSATE SODIUM 50 MG AND SENNOSIDES 8.6 MG 2 TABLET: 8.6; 5 TABLET, FILM COATED ORAL at 08:20

## 2020-11-25 RX ADMIN — ACETAMINOPHEN 1000 MG: 500 TABLET, FILM COATED ORAL at 08:20

## 2020-11-25 RX ADMIN — ACETAMINOPHEN 1000 MG: 500 TABLET, FILM COATED ORAL at 13:30

## 2020-11-25 ASSESSMENT — MONTREAL COGNITIVE ASSESSMENT (MOCA)
VISUOSPATIAL/EXECUTIVE SUBSCORE: 0
6. READ LIST OF DIGITS [FORWARD/BACKWARD]: 0
LEVEL OF SEVERITY: SEVERE COGNITIVE IMPAIRMENT
11. FOR EACH PAIR OF WORDS, WHAT CATEGORY DO THEY BELONG TO (OUT OF 2): 0
12. MEMORY INDEX SCORE: 0
8. SERIAL SUBTRACTION OF 7S: 0
WHAT LEVEL OF EDUCATION WAS ATTAINED: LESS THAN HIGH SCHOOL EDUCATION
9. REPEAT EACH SENTENCE: 0
ORIENTATION SUBSCORE: 0
WHAT IS THE TOTAL SCORE (OUT OF 30): 1
10. [FLUENCY] NAME WORDS STARTING WITH DESIGNATED LETTER: 0
4. NAME EACH OF THE THREE ANIMALS SHOWN: 0
7. [VIGILENCE] TAP WHEN HEARING DESIGNATED LETTER: 0

## 2020-11-25 ASSESSMENT — BRIEF INTERVIEW FOR MENTAL STATUS (BIMS)
BIMS SUMMARY SCORE: 0
GENERAL MEMORY AND RECALL ABILITY: NONE OF THE GIVEN OPTIONS
INITIAL REPETITION OF BED BLUE SOCK - FIRST ATTEMPT: 0
WHAT DAY OF THE WEEK IS IT: NO ANSWER
ASKED TO RECALL BLUE: NO, COULD NOT RECALL
WHAT MONTH IS IT: MISSED BY MORE THAN 1 MONTH
WHAT YEAR IS IT: NO ANSWER
ASKED TO RECALL SOCK: NO, COULD NOT RECALL
ASKED TO RECALL BED: NO, COULD NOT RECALL

## 2020-11-25 ASSESSMENT — ACTIVITIES OF DAILY LIVING (ADL)
TOILET_TRANSFER_DESCRIPTION: GRAB BAR
TOILETING: INDEPENDENT
BED_CHAIR_WHEELCHAIR_TRANSFER_DESCRIPTION: SQUAT PIVOT TRANSFER TO WHEELCHAIR
TOILET_TRANSFER_DESCRIPTION: GRAB BAR;INCREASED TIME;INITIAL PREPARATION FOR TASK;SET-UP OF EQUIPMENT;SUPERVISION FOR SAFETY;VERBAL CUEING

## 2020-11-25 ASSESSMENT — GAIT ASSESSMENTS
DEVIATION: STEP TO;DECREASED BASE OF SUPPORT;BRADYKINETIC;DECREASED HEEL STRIKE;DECREASED TOE OFF
GAIT LEVEL OF ASSIST: MINIMAL ASSIST
DISTANCE (FEET): 15
ASSISTIVE DEVICE: WHEELCHAIR PUSH

## 2020-11-25 ASSESSMENT — PAIN DESCRIPTION - PAIN TYPE: TYPE: CHRONIC PAIN

## 2020-11-25 NOTE — THERAPY
"Speech Language Pathology   Initial Assessment     Patient Name: Sushma Bowden  AGE:  85 y.o., SEX:  female  Medical Record #: [id number]  Today's Date: 11/25/2020     Subjective    Per chart review: \"The patient is a 85 y.o. female with a past medical history of hypertension, depression, dementia; now admitted for acute inpatient rehabilitation with severe functional debility after an acute stroke.  On admission the patient and medical record report, she initially presented to the hospital on 11/12 with altered mental status, and was found to have an acute left parietal lobe stroke with small amount of hemorrhage, though patient did not receive tPA. She was discharged home on 11/16 on Plavix, with recommendations for acute rehab, but family wanted to take patient home. She returned to the ED on 11/22 due to complaints of altered mental status again. Patient without any new changes on her Head CT, MRI showed evolving left sided stroke. 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 Patient's COVID was negative on 11/22. Per review of records, patient had been seen in the past by cardiology, with negative Ziopatch for any atrial fibrillation. She also was diagnosed with moderate cognitive deficits, thought to be secondary to vascular dementia, due to findings of multiple other strokes on her MRI.\"     Objective       11/25/20 0803   Precautions   Precautions Swallow Precautions ( See Comments)  (modified diet )   Prior Living Situation   Prior Services Unable To Determine At This Time   Housing / Facility Unable To Determine At This Time   Lives with - Patient's Self Care Capacity Adult Children  (pt reports living with her daughter)   Prior Level Of Function   Communication Within Functional Limits   Swallow Within Functional Limits   Dentition Edentulous   Dentures Uppers;Lowers   Hearing Impaired Both Ears   Hearing Aid None   Vision Unknown   Patient's Primary Language Setswana   Education " Grade School   Education Years Completed 2   Occupation (Pre-Hospital Vocational) Unable To Determine At This Time   Cognitive Pattern Assessment   Cognitive Pattern Assessment Used Staff Assessment   Staff Assessment for Mental Status   Memory/Recall Ability None of the given options   Labial Function   Labial Structure At Rest Minimal   Lingual Function   Lingual Function (WDL) WDL   Jaw Function   Jaw Function (WDL) WDL   Velar Function   Velar Function (WDL) WDL   Laryngeal Function   Laryngeal Function (WDL) WDL   Swallowing   Pureed (4) Within Functional Limits   Thin Liquid Within Functional Limits   Masticated Foods Minimal   Dysphagia Strategies / Recommendations   Strategies / Interventions Recommended (Yes / No) Yes   Compensatory Strategies Monitor During Meals;Single Sips / Bites;Head of Bed 90 Degrees During Eating / Drinking   Diet / Liquid Recommendation Thin (0);Minced & Moist (5) - (Dysphagia II)   Medication Administration  Crush all Medications in Puree   Therapy Interventions MBSS Evaluation;Dysphagia Therapy By Speech Language Pathologist;One To One Supervision With SLP;One To One Supervision With Nursing   Follow Up SLP Evaluation MBSS for further assessment of swallow as appropriate.    Barriers To Oral Feeding   Barriers To Oral Feeding Impaired Cognition / Attention;Generalized Weakness   Cervical Ausculatation Not Tested   Pre-Feeding Oral Stimulation Trial Not Tested   Swallowing/Nutritional Status   Swallowing/Nutritional Status Modified food consistency   Eating   Assistance Needed Verbal cues;Physical assistance   Physical Assistance Level 25% or less   CARE Score 3   Functional Level of Assist   Eating Supervision   Eating Description Modified diet;Insert dentures;Increased time;Verbal cueing   Comprehension Moderate Assist   Comprehension Description Verbal cues   Expression Maximal Assist   Expression Description Verbal cueing   Social Interaction Minimal Assist   Social Interaction  Description Verbal cues;Increased time   Problem Solving Maximal Assist   Memory Total Assist   Outcome Measures   Outcome Measures Utilized MoCA   MoCA ([place] Cognitive Assessment)   Visuospatial/Executive 0   Naming 0   Attention - Digits 0   Attention - Letters 0   Attention - Serial 7 Subtraction 0   Language - Repeat 0   Language - Fluency 0   Language - Abstraction 0   Delayed Recall 0   Orientation 0   Level of Education 1   Total 1   Level of Severity Severe cognitive impairment   Problem List   Problem List Dysphagia;Aphasia;Cognitive-Linguistic Deficits;Cognitive-Language Deficits;Expressive Language Deficit;Receptive Language Deficit;Hearing Deficit;Attention Deficit;Memory Deficit;Executive Function Deficit;Verbal Problem Solving Deficits;Impaired Judgement;Impaired Safety   Current Discharge Plan   Current Discharge Plan Return to Prior Living Situation   Benefit   Therapy Benefit Patient would benefit from Inpatient Rehab Speech-Language Pathology to address above identified deficits.   Interdisciplinary Plan of Care Collaboration   IDT Collaboration with  Physician;Nursing;Occupational Therapist   Patient Position at End of Therapy Seated;In Bed;Bed Alarm On;Call Light within Reach;Tray Table within Reach   Collaboration Comments results of assessment and POC   Speech Language Pathologist Assigned   Assigned SLP / Extension LW 60 no split    SLP Total Time Spent   SLP Individual Total Time Spent (Mins) 60   Evaluation Charges   Charges Yes   SLP Speech Language Evaluation Speech Sound Language Comprehension   SLP Oral Pharyngeal Evaluation Oral Pharyngeal Evaluation       Assessment    Patient is 85 y.o. female with a diagnosis of evolving left parietal lobe stroke.  Additional factors influencing patient status/progress (ie: cognitive factors, co-morbidities, social support, etc): pt reports being indep and able to care for self. Pt with language and cognitive impairments noted, unknown  "reliability of information re: PLOF, SLP to phone family to determine PLOF and indep.     SWALLOW: BSE completed. Oral [facility] exam was conducted however not all areas able to be completed in regards to pt having a difficult time following instructions. Pt presenting with mild right side facial weakness, pt edontulous however upper and lower dentures present. Pt assessed with minced and moist and soft and bite size textures with thin liquids. Pt with limited PO intake noted. Max cues for continued intake and initiation. Pt presenting with prolonged mastication with soft and bite size textures. No overt s/sx of asp/pen noted at this time. Rec: pt remain on current diet with initiation of soft and bite size textures with SLP. Medications to be crushed in puree. Unable to enroll in therapeutic dining due to isolation for 5 days.     COG/LANGUAGE: Per chart review pt had attended school until second grade, limited reading skills and unable to write.  used throughout evaluation, pt often stating \"I cant hear you\" pt noted to do better with male  rather than female. Pt required mult repetitions and breakdown of information. MOCA administered with score of 1/30 achieved. Per discussion with MD pt was given MOCA year prior and achieved score of 24/30. Pt not oriented despite CO2 provided. Pt indep able to state name, age, . Pt indep named items on tray with 80% accuracy however unable to name any of the animal on the MOCA. Pt folloed one step simple directions with 100% accuracy, 2 step directions however completed with 20% accuracy. Pt indep aware of language deficits stating \"I cannot hear or speak like I use too, it is so bad.\"  Pt reporting indep with meds and finances prior, SLP to discuss with family to determine PLOF.     Plan  Recommend Speech Therapy 30-60 minutes per day 5-6 days per week for 3 weeks for the following treatments:  SLP Aphasia Evaluation, SLP Speech Language Treatment, SLP " Swallowing Dysfunction Treatment, SLP Oral Pharyngeal Evaluation, SLP Video Swallow Evaluation, SLP Self Care / ADL Training , SLP Cognitive Skill Development and SLP Group Treatment.    Goals:  Long term and short term goals have been discussed with patient and they are in agreement.    Speech Therapy Problems     Problem: Comprehension STGs     Dates: Start: 11/25/20       Goal: STG-Within one week, patient will     Dates: Start: 11/25/20       Description: 1) Individualized goal:  follow 2 step verbal commands with 80% accuracy provided MOD A.  2) Interventions:  SLP Aphasia Evaluation, SLP Speech Language Treatment, SLP Self Care / ADL Training , SLP Cognitive Skill Development, and SLP Group Treatment                Problem: Expression STGs     Dates: Start: 11/25/20       Goal: STG-Within one week, patient will express     Dates: Start: 11/25/20       Description: 1) Individualized goal:  simple object naming with 80% accuracy provided MIN cues.   2) Interventions:  SLP Aphasia Evaluation, SLP Speech Language Treatment, SLP Self Care / ADL Training , SLP Cognitive Skill Development, and SLP Group Treatment                Problem: Speech/Swallowing LTGs     Dates: Start: 11/25/20       Goal: LTG-By discharge, patient will safely swallow     Dates: Start: 11/25/20       Description: 1) Individualized goal:  regular textures with thin liquids with no overt s/sx of asp/pen noted across meals with 100% accuracy provided MOD I.  2) Interventions:  SLP Swallowing Dysfunction Treatment, SLP Oral Pharyngeal Evaluation, SLP Video Swallow Evaluation, SLP Self Care / ADL Training , SLP Cognitive Skill Development, and SLP Group Treatment              Goal: LTG-By discharge, patient will express     Dates: Start: 11/25/20       Description: 1) Individualized goal:  basic wants and needs across environments with 80% accuracy provided SPV.  2) Interventions:  SLP Aphasia Evaluation, SLP Speech Language Treatment, SLP Self  Care / ADL Training , SLP Cognitive Skill Development, and SLP Group Treatment                    Problem: Swallowing STGs     Dates: Start: 11/25/20       Goal: STG-Within one week, patient will safely swallow     Dates: Start: 11/25/20       Description: 1) Individualized goal:  minced and moist textures with no overt s/sx of asp/pen noted across meals with MIN cues.  2) Interventions:  SLP Swallowing Dysfunction Treatment, SLP Oral Pharyngeal Evaluation, SLP Video Swallow Evaluation, SLP Self Care / ADL Training , SLP Cognitive Skill Development, and SLP Group Treatment

## 2020-11-25 NOTE — FLOWSHEET NOTE
11/24/20 1647   Events/Summary/Plan   Events/Summary/Plan RT assess   Vital Signs   Pulse 65   Respiration 18   Pulse Oximetry 96 %   $ Pulse Oximetry (Spot Check) Yes   Respiratory Assessment   Level of Consciousness Confused   Oxygen   O2 Delivery Device None - Room Air   Smoking History   Have you ever smoked Never

## 2020-11-25 NOTE — CARE PLAN
Problem: Safety  Goal: Will remain free from falls  Intervention: Implement fall precautions  Note: Use of call light encouraged to make needs known.Fall precautions and frequent rounding in place for safety.Call light within reach.Will continue to monitor and assess needs and safety.     Problem: Infection  Goal: Will remain free from infection  Intervention: Assess signs and symptoms of infection  Note: Pt is calm, comfortable and no sign of acute distress noted.Will continue to monitor.

## 2020-11-25 NOTE — THERAPY
Physical Therapy   Initial Evaluation     Patient Name: Sushma Bowden  Age:  85 y.o., Sex:  female  Medical Record #: [id number]  Today's Date: 11/25/2020     Subjective    Patient reported fear of falling during attempt at A assessment of gait, but agreed with use of wheelchair push.      Objective       11/25/20 1031   Prior Living Situation   Prior Services Unable To Determine At This Time   Housing / Facility 1 Story House   Steps Into Home 2   Steps In Home 0   Rail None   Equipment Owned 4-Wheel Walker;Single Point Cane   Lives with - Patient's Self Care Capacity Adult Children   Comments Patient is a poor historian, thus majority of PLOF information gathered from phone conversation w/son (Evan), and OT.   Prior Level of Functional Mobility   Bed Mobility Independent   Transfer Status Independent   Ambulation Independent   Distance Ambulation (Feet)   (unknown)   Assistive Devices Used Single Point Cane   Stairs Independent   Prior Functioning: Everyday Activities   Self Care Independent   Indoor Mobility (Ambulation) Independent   Stairs Independent   Functional Cognition Independent   Prior Device Use None of the given options  (intermittent use of SPC)   Cognition    Level of Consciousness Confused   Strength Lower Body   Comments Able to move BLE against gravity (LAQ, march, ankle pumps), unable to perform MMT due to cognitive deficits   Sensation Lower Body   Lower Extremity Sensation   Not Tested   Lower Body Muscle Tone   Lower Body Muscle Tone  Not Tested   Balance Assessment   Sitting Balance (Static) Good   Sitting Balance (Dynamic) Fair   Standing Balance (Static) Fair -   Standing Balance (Dynamic) Poor +   Weight Shift Sitting Fair   Weight Shift Standing Poor   Bed Mobility    Supine to Sit Moderate Assist  (Mod A trial 1, Min A trial 2)   Sit to Supine Contact Guard Assist   Sit to Stand Minimal Assist  (Min A trial 1, CGA trial 2,3)   Scooting Contact Guard Assist   Rolling Minimal Assist to  Rt.   Coordination Lower Body    Coordination Lower Body  Not Tested   Roll Left and Right   Assistance Needed Physical assistance   Physical Assistance Level 25% or less   CARE Score 3   Roll Left and Right Discharge Goal   Discharge Goal Independent   Sit to Lying   Assistance Needed Incidental touching   Physical Assistance Level No physical assistance   CARE Score 4   Sit to Lying Discharge Goal   Discharge Goal Independent   Lying to Sitting on Side of Bed   Assistance Needed Physical assistance   Physical Assistance Level 26%-50%   CARE Score 3   Lying to Sitting on Side of Bed Discharge Goal   Discharge Goal Independent   Sit to Stand   Assistance Needed Physical assistance   Physical Assistance Level 25% or less   CARE Score 3   Sit to Stand Discharge Goal   Discharge Goal Supervision or touching assistance   Chair/Bed-to-Chair Transfer   Assistance Needed Physical assistance   Physical Assistance Level 25% or less   CARE Score 3   Chair/Bed-to-Chair Transfer Discharge Goal   Discharge Goal Supervision or touching assistance   Toilet Transfer   Assistance Needed Physical assistance   Physical Assistance Level 25% or less   CARE Score 3   Toilet Transfer Discharge Goal   Discharge Goal Supervision or touching assistance   Car Transfer   Reason if not Attempted Safety concerns   CARE Score 88   Car Transfer Discharge Goal   Discharge Goal Supervision or touching assistance   Walk 10 Feet   Assistance Needed Physical assistance;Adaptive equipment   Physical Assistance Level 25% or less   CARE Score 3   Walk 10 Feet Discharge Goal   Discharge Goal Supervision or touching assistance   Walk 50 Feet with Two Turns   Reason if not Attempted Safety concerns   CARE Score 88   Walk 50 Feet with Two Turns Discharge Goal   Discharge Goal Supervision or touching assistance   Walk 150 Feet   Reason if not Attempted Safety concerns   CARE Score 88   Walk 150 Feet Discharge Goal   Discharge Goal Supervision or touching  assistance   Walking 10 Feet on Uneven Surfaces   Reason if not Attempted Safety concerns   CARE Score 88   Walking 10 Feet on Uneven Surfaces Discharge Goal   Discharge Goal Supervision or touching assistance   1 Step (Curb)   Reason if not Attempted Safety concerns   CARE Score 88   1 Step (Curb) Discharge Goal   Discharge Goal Partial/moderate assistance   4 Steps   Reason if not Attempted Safety concerns   CARE Score 88   4 Steps Discharge Goal   Discharge Goal Partial/moderate assistance   12 Steps   Reason if not Attempted Safety concerns   CARE Score 88   12 Steps Discharge Goal   Discharge Goal Partial/moderate assistance   Picking Up Object   Reason if not Attempted Safety concerns   CARE Score 88   Picking Up Object Discharge Goal   Discharge Goal Supervision or touching assistance   Wheel 50 Feet with Two Turns   Reason if not Attempted Safety concerns   CARE Score 88   Type of Wheelchair/Scooter Manual   Wheel 50 Feet with Two Turns Discharge Goal   Discharge Goal Supervision or touching assistance   Wheel 150 Feet   Reason if not Attempted Safety concerns   CARE Score 88   Type of Wheelchair/Scooter Manual   Wheel 150 Feet Discharge Goal   Discharge Goal Supervision or touching assistance   Gait Functional Level of Assist    Gait Level Of Assist Minimal Assist  (Attempted HHA mod A 3 steps, fear of falling )   Assistive Device Wheelchair Push   Distance (Feet) 15   # of Times Distance was Traveled 1   Deviation Step To;Decreased Base Of Support;Bradykinetic;Decreased Heel Strike;Decreased Toe Off   Wheelchair Functional Level of Assist   Wheelchair Assist Supervised   Distance Wheelchair (Feet or Distance) 20   Wheelchair Description Extra time  (20 ft x 2 BUE)   Stairs Functional Level of Assist   Level of Assist with Stairs Unable to Participate  (in room x 5 days after admission per precautions)   Transfer Functional Level of Assist   Bed, Chair, Wheelchair Transfer Minimal Assist   Bed Chair  Wheelchair Transfer Description Squat pivot transfer to wheelchair   Toilet Transfers Minimal Assist   Toilet Transfer Description Grab bar   Problem List    Problems Impaired Bed Mobility;Impaired Transfers;Impaired Ambulation;Impaired Balance;Impaired Coordination;Decreased Activity Tolerance;Motor Planning / Sequencing   Precautions   Precautions Swallow Precautions ( See Comments);Fall Risk   Comments dementia, confused despite use of interpretor services during duration of session, in room x 1st 5 days as added precaution   Current Discharge Plan   Current Discharge Plan Return to Prior Living Situation   Interdisciplinary Plan of Care Collaboration   IDT Collaboration with  Physician;Other (See Comments);Nursing  (Interpretor services)   Patient Position at End of Therapy Seated  (handoff to MD )   Collaboration Comments POC, CLOF   Benefit   Therapy Benefit Patient Would Benefit from Inpatient Rehabilitation Physical Therapy to Maximize Functional [place] with ADLs, IADLs and Mobility.   Strengths & Barriers   Strengths Supportive family;Independent prior level of function   Barriers Decreased endurance;Fatigue;Home accessibility;Impaired balance;Limited mobility   PT Total Time Spent   PT Individual Total Time Spent (Mins) 60   PT Charge Group   PT Therapeutic Activities 1   PT Evaluation PT Evaluation Mod       Assessment  Patient is 85 y.o. female with a diagnosis of acute L parietal lobe CVA with altered mental status.  Additional factors influencing patient status / progress (ie: cognitive factors, co-morbidities, social support, etc): PMH of HTN, depression, dementia.      Plan  Recommend Physical Therapy  minutes per day 5-7 days per week for 2 weeks for the following treatments:  PT Group Therapy, PT E Stim Attended, PT Gait Training, PT Self Care/Home Eval, PT Therapeutic Exercises, PT Neuro Re-Ed/Balance, PT Therapeutic Activity, PT Manual Therapy and PT Evaluation.    Goals:  Long term  "and short term goals have been discussed with patient and they are in agreement.    Physical Therapy Problems     Problem: Mobility     Dates: Start: 11/25/20       Goal: STG-Within one week, patient will ambulate household distance     Dates: Start: 11/25/20       Description: 1) Individualized goal:  Patient will amb with FWW >50 ft indoors CGA/SBA  2) Interventions:  PT Gait Training, PT Self Care/Home Eval, PT Therapeutic Exercises, PT Neuro Re-Ed/Balance, PT Aquatic Therapy, PT Therapeutic Activity, PT Manual Therapy, and PT Evaluation            Goal: STG-Within one week, patient will ascend and descend four to six stairs     Dates: Start: 11/25/20       Description: 1) Individualized goal:  Patient will amb up/down 6 4\" stairs with BHR CGA/min A  2) Interventions:  PT Gait Training, PT Self Care/Home Eval, PT Therapeutic Exercises, PT Neuro Re-Ed/Balance, PT Aquatic Therapy, PT Therapeutic Activity, PT Manual Therapy, and PT Evaluation                  Problem: Mobility Transfers     Dates: Start: 11/25/20       Goal: STG-Within one week, patient will transfer bed to chair     Dates: Start: 11/25/20       Description: 1) Individualized goal:  Patient will transfer SBA reach pivot consistently wc <> bed,  STS  2) Interventions:  PT Gait Training, PT Self Care/Home Eval, PT Therapeutic Exercises, PT Neuro Re-Ed/Balance, PT Aquatic Therapy, PT Therapeutic Activity, PT Manual Therapy, and PT Evaluation                  Problem: PT-Long Term Goals     Dates: Start: 11/25/20       Goal: LTG-By discharge, patient will ambulate     Dates: Start: 11/25/20       Description: 1) Individualized goal:  Patient will amb in home with LRAD >150 ft over various surfaces SPV  2) Interventions:  PT Gait Training, PT Self Care/Home Eval, PT Therapeutic Exercises, PT Neuro Re-Ed/Balance, PT Aquatic Therapy, PT Therapeutic Activity, PT Manual Therapy, and PT Evaluation            Goal: LTG-By discharge, patient will transfer one " surface to another     Dates: Start: 11/25/20       Description: 1) Individualized goal: Patient will transfer SPV with LRAD STS/SPT  2) Interventions:  PT Gait Training, PT Self Care/Home Eval, PT Therapeutic Exercises, PT Neuro Re-Ed/Balance, PT Aquatic Therapy, PT Therapeutic Activity, PT Manual Therapy, and PT Evaluation            Goal: LTG-By discharge, patient will ambulate up/down 4-6 stairs     Dates: Start: 11/25/20       Description: 1) Individualized goal:  Patient will amb up/down 2 stairs without HR, min A/ HHA  2) Interventions:  PT Gait Training, PT Self Care/Home Eval, PT Therapeutic Exercises, PT Neuro Re-Ed/Balance, PT Aquatic Therapy, PT Therapeutic Activity, PT Manual Therapy, and PT Evaluation

## 2020-11-25 NOTE — PROGRESS NOTES
Patient care assumed. Report received from [facility] RIANA Pérez. Patient is alert and calm, resting in bed. Call light and bedside table within reach. Will continue to monitor.

## 2020-11-25 NOTE — CARE PLAN
Problem: Safety  Goal: Will remain free from falls  Description: Patient is alert to self, oriented to unit and unit routine, patient oriented to safety precautions that are in place and how to call for assistance when needed. Patient has call light close by, non-skid socks in place, alarms on bed and w/c. Frequently rounded on to make sure needs are being met. Will continue to educate as needed.  Note: Patient is alert, [place] speaking only and confused. Patient has used the call light and has not attempted to self-transfer, has alarms set on bed and w/c.      Problem: Skin Integrity  Goal: Risk for impaired skin integrity will decrease  Note: Patient's skin remains intact and free from new or accidental injury this shift.  Will continue to monitor.

## 2020-11-25 NOTE — PROGRESS NOTES
Rehab Progress Note     Date of Service: 11/25/2020  Chief Complaint: follow up stroke    Interval Events (Subjective)    Patient seen and examined today in her room. Nursing reports she was confused this morning and slightly agitated for which she refused her morning labs.  Using the  patient continues to be confused and is unsure why she is in the hospital.  She was reoriented.  A lot of her answers to my questions do not make sense due to her aphasia.  She reports that she is not hungry for lunch that her abdomen is bothering her.  Per nursing she did have a small bowel movement this morning.  I attempted to call her son Evan to verify CODE STATUS but there was no answer.  Per physical therapy, they were also unable to do a manual muscle test on her but she was able to ambulate in the room with a walker.    Objective:  VITAL SIGNS: /60   Pulse 80   Temp 36.9 °C (98.5 °F) (Temporal)   Resp 16   Ht 1.524 m (5')   Wt 62.1 kg (137 lb)   SpO2 95%   BMI 26.76 kg/m²   Gen: alert, no apparent distress  Neuro: notable for expressive and receptive aphasia, very hard of hearing    Recent Results (from the past 72 hour(s))   CBC WITH DIFFERENTIAL    Collection Time: 11/22/20  9:00 PM   Result Value Ref Range    WBC 11.0 (H) 4.8 - 10.8 K/uL    RBC 4.10 (L) 4.20 - 5.40 M/uL    Hemoglobin 14.0 12.0 - 16.0 g/dL    Hematocrit 41.9 37.0 - 47.0 %    .2 (H) 81.4 - 97.8 fL    MCH 34.1 (H) 27.0 - 33.0 pg    MCHC 33.4 (L) 33.6 - 35.0 g/dL    RDW 46.8 35.9 - 50.0 fL    Platelet Count 251 164 - 446 K/uL    MPV 10.6 9.0 - 12.9 fL    Neutrophils-Polys 79.10 (H) 44.00 - 72.00 %    Lymphocytes 6.60 (L) 22.00 - 41.00 %    Monocytes 13.20 0.00 - 13.40 %    Eosinophils 0.10 0.00 - 6.90 %    Basophils 0.40 0.00 - 1.80 %    Immature Granulocytes 0.60 0.00 - 0.90 %    Nucleated RBC 0.00 /100 WBC    Neutrophils (Absolute) 8.69 (H) 2.00 - 7.15 K/uL    Lymphs (Absolute) 0.73 (L) 1.00 - 4.80 K/uL    Monos (Absolute)  1.45 (H) 0.00 - 0.85 K/uL    Eos (Absolute) 0.01 0.00 - 0.51 K/uL    Baso (Absolute) 0.04 0.00 - 0.12 K/uL    Immature Granulocytes (abs) 0.07 0.00 - 0.11 K/uL    NRBC (Absolute) 0.00 K/uL   COMP METABOLIC PANEL    Collection Time: 11/22/20  9:00 PM   Result Value Ref Range    Sodium 136 135 - 145 mmol/L    Potassium 3.6 3.6 - 5.5 mmol/L    Chloride 98 96 - 112 mmol/L    Co2 26 20 - 33 mmol/L    Anion Gap 12.0 7.0 - 16.0    Glucose 123 (H) 65 - 99 mg/dL    Bun 9 8 - 22 mg/dL    Creatinine 0.56 0.50 - 1.40 mg/dL    Calcium 9.4 8.5 - 10.5 mg/dL    AST(SGOT) 36 12 - 45 U/L    ALT(SGPT) 26 2 - 50 U/L    Alkaline Phosphatase 97 30 - 99 U/L    Total Bilirubin 0.9 0.1 - 1.5 mg/dL    Albumin 3.6 3.2 - 4.9 g/dL    Total Protein 7.2 6.0 - 8.2 g/dL    Globulin 3.6 (H) 1.9 - 3.5 g/dL    A-G Ratio 1.0 g/dL   HCG QUAL SERUM    Collection Time: 11/22/20  9:00 PM   Result Value Ref Range    Beta-Hcg Qualitative Serum Negative Negative   DIAGNOSTIC ALCOHOL    Collection Time: 11/22/20  9:00 PM   Result Value Ref Range    Diagnostic Alcohol <10.1 0.0 - 10.0 mg/dL   ESTIMATED GFR    Collection Time: 11/22/20  9:00 PM   Result Value Ref Range    GFR If African American >60 >60 mL/min/1.73 m 2    GFR If Non African American >60 >60 mL/min/1.73 m 2   Blood Culture,Hold    Collection Time: 11/22/20  9:00 PM   Result Value Ref Range    Blood Culture Hold Collected    Hemoglobin A1C    Collection Time: 11/22/20  9:00 PM   Result Value Ref Range    Glycohemoglobin 5.1 0.0 - 5.6 %    Est Avg Glucose 100 mg/dL   Magnesium    Collection Time: 11/22/20  9:00 PM   Result Value Ref Range    Magnesium 1.9 1.5 - 2.5 mg/dL   ACCU-CHEK GLUCOSE    Collection Time: 11/22/20  9:05 PM   Result Value Ref Range    Glucose - Accu-Ck 128 (H) 65 - 99 mg/dL   Blood Culture,Hold    Collection Time: 11/22/20  9:06 PM   Result Value Ref Range    Blood Culture Hold Collected    COVID/SARS CoV-2 PCR    Collection Time: 11/22/20  9:10 PM    Specimen: Nasopharyngeal;  Respirate   Result Value Ref Range    COVID Order Status Received    CoV-2, Flu A/B, And RSV by PCR    Collection Time: 20  9:10 PM   Result Value Ref Range    Influenza virus A RNA Negative Negative    Influenza virus B, PCR Negative Negative    RSV, PCR Negative Negative    SARS-CoV-2 by PCR NotDetected     SARS-CoV-2 Source Nasal Swab    EKG    Collection Time: 20  9:12 PM   Result Value Ref Range    Report       [facility] Emergency Dept.    Test Date:  2020  Pt Name:    FABIANA LEMON               Department: ER  MRN:        [id number]                      Room:       RD 03  Gender:     Female                       Technician: [id number]  :        [id number]                   Requested By:ER TRIAGE PROTOCOL  Order #:    [id number]                    Reading MD: Ramana Garcia MD    Measurements  Intervals                                Axis  Rate:       65                           P:          -14  WV:         168                          QRS:        -34  QRSD:       100                          T:          11  QT:         432  QTc:        450    Interpretive Statements  SINUS RHYTHM  LEFT AXIS DEVIATION  LVH WITH SECONDARY REPOLARIZATION ABNORMALITY  BASELINE WANDER IN LEAD(S) V6  Compared to ECG 2020 13:59:10  Atrial premature complex(es) no longer present  Electronically Signed On 2020 1:06:52 PST by Ramana Garcia MD     URINE DRUG SCREEN (TRIAGE)    Collection Time: 20 10:46 PM   Result Value Ref Range    Amphetamines Urine Negative Negative    Barbiturates Negative Negative    Benzodiazepines Negative Negative    Cocaine Metabolite Negative Negative    Methadone Negative Negative    Opiates Negative Negative    Oxycodone Negative Negative    Phencyclidine -Pcp Negative Negative    Propoxyphene Negative Negative    Cannabinoid Metab Positive (A) Negative   URINALYSIS    Collection Time: 20 10:46 PM    Specimen: Urine, Cath   Result Value Ref Range     Color Yellow     Character Clear     Specific Gravity 1.018 <1.035    Ph 6.5 5.0 - 8.0    Glucose Negative Negative mg/dL    Ketones 15 (A) Negative mg/dL    Protein Negative Negative mg/dL    Bilirubin Negative Negative    Urobilinogen, Urine 1.0 Negative    Nitrite Negative Negative    Leukocyte Esterase Negative Negative    Occult Blood Trace (A) Negative    Micro Urine Req Microscopic    URINE MICROSCOPIC (W/UA)    Collection Time: 11/22/20 10:46 PM   Result Value Ref Range    WBC 0-2 /hpf    RBC 5-10 (A) /hpf    Bacteria Negative None /hpf    Epithelial Cells Negative /hpf    Hyaline Cast 0-2 /lpf   FOLATE    Collection Time: 11/23/20  3:43 PM   Result Value Ref Range    Folate -Folic Acid 6.5 >4.0 ng/mL   FREE THYROXINE    Collection Time: 11/23/20  3:43 PM   Result Value Ref Range    Free T-4 1.37 0.93 - 1.70 ng/dL   TSH    Collection Time: 11/23/20  3:43 PM   Result Value Ref Range    TSH 2.290 0.380 - 5.330 uIU/mL   CBC with Differential    Collection Time: 11/24/20  2:58 AM   Result Value Ref Range    WBC 7.2 4.8 - 10.8 K/uL    RBC 3.53 (L) 4.20 - 5.40 M/uL    Hemoglobin 11.8 (L) 12.0 - 16.0 g/dL    Hematocrit 35.7 (L) 37.0 - 47.0 %    .1 (H) 81.4 - 97.8 fL    MCH 33.4 (H) 27.0 - 33.0 pg    MCHC 33.1 (L) 33.6 - 35.0 g/dL    RDW 47.8 35.9 - 50.0 fL    Platelet Count 269 164 - 446 K/uL    MPV 10.8 9.0 - 12.9 fL    Neutrophils-Polys 70.60 44.00 - 72.00 %    Lymphocytes 12.40 (L) 22.00 - 41.00 %    Monocytes 11.90 0.00 - 13.40 %    Eosinophils 3.40 0.00 - 6.90 %    Basophils 0.60 0.00 - 1.80 %    Immature Granulocytes 1.10 (H) 0.00 - 0.90 %    Nucleated RBC 0.00 /100 WBC    Neutrophils (Absolute) 5.05 2.00 - 7.15 K/uL    Lymphs (Absolute) 0.89 (L) 1.00 - 4.80 K/uL    Monos (Absolute) 0.85 0.00 - 0.85 K/uL    Eos (Absolute) 0.24 0.00 - 0.51 K/uL    Baso (Absolute) 0.04 0.00 - 0.12 K/uL    Immature Granulocytes (abs) 0.08 0.00 - 0.11 K/uL    NRBC (Absolute) 0.00 K/uL   Comp Metabolic Panel (CMP)     Collection Time: 11/24/20  2:58 AM   Result Value Ref Range    Sodium 138 135 - 145 mmol/L    Potassium 3.4 (L) 3.6 - 5.5 mmol/L    Chloride 105 96 - 112 mmol/L    Co2 27 20 - 33 mmol/L    Anion Gap 6.0 (L) 7.0 - 16.0    Glucose 110 (H) 65 - 99 mg/dL    Bun 8 8 - 22 mg/dL    Creatinine 0.41 (L) 0.50 - 1.40 mg/dL    Calcium 9.0 8.5 - 10.5 mg/dL    AST(SGOT) 29 12 - 45 U/L    ALT(SGPT) 20 2 - 50 U/L    Alkaline Phosphatase 73 30 - 99 U/L    Total Bilirubin 0.5 0.1 - 1.5 mg/dL    Albumin 2.9 (L) 3.2 - 4.9 g/dL    Total Protein 5.6 (L) 6.0 - 8.2 g/dL    Globulin 2.7 1.9 - 3.5 g/dL    A-G Ratio 1.1 g/dL   Lipid Profile    Collection Time: 11/24/20  2:58 AM   Result Value Ref Range    Cholesterol,Tot 84 (L) 100 - 199 mg/dL    Triglycerides 48 0 - 149 mg/dL    HDL 38 (A) >=40 mg/dL    LDL 36 <100 mg/dL   ESTIMATED GFR    Collection Time: 11/24/20  2:58 AM   Result Value Ref Range    GFR If African American >60 >60 mL/min/1.73 m 2    GFR If Non African American >60 >60 mL/min/1.73 m 2       Current Facility-Administered Medications   Medication Frequency   • QUEtiapine (Seroquel) tablet 25 mg TID PRN   • hydrOXYzine HCl (ATARAX) tablet 50 mg Q6HRS PRN   • melatonin tablet 3 mg HS PRN   • Respiratory Therapy Consult Continuous RT   • Pharmacy Consult Request ...Pain Management Review 1 Each PHARMACY TO DOSE   • hydrALAZINE (APRESOLINE) tablet 10 mg Q8HRS PRN   • acetaminophen (Tylenol) tablet 650 mg Q4HRS PRN   • artificial tears ophthalmic solution 1 Drop PRN   • benzocaine-menthol (CEPACOL) lozenge 1 Lozenge Q2HRS PRN   • mag hydrox-al hydrox-simeth (MAALOX PLUS ES or MYLANTA DS) suspension 20 mL Q2HRS PRN   • ondansetron (ZOFRAN ODT) dispertab 4 mg 4X/DAY PRN    Or   • ondansetron (ZOFRAN) syringe/vial injection 4 mg 4X/DAY PRN   • traZODone (DESYREL) tablet 50 mg QHS PRN   • sodium chloride (OCEAN) 0.65 % nasal spray 2 Spray PRN   • lactulose 20 GM/30ML solution 30 mL QDAY PRN   • docusate sodium (ENEMEEZ) enema 283 mg  QDAY PRN   • fleet enema 133 mL QDAY PRN   • senna-docusate (PERICOLACE or SENOKOT S) 8.6-50 MG per tablet 2 Tab BID    And   • polyethylene glycol/lytes (MIRALAX) PACKET 1 Packet QDAY PRN    And   • magnesium hydroxide (MILK OF MAGNESIA) suspension 30 mL QDAY PRN    And   • bisacodyl (DULCOLAX) suppository 10 mg QDAY PRN   • acetaminophen (TYLENOL) tablet 1,000 mg TID   • atorvastatin (LIPITOR) tablet 40 mg Q EVENING   • clopidogrel (PLAVIX) tablet 75 mg DAILY   • escitalopram (Lexapro) tablet 10 mg DAILY   • enoxaparin (LOVENOX) inj 40 mg DAILY       Orders Placed This Encounter   Procedures   • Diet Order Diet: Level 5 - Minced and Moist; Liquid level: Level 0 - Thin; Tray Modifications (optional): SLP - 1:1 Supervision by Nursing, SLP - Deliver to Nursing Station     Standing Status:   Standing     Number of Occurrences:   1     Order Specific Question:   Diet:     Answer:   Level 5 - Minced and Moist [24]     Order Specific Question:   Liquid level     Answer:   Level 0 - Thin     Order Specific Question:   Tray Modifications (optional)     Answer:   SLP - 1:1 Supervision by Nursing     Order Specific Question:   Tray Modifications (optional)     Answer:   SLP - Deliver to Nursing Station       Assessment:  Active Hospital Problems    Diagnosis   • *Stroke (cerebrum) ([facility])   • Anemia   • Essential hypertension   • Vascular dementia without behavioral disturbance ([facility])   • Depression   • Urinary incontinence   • Urinary retention   • Hypokalemia     This patient is a 85 y.o. female admitted for acute inpatient rehabilitation with Stroke (cerebrum) ([facility]).    Medical Decision Making and Plan:    Left parietal/occipital stroke  Expressive and receptive aphasia  No paresis  Cognitive deficits  Continue full rehab program  PT/OT/SLP, 1 hr each discipline, 5 days per week  Continue Plavix and statin for secondary stroke prophylaxis    Dementia  Likely would benefit from starting Aricept, will discuss with family, son  did not answer phone today     Chronic neck pain  Scheduled Tylenol     Depression  Continue Lexapro     Anemia  Check morning labs, patient refused     Hypokalemia  S/p supplementation  Check morning labs, patient refused this morning    Bowel program  Continue bowel medications  Scheduled Sennakot  PRN Miralax, MOM, bisacodyl suppository  Last BM 11/24    Bladder program  Check PVRs - 218  Bladder scan for no voids  ICP for over 400 cc  Scheduled toileting    DVT prophylaxis  Lovenox    Last COVID negative on 11/22, Recheck on 11/27    Total time:  35 minutes.  I spent greater than 50% of the time for patient care, counseling, and coordination on this date, including patient face-to face time, unit/floor time with review of records/pertinent lab data and studies, as well as discussing diagnostic evaluation/work up, planned therapeutic interventions, and future disposition of care, as per the interval events/subjective and the assessment and plan as noted above.      Bharti Brady M.D.   Physical Medicine and Rehabilitation

## 2020-11-25 NOTE — H&P
REHABILITATION HISTORY AND PHYSICAL/POST ADMISSION EVALUATION    11/24/2020  4:19 PM  Sushma Bowden  [id number]  Admission  11/24/2020  2:44 PM  [facility] Code/Reason for admission: 0001.4 - Stroke: No paresis  Etiologic diagnosis/problem: Stroke (cerebrum) (HCC)  Chief Complaint: generalized weakness, word finding difficulties, impaired memory    HPI:  The patient is a 85 y.o. female with a past medical history of hypertension, depression, dementia; now admitted for acute inpatient rehabilitation with severe functional debility after an acute stroke.      On admission the patient and medical record report, she initially presented to the hospital on 11/12 with altered mental status, and was found to have an acute left parietal lobe stroke with small amount of hemorrhage, though patient did not receive tPA. She was discharged home on 11/16 on Plavix, with recommendations for acute rehab, but family wanted to take patient home. She returned to the ED on 11/22 due to complaints of altered mental status again. Patient without any new changes on her Head CT, MRI showed evolving left sided stroke. 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 Patient's COVID was negative on 11/22. Per review of records, patient had been seen in the past by cardiology, with negative Ziopatch for any atrial fibrillation. She also was diagnosed with moderate cognitive deficits, thought to be secondary to vascular dementia, due to findings of multiple other strokes on her MRI.    Using the interpretor, patient current reports she does not understand why she is in the hospital.  And wants to know if she can go home as she does not live very far away.  Patient denies any focal weakness but does report generalized weakness all over her body.  She has some pain in her neck which she thinks is from the history of a fall.  She also reports abnormal sensation all over her body.  Patient appears to be a poor historian.  She does  report and is noted on interview to have word finding difficulties.  She also notes difficulty with her memory.    Patient reports she only went to the second grade, she can do a little bit of reading but does not write very well.  I explained to patient that she went home initially and then returned to the hospital to get more therapies before she goes home with her son.  She expressed understanding.    Patient was evaluated by Rehab Medicine physician and Physical Therapy, Occupational Therapy and Speech Therapy and determined to be appropriate for acute inpatient rehab and was transferred to [facility] on 11/24/2020  2:44 PM.    With this acute therapeutic intervention, this patient hopes to improve her functional status, and return to independent living with the supportive care of family.    REVIEW OF SYSTEMS:     A complete review of systems was performed and was negative in detail with the exception of items mentioned elsewhere in this document.    PMH:  Past Medical History:   Diagnosis Date   • Dementia (HCC)    • Depression    • Hyperlipidemia    • Hypertension    • Stroke (HCC)    • Urinary incontinence        PSH:  Past Surgical History:   Procedure Laterality Date   • KNEE ARTHROSCOPY Left    • OOPHORECTOMY         No family history on file.     MEDICATIONS:  Current Facility-Administered Medications   Medication Dose   • hydrOXYzine HCl (ATARAX) tablet 50 mg  50 mg   • melatonin tablet 3 mg  3 mg   • Respiratory Therapy Consult     • Pharmacy Consult Request ...Pain Management Review 1 Each  1 Each   • hydrALAZINE (APRESOLINE) tablet 10 mg  10 mg   • acetaminophen (Tylenol) tablet 650 mg  650 mg   • artificial tears ophthalmic solution 1 Drop  1 Drop   • benzocaine-menthol (CEPACOL) lozenge 1 Lozenge  1 Lozenge   • mag hydrox-al hydrox-simeth (MAALOX PLUS ES or MYLANTA DS) suspension 20 mL  20 mL   • ondansetron (ZOFRAN ODT) dispertab 4 mg  4 mg    Or   • ondansetron (ZOFRAN)  "syringe/vial injection 4 mg  4 mg   • traZODone (DESYREL) tablet 50 mg  50 mg   • sodium chloride (OCEAN) 0.65 % nasal spray 2 Spray  2 Spray   • lactulose 20 GM/30ML solution 30 mL  30 mL   • docusate sodium (ENEMEEZ) enema 283 mg  283 mg   • fleet enema 133 mL  1 Each   • senna-docusate (PERICOLACE or SENOKOT S) 8.6-50 MG per tablet 2 Tab  2 Tab    And   • polyethylene glycol/lytes (MIRALAX) PACKET 1 Packet  1 Packet    And   • magnesium hydroxide (MILK OF MAGNESIA) suspension 30 mL  30 mL    And   • bisacodyl (DULCOLAX) suppository 10 mg  10 mg   • acetaminophen (TYLENOL) tablet 1,000 mg  1,000 mg   • atorvastatin (LIPITOR) tablet 40 mg  40 mg   • [START ON 11/25/2020] clopidogrel (PLAVIX) tablet 75 mg  75 mg   • [START ON 11/25/2020] escitalopram (Lexapro) tablet 10 mg  10 mg   • potassium chloride SA (Kdur) tablet 40 mEq  40 mEq       ALLERGIES:  Penicillins    PSYCHOSOCIAL HISTORY:  Housing / Facility: 1 Story House  Steps Into Home: 2  Steps In Home: 0  Lives with - Patient's Self Care Capacity: Adult Children  Equipment Owned: Front-Wheel Walker, Single Point Cane    Patient reports she has been a  for 12 years.  She has 12 children.  She only went up to the second grade, with limited reading or writing skills.  She denies tobacco alcohol or drugs.    Per [facility]:    \"Follow up for rehab spoke with son Evan he is agreeable to transfer to [facility] Evan says that his sister is able to provider 24/7 support for their mother as she returns to her home. Single story 2 little steps to enter.\"    LEVEL OF FUNCTION PRIOR TO DISABILTY:  Used walker or cane for ambulation  Required assistance for ADLs    LEVEL OF FUNCTION PRIOR TO ADMISSION to [facility]:  PT:  Current Level of Function:   Gait Level Of Assist: Minimal Assist  Assistive Device: Front Wheel Walker  Distance (Feet): 20  Deviation: Decreased Heel Strike, Decreased Toe Off, Bradykinetic, Decreased Base Of Support(decr R foot clearance, " decr RLE step-through)  Weight Bearing Status: no restrictions  Supine to Sit: Supervised  Sit to Supine: Minimal Assist(at RLE)  Scooting: Supervised  Sit to Stand: Minimal Assist  Toilet Transfers: Moderate Assist  Sitting Edge of Bed: 12 min EOB + toilet  Standin min    OT:  Current Level of Function:   Lower Body Dressing: Supervision(don/doff socks)  Toileting: Moderate Assist(steadying assist for pericare)    SLP:  Problem List: Dysphagia, Cognitive-Linguistic Deficits  Diet / Liquid Recommendation: Minced & Moist (5) - (Dysphagia II), Thin (0)    CURRENT LEVEL OF FUNCTION:   Same as level of function prior to admission to [facility]    PHYSICAL EXAM:     VITAL SIGNS:   height is 1.524 m (5') and weight is 62.1 kg (137 lb). Her oral temperature is 36.8 °C (98.2 °F). Her blood pressure is 133/62 and her pulse is 68. Her respiration is 20 and oxygen saturation is 94%.     GENERAL: No apparent distress  HEENT: Normocephalic/atraumatic, moist mucous membranes  CARDIAC: Regular rate and rhythm, normal S1, S2, no murmurs, no peripheral edema   LUNGS: Clear to auscultation, normal respiratory effort, on room air   ABDOMINAL: bowel sounds present, soft, nontender and nondistended    EXTREMITIES: no spasticity, no edema or no calf tenderness bilaterally  MSK: Scar on left knee    NEURO:    Mental status: alert, not oriented to reason for admission  Speech: fluent, expressive aphasia with word finding deficits, also with some receptive aphasia with difficulty following commands, no dysarthria    CRANIAL NERVES:  Difficult to assess as patient does not follow commands very well    Motor:  Difficult to assess strength due to inability to follow commands but is moving all 4 limbs at least antigravity, appears to have more weakness in the left lower extremity    RADIOLOGY:              Results for orders placed during the hospital encounter of 20   MR-BRAIN-W/O    Impression 1.  No acute  intracranial abnormality    2.  Interval appropriate evolution of left occipital infarct    3.  Severe cerebral white matter change and there is volume loss                                                                                               Results for orders placed during the hospital encounter of 11/12/20   CT-CTA NECK WITH & W/O-POST PROCESSING    Impression 1.  No high-grade stenosis, large vessel occlusion, aneurysm or dissection.  2.  Enlarged, nodular right thyroid lobe relative to the left. This can be followed with outpatient ultrasound.                                 LABS:  Recent Labs     11/22/20  2100 11/24/20  0258   SODIUM 136 138   POTASSIUM 3.6 3.4*   CHLORIDE 98 105   CO2 26 27   GLUCOSE 123* 110*   BUN 9 8   CREATININE 0.56 0.41*   CALCIUM 9.4 9.0     Recent Labs     11/22/20  2100 11/24/20  0258   WBC 11.0* 7.2   RBC 4.10* 3.53*   HEMOGLOBIN 14.0 11.8*   HEMATOCRIT 41.9 35.7*   .2* 101.1*   MCH 34.1* 33.4*   MCHC 33.4* 33.1*   RDW 46.8 47.8   PLATELETCT 251 269   MPV 10.6 10.8         PRIMARY REHAB DIAGNOSIS:    This patient is a 85 y.o. female admitted for acute inpatient rehabilitation with Stroke (cerebrum) (HCC).    IMPAIRMENTS:   Cognitive  ADLs/IADLs  Mobility  Speech  Swallow    SECONDARY DIAGNOSIS/MEDICAL CO-MORBIDITIES AFFECTING FUNCTION:    Dementia  Chronic neck pain  Depression  Anemia  Hypokalemia      RELEVANT CHANGES SINCE PREADMISSION EVALUATION:    Status unchanged    The patient's rehabilitation potential is Good  The patient's medical prognosis is good    PLAN:   Discussion and Recommendations, discussed with the patient and/or family:   1. The patient requires an acute inpatient rehabilitation program with a coordinated program of care at an intensity and frequency not available at a lower level of care. This recommendation is substantiated by the patient's medical physicians who recommend that the patient's intervention and assessment of medical issues needs  to be done at an acute level of care for patient's safety and maximum outcome.     2. A coordinated program of care will be supplied by an interdisciplinary team of physical therapy, occupational therapy, rehab physician, rehab nursing, and, if needed, speech therapy and rehab psychology. Rehab team presents a patient-specific rehabilitation and education program concentrating on prevention of future problems related to accessibility, mobility, skin, bowel, bladder, sexuality, and psychosocial and medical/surgical problems.     3. Need for Rehabilitation Physician: The rehab physician will be evaluating the patient on a multi-weekly basis to help coordinate the program of care. The rehab physician communicates between medical physicians, therapists, and nurses to maximize the patient's potential outcome. Specific areas in which the rehab physician will be providing daily assessment include the following:   A. Assessing the patient's heart rate and blood pressure response (vitals monitoring) to activity and making adjustments in medications or conservative measures as needed.   B. The rehab physician will be assessing the frequency at which the program can be increased to allow the patient to reach optimal functional outcome.   C. The rehab physician will also provide assessments in daily skin care, especially in light of patient's impairments in mobility.   D. The rehab physician will provide special expertise in understanding how to work with functional impairment and recommend appropriate interventions, compensatory techniques, and education that will facilitate the patient's outcome.     4. Rehab R.N.   The rehab RN will be working with patient to carry over in room mobility and activities of daily living when the patient is not in 3 hours of skilled therapy. Rehab nursing will be working in conjunction with rehab physician to address all the medical issues above and continue to assess laboratory work and discuss  abnormalities with the treating physicians, assess vitals, and response to activity, and discuss and report abnormalities with the rehab physician. Rehab RN will also continue daily skin care, supervise bladder/bowel program, instruct in medication administration, and ensure patient safety.     5. Therapies to treat at intensity and frequency of (may change after completion of evaluation by all therapeutic disciplines):       PT:  Physical therapy to address mobility, transfer, gait training and evaluation for adaptive equipment needs 1hour/day at least 5 days/week for the duration of the ELOS (see below)       OT:  Occupational therapy to address ADLs, self-care, home management training, functional mobility/transfers and assistive device evaluation, and community re-integration 1hour/day at least 5 days/week for the duration of the ELOS (see below).        ST/Dysphagia:  Speech therapy to address speech, language, and cognitive deficits as well as swallowing difficulties with retraining/dysphagia management and community re-integration with comprehension, expression, cognitive training 1hour/day at least 5 days/week for the duration of the ELOS (see below).     6. Medical management / Rehabilitation Issues/Adverse Potential affecting function as part of rehabilitation plan.    Dementia  Likely would benefit from starting Aricept, will discuss with family    Chronic neck pain  Schedule Tylenol    Depression  Continue Lexapro    Anemia  Check morning labs    Hypokalemia  Continue supplementation  Check morning labs    I performed a complete drug regimen review and did not identify any potential clinically significant medication issues.    The patient's CODE STATUS was continued as full code until I can confirm with her family due to her cognitive and language deficits.    REHABILITATION ISSUES/ADVERSE POTENTIAL:  1.  CVA (Cerebrovascular Accident): Continue Plavix for secondary prophylaxis as well as lipid and blood  pressure management. Patient demonstrates functional deficits in strength, balance, coordination, and ADL's. Patient is admitted to [facility] for comprehensive rehabilitation therapy as described below.   Rehabilitation nursing monitors bowel and bladder control, educates on medication administration, co-morbidities and monitors patient safety.    2.  DVT prophylaxis:  Patient is on nothing for anticoagulation upon transfer. Start Lovenox. Encourage OOB. Monitor daily for signs and symptoms of DVT including but not limited to swelling and pain to prevent the development of DVT that may interfere with therapies.    3.  Pain: No issues with pain currently / Controlled with as needed oral analgesics.    4.  Nutrition/Dysphagia: Dietician monitors nutrient intake, recommend supplements prn and provide nutrition education to pt/family to promote optimal nutrition for wound healing/recovery.     5.  Bladder/bowel:  Start bowel and bladder program, to prevent constipation, urinary retention (which may lead to UTI), and urinary incontinence (which will impact upon pt's functional independence).   - TV Q3h while awake with post void bladder scans, I&O cath for PVRs >400  - up to commode after meal     6.  Skin/dermal ulcer prophylaxis: Monitor for new skin conditions with q.2 h. turns as required to prevent the development of skin breakdown.     7.  Cognition/Behavior:  Psychologist Dr. Murguia provides adjustment counseling to illness and psychosocial barriers that may be potential barriers to rehabilitation.     8. Respiratory therapy: RT performs O2 management prn, breathing retraining, pulmonary hygiene and bronchospasm management prn to optimize participation in therapies.    Pt was seen today for 75 min, and entire time spent in face-to-face contact was >50% in counseling and coordination of care as detailed in A/P above.        GOALS/EXPECTED LEVEL OF FUNCTION BASED ON CURRENT MEDICAL AND FUNCTIONAL  STATUS (may change based on patient's medical status and rate of impairment recovery):  Transfers:   Supervision  Mobility/Gait:   Supervision  ADL's:   Minimal Assistance  Cognition:  Moderate assistance  Swallowing:  Regular with thins    DISPOSITION: Discharge to pre-morbid independent living setting with the supportive care of patient's family.      ELOS: 14-21 days    Bharti Brady M.D.  Physical Medicine and Rehabilitation

## 2020-11-25 NOTE — THERAPY
"Occupational Therapy   Initial Evaluation     Patient Name: Sushma Bowden  Age:  85 y.o., Sex:  female  Medical Record #: [id number]  Today's Date: 11/25/2020     Subjective    Patient in bed upon arrival. Confused, unable to correctly identify current date/location/circumstance. Patient speaks Mongolian only, however had difficulty communicating w/ assist of  due to [place] and cognitive deficits.     Undersigned therapist spoke with patient's son, Evan via phone who provided information re: patient's PLOF. Per son's report, patient was independent with all ADLs, IADLs and functional mobility with intermittent use of SPC. He also ID's patient's cognitive deficits as \"mild\" however did say that she had trouble with taking her medications regularly. He attributes CVA onset is related to the poor medication management.  Patient lived in a single story home with daughter, son-in-law and 3 grandchildren (teenagers). Evan added that daughter previously worked, however as a family they decided she will quit working in order to care for patient upon DC.      Objective     11/25/20 0701   Prior Living Situation   Prior Services None;Home-Independent   Housing / Facility 1 Story House   Steps Into Home 2  (2 small steps per son)   Steps In Home 0   Rail None   Bathroom Set up Walk In Shower;Shower Glass Doors  (sliding doors, son unsure about shower chair/grab bars)   Equipment Owned Single Point Cane;4-Wheel Walker   Lives with - Patient's Self Care Capacity Adult Children;Child Less than 18 Years of Age  (daughter, son-in-law, 3 grand-children (teenagers))   Comments Patient is a poor historian, thus majority of PLOF information gathered from phone conversation w/son (Evan).    Prior Level of ADL Function   Self Feeding Independent   Grooming / Hygiene Independent   Bathing Independent   Dressing Independent   Toileting Independent   Comments Per son, patient was independent with all ADLs and functional mobility " "with intermittent use of SPC.   Prior Level of IADL Function   Medication Management   (Son believes patient was forgetting to take medications)   Laundry Independent   Kitchen Mobility Independent  (Intermittent use of SPC)   Finances   (Daughter)   Home Management Independent   Shopping   (Daughter)   Prior Level Of Mobility Independent With Device in Community;Independent With Device in Home  (intermittent use of SPC)   Driving / Transportation Relatives / Others Provide Transportation   Occupation (Pre-Hospital Vocational)   (Per son, patient was always a )   Leisure Interests   (Cooking)   Comments Per son, patient was independent with most IADLs w/ intermittent use of SPC.    Prior Functioning: Everyday Activities   Self Care Independent   Indoor Mobility (Ambulation) Independent   Stairs Independent   Functional Cognition Independent   Prior Device Use None of the given options  (intermittent use of SPC)   Vitals   O2 Delivery Device None - Room Air   Cognition    Level of Consciousness Confused   ABS (Agitated Behavior Scale)   Agitated Behavior Scale Performed No  (no agitation observed during this session)   Cognitive Pattern Assessment   Cognitive Pattern Assessment Used BIMS   Brief Interview for Mental Status (BIMS)   Repetition of Three Words (First Attempt) 0   Temporal Orientation: Year No answer   Temporal Orientation: Month Missed by more than 1 month  (\"January\" )   Temporal Orientation: Day No answer   Recall: \"Sock\" No, could not recall   Recall: \"Blue\" No, could not recall   Recall: \"Bed\" No, could not recall   BIMS Summary Score 0   Vision Screen   Vision Not tested   Passive ROM Upper Body   Passive ROM Upper Body WDL   Active ROM Upper Body   Active ROM Upper Body  WDL   Dominant Hand Right   Comments Appears WFL based on functional assessment.     Strength Upper Body   Comments UA to assess via MMT due to cognitive deficits however UB strength appears grossly intact bilaterally " (based on functional observation)    Sensation Upper Body   Upper Extremity Sensation  Not Tested   Comments NA due to cognitive/communication deficits    Upper Body Muscle Tone   Upper Body Muscle Tone  WDL   Comments Appears WFL based on functional observation    Balance Assessment   Sitting Balance (Static) Good   Sitting Balance (Dynamic) Fair   Bed Mobility    Supine to Sit Minimal Assist   Sit to Stand Minimal Assist   Scooting Contact Guard Assist   Coordination Upper Body   Coordination WDL   Comments Appears intact based on functional observation    Eating Discharge Goal   Discharge Goal Independent   Oral Hygiene   Assistance Needed Verbal cues;Set-up / clean-up;Supervision   CARE Score 4   Oral Hygiene Discharge Goal   Discharge Goal Independent   Shower/Bathe Self   Assistance Needed Physical assistance   Physical Assistance Level 25% or less  (min assist for partial sponge bath in bed )   CARE Score 3   Shower/Bathe Self Discharge Goal   Discharge Goal Supervision or touching assistance   Upper Body Dressing   Assistance Needed Incidental touching   CARE Score 4   Upper Body Dressing Discharge Goal   Discharge Goal Set-up/clean-up   Lower Body Dressing   Assistance Needed Physical assistance   Physical Assistance Level 25% or less   CARE Score 3   Lower Body Dressing Discharge Goal   Discharge Goal Supervision or touching assistance   Putting On/Taking Off Footwear   Assistance Needed Physical assistance   Physical Assistance Level 25% or less   CARE Score 3   Putting On/Taking Off Footwear Discharge Goal   Discharge Goal Set-up/clean-up   Toileting Hygiene   Assistance Needed Physical assistance   Physical Assistance Level 26%-50%   CARE Score 3   Toileting Hygiene Discharge Goal   Discharge Goal Supervision or touching assistance   Toilet Transfer   Assistance Needed Physical assistance   Physical Assistance Level 25% or less   CARE Score 3   Toilet Transfer Discharge Goal   Discharge Goal Supervision  or touching assistance   Hearing, Speech, and Vision   Expression of Ideas and Wants Frequent difficulty   Understanding Verbal and Non-Verbal Content Sometimes understands   Functional Level of Assist   Grooming Supervision;Seated  (Set-up, supervision and VCs for washing hands )   Bathing Minimal Assist  (for partial sponge bath in bed )   Upper Body Dressing Contact Guard Assist  (CGA for donning bra and button up pajama shirt EOB)   Lower Body Dressing Minimal Assist  (for standing balance during pants over hips pursuit)   Toileting Moderate Assist  (for hygiene pursuit following BM)   Toileting Description Grab bar;Assist for hygiene;Assist for standing balance;Increased time;Initial preparation for task;Set-up of equipment;Supervision for safety;Verbal cueing   Toilet Transfers Minimal Assist   Toilet Transfer Description Grab bar;Increased time;Initial preparation for task;Set-up of equipment;Supervision for safety;Verbal cueing   Tub / Shower Transfers   (NA at this time due to droplet precautions )   Problem List   Problem List Decreased Active Daily Living Skills;Decreased Homemaking Skills;Decreased Functional Mobility;Decreased Activity Tolerance;Safety Awareness Deficits / Cognition;Impaired Posture / Trunk Alignment;Impaired Cognitive Function;Impaired Postural Control / Balance   Precautions   Precautions Fall Risk;Swallow Precautions ( See Comments)   Comments Modified diet, dementia    Current Discharge Plan   Current Discharge Plan Return to Prior Living Situation   Benefit    Therapy Benefit Patient Would Benefit from Inpatient Rehab Occupational Therapy to Maximize [place] with ADLs, IADLs and Functional Mobility.   Interdisciplinary Plan of Care Collaboration   IDT Collaboration with  Nursing;Physical Therapist;Speech Therapist;Family / Caregiver   Patient Position at End of Therapy In Bed;Call Light within Reach   Collaboration Comments CLOF, PLOF   Equipment Needs   Assistive Device / DME    (TBA)   Adaptive Equipment   (TBA)   Strengths & Barriers   Strengths Adequate strength;Willingly participates in therapeutic activities;Supportive family;Pleasant and cooperative;Motivated for self care and independence   Barriers Confused;Decreased endurance;Dementia;Difficulty following instructions;Fatigue;Generalized weakness;Hearing impairment;Impaired activity tolerance;Impaired balance;Impaired carryover of learning;Impaired functional cognition;Language barrier, non-fluent English;Limited mobility;Pain   OT Total Time Spent   OT Individual Total Time Spent (Mins) 60   OT Charge Group   Charges Yes   OT Self Care / ADL 1   OT Evaluation OT Evaluation Mod       Assessment  Patient is 85 y.o. female with a diagnosis of Stroke.  Additional factors influencing patient status / progress (ie: cognitive factors, co-morbidities, social support, etc): past medical history of hypertension, depression, dementia.     At time of evaluation, patient presents below functional baseline with primary barriers being decreased balance, limited mobility and cognitive deficits. Patient will benefit from daily skilled OT to address these impairments and maximize independence of ADLs, IADLs and functional mobility.       Plan  Recommend Occupational Therapy  minutes per day 5-7 days per week for 2-3  weeks for the following treatments:  OT Self Care/ADL, OT Cognitive Skill Dev, OT Community Reintegration, OT Neuro Re-Ed/Balance, OT Sensory Int Techniques, OT Therapeutic Activity, OT Evaluation and OT Therapeutic Exercise.    Goals:  Long term and short term goals have been discussed with patient and they are in agreement.    Occupational Therapy Goals     Problem: Bathing     Dates: Start: 11/25/20       Goal: STG-Within one week, patient will bathe     Dates: Start: 11/25/20       Description: 1) Individualized Goal:  Min assist with AE as needed  2) Interventions:  OT Self Care/ADL, OT Cognitive Skill Dev, OT Community  Reintegration, OT Manual Ther Technique, OT Neuro Re-Ed/Balance, OT Sensory Int Techniques, OT Therapeutic Activity, OT Evaluation, and OT Therapeutic Exercise                  Problem: Dressing     Dates: Start: 11/25/20       Goal: STG-Within one week, patient will dress LB     Dates: Start: 11/25/20       Description: 1) Individualized Goal:  CGA  2) Interventions:  OT Self Care/ADL, OT Cognitive Skill Dev, OT Community Reintegration, OT Manual Ther Technique, OT Neuro Re-Ed/Balance, OT Sensory Int Techniques, OT Therapeutic Activity, OT Evaluation, and OT Therapeutic Exercise                  Problem: Functional Transfers     Dates: Start: 11/25/20       Goal: STG-Within one week, patient will transfer to toilet     Dates: Start: 11/25/20       Description: 1) Individualized Goal:  CGA    2) Interventions:  OT Self Care/ADL, OT Cognitive Skill Dev, OT Community Reintegration, OT Manual Ther Technique, OT Neuro Re-Ed/Balance, OT Sensory Int Techniques, OT Therapeutic Activity, OT Evaluation, and OT Therapeutic Exercise                  Problem: OT Long Term Goals     Dates: Start: 11/25/20       Goal: LTG-By discharge, patient will complete basic self care tasks     Dates: Start: 11/25/20       Description: 1) Individualized Goal:  Set-up for self-feeding, grooming, and UB dressing tasks, supervision for toileting, LB dressing and bathing.   2) Interventions:  OT Self Care/ADL, OT Cognitive Skill Dev, OT Community Reintegration, OT Manual Ther Technique, OT Neuro Re-Ed/Balance, OT Sensory Int Techniques, OT Therapeutic Activity, OT Evaluation, and OT Therapeutic Exercise            Goal: LTG-By discharge, patient will perform bathroom transfers     Dates: Start: 11/25/20       Description: 1) Individualized Goal:  Supervision   2) Interventions:  OT Self Care/ADL, OT Cognitive Skill Dev, OT Community Reintegration, OT Manual Ther Technique, OT Neuro Re-Ed/Balance, OT Sensory Int Techniques, OT Therapeutic  Activity, OT Evaluation, and OT Therapeutic Exercise

## 2020-11-25 NOTE — DISCHARGE PLANNING
CASE MANAGEMENT INITIAL ASSESSMENT    Admit Date:  11/24/2020      Patient is a  85 y.o. female transferred from [facility].  She was initially released from [facility] on 11/16 following a stroke but readmitted on 11/22 with ongoing symptoms.  Her admitting physician for rehab is Dr. Brady.  Patient was referred to [facility] prior. Case management will follow for resume of this.  I have confirmed demographics with patient's son Evan.    Diagnosis: 0001.2 Stroke Right Body Involvement (Left Brain)  Stroke (cerebrum) (HCC)    Co-morbidities:   Patient Active Problem List    Diagnosis Date Noted   • Acute encephalopathy 11/23/2020     Priority: High   • Headache 10/13/2013     Priority: High   • Delirium 10/13/2013     Priority: High   • Cerebral infarction (HCC) 10/13/2013     Priority: High   • Stroke (cerebrum) (HCC) 11/24/2020   • Anemia 11/24/2020   • Essential hypertension 11/24/2020   • Vascular dementia without behavioral disturbance ([facility]) 11/24/2020   • Depression 11/24/2020   • Urinary incontinence 11/24/2020   • Urinary retention 11/24/2020   • Debility 11/23/2020   • Dehydration 11/23/2020   • Hypokalemia 11/14/2020   • Macrocytosis 11/14/2020     Prior Living Situation:  Housing / Facility: 1 Story House  Lives with - Patient's Self Care Capacity: Adult Children    Prior Level of Function:  Medication Management: (Son believes patient was forgetting to take medications)  Finances: (Daughter)  Home Management: Independent  Shopping: (Daughter)  Prior Level Of Mobility: Independent With Device in Community, Independent With Device in Home(intermittent use of SPC)  Driving / Transportation: Relatives / Others Provide Transportation    Support Systems:  Primary : Son is Evan Blanco at [phone]  Other support systems:      Previous Services Utilized:   Equipment Owned: 4-Wheel Walker, Single Point Cane  Prior Services: Skilled Home Health Services(Referred to [facility]  prior.)    Other Information:  Occupation (Pre-Hospital Vocational): Not Employed  Primary Payor Source: Medicare A, Medicare B  Secondary Payor Source: Supplemental Insurance(Medicaid)  Primary Care Practitioner : Dr. Clayton Kohli    Additional Case Management Questions:  Have you ever received case management services for yourself or a family member?  Yes, patient was recently discharged from the hospital.    Do you feel you have and an understanding of what services  provide? Reviewed with son Evan and he verbalizes understanding.    Do you have any additional questions regarding case management?  I deferred him to nursing staff for update on how patient is doing today.      CASE MANAGEMENT PLAN OF CARE   Individualized Goals:   1. We will need to confirm with patient's daughter who her PCP is.  2.  Will need to resume referral to [facility].    Barriers:   1. Second hospitalization in recent weeks.      Patient / Family Goal:  Patient / Family Goal: Patient was recently hospitalized with a stroke.  She was referred to [facility].  She was readmitted with further symptoms.  She has supportive family and lives with daughter.  We will need to re refer for home health.  She has a fww and spc.    Plan:  1. Continue to follow patient through hospitalization and provide discharge planning in collaboration with patient, family, physicians and ancillary services.     2. Utilize community resources to ensure a safe discharge.

## 2020-11-26 LAB
25(OH)D3 SERPL-MCNC: 26 NG/ML (ref 30–100)
ALBUMIN SERPL BCP-MCNC: 3.1 G/DL (ref 3.2–4.9)
ALBUMIN/GLOB SERPL: 1 G/DL
ALP SERPL-CCNC: 75 U/L (ref 30–99)
ALT SERPL-CCNC: 17 U/L (ref 2–50)
ANION GAP SERPL CALC-SCNC: 7 MMOL/L (ref 7–16)
AST SERPL-CCNC: 26 U/L (ref 12–45)
BASOPHILS # BLD AUTO: 0.6 % (ref 0–1.8)
BASOPHILS # BLD: 0.04 K/UL (ref 0–0.12)
BILIRUB SERPL-MCNC: 0.4 MG/DL (ref 0.1–1.5)
BUN SERPL-MCNC: 6 MG/DL (ref 8–22)
CALCIUM SERPL-MCNC: 9.1 MG/DL (ref 8.5–10.5)
CHLORIDE SERPL-SCNC: 106 MMOL/L (ref 96–112)
CO2 SERPL-SCNC: 28 MMOL/L (ref 20–33)
CREAT SERPL-MCNC: 0.49 MG/DL (ref 0.5–1.4)
EOSINOPHIL # BLD AUTO: 0.34 K/UL (ref 0–0.51)
EOSINOPHIL NFR BLD: 5.3 % (ref 0–6.9)
ERYTHROCYTE [DISTWIDTH] IN BLOOD BY AUTOMATED COUNT: 49.5 FL (ref 35.9–50)
GLOBULIN SER CALC-MCNC: 3.1 G/DL (ref 1.9–3.5)
GLUCOSE SERPL-MCNC: 82 MG/DL (ref 65–99)
HCT VFR BLD AUTO: 39 % (ref 37–47)
HGB BLD-MCNC: 12.9 G/DL (ref 12–16)
IMM GRANULOCYTES # BLD AUTO: 0.09 K/UL (ref 0–0.11)
IMM GRANULOCYTES NFR BLD AUTO: 1.4 % (ref 0–0.9)
LYMPHOCYTES # BLD AUTO: 1.36 K/UL (ref 1–4.8)
LYMPHOCYTES NFR BLD: 21.1 % (ref 22–41)
MAGNESIUM SERPL-MCNC: 1.8 MG/DL (ref 1.5–2.5)
MCH RBC QN AUTO: 34.1 PG (ref 27–33)
MCHC RBC AUTO-ENTMCNC: 33.1 G/DL (ref 33.6–35)
MCV RBC AUTO: 103.2 FL (ref 81.4–97.8)
MONOCYTES # BLD AUTO: 0.65 K/UL (ref 0–0.85)
MONOCYTES NFR BLD AUTO: 10.1 % (ref 0–13.4)
NEUTROPHILS # BLD AUTO: 3.98 K/UL (ref 2–7.15)
NEUTROPHILS NFR BLD: 61.5 % (ref 44–72)
NRBC # BLD AUTO: 0 K/UL
NRBC BLD-RTO: 0 /100 WBC
PLATELET # BLD AUTO: 309 K/UL (ref 164–446)
PMV BLD AUTO: 10.2 FL (ref 9–12.9)
POTASSIUM SERPL-SCNC: 3.5 MMOL/L (ref 3.6–5.5)
PROT SERPL-MCNC: 6.2 G/DL (ref 6–8.2)
RBC # BLD AUTO: 3.78 M/UL (ref 4.2–5.4)
SODIUM SERPL-SCNC: 141 MMOL/L (ref 135–145)
WBC # BLD AUTO: 6.5 K/UL (ref 4.8–10.8)

## 2020-11-26 PROCEDURE — 85025 COMPLETE CBC W/AUTO DIFF WBC: CPT

## 2020-11-26 PROCEDURE — A9270 NON-COVERED ITEM OR SERVICE: HCPCS | Performed by: PHYSICAL MEDICINE & REHABILITATION

## 2020-11-26 PROCEDURE — 770010 HCHG ROOM/CARE - REHAB SEMI PRIVAT*

## 2020-11-26 PROCEDURE — 99232 SBSQ HOSP IP/OBS MODERATE 35: CPT | Performed by: PHYSICAL MEDICINE & REHABILITATION

## 2020-11-26 PROCEDURE — 82306 VITAMIN D 25 HYDROXY: CPT

## 2020-11-26 PROCEDURE — 700102 HCHG RX REV CODE 250 W/ 637 OVERRIDE(OP): Performed by: PHYSICAL MEDICINE & REHABILITATION

## 2020-11-26 PROCEDURE — 83735 ASSAY OF MAGNESIUM: CPT

## 2020-11-26 PROCEDURE — 80053 COMPREHEN METABOLIC PANEL: CPT

## 2020-11-26 PROCEDURE — 36415 COLL VENOUS BLD VENIPUNCTURE: CPT

## 2020-11-26 PROCEDURE — 700111 HCHG RX REV CODE 636 W/ 250 OVERRIDE (IP): Performed by: PHYSICAL MEDICINE & REHABILITATION

## 2020-11-26 RX ORDER — BUTALBITAL, ACETAMINOPHEN AND CAFFEINE 50; 325; 40 MG/1; MG/1; MG/1
1 TABLET ORAL EVERY 6 HOURS PRN
Status: DISCONTINUED | OUTPATIENT
Start: 2020-11-26 | End: 2020-12-06 | Stop reason: HOSPADM

## 2020-11-26 RX ORDER — POTASSIUM CHLORIDE 20 MEQ/1
40 TABLET, EXTENDED RELEASE ORAL ONCE
Status: COMPLETED | OUTPATIENT
Start: 2020-11-26 | End: 2020-11-26

## 2020-11-26 RX ADMIN — ACETAMINOPHEN 1000 MG: 500 TABLET, FILM COATED ORAL at 08:19

## 2020-11-26 RX ADMIN — BUTALBITAL, ACETAMINOPHEN, AND CAFFEINE 1 TABLET: 50; 325; 40 TABLET ORAL at 11:45

## 2020-11-26 RX ADMIN — DOCUSATE SODIUM 50 MG AND SENNOSIDES 8.6 MG 2 TABLET: 8.6; 5 TABLET, FILM COATED ORAL at 08:19

## 2020-11-26 RX ADMIN — POTASSIUM CHLORIDE 40 MEQ: 1500 TABLET, EXTENDED RELEASE ORAL at 11:41

## 2020-11-26 RX ADMIN — ENOXAPARIN SODIUM 40 MG: 40 INJECTION SUBCUTANEOUS at 19:46

## 2020-11-26 RX ADMIN — CLOPIDOGREL BISULFATE 75 MG: 75 TABLET ORAL at 08:19

## 2020-11-26 RX ADMIN — ESCITALOPRAM OXALATE 10 MG: 10 TABLET, FILM COATED ORAL at 08:19

## 2020-11-26 RX ADMIN — ATORVASTATIN CALCIUM 40 MG: 40 TABLET, FILM COATED ORAL at 19:46

## 2020-11-26 ASSESSMENT — PATIENT HEALTH QUESTIONNAIRE - PHQ9
SUM OF ALL RESPONSES TO PHQ9 QUESTIONS 1 AND 2: 0
1. LITTLE INTEREST OR PLEASURE IN DOING THINGS: NOT AT ALL
2. FEELING DOWN, DEPRESSED, IRRITABLE, OR HOPELESS: NOT AT ALL

## 2020-11-26 NOTE — CARE PLAN
Problem: Communication  Goal: The ability to communicate needs accurately and effectively will improve  Outcome: PROGRESSING AS EXPECTED  Patient Tu[place] speaking only, able to communicate with limited Tuvaluan words.     Problem: Safety  Goal: Will remain free from injury  Outcome: PROGRESSING AS EXPECTED  Patient confused/impulsive, on bed check/wheelchair alarm for frequent monitoring.

## 2020-11-26 NOTE — PROGRESS NOTES
Patient refused medications - this writer used  to speak with patient and patient continues to refuse to take medications. Patient educated to medication - pt continues to refuse all HS medications.

## 2020-11-26 NOTE — PROGRESS NOTES
Rehab Progress Note     Chief Complaint: stroke    Interval Events (Subjective)  Patient is doing well. C/o headache, otherwise pain is controlled, sleeping well, and overall feeling ok. Discussed adding fioricet PRN. Updated nurse.      Objective:  VITAL SIGNS: /64   Pulse 78   Temp 36.1 °C (97 °F) (Oral)   Resp 18   Ht 1.524 m (5')   Wt 62.1 kg (137 lb)   SpO2 95%   BMI 26.76 kg/m²     Physical Exam:  Vitals: /64   Pulse 78   Temp 36.1 °C (97 °F) (Oral)   Resp 18   Ht 1.524 m (5')   Wt 62.1 kg (137 lb)   SpO2 95%   Gen: NAD  Head: NC/AT  Eyes/ Nose/ Mouth: PERRLA, moist mucous membranes  Cardio: RRR good distal perfusion, warm extremities  Pulm: normal respiratory effort, no cyanosis   Abd: Soft NTND, active bowel sounds,   Ext: No peripheral edema. No calf tenderness. No clubbing/cyanosis.       Recent Results (from the past 72 hour(s))   FOLATE    Collection Time: 11/23/20  3:43 PM   Result Value Ref Range    Folate -Folic Acid 6.5 >4.0 ng/mL   FREE THYROXINE    Collection Time: 11/23/20  3:43 PM   Result Value Ref Range    Free T-4 1.37 0.93 - 1.70 ng/dL   TSH    Collection Time: 11/23/20  3:43 PM   Result Value Ref Range    TSH 2.290 0.380 - 5.330 uIU/mL   CBC with Differential    Collection Time: 11/24/20  2:58 AM   Result Value Ref Range    WBC 7.2 4.8 - 10.8 K/uL    RBC 3.53 (L) 4.20 - 5.40 M/uL    Hemoglobin 11.8 (L) 12.0 - 16.0 g/dL    Hematocrit 35.7 (L) 37.0 - 47.0 %    .1 (H) 81.4 - 97.8 fL    MCH 33.4 (H) 27.0 - 33.0 pg    MCHC 33.1 (L) 33.6 - 35.0 g/dL    RDW 47.8 35.9 - 50.0 fL    Platelet Count 269 164 - 446 K/uL    MPV 10.8 9.0 - 12.9 fL    Neutrophils-Polys 70.60 44.00 - 72.00 %    Lymphocytes 12.40 (L) 22.00 - 41.00 %    Monocytes 11.90 0.00 - 13.40 %    Eosinophils 3.40 0.00 - 6.90 %    Basophils 0.60 0.00 - 1.80 %    Immature Granulocytes 1.10 (H) 0.00 - 0.90 %    Nucleated RBC 0.00 /100 WBC    Neutrophils (Absolute) 5.05 2.00 - 7.15 K/uL    Lymphs (Absolute)  0.89 (L) 1.00 - 4.80 K/uL    Monos (Absolute) 0.85 0.00 - 0.85 K/uL    Eos (Absolute) 0.24 0.00 - 0.51 K/uL    Baso (Absolute) 0.04 0.00 - 0.12 K/uL    Immature Granulocytes (abs) 0.08 0.00 - 0.11 K/uL    NRBC (Absolute) 0.00 K/uL   Comp Metabolic Panel (CMP)    Collection Time: 11/24/20  2:58 AM   Result Value Ref Range    Sodium 138 135 - 145 mmol/L    Potassium 3.4 (L) 3.6 - 5.5 mmol/L    Chloride 105 96 - 112 mmol/L    Co2 27 20 - 33 mmol/L    Anion Gap 6.0 (L) 7.0 - 16.0    Glucose 110 (H) 65 - 99 mg/dL    Bun 8 8 - 22 mg/dL    Creatinine 0.41 (L) 0.50 - 1.40 mg/dL    Calcium 9.0 8.5 - 10.5 mg/dL    AST(SGOT) 29 12 - 45 U/L    ALT(SGPT) 20 2 - 50 U/L    Alkaline Phosphatase 73 30 - 99 U/L    Total Bilirubin 0.5 0.1 - 1.5 mg/dL    Albumin 2.9 (L) 3.2 - 4.9 g/dL    Total Protein 5.6 (L) 6.0 - 8.2 g/dL    Globulin 2.7 1.9 - 3.5 g/dL    A-G Ratio 1.1 g/dL   Lipid Profile    Collection Time: 11/24/20  2:58 AM   Result Value Ref Range    Cholesterol,Tot 84 (L) 100 - 199 mg/dL    Triglycerides 48 0 - 149 mg/dL    HDL 38 (A) >=40 mg/dL    LDL 36 <100 mg/dL   ESTIMATED GFR    Collection Time: 11/24/20  2:58 AM   Result Value Ref Range    GFR If African American >60 >60 mL/min/1.73 m 2    GFR If Non African American >60 >60 mL/min/1.73 m 2   CBC with Differential    Collection Time: 11/26/20  5:59 AM   Result Value Ref Range    WBC 6.5 4.8 - 10.8 K/uL    RBC 3.78 (L) 4.20 - 5.40 M/uL    Hemoglobin 12.9 12.0 - 16.0 g/dL    Hematocrit 39.0 37.0 - 47.0 %    .2 (H) 81.4 - 97.8 fL    MCH 34.1 (H) 27.0 - 33.0 pg    MCHC 33.1 (L) 33.6 - 35.0 g/dL    RDW 49.5 35.9 - 50.0 fL    Platelet Count 309 164 - 446 K/uL    MPV 10.2 9.0 - 12.9 fL    Neutrophils-Polys 61.50 44.00 - 72.00 %    Lymphocytes 21.10 (L) 22.00 - 41.00 %    Monocytes 10.10 0.00 - 13.40 %    Eosinophils 5.30 0.00 - 6.90 %    Basophils 0.60 0.00 - 1.80 %    Immature Granulocytes 1.40 (H) 0.00 - 0.90 %    Nucleated RBC 0.00 /100 WBC    Neutrophils (Absolute)  3.98 2.00 - 7.15 K/uL    Lymphs (Absolute) 1.36 1.00 - 4.80 K/uL    Monos (Absolute) 0.65 0.00 - 0.85 K/uL    Eos (Absolute) 0.34 0.00 - 0.51 K/uL    Baso (Absolute) 0.04 0.00 - 0.12 K/uL    Immature Granulocytes (abs) 0.09 0.00 - 0.11 K/uL    NRBC (Absolute) 0.00 K/uL   Comp Metabolic Panel (CMP)    Collection Time: 11/26/20  5:59 AM   Result Value Ref Range    Sodium 141 135 - 145 mmol/L    Potassium 3.5 (L) 3.6 - 5.5 mmol/L    Chloride 106 96 - 112 mmol/L    Co2 28 20 - 33 mmol/L    Anion Gap 7.0 7.0 - 16.0    Glucose 82 65 - 99 mg/dL    Bun 6 (L) 8 - 22 mg/dL    Creatinine 0.49 (L) 0.50 - 1.40 mg/dL    Calcium 9.1 8.5 - 10.5 mg/dL    AST(SGOT) 26 12 - 45 U/L    ALT(SGPT) 17 2 - 50 U/L    Alkaline Phosphatase 75 30 - 99 U/L    Total Bilirubin 0.4 0.1 - 1.5 mg/dL    Albumin 3.1 (L) 3.2 - 4.9 g/dL    Total Protein 6.2 6.0 - 8.2 g/dL    Globulin 3.1 1.9 - 3.5 g/dL    A-G Ratio 1.0 g/dL   Magnesium    Collection Time: 11/26/20  5:59 AM   Result Value Ref Range    Magnesium 1.8 1.5 - 2.5 mg/dL   Vitamin D, 25-hydroxy (blood)    Collection Time: 11/26/20  5:59 AM   Result Value Ref Range    25-Hydroxy   Vitamin D 25 26 (L) 30 - 100 ng/mL   ESTIMATED GFR    Collection Time: 11/26/20  5:59 AM   Result Value Ref Range    GFR If African American >60 >60 mL/min/1.73 m 2    GFR If Non African American >60 >60 mL/min/1.73 m 2       Current Facility-Administered Medications   Medication Frequency   • potassium chloride SA (Kdur) tablet 40 mEq Once   • butalbital/apap/caffeine -40 mg (Fioricet) per tablet 1 Tab Q6HRS PRN   • QUEtiapine (Seroquel) tablet 25 mg TID PRN   • melatonin tablet 3 mg HS PRN   • hydrOXYzine HCl (ATARAX) tablet 50 mg Q6HRS PRN   • melatonin tablet 3 mg HS PRN   • Respiratory Therapy Consult Continuous RT   • Pharmacy Consult Request ...Pain Management Review 1 Each PHARMACY TO DOSE   • hydrALAZINE (APRESOLINE) tablet 10 mg Q8HRS PRN   • acetaminophen (Tylenol) tablet 650 mg Q4HRS PRN   • artificial  tears ophthalmic solution 1 Drop PRN   • benzocaine-menthol (CEPACOL) lozenge 1 Lozenge Q2HRS PRN   • mag hydrox-al hydrox-simeth (MAALOX PLUS ES or MYLANTA DS) suspension 20 mL Q2HRS PRN   • ondansetron (ZOFRAN ODT) dispertab 4 mg 4X/DAY PRN    Or   • ondansetron (ZOFRAN) syringe/vial injection 4 mg 4X/DAY PRN   • traZODone (DESYREL) tablet 50 mg QHS PRN   • sodium chloride (OCEAN) 0.65 % nasal spray 2 Spray PRN   • lactulose 20 GM/30ML solution 30 mL QDAY PRN   • docusate sodium (ENEMEEZ) enema 283 mg QDAY PRN   • fleet enema 133 mL QDAY PRN   • senna-docusate (PERICOLACE or SENOKOT S) 8.6-50 MG per tablet 2 Tab BID    And   • polyethylene glycol/lytes (MIRALAX) PACKET 1 Packet QDAY PRN    And   • magnesium hydroxide (MILK OF MAGNESIA) suspension 30 mL QDAY PRN    And   • bisacodyl (DULCOLAX) suppository 10 mg QDAY PRN   • acetaminophen (TYLENOL) tablet 1,000 mg TID   • atorvastatin (LIPITOR) tablet 40 mg Q EVENING   • clopidogrel (PLAVIX) tablet 75 mg DAILY   • escitalopram (Lexapro) tablet 10 mg DAILY   • enoxaparin (LOVENOX) inj 40 mg DAILY       Orders Placed This Encounter   Procedures   • Diet Order Diet: Level 5 - Minced and Moist; Liquid level: Level 0 - Thin; Tray Modifications (optional): SLP - 1:1 Supervision by Nursing, SLP - Deliver to Nursing Station     Standing Status:   Standing     Number of Occurrences:   1     Order Specific Question:   Diet:     Answer:   Level 5 - Minced and Moist [24]     Order Specific Question:   Liquid level     Answer:   Level 0 - Thin     Order Specific Question:   Tray Modifications (optional)     Answer:   SLP - 1:1 Supervision by Nursing     Order Specific Question:   Tray Modifications (optional)     Answer:   SLP - Deliver to Nursing Station       Assessment:  Active Hospital Problems    Diagnosis   • *Stroke (cerebrum) (HCC)   • Anemia   • Essential hypertension   • Vascular dementia without behavioral disturbance (HCC)   • Depression   • Urinary incontinence   •  Urinary retention   • Hypokalemia       Medical Decision Making and Plan:  Adding fioricet PRN for headache management   No other changes   Continue treatment per primary team    Total time:  26 minutes.  I spent greater than 50% of the time for patient care and coordination on this date, including unit/floor time, and face-to-face time with the patient as per assessment and plan above.    Roberto Sullivan, DO   Physical Medicine and Rehabilitation

## 2020-11-27 LAB
COVID ORDER STATUS COVID19: NORMAL
SARS-COV-2 RNA RESP QL NAA+PROBE: NOTDETECTED
SPECIMEN SOURCE: NORMAL
VIT B1 BLD-MCNC: 283 NMOL/L (ref 70–180)

## 2020-11-27 PROCEDURE — 97116 GAIT TRAINING THERAPY: CPT

## 2020-11-27 PROCEDURE — 92526 ORAL FUNCTION THERAPY: CPT

## 2020-11-27 PROCEDURE — 97530 THERAPEUTIC ACTIVITIES: CPT

## 2020-11-27 PROCEDURE — U0003 INFECTIOUS AGENT DETECTION BY NUCLEIC ACID (DNA OR RNA); SEVERE ACUTE RESPIRATORY SYNDROME CORONAVIRUS 2 (SARS-COV-2) (CORONAVIRUS DISEASE [COVID-19]), AMPLIFIED PROBE TECHNIQUE, MAKING USE OF HIGH THROUGHPUT TECHNOLOGIES AS DESCRIBED BY CMS-2020-01-R: HCPCS

## 2020-11-27 PROCEDURE — A9270 NON-COVERED ITEM OR SERVICE: HCPCS | Performed by: PHYSICAL MEDICINE & REHABILITATION

## 2020-11-27 PROCEDURE — 92507 TX SP LANG VOICE COMM INDIV: CPT

## 2020-11-27 PROCEDURE — 700111 HCHG RX REV CODE 636 W/ 250 OVERRIDE (IP): Performed by: PHYSICAL MEDICINE & REHABILITATION

## 2020-11-27 PROCEDURE — 99232 SBSQ HOSP IP/OBS MODERATE 35: CPT | Performed by: PHYSICAL MEDICINE & REHABILITATION

## 2020-11-27 PROCEDURE — 770010 HCHG ROOM/CARE - REHAB SEMI PRIVAT*

## 2020-11-27 PROCEDURE — 700102 HCHG RX REV CODE 250 W/ 637 OVERRIDE(OP): Performed by: PHYSICAL MEDICINE & REHABILITATION

## 2020-11-27 PROCEDURE — 97535 SELF CARE MNGMENT TRAINING: CPT

## 2020-11-27 PROCEDURE — 97110 THERAPEUTIC EXERCISES: CPT

## 2020-11-27 RX ORDER — POTASSIUM CHLORIDE 20 MEQ/1
20 TABLET, EXTENDED RELEASE ORAL DAILY
Status: COMPLETED | OUTPATIENT
Start: 2020-11-28 | End: 2020-11-30

## 2020-11-27 RX ADMIN — ATORVASTATIN CALCIUM 40 MG: 40 TABLET, FILM COATED ORAL at 20:22

## 2020-11-27 RX ADMIN — ESCITALOPRAM OXALATE 10 MG: 10 TABLET, FILM COATED ORAL at 08:09

## 2020-11-27 RX ADMIN — ACETAMINOPHEN 1000 MG: 500 TABLET, FILM COATED ORAL at 08:04

## 2020-11-27 RX ADMIN — DOCUSATE SODIUM 50 MG AND SENNOSIDES 8.6 MG 2 TABLET: 8.6; 5 TABLET, FILM COATED ORAL at 08:05

## 2020-11-27 RX ADMIN — ACETAMINOPHEN 1000 MG: 500 TABLET, FILM COATED ORAL at 20:22

## 2020-11-27 RX ADMIN — ENOXAPARIN SODIUM 40 MG: 40 INJECTION SUBCUTANEOUS at 20:22

## 2020-11-27 RX ADMIN — CLOPIDOGREL BISULFATE 75 MG: 75 TABLET ORAL at 08:05

## 2020-11-27 RX ADMIN — ACETAMINOPHEN 1000 MG: 500 TABLET, FILM COATED ORAL at 14:28

## 2020-11-27 RX ADMIN — DOCUSATE SODIUM 50 MG AND SENNOSIDES 8.6 MG 2 TABLET: 8.6; 5 TABLET, FILM COATED ORAL at 20:22

## 2020-11-27 ASSESSMENT — ACTIVITIES OF DAILY LIVING (ADL)
TOILET_TRANSFER_DESCRIPTION: GRAB BAR;SET-UP OF EQUIPMENT;SUPERVISION FOR SAFETY
TOILETING_LEVEL_OF_ASSIST_DESCRIPTION: SUPERVISION FOR SAFETY;SET-UP OF EQUIPMENT
BED_CHAIR_WHEELCHAIR_TRANSFER_DESCRIPTION: ADAPTIVE EQUIPMENT;SUPERVISION FOR SAFETY;VERBAL CUEING;SET-UP OF EQUIPMENT

## 2020-11-27 ASSESSMENT — GAIT ASSESSMENTS
DISTANCE (FEET): 120
ASSISTIVE DEVICE: FRONT WHEEL WALKER
GAIT LEVEL OF ASSIST: CONTACT GUARD ASSIST

## 2020-11-27 ASSESSMENT — PATIENT HEALTH QUESTIONNAIRE - PHQ9
1. LITTLE INTEREST OR PLEASURE IN DOING THINGS: NOT AT ALL
SUM OF ALL RESPONSES TO PHQ9 QUESTIONS 1 AND 2: 0
2. FEELING DOWN, DEPRESSED, IRRITABLE, OR HOPELESS: NOT AT ALL

## 2020-11-27 ASSESSMENT — PAIN DESCRIPTION - PAIN TYPE: TYPE: CHRONIC PAIN

## 2020-11-27 NOTE — CARE PLAN
Problem: Communication  Goal: The ability to communicate needs accurately and effectively will improve  Outcome: PROGRESSING AS EXPECTED  Patient speaks Danish only, utilized Danish speaking CNA to interpret for patient.     Problem: Safety  Goal: Will remain free from injury  Outcome: PROGRESSING AS EXPECTED  Patient reminded to use call light for assist with transfers to prevent falls/injury.

## 2020-11-27 NOTE — THERAPY
Physical Therapy   Daily Treatment     Patient Name: Sushma Bowden  Age:  85 y.o., Sex:  female  Medical Record #: [id number]  Today's Date: 11/27/2020     Precautions  Precautions: Fall Risk, Swallow Precautions ( See Comments)  Comments: dementia, confused despite use of interpretor services during duration of session, in room x 1st 5 days as added precaution, Chinese speaking    Subjective    Pt was seated in w/c upon arrival and agreeable to treatment.  [place] language services provided through [place] language translation services.      Objective       11/27/20 1301   Precautions   Precautions Fall Risk;Swallow Precautions ( See Comments)   Gait Functional Level of Assist    Gait Level Of Assist Contact Guard Assist   Assistive Device Front Wheel Walker   Distance (Feet) 120   # of Times Distance was Traveled 2   Deviation Decreased Base Of Support;Bradykinetic;Decreased Heel Strike;Shuffled Gait;Decreased Toe Off   Wheelchair Functional Level of Assist   Wheelchair Assist Minimal Assist  (for steering otherwise SPV)   Distance Wheelchair (Feet or Distance) 20  (x2)   Wheelchair Description Extra time   Transfer Functional Level of Assist   Bed, Chair, Wheelchair Transfer Contact Guard Assist   Bed Chair Wheelchair Transfer Description Adaptive equipment;Supervision for safety;Verbal cueing;Set-up of equipment   Sitting Lower Body Exercises   Ankle Pumps 1 set of 15;Bilateral   Hip Abduction 1 set of 15;Bilateral   Hip Adduction 1 set of 15;Bilateral   Long Arc Quad 1 set of 15;Bilateral   Marching Reciprocal;1 set of 15   Hamstring Curl 1 set of 15;Bilateral   Bed Mobility    Sit to Supine Stand by Assist   Sit to Stand Contact Guard Assist   Scooting Stand by Assist   Rolling Supervised   Interdisciplinary Plan of Care Collaboration   IDT Collaboration with  Nursing   Patient Position at End of Therapy In Bed;Bed Alarm On;Call Light within Reach;Tray Table within Reach;Phone within Reach   Collaboration  "Comments POC, pt position at end of session   PT Total Time Spent   PT Individual Total Time Spent (Mins) 60   PT Charge Group   PT Gait Training 2   PT Therapeutic Exercise 1   PT Therapeutic Activities 1       Assessment    Pt continues to present with confusion, but with increased activity tolerance this session.  Pt demonstrated poor sequencing and motor planning, requiring intermittent TC and hand over hand cues to complete tasks.  Pt with poor safety with FWW consistently setting it aside when going to sit in w/c or EOB.  Strengths: Supportive family, Independent prior level of function  Barriers: Decreased endurance, Fatigue, Home accessibility, Impaired balance, Limited mobility    Plan    Continue gait training with FWW, balance training, transfer training, safety with transfers, trial step as appropriate    Physical Therapy Problems     Problem: Mobility     Dates: Start: 11/25/20       Goal: STG-Within one week, patient will ambulate household distance     Dates: Start: 11/25/20       Description: 1) Individualized goal:  Patient will amb with FWW >50 ft indoors CGA/SBA  2) Interventions:  PT Gait Training, PT Self Care/Home Eval, PT Therapeutic Exercises, PT Neuro Re-Ed/Balance, PT Aquatic Therapy, PT Therapeutic Activity, PT Manual Therapy, and PT Evaluation            Goal: STG-Within one week, patient will ascend and descend four to six stairs     Dates: Start: 11/25/20       Description: 1) Individualized goal:  Patient will amb up/down 6 4\" stairs with BHR CGA/min A  2) Interventions:  PT Gait Training, PT Self Care/Home Eval, PT Therapeutic Exercises, PT Neuro Re-Ed/Balance, PT Aquatic Therapy, PT Therapeutic Activity, PT Manual Therapy, and PT Evaluation                  Problem: Mobility Transfers     Dates: Start: 11/25/20       Goal: STG-Within one week, patient will transfer bed to chair     Dates: Start: 11/25/20       Description: 1) Individualized goal:  Patient will transfer SBA reach " pivot consistently wc <> bed,  STS  2) Interventions:  PT Gait Training, PT Self Care/Home Eval, PT Therapeutic Exercises, PT Neuro Re-Ed/Balance, PT Aquatic Therapy, PT Therapeutic Activity, PT Manual Therapy, and PT Evaluation                  Problem: PT-Long Term Goals     Dates: Start: 11/25/20       Goal: LTG-By discharge, patient will ambulate     Dates: Start: 11/25/20       Description: 1) Individualized goal:  Patient will amb in home with LRAD >150 ft over various surfaces SPV  2) Interventions:  PT Gait Training, PT Self Care/Home Eval, PT Therapeutic Exercises, PT Neuro Re-Ed/Balance, PT Aquatic Therapy, PT Therapeutic Activity, PT Manual Therapy, and PT Evaluation            Goal: LTG-By discharge, patient will transfer one surface to another     Dates: Start: 11/25/20       Description: 1) Individualized goal: Patient will transfer SPV with LRAD STS/SPT  2) Interventions:  PT Gait Training, PT Self Care/Home Eval, PT Therapeutic Exercises, PT Neuro Re-Ed/Balance, PT Aquatic Therapy, PT Therapeutic Activity, PT Manual Therapy, and PT Evaluation            Goal: LTG-By discharge, patient will ambulate up/down 4-6 stairs     Dates: Start: 11/25/20       Description: 1) Individualized goal:  Patient will amb up/down 2 stairs without HR, min A/ HHA  2) Interventions:  PT Gait Training, PT Self Care/Home Eval, PT Therapeutic Exercises, PT Neuro Re-Ed/Balance, PT Aquatic Therapy, PT Therapeutic Activity, PT Manual Therapy, and PT Evaluation

## 2020-11-27 NOTE — PROGRESS NOTES
Rehab Progress Note     Encounter Date: 11/27/2020    CC: CVA, dementia, headache    Interval Events (Subjective)  Patient sitting up in wheelchair. Patient very hard of hearing. She reports Fioricet helped yesterday with headache. She reports therapy is OK. Denies pain at this time.     Objective:  VITAL SIGNS: /63   Pulse 70   Temp 36.6 °C (97.8 °F) (Temporal)   Resp 16   Ht 1.524 m (5')   Wt 62.1 kg (137 lb)   SpO2 95%   BMI 26.76 kg/m²   Gen: NAD  Psych: Mood and affect appropriate  CV: RRR, no edema  Resp: CTAB, no upper airway sounds  Abd: NTND  Neuro: Following some commands, appears expressive and receptive aphasia     Recent Results (from the past 72 hour(s))   CBC with Differential    Collection Time: 11/26/20  5:59 AM   Result Value Ref Range    WBC 6.5 4.8 - 10.8 K/uL    RBC 3.78 (L) 4.20 - 5.40 M/uL    Hemoglobin 12.9 12.0 - 16.0 g/dL    Hematocrit 39.0 37.0 - 47.0 %    .2 (H) 81.4 - 97.8 fL    MCH 34.1 (H) 27.0 - 33.0 pg    MCHC 33.1 (L) 33.6 - 35.0 g/dL    RDW 49.5 35.9 - 50.0 fL    Platelet Count 309 164 - 446 K/uL    MPV 10.2 9.0 - 12.9 fL    Neutrophils-Polys 61.50 44.00 - 72.00 %    Lymphocytes 21.10 (L) 22.00 - 41.00 %    Monocytes 10.10 0.00 - 13.40 %    Eosinophils 5.30 0.00 - 6.90 %    Basophils 0.60 0.00 - 1.80 %    Immature Granulocytes 1.40 (H) 0.00 - 0.90 %    Nucleated RBC 0.00 /100 WBC    Neutrophils (Absolute) 3.98 2.00 - 7.15 K/uL    Lymphs (Absolute) 1.36 1.00 - 4.80 K/uL    Monos (Absolute) 0.65 0.00 - 0.85 K/uL    Eos (Absolute) 0.34 0.00 - 0.51 K/uL    Baso (Absolute) 0.04 0.00 - 0.12 K/uL    Immature Granulocytes (abs) 0.09 0.00 - 0.11 K/uL    NRBC (Absolute) 0.00 K/uL   Comp Metabolic Panel (CMP)    Collection Time: 11/26/20  5:59 AM   Result Value Ref Range    Sodium 141 135 - 145 mmol/L    Potassium 3.5 (L) 3.6 - 5.5 mmol/L    Chloride 106 96 - 112 mmol/L    Co2 28 20 - 33 mmol/L    Anion Gap 7.0 7.0 - 16.0    Glucose 82 65 - 99 mg/dL    Bun 6 (L) 8 - 22  mg/dL    Creatinine 0.49 (L) 0.50 - 1.40 mg/dL    Calcium 9.1 8.5 - 10.5 mg/dL    AST(SGOT) 26 12 - 45 U/L    ALT(SGPT) 17 2 - 50 U/L    Alkaline Phosphatase 75 30 - 99 U/L    Total Bilirubin 0.4 0.1 - 1.5 mg/dL    Albumin 3.1 (L) 3.2 - 4.9 g/dL    Total Protein 6.2 6.0 - 8.2 g/dL    Globulin 3.1 1.9 - 3.5 g/dL    A-G Ratio 1.0 g/dL   Magnesium    Collection Time: 11/26/20  5:59 AM   Result Value Ref Range    Magnesium 1.8 1.5 - 2.5 mg/dL   Vitamin D, 25-hydroxy (blood)    Collection Time: 11/26/20  5:59 AM   Result Value Ref Range    25-Hydroxy   Vitamin D 25 26 (L) 30 - 100 ng/mL   ESTIMATED GFR    Collection Time: 11/26/20  5:59 AM   Result Value Ref Range    GFR If African American >60 >60 mL/min/1.73 m 2    GFR If Non African American >60 >60 mL/min/1.73 m 2       Current Facility-Administered Medications   Medication Frequency   • butalbital/apap/caffeine -40 mg (Fioricet) per tablet 1 Tab Q6HRS PRN   • QUEtiapine (Seroquel) tablet 25 mg TID PRN   • melatonin tablet 3 mg HS PRN   • hydrOXYzine HCl (ATARAX) tablet 50 mg Q6HRS PRN   • melatonin tablet 3 mg HS PRN   • Respiratory Therapy Consult Continuous RT   • Pharmacy Consult Request ...Pain Management Review 1 Each PHARMACY TO DOSE   • hydrALAZINE (APRESOLINE) tablet 10 mg Q8HRS PRN   • acetaminophen (Tylenol) tablet 650 mg Q4HRS PRN   • artificial tears ophthalmic solution 1 Drop PRN   • benzocaine-menthol (CEPACOL) lozenge 1 Lozenge Q2HRS PRN   • mag hydrox-al hydrox-simeth (MAALOX PLUS ES or MYLANTA DS) suspension 20 mL Q2HRS PRN   • ondansetron (ZOFRAN ODT) dispertab 4 mg 4X/DAY PRN    Or   • ondansetron (ZOFRAN) syringe/vial injection 4 mg 4X/DAY PRN   • traZODone (DESYREL) tablet 50 mg QHS PRN   • sodium chloride (OCEAN) 0.65 % nasal spray 2 Spray PRN   • lactulose 20 GM/30ML solution 30 mL QDAY PRN   • docusate sodium (ENEMEEZ) enema 283 mg QDAY PRN   • fleet enema 133 mL QDAY PRN   • senna-docusate (PERICOLACE or SENOKOT S) 8.6-50 MG per tablet  2 Tab BID    And   • polyethylene glycol/lytes (MIRALAX) PACKET 1 Packet QDAY PRN    And   • magnesium hydroxide (MILK OF MAGNESIA) suspension 30 mL QDAY PRN    And   • bisacodyl (DULCOLAX) suppository 10 mg QDAY PRN   • acetaminophen (TYLENOL) tablet 1,000 mg TID   • atorvastatin (LIPITOR) tablet 40 mg Q EVENING   • clopidogrel (PLAVIX) tablet 75 mg DAILY   • escitalopram (Lexapro) tablet 10 mg DAILY   • enoxaparin (LOVENOX) inj 40 mg DAILY       Orders Placed This Encounter   Procedures   • Diet Order Diet: Level 5 - Minced and Moist; Liquid level: Level 0 - Thin; Tray Modifications (optional): SLP - 1:1 Supervision by Nursing, SLP - Deliver to Nursing Station     Standing Status:   Standing     Number of Occurrences:   1     Order Specific Question:   Diet:     Answer:   Level 5 - Minced and Moist [24]     Order Specific Question:   Liquid level     Answer:   Level 0 - Thin     Order Specific Question:   Tray Modifications (optional)     Answer:   SLP - 1:1 Supervision by Nursing     Order Specific Question:   Tray Modifications (optional)     Answer:   SLP - Deliver to Nursing Station       Assessment:  Active Hospital Problems    Diagnosis   • *Stroke (cerebrum) (HCC)   • Anemia   • Essential hypertension   • Vascular dementia without behavioral disturbance (HCC)   • Depression   • Urinary incontinence   • Urinary retention   • Hypokalemia       Medical Decision Making and Plan:  Left parietal/occipital stroke  Expressive and receptive aphasia  No paresis  Cognitive deficits  Continue full rehab program  PT/OT/SLP, 1 hr each discipline, 5 days per week  Continue Plavix and statin for secondary stroke prophylaxis     Dementia  Likely would benefit from starting Aricept, will discuss with family     Chronic neck pain  Scheduled Tylenol    Headache  Improved on Fioricet PRN     Depression  Continue Lexapro     Anemia  Recheck 11/26/20, resolved.      Hypokalemia  S/p supplementation  Check morning labs, 3.5, start  20 mEq daily. Recheck next week.      Bowel program  Continue bowel medications  Scheduled Sennakot  PRN Miralax, MOM, bisacodyl suppository  Last BM 11/24     Bladder program  Check PVRs - 218  Bladder scan for no voids  ICP for over 400 cc  Scheduled toileting     DVT prophylaxis  Lovenox. Occasionally refusing     Last COVID negative on 11/22, Recheck on 11/27 - pending    Total time:  26 minutes.  I spent greater than 50% of the time for patient care and coordination on this date, including unit/floor time, and face-to-face time with the patient as per assessment and plan above. Discussion included improved headache on fioricet, improved labs including anemia, hypokalemia, and start supplement with recheck of K next week. Repeat screen for COVID    Sophia Peguero M.D.

## 2020-11-27 NOTE — CARE PLAN
Problem: Safety  Goal: Will remain free from falls  Description: Patient is alert to self, oriented to unit and unit routine, patient oriented to safety precautions that are in place and how to call for assistance when needed. Patient has call light close by, non-skid socks in place, alarms on bed and w/c. Frequently rounded on to make sure needs are being met. Will continue to educate as needed.  Outcome: PROGRESSING AS EXPECTED     Problem: Infection  Goal: Will remain free from infection  Outcome: PROGRESSING AS EXPECTED

## 2020-11-27 NOTE — THERAPY
Occupational Therapy  Daily Treatment     Patient Name: Suhsma Bowden  Age:  85 y.o., Sex:  female  Medical Record #: [id number]  Today's Date: 11/27/2020     Precautions  Precautions: (P) Swallow Precautions ( See Comments), Fall Risk  Comments: (P) dementia, confused despite use of interpretor services during duration of session, in room x 1st 5 days as added precaution, Setswana speaking         Subjective    Patient using restroom upon OT arrival to room.  Agreeable to work on grooming after toileting.     Objective       11/27/20 0701   Precautions   Precautions Swallow Precautions ( See Comments);Fall Risk   Comments dementia, confused despite use of interpretor services during duration of session, in room x 1st 5 days as added precaution, Setswana speaking   Functional Level of Assist   Grooming Modified Independent   Grooming Description Seated in wheelchair at sink   Lower Body Dressing Supervision   Lower Body Dressing Description Supervision for safety  (to don/doff socks)   Toileting Stand by Assist   Toileting Description Supervision for safety;Set-up of equipment   Toilet Transfers Stand by Assist   Toilet Transfer Description Grab bar;Set-up of equipment;Supervision for safety   Sitting Upper Body Exercises   Sitting Upper Body Exercises Yes   Front Arm Raise 2 sets of 10;Right ;Left;Weight (See Comments for lbs)   Bicep Curls 2 sets of 10;Right ;Left;Weight (See Comments for lbs)   Pronation / Supination 2 sets of 10;Right ;Left;Weight (See Comments for lbs)   Wrist Flexion / Extension 2 sets of 10;Right ;Left;Weight (See Comments for lbs)   Comments 2 lb weight   Interdisciplinary Plan of Care Collaboration   Patient Position at End of Therapy Seated;Self Releasing Lap Belt Applied;Chair Alarm On;Call Light within Reach;Tray Table within Reach   OT Total Time Spent   OT Individual Total Time Spent (Mins) 60   OT Charge Group   OT Self Care / ADL 2   OT Therapy Activity 1   OT Therapeutic Exercise  1      Functional mobility in room with FWW and CGA.  STS from w/c with CGA.    Assessment  Patient requires CGA with mobility with FWW, supervised with grooming seated in w/c and SBA for toileting.    Strengths: Adequate strength, Willingly participates in therapeutic activities, Supportive family, Pleasant and cooperative, Motivated for self care and independence  Barriers: Confused, Decreased endurance, Dementia, Difficulty following instructions, Fatigue, Generalized weakness, Hearing impairment, Impaired activity tolerance, Impaired balance, Impaired carryover of learning, Impaired functional cognition, Language barrier, non-fluent English, Limited mobility, Pain    Plan    ADLs, functional mobility, strength/endurance, balance    Occupational Therapy Goals     Problem: Bathing     Dates: Start: 11/25/20       Goal: STG-Within one week, patient will bathe     Dates: Start: 11/25/20       Description: 1) Individualized Goal:  Min assist with AE as needed  2) Interventions:  OT Self Care/ADL, OT Cognitive Skill Dev, OT Community Reintegration, OT Manual Ther Technique, OT Neuro Re-Ed/Balance, OT Sensory Int Techniques, OT Therapeutic Activity, OT Evaluation, and OT Therapeutic Exercise                  Problem: Dressing     Dates: Start: 11/25/20       Goal: STG-Within one week, patient will dress LB     Dates: Start: 11/25/20       Description: 1) Individualized Goal:  CGA  2) Interventions:  OT Self Care/ADL, OT Cognitive Skill Dev, OT Community Reintegration, OT Manual Ther Technique, OT Neuro Re-Ed/Balance, OT Sensory Int Techniques, OT Therapeutic Activity, OT Evaluation, and OT Therapeutic Exercise                  Problem: Functional Transfers     Dates: Start: 11/25/20       Goal: STG-Within one week, patient will transfer to toilet     Dates: Start: 11/25/20       Description: 1) Individualized Goal:  CGA    2) Interventions:  OT Self Care/ADL, OT Cognitive Skill Dev, OT Community Reintegration, OT Manual  Ther Technique, OT Neuro Re-Ed/Balance, OT Sensory Int Techniques, OT Therapeutic Activity, OT Evaluation, and OT Therapeutic Exercise                  Problem: OT Long Term Goals     Dates: Start: 11/25/20       Goal: LTG-By discharge, patient will complete basic self care tasks     Dates: Start: 11/25/20       Description: 1) Individualized Goal:  Set-up for self-feeding, grooming, and UB dressing tasks, supervision for toileting, LB dressing and bathing.   2) Interventions:  OT Self Care/ADL, OT Cognitive Skill Dev, OT Community Reintegration, OT Manual Ther Technique, OT Neuro Re-Ed/Balance, OT Sensory Int Techniques, OT Therapeutic Activity, OT Evaluation, and OT Therapeutic Exercise            Goal: LTG-By discharge, patient will perform bathroom transfers     Dates: Start: 11/25/20       Description: 1) Individualized Goal:  Supervision   2) Interventions:  OT Self Care/ADL, OT Cognitive Skill Dev, OT Community Reintegration, OT Manual Ther Technique, OT Neuro Re-Ed/Balance, OT Sensory Int Techniques, OT Therapeutic Activity, OT Evaluation, and OT Therapeutic Exercise

## 2020-11-27 NOTE — THERAPY
Speech Language Pathology  Daily Treatment     Patient Name: Sushma Bowden  Age:  85 y.o., Sex:  female  Medical Record #: [id number]  Today's Date: 11/27/2020     Precautions  Precautions: Swallow Precautions ( See Comments), Fall Risk  Comments: dementia, confused despite use of interpretor services during duration of session, in room x 1st 5 days as added precaution, [place] speaking    Subjective    Pt pleasant and cooperative, low initiation max cues required.      Objective       11/27/20 0803   SLP Total Time Spent   SLP Individual Total Time Spent (Mins) 60   Treatment Charges   SLP Treatment - Individual Speech Language Treatment - Individual   SLP Swallowing Dysfunction Treatment Swallowing Dysfunction Treatment       Assessment    Pt assessed with trials of soft and bite size textures with thin liquids. Pt initially attempting to use knife for self feeding max cues required for spoon/fork. Pt with prolonged mastication with little intake noted. Medications trialed with whole floated in puree, pt tolerated one at a time. Rec: meds to be administered one at a time floated, soft and bite size trials to be continued with SLP only. Automatic speech tasks completed at this time with set up assistance provided, pt indep completed counting to 10, KARMEN and AMY. Object naming presented with real items in room, pt completed object naming with 70% accuracy indep, increased to 100% provided sent and phonemic cues, much better than day of admission. Pt indep aware of difficulty expressing herself however poor understanding as to why she is here in the hospital with emphasis consistently on need to return home. Reassurance provided and pt in agreement to stay in hospital to get stronger prior to returning home with her daughter.          Plan    Soft and bite size trials with SLP only. Cont to address expressive language skills    Speech Therapy Problems     Problem: Comprehension STGs     Dates: Start: 11/25/20        Goal: STG-Within one week, patient will     Dates: Start: 11/25/20       Description: 1) Individualized goal:  follow 2 step verbal commands with 80% accuracy provided MOD A.  2) Interventions:  SLP Aphasia Evaluation, SLP Speech Language Treatment, SLP Self Care / ADL Training , SLP Cognitive Skill Development, and SLP Group Treatment                Problem: Expression STGs     Dates: Start: 11/25/20       Goal: STG-Within one week, patient will express     Dates: Start: 11/25/20       Description: 1) Individualized goal:  simple object naming with 80% accuracy provided MIN cues.   2) Interventions:  SLP Aphasia Evaluation, SLP Speech Language Treatment, SLP Self Care / ADL Training , SLP Cognitive Skill Development, and SLP Group Treatment                Problem: Speech/Swallowing LTGs     Dates: Start: 11/25/20       Goal: LTG-By discharge, patient will safely swallow     Dates: Start: 11/25/20       Description: 1) Individualized goal:  regular textures with thin liquids with no overt s/sx of asp/pen noted across meals with 100% accuracy provided MOD I.  2) Interventions:  SLP Swallowing Dysfunction Treatment, SLP Oral Pharyngeal Evaluation, SLP Video Swallow Evaluation, SLP Self Care / ADL Training , SLP Cognitive Skill Development, and SLP Group Treatment              Goal: LTG-By discharge, patient will express     Dates: Start: 11/25/20       Description: 1) Individualized goal:  basic wants and needs across environments with 80% accuracy provided SPV.  2) Interventions:  SLP Aphasia Evaluation, SLP Speech Language Treatment, SLP Self Care / ADL Training , SLP Cognitive Skill Development, and SLP Group Treatment                    Problem: Swallowing STGs     Dates: Start: 11/25/20       Goal: STG-Within one week, patient will safely swallow     Dates: Start: 11/25/20       Description: 1) Individualized goal:  minced and moist textures with no overt s/sx of asp/pen noted across meals with MIN cues.  2)  Interventions:  SLP Swallowing Dysfunction Treatment, SLP Oral Pharyngeal Evaluation, SLP Video Swallow Evaluation, SLP Self Care / ADL Training , SLP Cognitive Skill Development, and SLP Group Treatment

## 2020-11-28 PROCEDURE — 97530 THERAPEUTIC ACTIVITIES: CPT

## 2020-11-28 PROCEDURE — 97535 SELF CARE MNGMENT TRAINING: CPT

## 2020-11-28 PROCEDURE — 700102 HCHG RX REV CODE 250 W/ 637 OVERRIDE(OP): Performed by: PHYSICAL MEDICINE & REHABILITATION

## 2020-11-28 PROCEDURE — 770010 HCHG ROOM/CARE - REHAB SEMI PRIVAT*

## 2020-11-28 PROCEDURE — 97110 THERAPEUTIC EXERCISES: CPT

## 2020-11-28 PROCEDURE — 92526 ORAL FUNCTION THERAPY: CPT

## 2020-11-28 PROCEDURE — 700111 HCHG RX REV CODE 636 W/ 250 OVERRIDE (IP): Performed by: PHYSICAL MEDICINE & REHABILITATION

## 2020-11-28 PROCEDURE — 97116 GAIT TRAINING THERAPY: CPT

## 2020-11-28 PROCEDURE — A9270 NON-COVERED ITEM OR SERVICE: HCPCS | Performed by: PHYSICAL MEDICINE & REHABILITATION

## 2020-11-28 PROCEDURE — 92507 TX SP LANG VOICE COMM INDIV: CPT

## 2020-11-28 PROCEDURE — 99232 SBSQ HOSP IP/OBS MODERATE 35: CPT | Performed by: PHYSICAL MEDICINE & REHABILITATION

## 2020-11-28 RX ADMIN — POTASSIUM CHLORIDE 20 MEQ: 1500 TABLET, EXTENDED RELEASE ORAL at 08:52

## 2020-11-28 RX ADMIN — CLOPIDOGREL BISULFATE 75 MG: 75 TABLET ORAL at 08:51

## 2020-11-28 RX ADMIN — HYDROXYZINE HYDROCHLORIDE 50 MG: 25 TABLET, FILM COATED ORAL at 22:04

## 2020-11-28 RX ADMIN — ATORVASTATIN CALCIUM 40 MG: 40 TABLET, FILM COATED ORAL at 22:05

## 2020-11-28 RX ADMIN — ACETAMINOPHEN 1000 MG: 500 TABLET, FILM COATED ORAL at 22:05

## 2020-11-28 RX ADMIN — ACETAMINOPHEN 1000 MG: 500 TABLET, FILM COATED ORAL at 08:52

## 2020-11-28 RX ADMIN — ESCITALOPRAM OXALATE 10 MG: 10 TABLET, FILM COATED ORAL at 08:51

## 2020-11-28 RX ADMIN — ENOXAPARIN SODIUM 40 MG: 40 INJECTION SUBCUTANEOUS at 22:05

## 2020-11-28 ASSESSMENT — GAIT ASSESSMENTS
ASSISTIVE DEVICE: FRONT WHEEL WALKER
DISTANCE (FEET): 150
GAIT LEVEL OF ASSIST: CONTACT GUARD ASSIST
DEVIATION: DECREASED BASE OF SUPPORT;BRADYKINETIC;DECREASED HEEL STRIKE;OTHER (COMMENT)
DEVIATION: DECREASED BASE OF SUPPORT;BRADYKINETIC;SHUFFLED GAIT;OTHER (COMMENT)
GAIT LEVEL OF ASSIST: CONTACT GUARD ASSIST
DISTANCE (FEET): 200
ASSISTIVE DEVICE: FRONT WHEEL WALKER

## 2020-11-28 ASSESSMENT — PATIENT HEALTH QUESTIONNAIRE - PHQ9
2. FEELING DOWN, DEPRESSED, IRRITABLE, OR HOPELESS: NOT AT ALL
1. LITTLE INTEREST OR PLEASURE IN DOING THINGS: NOT AT ALL
SUM OF ALL RESPONSES TO PHQ9 QUESTIONS 1 AND 2: 0

## 2020-11-28 ASSESSMENT — ACTIVITIES OF DAILY LIVING (ADL)
TOILET_TRANSFER_DESCRIPTION: GRAB BAR;INCREASED TIME
BED_CHAIR_WHEELCHAIR_TRANSFER_DESCRIPTION: ADAPTIVE EQUIPMENT;INCREASED TIME;INITIAL PREPARATION FOR TASK;SET-UP OF EQUIPMENT;SUPERVISION FOR SAFETY;VERBAL CUEING

## 2020-11-28 ASSESSMENT — PAIN DESCRIPTION - PAIN TYPE: TYPE: ACUTE PAIN

## 2020-11-28 NOTE — THERAPY
"Occupational Therapy  Daily Treatment     Patient Name: Sushma Bowden  Age:  86 y.o., Sex:  female  Medical Record #: [id number]  Today's Date: 11/28/2020     Precautions  Precautions: (P) Fall Risk, Swallow Precautions ( See Comments)  Comments: (P) dementia, confused with , in room 1st x5 days as added precaution         Subjective    \"It feels twisted, it doesn't hurt, it's just twisted.\" pt reported about her R elbow/forearm area      Vikas #[id number]     Objective       11/28/20 1431   Precautions   Precautions Fall Risk;Swallow Precautions ( See Comments)   Comments dementia, confused with , in room 1st x5 days as added precaution   Pain   Intervention Declines   Pain 0 - 10 Group   Pain Rating Scale (NPRS) 0   Therapist Pain Assessment Post Activity Pain Same as Prior to Activity   Cognition    Level of Consciousness Confused   Comments pt oriented to name, place, reason    ABS (Agitated Behavior Scale)   Agitated Behavior Scale Performed No   Functional Level of Assist   Bed, Chair, Wheelchair Transfer Contact Guard Assist   Bed Chair Wheelchair Transfer Description Adaptive equipment;Increased time;Initial preparation for task;Set-up of equipment;Supervision for safety;Verbal cueing   Sitting Upper Body Exercises   Chest Press 1 set of 15;Bilateral;Weight (See Comments for lbs)  (2 lb )   Front Arm Raise 1 set of 15;Bilateral;Weight (See Comments for lbs)  (2 lb )   Internal Shoulder Rotation 1 set of 15;Bilateral;Weight (See Comments for lbs)  (2 lb )   Bicep Curls 1 set of 15;Bilateral;Weight (See Comments for lbs)  (2 lb )   Pronation / Supination 1 set of 10;Bilateral;Weight (See Comments for lbs)  (2 lb )   Balance   Comments Pt completed functional ambulation from window to door and door to bed using FWW with CGA   (~50 ft )   Bed Mobility    Sit to Supine Minimal Assist  (for trunk mgmt )   Scooting Stand by Assist   Rolling Supervised "   Interdisciplinary Plan of Care Collaboration   Patient Position at End of Therapy In Bed;Bed Alarm On;Call Light within Reach;Tray Table within Reach;Phone within Reach   OT Total Time Spent   OT Individual Total Time Spent (Mins) 60   OT Charge Group   OT Self Care / ADL 1   OT Therapy Activity 1   OT Therapeutic Exercise  2       Assessment    Pt tolerated session fair. Pt fatigues quickly and requires increased time and rest breaks throughout session. Pt required tactile/visual and verbal cues for BUE strengthening exercises. Pt completed LBD seated at EOB for doffing B shoes and donning socks with SBA. Increased time needed for all tasks for pt to understand  and what is being asked of her. Pt was oriented to name, place, reason this session.     Strengths: Adequate strength, Willingly participates in therapeutic activities, Supportive family, Pleasant and cooperative, Motivated for self care and independence  Barriers: Confused, Decreased endurance, Dementia, Difficulty following instructions, Fatigue, Generalized weakness, Hearing impairment, Impaired activity tolerance, Impaired balance, Impaired carryover of learning, Impaired functional cognition, Language barrier, non-fluent English, Limited mobility, Pain    Plan    ADLs, functional mobility, strength/endurance, balance    Occupational Therapy Goals     Problem: Bathing     Dates: Start: 11/25/20       Goal: STG-Within one week, patient will bathe     Dates: Start: 11/25/20       Description: 1) Individualized Goal:  Min assist with AE as needed  2) Interventions:  OT Self Care/ADL, OT Cognitive Skill Dev, OT Community Reintegration, OT Manual Ther Technique, OT Neuro Re-Ed/Balance, OT Sensory Int Techniques, OT Therapeutic Activity, OT Evaluation, and OT Therapeutic Exercise                  Problem: Dressing     Dates: Start: 11/25/20       Goal: STG-Within one week, patient will dress LB     Dates: Start: 11/25/20       Description: 1)  Individualized Goal:  CGA  2) Interventions:  OT Self Care/ADL, OT Cognitive Skill Dev, OT Community Reintegration, OT Manual Ther Technique, OT Neuro Re-Ed/Balance, OT Sensory Int Techniques, OT Therapeutic Activity, OT Evaluation, and OT Therapeutic Exercise                  Problem: Functional Transfers     Dates: Start: 11/25/20       Goal: STG-Within one week, patient will transfer to toilet     Dates: Start: 11/25/20       Description: 1) Individualized Goal:  CGA    2) Interventions:  OT Self Care/ADL, OT Cognitive Skill Dev, OT Community Reintegration, OT Manual Ther Technique, OT Neuro Re-Ed/Balance, OT Sensory Int Techniques, OT Therapeutic Activity, OT Evaluation, and OT Therapeutic Exercise                  Problem: OT Long Term Goals     Dates: Start: 11/25/20       Goal: LTG-By discharge, patient will complete basic self care tasks     Dates: Start: 11/25/20       Description: 1) Individualized Goal:  Set-up for self-feeding, grooming, and UB dressing tasks, supervision for toileting, LB dressing and bathing.   2) Interventions:  OT Self Care/ADL, OT Cognitive Skill Dev, OT Community Reintegration, OT Manual Ther Technique, OT Neuro Re-Ed/Balance, OT Sensory Int Techniques, OT Therapeutic Activity, OT Evaluation, and OT Therapeutic Exercise            Goal: LTG-By discharge, patient will perform bathroom transfers     Dates: Start: 11/25/20       Description: 1) Individualized Goal:  Supervision   2) Interventions:  OT Self Care/ADL, OT Cognitive Skill Dev, OT Community Reintegration, OT Manual Ther Technique, OT Neuro Re-Ed/Balance, OT Sensory Int Techniques, OT Therapeutic Activity, OT Evaluation, and OT Therapeutic Exercise

## 2020-11-28 NOTE — THERAPY
Speech Language Pathology  Daily Treatment     Patient Name: Sushma Bowden  Age:  86 y.o., Sex:  female  Medical Record #: [id number]  Today's Date: 11/28/2020     Precautions  Precautions: Fall Risk, Swallow Precautions ( See Comments)  Comments: dementia, confused despite use of interpretor services during duration of session, in room x 1st 5 days as added precaution, Gabonese speaking    Subjective    Patient pleasant and cooperative.  Seen with assistance of online video interpretation service ( agent # [id number] [place]).    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  She never worked out side the home.     Objective       11/28/20 0801   Receptive Language / Auditory Comprehension   Follows One Unit Commands Minimal (4)   Follows Two Unit Commands Moderate (3)   Expressive Language   Naming Minimal (4)   Dysphagia    Other Treatments Therapeutic trial of (6) soft and bite size canned fruit.   Patient delined because she felt stomach discomfort.   Diet / Liquid Recommendation Thin (0);Minced & Moist (5) - (Dysphagia II)   Nutritional Liquid Intake Rating Scale Non thickened beverages   Nutritional Food Intake Rating Scale Total oral diet with multiple consistencies but requiring special preparation or compensations   SLP Total Time Spent   SLP Individual Total Time Spent (Mins) 60   Treatment Charges   SLP Treatment - Individual Speech Language Treatment - Individual   SLP Swallowing Dysfunction Treatment Swallowing Dysfunction Treatment       Assessment    Patient seen with current diet (5) M+M diet textures/ (0) thin liquids.  Patient tolerated several bites/sips with no difficulty.  Offered therapeutic trial of canned fruit.  Patient declined to eat any more due to stomach discomfort.  CNA reported that the patient  has been constipated and nursing is working to help relieve her constipation.  Patient was able to name 8/10 common items on tray or in room.  She was able to express simple wants and  needs during session. She followed simple 1 step directives with body part movements.  2 steps performed at 50% with extra repetitions needed or 2 steps simplified to single elements.  Hearing loss coupled with memory impairment may contribute to performance in 2 step directions.         Plan    Target comprehension of 2 step directives,  Naming/ verbal expression.    Speech Therapy Problems     Problem: Comprehension STGs     Dates: Start: 11/25/20       Goal: STG-Within one week, patient will     Dates: Start: 11/25/20       Description: 1) Individualized goal:  follow 2 step verbal commands with 80% accuracy provided MOD A.  2) Interventions:  SLP Aphasia Evaluation, SLP Speech Language Treatment, SLP Self Care / ADL Training , SLP Cognitive Skill Development, and SLP Group Treatment                Problem: Expression STGs     Dates: Start: 11/25/20       Goal: STG-Within one week, patient will express     Dates: Start: 11/25/20       Description: 1) Individualized goal:  simple object naming with 80% accuracy provided MIN cues.   2) Interventions:  SLP Aphasia Evaluation, SLP Speech Language Treatment, SLP Self Care / ADL Training , SLP Cognitive Skill Development, and SLP Group Treatment                Problem: Speech/Swallowing LTGs     Dates: Start: 11/25/20       Goal: LTG-By discharge, patient will safely swallow     Dates: Start: 11/25/20       Description: 1) Individualized goal:  regular textures with thin liquids with no overt s/sx of asp/pen noted across meals with 100% accuracy provided MOD I.  2) Interventions:  SLP Swallowing Dysfunction Treatment, SLP Oral Pharyngeal Evaluation, SLP Video Swallow Evaluation, SLP Self Care / ADL Training , SLP Cognitive Skill Development, and SLP Group Treatment              Goal: LTG-By discharge, patient will express     Dates: Start: 11/25/20       Description: 1) Individualized goal:  basic wants and needs across environments with 80% accuracy provided SPV.  2)  Interventions:  SLP Aphasia Evaluation, SLP Speech Language Treatment, SLP Self Care / ADL Training , SLP Cognitive Skill Development, and SLP Group Treatment                    Problem: Swallowing STGs     Dates: Start: 11/25/20       Goal: STG-Within one week, patient will safely swallow     Dates: Start: 11/25/20       Description: 1) Individualized goal:  minced and moist textures with no overt s/sx of asp/pen noted across meals with MIN cues.  2) Interventions:  SLP Swallowing Dysfunction Treatment, SLP Oral Pharyngeal Evaluation, SLP Video Swallow Evaluation, SLP Self Care / ADL Training , SLP Cognitive Skill Development, and SLP Group Treatment

## 2020-11-28 NOTE — CARE PLAN
Problem: Communication  Goal: The ability to communicate needs accurately and effectively will improve  Note: Setswana speaking. No complains of pain. No respiratory distress. Will continue to monitor.     Problem: Safety  Goal: Will remain free from injury  Note: Call light with in reach. Redirection to use call light for assistance. Non skid socks. Upper siderails up x2 while in bed. Bed alarm for safety awareness.

## 2020-11-28 NOTE — PROGRESS NOTES
Rehab Progress Note     Chief Complaint: CVA, dementia, headache    Interval Events (Subjective)   used for the duration of the visit.She is hard of hearing and communication was slow and difficult. She reported upset stomach, but decent appetite. Will discuss with nursing about slowing stool softeners. No other complaints. It [birth year] today.     Objective:  VITAL SIGNS: /68   Pulse 74   Temp 36.4 °C (97.6 °F) (Temporal)   Resp 17   Ht 1.524 m (5')   Wt 62.1 kg (137 lb)   SpO2 95%   BMI 26.76 kg/m²     Physical Exam:  Vitals: /68   Pulse 74   Temp 36.4 °C (97.6 °F) (Temporal)   Resp 17   Ht 1.524 m (5')   Wt 62.1 kg (137 lb)   SpO2 95%   Gen: NAD  Head:  NC/AT  Eyes/ Nose/ Mouth: PERRLA, moist mucous membranes  Cardio: RRR, no mumurs  Pulm: CTAB, with normal respiratory effort  Abd: Soft NTND, active bowel sounds,   Ext: No peripheral edema. No calf tenderness. No clubbing/cyanosis.       Tone: no spasticity noted, no cogwheeling noted    Recent Results (from the past 72 hour(s))   CBC with Differential    Collection Time: 11/26/20  5:59 AM   Result Value Ref Range    WBC 6.5 4.8 - 10.8 K/uL    RBC 3.78 (L) 4.20 - 5.40 M/uL    Hemoglobin 12.9 12.0 - 16.0 g/dL    Hematocrit 39.0 37.0 - 47.0 %    .2 (H) 81.4 - 97.8 fL    MCH 34.1 (H) 27.0 - 33.0 pg    MCHC 33.1 (L) 33.6 - 35.0 g/dL    RDW 49.5 35.9 - 50.0 fL    Platelet Count 309 164 - 446 K/uL    MPV 10.2 9.0 - 12.9 fL    Neutrophils-Polys 61.50 44.00 - 72.00 %    Lymphocytes 21.10 (L) 22.00 - 41.00 %    Monocytes 10.10 0.00 - 13.40 %    Eosinophils 5.30 0.00 - 6.90 %    Basophils 0.60 0.00 - 1.80 %    Immature Granulocytes 1.40 (H) 0.00 - 0.90 %    Nucleated RBC 0.00 /100 WBC    Neutrophils (Absolute) 3.98 2.00 - 7.15 K/uL    Lymphs (Absolute) 1.36 1.00 - 4.80 K/uL    Monos (Absolute) 0.65 0.00 - 0.85 K/uL    Eos (Absolute) 0.34 0.00 - 0.51 K/uL    Baso (Absolute) 0.04 0.00 - 0.12 K/uL    Immature  Granulocytes (abs) 0.09 0.00 - 0.11 K/uL    NRBC (Absolute) 0.00 K/uL   Comp Metabolic Panel (CMP)    Collection Time: 11/26/20  5:59 AM   Result Value Ref Range    Sodium 141 135 - 145 mmol/L    Potassium 3.5 (L) 3.6 - 5.5 mmol/L    Chloride 106 96 - 112 mmol/L    Co2 28 20 - 33 mmol/L    Anion Gap 7.0 7.0 - 16.0    Glucose 82 65 - 99 mg/dL    Bun 6 (L) 8 - 22 mg/dL    Creatinine 0.49 (L) 0.50 - 1.40 mg/dL    Calcium 9.1 8.5 - 10.5 mg/dL    AST(SGOT) 26 12 - 45 U/L    ALT(SGPT) 17 2 - 50 U/L    Alkaline Phosphatase 75 30 - 99 U/L    Total Bilirubin 0.4 0.1 - 1.5 mg/dL    Albumin 3.1 (L) 3.2 - 4.9 g/dL    Total Protein 6.2 6.0 - 8.2 g/dL    Globulin 3.1 1.9 - 3.5 g/dL    A-G Ratio 1.0 g/dL   Magnesium    Collection Time: 11/26/20  5:59 AM   Result Value Ref Range    Magnesium 1.8 1.5 - 2.5 mg/dL   Vitamin D, 25-hydroxy (blood)    Collection Time: 11/26/20  5:59 AM   Result Value Ref Range    25-Hydroxy   Vitamin D 25 26 (L) 30 - 100 ng/mL   ESTIMATED GFR    Collection Time: 11/26/20  5:59 AM   Result Value Ref Range    GFR If African American >60 >60 mL/min/1.73 m 2    GFR If Non African American >60 >60 mL/min/1.73 m 2   COVID/SARS CoV-2 PCR    Collection Time: 11/27/20 11:10 AM    Specimen: Nasopharyngeal; Respirate   Result Value Ref Range    COVID Order Status Received    SARS-CoV-2, PCR (In-House)    Collection Time: 11/27/20 11:10 AM   Result Value Ref Range    SARS-CoV-2 Source NP Swab     SARS-CoV-2 by PCR NotDetected NotDetected       Current Facility-Administered Medications   Medication Frequency   • potassium chloride SA (Kdur) tablet 20 mEq DAILY   • butalbital/apap/caffeine -40 mg (Fioricet) per tablet 1 Tab Q6HRS PRN   • QUEtiapine (Seroquel) tablet 25 mg TID PRN   • melatonin tablet 3 mg HS PRN   • hydrOXYzine HCl (ATARAX) tablet 50 mg Q6HRS PRN   • Respiratory Therapy Consult Continuous RT   • hydrALAZINE (APRESOLINE) tablet 10 mg Q8HRS PRN   • acetaminophen (Tylenol) tablet 650 mg Q4HRS PRN    • artificial tears ophthalmic solution 1 Drop PRN   • benzocaine-menthol (CEPACOL) lozenge 1 Lozenge Q2HRS PRN   • mag hydrox-al hydrox-simeth (MAALOX PLUS ES or MYLANTA DS) suspension 20 mL Q2HRS PRN   • ondansetron (ZOFRAN ODT) dispertab 4 mg 4X/DAY PRN    Or   • ondansetron (ZOFRAN) syringe/vial injection 4 mg 4X/DAY PRN   • traZODone (DESYREL) tablet 50 mg QHS PRN   • sodium chloride (OCEAN) 0.65 % nasal spray 2 Spray PRN   • lactulose 20 GM/30ML solution 30 mL QDAY PRN   • docusate sodium (ENEMEEZ) enema 283 mg QDAY PRN   • fleet enema 133 mL QDAY PRN   • senna-docusate (PERICOLACE or SENOKOT S) 8.6-50 MG per tablet 2 Tab BID    And   • polyethylene glycol/lytes (MIRALAX) PACKET 1 Packet QDAY PRN    And   • magnesium hydroxide (MILK OF MAGNESIA) suspension 30 mL QDAY PRN    And   • bisacodyl (DULCOLAX) suppository 10 mg QDAY PRN   • acetaminophen (TYLENOL) tablet 1,000 mg TID   • atorvastatin (LIPITOR) tablet 40 mg Q EVENING   • clopidogrel (PLAVIX) tablet 75 mg DAILY   • escitalopram (Lexapro) tablet 10 mg DAILY   • enoxaparin (LOVENOX) inj 40 mg DAILY       Orders Placed This Encounter   Procedures   • Diet Order Diet: Level 5 - Minced and Moist; Liquid level: Level 0 - Thin; Tray Modifications (optional): SLP - 1:1 Supervision by Nursing, SLP - Deliver to Nursing Station     Standing Status:   Standing     Number of Occurrences:   1     Order Specific Question:   Diet:     Answer:   Level 5 - Minced and Moist [24]     Order Specific Question:   Liquid level     Answer:   Level 0 - Thin     Order Specific Question:   Tray Modifications (optional)     Answer:   SLP - 1:1 Supervision by Nursing     Order Specific Question:   Tray Modifications (optional)     Answer:   SLP - Deliver to Nursing Station       Assessment:  Active Hospital Problems    Diagnosis   • *Stroke (cerebrum) (HCC)   • Anemia   • Essential hypertension   • Vascular dementia without behavioral disturbance (HCC)   • Depression   • Urinary  incontinence   • Urinary retention   • Hypokalemia       Medical Decision Making and Plan:  - slow stool softeners to ease diarrhea   - no other changes     Total time:  30 minutes.  I spent greater than 50% of the time for patient care and coordination on this date, including unit/floor time, and face-to-face time with the patient as per assessment and plan above.    Roberto Sullivan, DO   Physical Medicine and Rehabilitation

## 2020-11-29 PROCEDURE — 92507 TX SP LANG VOICE COMM INDIV: CPT

## 2020-11-29 PROCEDURE — 97530 THERAPEUTIC ACTIVITIES: CPT

## 2020-11-29 PROCEDURE — 97116 GAIT TRAINING THERAPY: CPT

## 2020-11-29 PROCEDURE — 770010 HCHG ROOM/CARE - REHAB SEMI PRIVAT*

## 2020-11-29 PROCEDURE — A9270 NON-COVERED ITEM OR SERVICE: HCPCS | Performed by: PHYSICAL MEDICINE & REHABILITATION

## 2020-11-29 PROCEDURE — 97535 SELF CARE MNGMENT TRAINING: CPT

## 2020-11-29 PROCEDURE — 92526 ORAL FUNCTION THERAPY: CPT

## 2020-11-29 PROCEDURE — 700111 HCHG RX REV CODE 636 W/ 250 OVERRIDE (IP): Performed by: PHYSICAL MEDICINE & REHABILITATION

## 2020-11-29 PROCEDURE — 97110 THERAPEUTIC EXERCISES: CPT

## 2020-11-29 PROCEDURE — 700102 HCHG RX REV CODE 250 W/ 637 OVERRIDE(OP): Performed by: PHYSICAL MEDICINE & REHABILITATION

## 2020-11-29 RX ADMIN — ACETAMINOPHEN 1000 MG: 500 TABLET, FILM COATED ORAL at 21:48

## 2020-11-29 RX ADMIN — ESCITALOPRAM OXALATE 10 MG: 10 TABLET, FILM COATED ORAL at 08:39

## 2020-11-29 RX ADMIN — ACETAMINOPHEN 1000 MG: 500 TABLET, FILM COATED ORAL at 14:27

## 2020-11-29 RX ADMIN — ATORVASTATIN CALCIUM 40 MG: 40 TABLET, FILM COATED ORAL at 21:48

## 2020-11-29 RX ADMIN — ENOXAPARIN SODIUM 40 MG: 40 INJECTION SUBCUTANEOUS at 21:48

## 2020-11-29 RX ADMIN — CLOPIDOGREL BISULFATE 75 MG: 75 TABLET ORAL at 08:38

## 2020-11-29 RX ADMIN — POTASSIUM CHLORIDE 20 MEQ: 1500 TABLET, EXTENDED RELEASE ORAL at 08:38

## 2020-11-29 RX ADMIN — ACETAMINOPHEN 1000 MG: 500 TABLET, FILM COATED ORAL at 08:38

## 2020-11-29 ASSESSMENT — GAIT ASSESSMENTS
ASSISTIVE DEVICE: FRONT WHEEL WALKER
DEVIATION: DECREASED BASE OF SUPPORT;BRADYKINETIC;SHUFFLED GAIT;DECREASED HEEL STRIKE;DECREASED TOE OFF
GAIT LEVEL OF ASSIST: CONTACT GUARD ASSIST
DEVIATION: BRADYKINETIC;DECREASED BASE OF SUPPORT;DECREASED HEEL STRIKE;DECREASED TOE OFF
DISTANCE (FEET): 90
GAIT LEVEL OF ASSIST: CONTACT GUARD ASSIST
ASSISTIVE DEVICE: FRONT WHEEL WALKER
DISTANCE (FEET): 210

## 2020-11-29 ASSESSMENT — LIFESTYLE VARIABLES
HAVE YOU EVER FELT YOU SHOULD CUT DOWN ON YOUR DRINKING: NO
ON A TYPICAL DAY WHEN YOU DRINK ALCOHOL HOW MANY DRINKS DO YOU HAVE: 0
EVER FELT BAD OR GUILTY ABOUT YOUR DRINKING: NO
HAVE PEOPLE ANNOYED YOU BY CRITICIZING YOUR DRINKING: NO
CONSUMPTION TOTAL: NEGATIVE
EVER HAD A DRINK FIRST THING IN THE MORNING TO STEADY YOUR NERVES TO GET RID OF A HANGOVER: NO
TOTAL SCORE: 0
TOTAL SCORE: 0
HOW MANY TIMES IN THE PAST YEAR HAVE YOU HAD 5 OR MORE DRINKS IN A DAY: 0
ALCOHOL_USE: NO
TOTAL SCORE: 0
AVERAGE NUMBER OF DAYS PER WEEK YOU HAVE A DRINK CONTAINING ALCOHOL: 0

## 2020-11-29 ASSESSMENT — PAIN DESCRIPTION - PAIN TYPE: TYPE: ACUTE PAIN

## 2020-11-29 ASSESSMENT — FIBROSIS 4 INDEX: FIB4 SCORE: 1.76

## 2020-11-29 ASSESSMENT — ACTIVITIES OF DAILY LIVING (ADL)
TOILET_TRANSFER_DESCRIPTION: GRAB BAR
BED_CHAIR_WHEELCHAIR_TRANSFER_DESCRIPTION: SET-UP OF EQUIPMENT;SUPERVISION FOR SAFETY

## 2020-11-29 NOTE — THERAPY
"Physical Therapy   Daily Treatment     Patient Name: Sushma Bowden  Age:  86 y.o., Sex:  female  Medical Record #: [id number]  Today's Date: 11/28/2020     Precautions  Precautions: Fall Risk, Swallow Precautions ( See Comments)  Comments: dementia, confused with , in room 1st x5 days as added precaution    Subjective    Pt found in room, up in w/c, reporting need to use the Br.     Objective       11/28/20 1301   Precautions   Precautions Fall Risk;Swallow Precautions ( See Comments)   Comments dementia, confused with , in room 1st x5 days as added precaution   Gait Functional Level of Assist    Gait Level Of Assist Contact Guard Assist   Assistive Device Front Wheel Walker   Distance (Feet) 200   # of Times Distance was Traveled 1   Deviation Decreased Base Of Support;Bradykinetic;Decreased Heel Strike;Other (Comment)  (ant lean BUE's)   Stairs Functional Level of Assist   Level of Assist with Stairs Contact Guard Assist   # of Stairs Climbed 4   Stairs Description Assist device/equipment;Extra time;Safety concerns;Verbal cueing  (Up/down 4, 6\" curbs CGA with FWW)   Transfer Functional Level of Assist   Toilet Transfers Contact Guard Assist  (w/c<>toilet with grab bar CGA cues for safety)   Toilet Transfer Description Grab bar;Increased time;Verbal cues for spinal precautions   Neuro-Muscular Treatments   Neuro-Muscular Treatments Tactile Cuing;Verbal Cuing   Interdisciplinary Plan of Care Collaboration   Patient Position at End of Therapy Seated;Chair Alarm On;Tray Table within Reach;Call Light within Reach   PT Total Time Spent   PT Individual Total Time Spent (Mins) 60   PT Charge Group   PT Gait Training 2   PT Therapeutic Exercise 2     Collaboration with RN, notified of use of BR x2 today with therapy, small soft BM's and c/o abdominal discomfort.     used.  Orientated to review to place and reason at start of session.  Minimal comprehension " "verbalized.    Gait training with FWW, focus on upright posture & inc gait speed.      Curb training with FWW CGA  x4 reps, demo prior to performance, max cues throughout.    STS standard chair<>FWW focus on safety with hand placement x 10 max/min cues throughout    Assessment    Pt demo improved sequencing with repetition within session.  Pt confused with using  and speaking to english to her, required repetition for how the  worked, also difficult for comprehension because the translators video wasn't working.    Strengths: Supportive family, Independent prior level of function  Barriers: Decreased endurance, Fatigue, Home accessibility, Impaired balance, Limited mobility    Plan    LE strengthening, gait with FWW, endurance, balance    Physical Therapy Problems     Problem: Mobility     Dates: Start: 11/25/20       Goal: STG-Within one week, patient will ambulate household distance     Dates: Start: 11/25/20       Description: 1) Individualized goal:  Patient will amb with FWW >50 ft indoors CGA/SBA  2) Interventions:  PT Gait Training, PT Self Care/Home Eval, PT Therapeutic Exercises, PT Neuro Re-Ed/Balance, PT Aquatic Therapy, PT Therapeutic Activity, PT Manual Therapy, and PT Evaluation            Goal: STG-Within one week, patient will ascend and descend four to six stairs     Dates: Start: 11/25/20       Description: 1) Individualized goal:  Patient will amb up/down 6 4\" stairs with BHR CGA/min A  2) Interventions:  PT Gait Training, PT Self Care/Home Eval, PT Therapeutic Exercises, PT Neuro Re-Ed/Balance, PT Aquatic Therapy, PT Therapeutic Activity, PT Manual Therapy, and PT Evaluation                  Problem: Mobility Transfers     Dates: Start: 11/25/20       Goal: STG-Within one week, patient will transfer bed to chair     Dates: Start: 11/25/20       Description: 1) Individualized goal:  Patient will transfer SBA reach pivot consistently wc <> bed,  STS  2) Interventions:  PT Gait " Training, PT Self Care/Home Eval, PT Therapeutic Exercises, PT Neuro Re-Ed/Balance, PT Aquatic Therapy, PT Therapeutic Activity, PT Manual Therapy, and PT Evaluation                  Problem: PT-Long Term Goals     Dates: Start: 11/25/20       Goal: LTG-By discharge, patient will ambulate     Dates: Start: 11/25/20       Description: 1) Individualized goal:  Patient will amb in home with LRAD >150 ft over various surfaces SPV  2) Interventions:  PT Gait Training, PT Self Care/Home Eval, PT Therapeutic Exercises, PT Neuro Re-Ed/Balance, PT Aquatic Therapy, PT Therapeutic Activity, PT Manual Therapy, and PT Evaluation            Goal: LTG-By discharge, patient will transfer one surface to another     Dates: Start: 11/25/20       Description: 1) Individualized goal: Patient will transfer SPV with LRAD STS/SPT  2) Interventions:  PT Gait Training, PT Self Care/Home Eval, PT Therapeutic Exercises, PT Neuro Re-Ed/Balance, PT Aquatic Therapy, PT Therapeutic Activity, PT Manual Therapy, and PT Evaluation            Goal: LTG-By discharge, patient will ambulate up/down 4-6 stairs     Dates: Start: 11/25/20       Description: 1) Individualized goal:  Patient will amb up/down 2 stairs without HR, min A/ HHA  2) Interventions:  PT Gait Training, PT Self Care/Home Eval, PT Therapeutic Exercises, PT Neuro Re-Ed/Balance, PT Aquatic Therapy, PT Therapeutic Activity, PT Manual Therapy, and PT Evaluation

## 2020-11-29 NOTE — CARE PLAN
Problem: Safety  Goal: Will remain free from falls  Description: Patient is alert to self, oriented to unit and unit routine, patient oriented to safety precautions that are in place and how to call for assistance when needed. Patient has call light close by, non-skid socks in place, alarms on bed and w/c. Frequently rounded on to make sure needs are being met. Will continue to educate as needed.  Outcome: PROGRESSING AS EXPECTED   Pt uses call light consistently and appropriately. Waits for assistance does not attempt self transfer this shift. Able to verbalize needs.   Problem: Infection  Goal: Will remain free from infection  Outcome: PROGRESSING AS EXPECTED   Patient remains free of infection as evidenced by normal vital signs and breath sounds. Will continue monitoring.   Problem: Skin Integrity  Goal: Risk for impaired skin integrity will decrease  Outcome: PROGRESSING AS EXPECTED   Patient's skin remains intact and free from new or accidental injury this shift.  Will continue to monitor.

## 2020-11-29 NOTE — THERAPY
Physical Therapy   Daily Treatment     Patient Name: Sushma Bowden  Age:  86 y.o., Sex:  female  Medical Record #: [id number]  Today's Date: 11/29/2020     Precautions  Precautions: Fall Risk, Swallow Precautions ( See Comments)  Comments: dementia, confused with     Subjective    Pt was seated in w/c upon arrival getting medication from RN and was agreeable to treatment.       Objective       11/29/20 0831   Precautions   Precautions Fall Risk;Swallow Precautions ( See Comments)   Gait Functional Level of Assist    Gait Level Of Assist Contact Guard Assist  (to SBA)   Assistive Device Front Wheel Walker   Distance (Feet) 210   # of Times Distance was Traveled 1   Deviation Decreased Base Of Support;Bradykinetic;Shuffled Gait;Decreased Heel Strike;Decreased Toe Off   Interdisciplinary Plan of Care Collaboration   Patient Position at End of Therapy Seated;Chair Alarm On;Call Light within Reach;Tray Table within Reach;Phone within Reach   PT Total Time Spent   PT Individual Total Time Spent (Mins) 30   PT Charge Group   PT Gait Training 1   PT Therapeutic Activities 1       Assessment    Pt continues to demonstrate improving activity tolerance with better sequencing noted this session.  Pt is motivated to progress.  Pt continues with decreased safety with FWW.    Strengths: Supportive family, Independent prior level of function  Barriers: Decreased endurance, Fatigue, Home accessibility, Impaired balance, Limited mobility    Plan    Continue gait training with FWW trailing SPC if appropriate (SPC is pt's prior AD and she may demonstrate improved safety), balance training, balance assessment, continue stair training, LE strengthening.    Physical Therapy Problems     Problem: Mobility     Dates: Start: 11/25/20       Goal: STG-Within one week, patient will ambulate household distance     Dates: Start: 11/25/20       Description: 1) Individualized goal:  Patient will amb with FWW >50 ft indoors  "CGA/SBA  2) Interventions:  PT Gait Training, PT Self Care/Home Eval, PT Therapeutic Exercises, PT Neuro Re-Ed/Balance, PT Aquatic Therapy, PT Therapeutic Activity, PT Manual Therapy, and PT Evaluation            Goal: STG-Within one week, patient will ascend and descend four to six stairs     Dates: Start: 11/25/20       Description: 1) Individualized goal:  Patient will amb up/down 6 4\" stairs with BHR CGA/min A  2) Interventions:  PT Gait Training, PT Self Care/Home Eval, PT Therapeutic Exercises, PT Neuro Re-Ed/Balance, PT Aquatic Therapy, PT Therapeutic Activity, PT Manual Therapy, and PT Evaluation                  Problem: Mobility Transfers     Dates: Start: 11/25/20       Goal: STG-Within one week, patient will transfer bed to chair     Dates: Start: 11/25/20       Description: 1) Individualized goal:  Patient will transfer SBA reach pivot consistently wc <> bed,  STS  2) Interventions:  PT Gait Training, PT Self Care/Home Eval, PT Therapeutic Exercises, PT Neuro Re-Ed/Balance, PT Aquatic Therapy, PT Therapeutic Activity, PT Manual Therapy, and PT Evaluation                  Problem: PT-Long Term Goals     Dates: Start: 11/25/20       Goal: LTG-By discharge, patient will ambulate     Dates: Start: 11/25/20       Description: 1) Individualized goal:  Patient will amb in home with LRAD >150 ft over various surfaces SPV  2) Interventions:  PT Gait Training, PT Self Care/Home Eval, PT Therapeutic Exercises, PT Neuro Re-Ed/Balance, PT Aquatic Therapy, PT Therapeutic Activity, PT Manual Therapy, and PT Evaluation            Goal: LTG-By discharge, patient will transfer one surface to another     Dates: Start: 11/25/20       Description: 1) Individualized goal: Patient will transfer SPV with LRAD STS/SPT  2) Interventions:  PT Gait Training, PT Self Care/Home Eval, PT Therapeutic Exercises, PT Neuro Re-Ed/Balance, PT Aquatic Therapy, PT Therapeutic Activity, PT Manual Therapy, and PT Evaluation            Goal: " LTG-By discharge, patient will ambulate up/down 4-6 stairs     Dates: Start: 11/25/20       Description: 1) Individualized goal:  Patient will amb up/down 2 stairs without HR, min A/ HHA  2) Interventions:  PT Gait Training, PT Self Care/Home Eval, PT Therapeutic Exercises, PT Neuro Re-Ed/Balance, PT Aquatic Therapy, PT Therapeutic Activity, PT Manual Therapy, and PT Evaluation

## 2020-11-29 NOTE — CARE PLAN
Problem: Infection  Goal: Will remain free from infection  Outcome: PROGRESSING AS EXPECTED     Problem: Venous Thromboembolism (VTW)/Deep Vein Thrombosis (DVT) Prevention:  Goal: Patient will participate in Venous Thrombosis (VTE)/Deep Vein Thrombosis (DVT)Prevention Measures  Outcome: PROGRESSING AS EXPECTED  Flowsheets (Taken 11/28/2020 2512)  Pharmacologic Prophylaxis Used: (plavix) LMWH: Enoxaparin(Lovenox)     Problem: Bowel/Gastric:  Goal: Normal bowel function is maintained or improved  Outcome: PROGRESSING AS EXPECTED  Note: Pericolace held this evening due to reports of loose stool.   Goal: Will not experience complications related to bowel motility  Outcome: PROGRESSING AS EXPECTED     Problem: Skin Integrity  Goal: Risk for impaired skin integrity will decrease  Outcome: PROGRESSING AS EXPECTED  Note: Skin remains intact, skin remaining clean and dry with timed toileting     Problem: Respiratory:  Goal: Respiratory status will improve  Outcome: PROGRESSING AS EXPECTED     Problem: Pain Management  Goal: Pain level will decrease to patient's comfort goal  Outcome: PROGRESSING AS EXPECTED  Note: At first patient denied pain. Then later in the exam voiced posterior and either side neck pain. Able to nod and shake head. Tylenol given and heat applied with good results.      Problem: Communication  Goal: The ability to communicate needs accurately and effectively will improve  Outcome: PROGRESSING SLOWER THAN EXPECTED  Note: Need to utilize the  for all communication. Pt forgetful, while going over medications with patient had to repeat several times what things were for even though we had already talked about it and they said they understood the first time. Also seems to either have a short attention span or difficulty understanding. When asking patient about pain they said no first, then when asking about the temperature they said they have neck pain. The  had patient repeat themselves  "a few times due to not understanding as well. Pt denied any questions at the end of exam/med pass.      Problem: Safety  Goal: Will remain free from injury  Outcome: PROGRESSING SLOWER THAN EXPECTED  Note: Pt did tell nurse they had to go to the bathroom while they were in the room but attempted to get off of the toilet unassisted. Educated patient on waiting for help and using call light but does not seem like any learning took place.   Goal: Will remain free from falls  Outcome: PROGRESSING SLOWER THAN EXPECTED     Problem: Knowledge Deficit  Goal: Knowledge of disease process/condition, treatment plan, diagnostic tests, and medications will improve  Outcome: PROGRESSING SLOWER THAN EXPECTED  Goal: Knowledge of the prescribed therapeutic regimen will improve  Outcome: PROGRESSING SLOWER THAN EXPECTED     Problem: Psychosocial Needs:  Goal: Level of anxiety will decrease  Outcome: PROGRESSING SLOWER THAN EXPECTED  Note: Pt did voice they were concerned about \"my nerves\". Atarax given, will monitor for effect.      "

## 2020-11-29 NOTE — THERAPY
Occupational Therapy  Daily Treatment     Patient Name: Sushma Bowden  Age:  86 y.o., Sex:  female  Medical Record #: [id number]  Today's Date: 11/29/2020     Precautions  Precautions: (P) Fall Risk, Swallow Precautions ( See Comments)  Comments: (P) dementia, confused with          Subjective    Patient lying in bed awake.  Agreeable to get out of bed and participate.     Objective       11/29/20 0701   Precautions   Precautions Fall Risk;Swallow Precautions ( See Comments)   Comments dementia, confused with    Functional Level of Assist   Grooming Independent   Grooming Description Seated in wheelchair at sink   Lower Body Dressing Supervision   Lower Body Dressing Description Set-up of equipment;Increased time  (setup to doff socks and don slip on shoes)   Bed, Chair, Wheelchair Transfer Stand by Assist   Bed Chair Wheelchair Transfer Description Set-up of equipment;Supervision for safety  (setup, SBA reach pivot bed to w/c)   Sitting Lower Body Exercises   Sitting Lower Body Exercises Yes   Nustep Time (See Comments)  (level 1 x 10 minutes with B UE/LE)   Bed Mobility    Supine to Sit Stand by Assist   Scooting Stand by Assist   Interdisciplinary Plan of Care Collaboration   Patient Position at End of Therapy Seated;Chair Alarm On;Self Releasing Lap Belt Applied;Call Light within Reach;Tray Table within Reach;Phone within Reach   OT Total Time Spent   OT Individual Total Time Spent (Mins) 60   OT Charge Group   OT Self Care / ADL 2   OT Therapy Activity 1   OT Therapeutic Exercise  1     Functional mobility with FWW CGA/SBA from family lounge gym to room.    Assessment    Patient completing all mobility, transfers and adls with SBA/CGA.  Strengths: Adequate strength, Willingly participates in therapeutic activities, Supportive family, Pleasant and cooperative, Motivated for self care and independence  Barriers: Confused, Decreased endurance, Dementia, Difficulty following  instructions, Fatigue, Generalized weakness, Hearing impairment, Impaired activity tolerance, Impaired balance, Impaired carryover of learning, Impaired functional cognition, Language barrier, non-fluent English, Limited mobility, Pain    Plan    Continue with adls, functional mobility, strength/endurance    Occupational Therapy Goals     Problem: Bathing     Dates: Start: 11/25/20       Goal: STG-Within one week, patient will bathe     Dates: Start: 11/25/20       Description: 1) Individualized Goal:  Min assist with AE as needed  2) Interventions:  OT Self Care/ADL, OT Cognitive Skill Dev, OT Community Reintegration, OT Manual Ther Technique, OT Neuro Re-Ed/Balance, OT Sensory Int Techniques, OT Therapeutic Activity, OT Evaluation, and OT Therapeutic Exercise                  Problem: Dressing     Dates: Start: 11/25/20       Goal: STG-Within one week, patient will dress LB     Dates: Start: 11/25/20       Description: 1) Individualized Goal:  CGA  2) Interventions:  OT Self Care/ADL, OT Cognitive Skill Dev, OT Community Reintegration, OT Manual Ther Technique, OT Neuro Re-Ed/Balance, OT Sensory Int Techniques, OT Therapeutic Activity, OT Evaluation, and OT Therapeutic Exercise                  Problem: Functional Transfers     Dates: Start: 11/25/20       Goal: STG-Within one week, patient will transfer to toilet     Dates: Start: 11/25/20       Description: 1) Individualized Goal:  CGA    2) Interventions:  OT Self Care/ADL, OT Cognitive Skill Dev, OT Community Reintegration, OT Manual Ther Technique, OT Neuro Re-Ed/Balance, OT Sensory Int Techniques, OT Therapeutic Activity, OT Evaluation, and OT Therapeutic Exercise                  Problem: OT Long Term Goals     Dates: Start: 11/25/20       Goal: LTG-By discharge, patient will complete basic self care tasks     Dates: Start: 11/25/20       Description: 1) Individualized Goal:  Set-up for self-feeding, grooming, and UB dressing tasks, supervision for  toileting, LB dressing and bathing.   2) Interventions:  OT Self Care/ADL, OT Cognitive Skill Dev, OT Community Reintegration, OT Manual Ther Technique, OT Neuro Re-Ed/Balance, OT Sensory Int Techniques, OT Therapeutic Activity, OT Evaluation, and OT Therapeutic Exercise            Goal: LTG-By discharge, patient will perform bathroom transfers     Dates: Start: 11/25/20       Description: 1) Individualized Goal:  Supervision   2) Interventions:  OT Self Care/ADL, OT Cognitive Skill Dev, OT Community Reintegration, OT Manual Ther Technique, OT Neuro Re-Ed/Balance, OT Sensory Int Techniques, OT Therapeutic Activity, OT Evaluation, and OT Therapeutic Exercise

## 2020-11-29 NOTE — CARE PLAN
Problem: Bowel/Gastric:  Goal: Normal bowel function is maintained or improved  Outcome: PROGRESSING SLOWER THAN EXPECTED  Note: Patient c/o stomach discomfort this am. Declined any intervention. Had multiple soft/formed BM today. Remains continent of bowel.

## 2020-11-29 NOTE — THERAPY
"Physical Therapy   Daily Treatment     Patient Name: Sushma Bowden  Age:  86 y.o., Sex:  female  Medical Record #: [id number]  Today's Date: 11/28/2020     Precautions  Precautions: Fall Risk, Swallow Precautions ( See Comments)  Comments: dementia, confused with , in room 1st x5 days as added precaution    Subjective    Pt found in bed \"my stomach hurts\" in [place].  Pt assisted to BR.     Objective       11/28/20 1031   Precautions   Precautions Fall Risk;Swallow Precautions ( See Comments)   Comments dementia, confused with , in room 1st x5 days as added precaution   Gait Functional Level of Assist    Gait Level Of Assist Contact Guard Assist   Assistive Device Front Wheel Walker   Distance (Feet) 150   # of Times Distance was Traveled 1   Deviation Decreased Base Of Support;Bradykinetic;Shuffled Gait;Other (Comment)  (ant lean BUE's)   Transfer Functional Level of Assist   Toilet Transfers Contact Guard Assist   Toilet Transfer Description Grab bar;Increased time   Interdisciplinary Plan of Care Collaboration   Patient Position at End of Therapy Seated;Chair Alarm On;Call Light within Reach;Tray Table within Reach   PT Total Time Spent   PT Individual Total Time Spent (Mins) 30   PT Charge Group   PT Gait Training 1   PT Therapeutic Activities 1       Assessment    Pt demo bradykinetic gait with limited spinal mobility/significant scoliosis.   Strengths: Supportive family, Independent prior level of function  Barriers: Decreased endurance, Fatigue, Home accessibility, Impaired balance, Limited mobility    Plan    LE strengthening, endurance, gait with FWW, safety    Physical Therapy Problems     Problem: Mobility     Dates: Start: 11/25/20       Goal: STG-Within one week, patient will ambulate household distance     Dates: Start: 11/25/20       Description: 1) Individualized goal:  Patient will amb with FWW >50 ft indoors CGA/SBA  2) Interventions:  PT Gait Training, PT Self " "Care/Home Eval, PT Therapeutic Exercises, PT Neuro Re-Ed/Balance, PT Aquatic Therapy, PT Therapeutic Activity, PT Manual Therapy, and PT Evaluation            Goal: STG-Within one week, patient will ascend and descend four to six stairs     Dates: Start: 11/25/20       Description: 1) Individualized goal:  Patient will amb up/down 6 4\" stairs with BHR CGA/min A  2) Interventions:  PT Gait Training, PT Self Care/Home Eval, PT Therapeutic Exercises, PT Neuro Re-Ed/Balance, PT Aquatic Therapy, PT Therapeutic Activity, PT Manual Therapy, and PT Evaluation                  Problem: Mobility Transfers     Dates: Start: 11/25/20       Goal: STG-Within one week, patient will transfer bed to chair     Dates: Start: 11/25/20       Description: 1) Individualized goal:  Patient will transfer SBA reach pivot consistently wc <> bed,  STS  2) Interventions:  PT Gait Training, PT Self Care/Home Eval, PT Therapeutic Exercises, PT Neuro Re-Ed/Balance, PT Aquatic Therapy, PT Therapeutic Activity, PT Manual Therapy, and PT Evaluation                  Problem: PT-Long Term Goals     Dates: Start: 11/25/20       Goal: LTG-By discharge, patient will ambulate     Dates: Start: 11/25/20       Description: 1) Individualized goal:  Patient will amb in home with LRAD >150 ft over various surfaces SPV  2) Interventions:  PT Gait Training, PT Self Care/Home Eval, PT Therapeutic Exercises, PT Neuro Re-Ed/Balance, PT Aquatic Therapy, PT Therapeutic Activity, PT Manual Therapy, and PT Evaluation            Goal: LTG-By discharge, patient will transfer one surface to another     Dates: Start: 11/25/20       Description: 1) Individualized goal: Patient will transfer SPV with LRAD STS/SPT  2) Interventions:  PT Gait Training, PT Self Care/Home Eval, PT Therapeutic Exercises, PT Neuro Re-Ed/Balance, PT Aquatic Therapy, PT Therapeutic Activity, PT Manual Therapy, and PT Evaluation            Goal: LTG-By discharge, patient will ambulate up/down 4-6 " stairs     Dates: Start: 11/25/20       Description: 1) Individualized goal:  Patient will amb up/down 2 stairs without HR, min A/ HHA  2) Interventions:  PT Gait Training, PT Self Care/Home Eval, PT Therapeutic Exercises, PT Neuro Re-Ed/Balance, PT Aquatic Therapy, PT Therapeutic Activity, PT Manual Therapy, and PT Evaluation

## 2020-11-29 NOTE — THERAPY
Physical Therapy   Daily Treatment     Patient Name: Sushma Bowden  Age:  86 y.o., Sex:  female  Medical Record #: [id number]  Today's Date: 11/29/2020     Precautions  Precautions: (P) Fall Risk, Swallow Precautions ( See Comments)  Comments: (P) dementia, confused with     Subjective    Pt was sitting in w/c upon arrival and agreeable to tx     Objective       11/29/20 1431   Precautions   Precautions Fall Risk;Swallow Precautions ( See Comments)   Comments dementia, confused with    Gait Functional Level of Assist    Gait Level Of Assist Contact Guard Assist   Assistive Device Front Wheel Walker   Distance (Feet) 90   # of Times Distance was Traveled 2   Deviation Bradykinetic;Decreased Base Of Support;Decreased Heel Strike;Decreased Toe Off  (anterior lean, 3x Jesus for balance)   Transfer Functional Level of Assist   Toilet Transfers Contact Guard Assist   Toilet Transfer Description Grab bar  (VCs for safety)   Sitting Lower Body Exercises   Comments scapular retraction 3 x 10 with TCs, PROM R/L scapular depression and retraction 2x5, 2min x 2 thoracic ext in sitting with foam roll at thoracic spine PROM pec minor stretch   Interdisciplinary Plan of Care Collaboration   Patient Position at End of Therapy Seated;Chair Alarm On;Call Light within Reach;Tray Table within Reach;Phone within Reach   PT Total Time Spent   PT Individual Total Time Spent (Mins) 30   PT Charge Group   PT Gait Training 1   PT Therapeutic Activities 1     Don/doff pants with CGAs, VCs for safety  Hygiene at sink in sitting with SPV    10ft x 1 AMB with SPC, CGA with no LOB    Assessment    Pt limited this session by thoracic flexion but demonstrated improved activation of scapular mm post mobilization and PROM. Pt willing to trial SPC but with strong preference to FWW at this time.     Strengths: Supportive family, Independent prior level of function  Barriers: Decreased endurance, Fatigue, Home  "accessibility, Impaired balance, Limited mobility    Plan    LE strengthening, gait with FWW, endurance, balance     Physical Therapy Problems     Problem: Mobility     Dates: Start: 11/25/20       Goal: STG-Within one week, patient will ambulate household distance     Dates: Start: 11/25/20       Description: 1) Individualized goal:  Patient will amb with FWW >50 ft indoors CGA/SBA  2) Interventions:  PT Gait Training, PT Self Care/Home Eval, PT Therapeutic Exercises, PT Neuro Re-Ed/Balance, PT Aquatic Therapy, PT Therapeutic Activity, PT Manual Therapy, and PT Evaluation            Goal: STG-Within one week, patient will ascend and descend four to six stairs     Dates: Start: 11/25/20       Description: 1) Individualized goal:  Patient will amb up/down 6 4\" stairs with BHR CGA/min A  2) Interventions:  PT Gait Training, PT Self Care/Home Eval, PT Therapeutic Exercises, PT Neuro Re-Ed/Balance, PT Aquatic Therapy, PT Therapeutic Activity, PT Manual Therapy, and PT Evaluation                  Problem: Mobility Transfers     Dates: Start: 11/25/20       Goal: STG-Within one week, patient will transfer bed to chair     Dates: Start: 11/25/20       Description: 1) Individualized goal:  Patient will transfer SBA reach pivot consistently wc <> bed,  STS  2) Interventions:  PT Gait Training, PT Self Care/Home Eval, PT Therapeutic Exercises, PT Neuro Re-Ed/Balance, PT Aquatic Therapy, PT Therapeutic Activity, PT Manual Therapy, and PT Evaluation                  Problem: PT-Long Term Goals     Dates: Start: 11/25/20       Goal: LTG-By discharge, patient will ambulate     Dates: Start: 11/25/20       Description: 1) Individualized goal:  Patient will amb in home with LRAD >150 ft over various surfaces SPV  2) Interventions:  PT Gait Training, PT Self Care/Home Eval, PT Therapeutic Exercises, PT Neuro Re-Ed/Balance, PT Aquatic Therapy, PT Therapeutic Activity, PT Manual Therapy, and PT Evaluation            Goal: LTG-By " discharge, patient will transfer one surface to another     Dates: Start: 11/25/20       Description: 1) Individualized goal: Patient will transfer SPV with LRAD STS/SPT  2) Interventions:  PT Gait Training, PT Self Care/Home Eval, PT Therapeutic Exercises, PT Neuro Re-Ed/Balance, PT Aquatic Therapy, PT Therapeutic Activity, PT Manual Therapy, and PT Evaluation            Goal: LTG-By discharge, patient will ambulate up/down 4-6 stairs     Dates: Start: 11/25/20       Description: 1) Individualized goal:  Patient will amb up/down 2 stairs without HR, min A/ HHA  2) Interventions:  PT Gait Training, PT Self Care/Home Eval, PT Therapeutic Exercises, PT Neuro Re-Ed/Balance, PT Aquatic Therapy, PT Therapeutic Activity, PT Manual Therapy, and PT Evaluation

## 2020-11-30 DIAGNOSIS — I63.9 CEREBRAL INFARCTION, UNSPECIFIED MECHANISM (HCC): ICD-10-CM

## 2020-11-30 LAB
ANION GAP SERPL CALC-SCNC: 3 MMOL/L (ref 7–16)
BUN SERPL-MCNC: 11 MG/DL (ref 8–22)
CALCIUM SERPL-MCNC: 9.1 MG/DL (ref 8.5–10.5)
CHLORIDE SERPL-SCNC: 103 MMOL/L (ref 96–112)
CO2 SERPL-SCNC: 28 MMOL/L (ref 20–33)
CREAT SERPL-MCNC: 0.52 MG/DL (ref 0.5–1.4)
GLUCOSE SERPL-MCNC: 84 MG/DL (ref 65–99)
POTASSIUM SERPL-SCNC: 3.6 MMOL/L (ref 3.6–5.5)
SODIUM SERPL-SCNC: 134 MMOL/L (ref 135–145)

## 2020-11-30 PROCEDURE — 700102 HCHG RX REV CODE 250 W/ 637 OVERRIDE(OP): Performed by: PHYSICAL MEDICINE & REHABILITATION

## 2020-11-30 PROCEDURE — 770010 HCHG ROOM/CARE - REHAB SEMI PRIVAT*

## 2020-11-30 PROCEDURE — 97535 SELF CARE MNGMENT TRAINING: CPT

## 2020-11-30 PROCEDURE — 80048 BASIC METABOLIC PNL TOTAL CA: CPT

## 2020-11-30 PROCEDURE — 97530 THERAPEUTIC ACTIVITIES: CPT

## 2020-11-30 PROCEDURE — 36415 COLL VENOUS BLD VENIPUNCTURE: CPT

## 2020-11-30 PROCEDURE — 97110 THERAPEUTIC EXERCISES: CPT

## 2020-11-30 PROCEDURE — 92526 ORAL FUNCTION THERAPY: CPT

## 2020-11-30 PROCEDURE — 99233 SBSQ HOSP IP/OBS HIGH 50: CPT | Performed by: PHYSICAL MEDICINE & REHABILITATION

## 2020-11-30 PROCEDURE — 92507 TX SP LANG VOICE COMM INDIV: CPT

## 2020-11-30 PROCEDURE — A9270 NON-COVERED ITEM OR SERVICE: HCPCS | Performed by: PHYSICAL MEDICINE & REHABILITATION

## 2020-11-30 PROCEDURE — 97116 GAIT TRAINING THERAPY: CPT

## 2020-11-30 RX ADMIN — DOCUSATE SODIUM 50 MG AND SENNOSIDES 8.6 MG 2 TABLET: 8.6; 5 TABLET, FILM COATED ORAL at 20:26

## 2020-11-30 RX ADMIN — DOCUSATE SODIUM 50 MG AND SENNOSIDES 8.6 MG 2 TABLET: 8.6; 5 TABLET, FILM COATED ORAL at 07:48

## 2020-11-30 RX ADMIN — CLOPIDOGREL BISULFATE 75 MG: 75 TABLET ORAL at 07:48

## 2020-11-30 RX ADMIN — ACETAMINOPHEN 1000 MG: 500 TABLET, FILM COATED ORAL at 07:48

## 2020-11-30 RX ADMIN — ACETAMINOPHEN 1000 MG: 500 TABLET, FILM COATED ORAL at 14:41

## 2020-11-30 RX ADMIN — POTASSIUM CHLORIDE 20 MEQ: 1500 TABLET, EXTENDED RELEASE ORAL at 07:48

## 2020-11-30 RX ADMIN — POLYETHYLENE GLYCOL 3350 1 PACKET: 17 POWDER, FOR SOLUTION ORAL at 12:29

## 2020-11-30 RX ADMIN — ESCITALOPRAM OXALATE 10 MG: 10 TABLET, FILM COATED ORAL at 07:48

## 2020-11-30 RX ADMIN — ATORVASTATIN CALCIUM 40 MG: 40 TABLET, FILM COATED ORAL at 20:26

## 2020-11-30 RX ADMIN — ACETAMINOPHEN 1000 MG: 500 TABLET, FILM COATED ORAL at 20:26

## 2020-11-30 ASSESSMENT — GAIT ASSESSMENTS
ASSISTIVE DEVICE: FRONT WHEEL WALKER;SINGLE POINT CANE
DISTANCE (FEET): 125
GAIT LEVEL OF ASSIST: CONTACT GUARD ASSIST
DEVIATION: BRADYKINETIC;SHUFFLED GAIT;DECREASED BASE OF SUPPORT

## 2020-11-30 ASSESSMENT — ACTIVITIES OF DAILY LIVING (ADL)
TOILETING_LEVEL_OF_ASSIST_DESCRIPTION: GRAB BAR;SET-UP OF EQUIPMENT;SUPERVISION FOR SAFETY
TOILET_TRANSFER_DESCRIPTION: GRAB BAR;SET-UP OF EQUIPMENT;SUPERVISION FOR SAFETY

## 2020-11-30 ASSESSMENT — PAIN DESCRIPTION - PAIN TYPE: TYPE: ACUTE PAIN

## 2020-11-30 NOTE — THERAPY
"Physical Therapy   Daily Treatment     Patient Name: Sushma Bowden  Age:  86 y.o., Sex:  female  Medical Record #: [id number]  Today's Date: 11/30/2020     Precautions  Precautions: Fall Risk, Swallow Precautions ( See Comments)  Comments: dementia, confused with     Subjective    Pt seated in room, agreeable to PT.      Objective       11/30/20 1001   Precautions   Precautions Fall Risk;Swallow Precautions ( See Comments)   Comments dementia, confused with    Gait Functional Level of Assist    Gait Level Of Assist Contact Guard Assist  (CGA - min A )   Assistive Device Front Wheel Walker;Single Point Cane  (CGA with FWW, min A with SPC)   Distance (Feet) 125   # of Times Distance was Traveled 3  (2x with FWW, 1x with SPC)   Deviation Bradykinetic;Shuffled Gait;Decreased Base Of Support   Stairs Functional Level of Assist   Level of Assist with Stairs Contact Guard Assist   # of Stairs Climbed 6  (4\")   Stairs Description Extra time;Hand rails;Verbal cueing   Sitting Lower Body Exercises   Nustep Resistance Level 4  (10 min, 370 steps)   Interdisciplinary Plan of Care Collaboration   IDT Collaboration with  Other (See Comments)  ()   Patient Position at End of Therapy Seated;Call Light within Reach;Tray Table within Reach;Self Releasing Lap Belt Applied   Collaboration Comments  used for communication    PT Total Time Spent   PT Individual Total Time Spent (Mins) 60   PT Charge Group   PT Gait Training 1   PT Therapeutic Exercise 1   PT Therapeutic Activities 2     Pt education regarding safe use of FWW and importance of posture and close proximity to walker.    Assessment    Pt with good in session carry-over of education for safe use of FWW, questionable for inter session carry-over. More assistance required with gait with SPC vs FWW.     Strengths: Supportive family, Independent prior level of function  Barriers: Decreased endurance, Fatigue, Home " accessibility, Impaired balance, Limited mobility    Plan    LE strengthening, gait with FWW, endurance, balance    Physical Therapy Problems     Problem: Mobility Transfers     Dates: Start: 11/25/20       Goal: STG-Within one week, patient will transfer bed to chair     Dates: Start: 11/25/20       Description: 1) Individualized goal:  Patient will transfer SBA reach pivot consistently wc <> bed,  STS  2) Interventions:  PT Gait Training, PT Self Care/Home Eval, PT Therapeutic Exercises, PT Neuro Re-Ed/Balance, PT Aquatic Therapy, PT Therapeutic Activity, PT Manual Therapy, and PT Evaluation      Note:     Goal Note filed on 11/30/20 1111 by Tracy Patricio, PT    CGA                        Problem: PT-Long Term Goals     Dates: Start: 11/25/20       Goal: LTG-By discharge, patient will ambulate     Dates: Start: 11/25/20       Description: 1) Individualized goal:  Patient will amb in home with LRAD >150 ft over various surfaces SPV  2) Interventions:  PT Gait Training, PT Self Care/Home Eval, PT Therapeutic Exercises, PT Neuro Re-Ed/Balance, PT Aquatic Therapy, PT Therapeutic Activity, PT Manual Therapy, and PT Evaluation            Goal: LTG-By discharge, patient will transfer one surface to another     Dates: Start: 11/25/20       Description: 1) Individualized goal: Patient will transfer SPV with LRAD STS/SPT  2) Interventions:  PT Gait Training, PT Self Care/Home Eval, PT Therapeutic Exercises, PT Neuro Re-Ed/Balance, PT Aquatic Therapy, PT Therapeutic Activity, PT Manual Therapy, and PT Evaluation            Goal: LTG-By discharge, patient will ambulate up/down 4-6 stairs     Dates: Start: 11/25/20       Description: 1) Individualized goal:  Patient will amb up/down 2 stairs without HR, min A/ HHA  2) Interventions:  PT Gait Training, PT Self Care/Home Eval, PT Therapeutic Exercises, PT Neuro Re-Ed/Balance, PT Aquatic Therapy, PT Therapeutic Activity, PT Manual Therapy, and PT Evaluation

## 2020-11-30 NOTE — CARE PLAN
Problem: Communication  Goal: The ability to communicate needs accurately and effectively will improve  Outcome: PROGRESSING AS EXPECTED  Note: Pt tired this evening but able to use call light so far when wanting to get back to bed and once when needing to use bathroom. Participates in conversation with use of .      Problem: Safety  Goal: Will remain free from injury  Outcome: PROGRESSING AS EXPECTED  Note: Does not get up from bed unassisted but repeatedly tries to get off of toilet by self despite being instructed not to.   Goal: Will remain free from falls  Outcome: PROGRESSING AS EXPECTED

## 2020-11-30 NOTE — PROGRESS NOTES
Rehab Progress Note     Date of Service: 11/30/2020  Chief Complaint: follow up stroke    Interval Events (Subjective)    Patient seen and examined today in her room.  She denies any pain.  Her biggest complaint is that she would like to eat some tortillas.  She would also like to go home as soon as possible.  She reports she does not think she is going to ambulate any better than she is currently due to a fall she had about 50 years ago.  Advised patient will be ready to leave very soon.    Objective:  VITAL SIGNS: /68   Pulse 70   Temp 36.2 °C (97.2 °F) (Temporal)   Resp 16   Ht 1.524 m (5')   Wt 60.1 kg (132 lb 7.9 oz)   SpO2 92%   BMI 25.88 kg/m²   Gen: alert, no apparent distress  Neuro: notable for hard of hearing, short-term memory deficits    Recent Results (from the past 72 hour(s))   Basic Metabolic Panel    Collection Time: 11/30/20  6:10 AM   Result Value Ref Range    Sodium 134 (L) 135 - 145 mmol/L    Potassium 3.6 3.6 - 5.5 mmol/L    Chloride 103 96 - 112 mmol/L    Co2 28 20 - 33 mmol/L    Glucose 84 65 - 99 mg/dL    Bun 11 8 - 22 mg/dL    Creatinine 0.52 0.50 - 1.40 mg/dL    Calcium 9.1 8.5 - 10.5 mg/dL    Anion Gap 3.0 (L) 7.0 - 16.0   ESTIMATED GFR    Collection Time: 11/30/20  6:10 AM   Result Value Ref Range    GFR If African American >60 >60 mL/min/1.73 m 2    GFR If Non African American >60 >60 mL/min/1.73 m 2       Current Facility-Administered Medications   Medication Frequency   • butalbital/apap/caffeine -40 mg (Fioricet) per tablet 1 Tab Q6HRS PRN   • QUEtiapine (Seroquel) tablet 25 mg TID PRN   • melatonin tablet 3 mg HS PRN   • hydrOXYzine HCl (ATARAX) tablet 50 mg Q6HRS PRN   • Respiratory Therapy Consult Continuous RT   • hydrALAZINE (APRESOLINE) tablet 10 mg Q8HRS PRN   • acetaminophen (Tylenol) tablet 650 mg Q4HRS PRN   • artificial tears ophthalmic solution 1 Drop PRN   • benzocaine-menthol (CEPACOL) lozenge 1 Lozenge Q2HRS PRN   • mag hydrox-al hydrox-simeth  (MAALOX PLUS ES or MYLANTA DS) suspension 20 mL Q2HRS PRN   • ondansetron (ZOFRAN ODT) dispertab 4 mg 4X/DAY PRN    Or   • ondansetron (ZOFRAN) syringe/vial injection 4 mg 4X/DAY PRN   • traZODone (DESYREL) tablet 50 mg QHS PRN   • sodium chloride (OCEAN) 0.65 % nasal spray 2 Spray PRN   • lactulose 20 GM/30ML solution 30 mL QDAY PRN   • docusate sodium (ENEMEEZ) enema 283 mg QDAY PRN   • fleet enema 133 mL QDAY PRN   • senna-docusate (PERICOLACE or SENOKOT S) 8.6-50 MG per tablet 2 Tab BID    And   • polyethylene glycol/lytes (MIRALAX) PACKET 1 Packet QDAY PRN    And   • magnesium hydroxide (MILK OF MAGNESIA) suspension 30 mL QDAY PRN    And   • bisacodyl (DULCOLAX) suppository 10 mg QDAY PRN   • acetaminophen (TYLENOL) tablet 1,000 mg TID   • atorvastatin (LIPITOR) tablet 40 mg Q EVENING   • clopidogrel (PLAVIX) tablet 75 mg DAILY   • escitalopram (Lexapro) tablet 10 mg DAILY       Orders Placed This Encounter   Procedures   • Diet Order Diet: Level 6 - Soft and Bite Sized; Liquid level: Level 0 - Thin     Standing Status:   Standing     Number of Occurrences:   1     Order Specific Question:   Diet:     Answer:   Level 6 - Soft and Bite Sized [23]     Order Specific Question:   Liquid level     Answer:   Level 0 - Thin       Assessment:  Active Hospital Problems    Diagnosis   • *Stroke (cerebrum) ([facility])   • Anemia   • Essential hypertension   • Vascular dementia without behavioral disturbance ([facility])   • Depression   • Urinary incontinence   • Urinary retention   • Hypokalemia     This patient is a 85 y.o. female admitted for acute inpatient rehabilitation with Stroke (cerebrum) ([facility]).    I led and attended the weekly conference today, and agree with the IDT conference documentation and plan of care as noted below.    Date of conference: 11/30/2020    Goals and barriers: See IDT note.    Biggest barriers: cognitive deficits, hard of hearing, poor appetite (doesn't like the food here), impaired balance    Goals in  next week: discharge    CM/social support: family supportive    Anticipated DC date: 12/5    Home health: PT/OT/SLP/RN    Equip: FWW    Follow up: PCP, stroke bridge clinic, Dr. Chew      Medical Decision Making and Plan:    Left parietal/occipital stroke  Expressive and receptive aphasia, improved  No paresis  Cognitive deficits, continues  Continue full rehab program  PT/OT/SLP, 1 hr each discipline, 5 days per week  Continue Plavix and statin for secondary stroke prophylaxis  Follow up with neurology and Dr. Chew, referrals made    Dementia  May benefit from starting Aricept, defer to outpatient neurology     Chronic neck pain  Scheduled Tylenol  Currently without any pain complaints     Depression  Continue Lexapro  Mood currently stable     Macrocytic anemia, improved  Now with macrocytosis without anemia  Vitamin B12 and folic acid normal    Hypokalemia, resolved  S/p supplementation    Hyponatremia  Mild    Bowel program  Continue bowel medications  Scheduled Sennakot  PRN Miralax, MOM, bisacodyl suppository  Last BM 11/28    Bladder program  Check PVRs - 65, 90, 57, okay to discontinue  Bladder scan for no voids  ICP for over 400 cc  Scheduled toileting    DVT prophylaxis  Discontinue Lovenox as patient is ambulating long distances    Last COVID negative on 11/22, Recheck on 11/27 - also negative    Total time:  40 minutes.  I spent greater than 50% of the time for patient care, counseling, and coordination on this date, including patient face-to face time, unit/floor time with review of records/pertinent lab data and studies, as well as discussing diagnostic evaluation/work up, planned therapeutic interventions, and future disposition of care, as per the interval events/subjective and the assessment and plan as noted above.    Bharti Brady M.D.   Physical Medicine and Rehabilitation

## 2020-11-30 NOTE — THERAPY
Speech Language Pathology  Daily Treatment     Patient Name: Sushma Bowden  Age:  86 y.o., Sex:  female  Medical Record #: [id number]  Today's Date: 2020     Precautions  Precautions: Fall Risk, Swallow Precautions ( See Comments)  Comments: dementia, confused with     Subjective    Pt pleasant and cooperative during tx.  Pt completed 2, 30 minute, treatments- 8-830, 9034-4100.     Objective       20 1301   Expressive Language   Naming Minimal (4)   Word Finding Deficits Moderate (3)   Cognition   Orientation  Moderate (3)   SLP Total Time Spent   SLP Individual Total Time Spent (Mins) 60   Treatment Charges   SLP Treatment - Individual Speech Language Treatment - Individual   SLP Swallowing Dysfunction Treatment Swallowing Dysfunction Treatment       Assessment    Pt assessed with level 5 MM and level 0 (thin).  No oral residue or s/s of aspiration with level 5, or level 0.  Pt has poor p.o. intake and reported diarrhea.  Nrsing informed.  Common object namin% independent; 100% Jesus.  Word finding during conversation Jesus.  Pt often starts sentences but does not complete thought.   utilized.  Pt oriented to self, and situation (stroke).  Not oriented to place or date.            Plan    Expressive language, naming, orientation (possibly O-log).  Trial dysphagia 6 textures.      Speech Therapy Problems     Problem: Comprehension STGs     Dates: Start: 20       Goal: STG-Within one week, patient will     Dates: Start: 20       Description: 1) Individualized goal:  follow 2 step verbal commands with 80% accuracy provided MOD A.  2) Interventions:  SLP Aphasia Evaluation, SLP Speech Language Treatment, SLP Self Care / ADL Training , SLP Cognitive Skill Development, and SLP Group Treatment                Problem: Expression STGs     Dates: Start: 20       Goal: STG-Within one week, patient will express     Dates: Start: 20       Description: 1)  Individualized goal:  simple object naming with 80% accuracy provided MIN cues.   2) Interventions:  SLP Aphasia Evaluation, SLP Speech Language Treatment, SLP Self Care / ADL Training , SLP Cognitive Skill Development, and SLP Group Treatment                Problem: Speech/Swallowing LTGs     Dates: Start: 11/25/20       Goal: LTG-By discharge, patient will safely swallow     Dates: Start: 11/25/20       Description: 1) Individualized goal:  regular textures with thin liquids with no overt s/sx of asp/pen noted across meals with 100% accuracy provided MOD I.  2) Interventions:  SLP Swallowing Dysfunction Treatment, SLP Oral Pharyngeal Evaluation, SLP Video Swallow Evaluation, SLP Self Care / ADL Training , SLP Cognitive Skill Development, and SLP Group Treatment              Goal: LTG-By discharge, patient will express     Dates: Start: 11/25/20       Description: 1) Individualized goal:  basic wants and needs across environments with 80% accuracy provided SPV.  2) Interventions:  SLP Aphasia Evaluation, SLP Speech Language Treatment, SLP Self Care / ADL Training , SLP Cognitive Skill Development, and SLP Group Treatment                    Problem: Swallowing STGs     Dates: Start: 11/25/20       Goal: STG-Within one week, patient will safely swallow     Dates: Start: 11/25/20       Description: 1) Individualized goal:  minced and moist textures with no overt s/sx of asp/pen noted across meals with MIN cues.  2) Interventions:  SLP Swallowing Dysfunction Treatment, SLP Oral Pharyngeal Evaluation, SLP Video Swallow Evaluation, SLP Self Care / ADL Training , SLP Cognitive Skill Development, and SLP Group Treatment

## 2020-11-30 NOTE — CARE PLAN
Problem: Comprehension STGs  Goal: STG-Within one week, patient will  Description: 1) Individualized goal:  follow 2 step verbal commands with 80% accuracy provided MOD A.  2) Interventions:  SLP Aphasia Evaluation, SLP Speech Language Treatment, SLP Self Care / ADL Training , SLP Cognitive Skill Development, and SLP Group Treatment  Outcome: NOT MET

## 2020-11-30 NOTE — CARE PLAN
Problem: Bathing  Goal: STG-Within one week, patient will bathe  Description: 1) Individualized Goal:  Min assist with AE as needed  2) Interventions:  OT Self Care/ADL, OT Cognitive Skill Dev, OT Community Reintegration, OT Manual Ther Technique, OT Neuro Re-Ed/Balance, OT Sensory Int Techniques, OT Therapeutic Activity, OT Evaluation, and OT Therapeutic Exercise    Outcome: MET  Note: CGA     Problem: Dressing  Goal: STG-Within one week, patient will dress LB  Description: 1) Individualized Goal:  CGA  2) Interventions:  OT Self Care/ADL, OT Cognitive Skill Dev, OT Community Reintegration, OT Manual Ther Technique, OT Neuro Re-Ed/Balance, OT Sensory Int Techniques, OT Therapeutic Activity, OT Evaluation, and OT Therapeutic Exercise    Outcome: MET  Note: SBA     Problem: Functional Transfers  Goal: STG-Within one week, patient will transfer to toilet  Description: 1) Individualized Goal:  CGA    2) Interventions:  OT Self Care/ADL, OT Cognitive Skill Dev, OT Community Reintegration, OT Manual Ther Technique, OT Neuro Re-Ed/Balance, OT Sensory Int Techniques, OT Therapeutic Activity, OT Evaluation, and OT Therapeutic Exercise    Outcome: MET  Note: SBA with grab bar

## 2020-11-30 NOTE — THERAPY
Occupational Therapy  Daily Treatment     Patient Name: Sushma Bowden  Age:  86 y.o., Sex:  female  Medical Record #: [id number]  Today's Date: 11/30/2020     Precautions  Precautions: (P) Fall Risk, Swallow Precautions ( See Comments)  Comments: (P) dementia, confused with          Subjective    Patient stated she needed to use the restroom mid-session.     Objective       11/30/20 0831   Precautions   Precautions Fall Risk;Swallow Precautions ( See Comments)   Comments dementia, confused with    Functional Level of Assist   Eating Supervision   Eating Description Modified diet;Insert dentures;Increased time;Verbal cueing   Grooming Independent   Grooming Description Seated in wheelchair at sink   Upper Body Dressing Supervision   Upper Body Dressing Description Set-up of equipment;Supervision for safety   Toileting Stand by Assist   Toileting Description Grab bar;Set-up of equipment;Supervision for safety   Toilet Transfers Supervised   Toilet Transfer Description Grab bar;Set-up of equipment;Supervision for safety   Sitting Upper Body Exercises   Sitting Upper Body Exercises Yes   Shoulder Press 2 sets of 10;Right ;Left;Weight (See Comments for lbs)   Bicep Curls 2 sets of 10;Right ;Left;Weight (See Comments for lbs)   Pronation / Supination 2 sets of 10;Right ;Left;Weight (See Comments for lbs)   Upper Extremity Bike Minutes / Rest Breaks (See Comments)  (motomed x 6 minutes level 3)   Comments 3 lb weights   Interdisciplinary Plan of Care Collaboration   Patient Position at End of Therapy Seated;Chair Alarm On;Self Releasing Lap Belt Applied;Call Light within Reach;Tray Table within Reach;Phone within Reach   OT Total Time Spent   OT Individual Total Time Spent (Mins) 60   OT Charge Group   OT Self Care / ADL 2   OT Therapy Activity 1   OT Therapeutic Exercise  1     Completed STS x 10 from w/c for LB strengthening and endurance building.  Stood unsupported x 5 seconds after  each stand.    Assessment    Patient with improving balance and adl ability.  Participates well most of the time, though needs encouragement at times with UB exercises.  Strengths: Adequate strength, Willingly participates in therapeutic activities, Supportive family, Pleasant and cooperative, Motivated for self care and independence  Barriers: Confused, Decreased endurance, Dementia, Difficulty following instructions, Fatigue, Generalized weakness, Hearing impairment, Impaired activity tolerance, Impaired balance, Impaired carryover of learning, Impaired functional cognition, Language barrier, non-fluent English, Limited mobility, Pain    Plan    Continue with adls, functional mobility, strength/endurance    Occupational Therapy Goals     Problem: Bathing     Dates: Start: 11/25/20       Goal: STG-Within one week, patient will bathe     Dates: Start: 11/25/20       Description: 1) Individualized Goal:  Min assist with AE as needed  2) Interventions:  OT Self Care/ADL, OT Cognitive Skill Dev, OT Community Reintegration, OT Manual Ther Technique, OT Neuro Re-Ed/Balance, OT Sensory Int Techniques, OT Therapeutic Activity, OT Evaluation, and OT Therapeutic Exercise                  Problem: Dressing     Dates: Start: 11/25/20       Goal: STG-Within one week, patient will dress LB     Dates: Start: 11/25/20       Description: 1) Individualized Goal:  CGA  2) Interventions:  OT Self Care/ADL, OT Cognitive Skill Dev, OT Community Reintegration, OT Manual Ther Technique, OT Neuro Re-Ed/Balance, OT Sensory Int Techniques, OT Therapeutic Activity, OT Evaluation, and OT Therapeutic Exercise                  Problem: Functional Transfers     Dates: Start: 11/25/20       Goal: STG-Within one week, patient will transfer to toilet     Dates: Start: 11/25/20       Description: 1) Individualized Goal:  CGA    2) Interventions:  OT Self Care/ADL, OT Cognitive Skill Dev, OT Community Reintegration, OT Manual Ther Technique, OT Neuro  Re-Ed/Balance, OT Sensory Int Techniques, OT Therapeutic Activity, OT Evaluation, and OT Therapeutic Exercise                  Problem: OT Long Term Goals     Dates: Start: 11/25/20       Goal: LTG-By discharge, patient will complete basic self care tasks     Dates: Start: 11/25/20       Description: 1) Individualized Goal:  Set-up for self-feeding, grooming, and UB dressing tasks, supervision for toileting, LB dressing and bathing.   2) Interventions:  OT Self Care/ADL, OT Cognitive Skill Dev, OT Community Reintegration, OT Manual Ther Technique, OT Neuro Re-Ed/Balance, OT Sensory Int Techniques, OT Therapeutic Activity, OT Evaluation, and OT Therapeutic Exercise            Goal: LTG-By discharge, patient will perform bathroom transfers     Dates: Start: 11/25/20       Description: 1) Individualized Goal:  Supervision   2) Interventions:  OT Self Care/ADL, OT Cognitive Skill Dev, OT Community Reintegration, OT Manual Ther Technique, OT Neuro Re-Ed/Balance, OT Sensory Int Techniques, OT Therapeutic Activity, OT Evaluation, and OT Therapeutic Exercise

## 2020-11-30 NOTE — CARE PLAN
Problem: Safety  Goal: Will remain free from injury  Outcome: PROGRESSING AS EXPECTED   Pt uses call light consistently and appropriately. Waits for assistance does not attempt self transfer this shift. Able to verbalize needs.   Problem: Infection  Goal: Will remain free from infection  Outcome: PROGRESSING AS EXPECTED   Patient remains free of infection as evidenced by normal vital signs and breath sounds. Will continue monitoring.   Problem: Venous Thromboembolism (VTW)/Deep Vein Thrombosis (DVT) Prevention:  Goal: Patient will participate in Venous Thrombosis (VTE)/Deep Vein Thrombosis (DVT)Prevention Measures  Outcome: PROGRESSING AS EXPECTED   Patient on Lovenox therapy for DVT prophylaxis.

## 2020-11-30 NOTE — CARE PLAN
"  Problem: Mobility Transfers  Goal: STG-Within one week, patient will transfer bed to chair  Description: 1) Individualized goal:  Patient will transfer SBA reach pivot consistently wc <> bed,  STS  2) Interventions:  PT Gait Training, PT Self Care/Home Eval, PT Therapeutic Exercises, PT Neuro Re-Ed/Balance, PT Aquatic Therapy, PT Therapeutic Activity, PT Manual Therapy, and PT Evaluation    Outcome: NOT MET  Note: CGA     Problem: Mobility  Goal: STG-Within one week, patient will ambulate household distance  Description: 1) Individualized goal:  Patient will amb with FWW >50 ft indoors CGA/SBA  2) Interventions:  PT Gait Training, PT Self Care/Home Eval, PT Therapeutic Exercises, PT Neuro Re-Ed/Balance, PT Aquatic Therapy, PT Therapeutic Activity, PT Manual Therapy, and PT Evaluation    Outcome: MET  Goal: STG-Within one week, patient will ascend and descend four to six stairs  Description: 1) Individualized goal:  Patient will amb up/down 6 4\" stairs with BHR CGA/min A  2) Interventions:  PT Gait Training, PT Self Care/Home Eval, PT Therapeutic Exercises, PT Neuro Re-Ed/Balance, PT Aquatic Therapy, PT Therapeutic Activity, PT Manual Therapy, and PT Evaluation    Outcome: MET     "

## 2020-12-01 PROCEDURE — 770010 HCHG ROOM/CARE - REHAB SEMI PRIVAT*

## 2020-12-01 PROCEDURE — 97110 THERAPEUTIC EXERCISES: CPT

## 2020-12-01 PROCEDURE — 97129 THER IVNTJ 1ST 15 MIN: CPT

## 2020-12-01 PROCEDURE — 97530 THERAPEUTIC ACTIVITIES: CPT

## 2020-12-01 PROCEDURE — 97116 GAIT TRAINING THERAPY: CPT

## 2020-12-01 PROCEDURE — A9270 NON-COVERED ITEM OR SERVICE: HCPCS | Performed by: PHYSICAL MEDICINE & REHABILITATION

## 2020-12-01 PROCEDURE — 97130 THER IVNTJ EA ADDL 15 MIN: CPT

## 2020-12-01 PROCEDURE — 99231 SBSQ HOSP IP/OBS SF/LOW 25: CPT | Performed by: PHYSICAL MEDICINE & REHABILITATION

## 2020-12-01 PROCEDURE — 700102 HCHG RX REV CODE 250 W/ 637 OVERRIDE(OP): Performed by: PHYSICAL MEDICINE & REHABILITATION

## 2020-12-01 PROCEDURE — 92526 ORAL FUNCTION THERAPY: CPT

## 2020-12-01 RX ADMIN — CLOPIDOGREL BISULFATE 75 MG: 75 TABLET ORAL at 08:58

## 2020-12-01 RX ADMIN — ACETAMINOPHEN 1000 MG: 500 TABLET, FILM COATED ORAL at 14:16

## 2020-12-01 RX ADMIN — ESCITALOPRAM OXALATE 10 MG: 10 TABLET, FILM COATED ORAL at 08:58

## 2020-12-01 RX ADMIN — DOCUSATE SODIUM 50 MG AND SENNOSIDES 8.6 MG 2 TABLET: 8.6; 5 TABLET, FILM COATED ORAL at 08:58

## 2020-12-01 RX ADMIN — ATORVASTATIN CALCIUM 40 MG: 40 TABLET, FILM COATED ORAL at 20:27

## 2020-12-01 RX ADMIN — ACETAMINOPHEN 1000 MG: 500 TABLET, FILM COATED ORAL at 08:58

## 2020-12-01 RX ADMIN — ACETAMINOPHEN 1000 MG: 500 TABLET, FILM COATED ORAL at 20:26

## 2020-12-01 ASSESSMENT — GAIT ASSESSMENTS
GAIT LEVEL OF ASSIST: STAND BY ASSIST
ASSISTIVE DEVICE: FRONT WHEEL WALKER
GAIT LEVEL OF ASSIST: STAND BY ASSIST
DISTANCE (FEET): 65
DEVIATION: BRADYKINETIC;SHUFFLED GAIT;DECREASED BASE OF SUPPORT
DEVIATION: BRADYKINETIC
ASSISTIVE DEVICE: FRONT WHEEL WALKER

## 2020-12-01 ASSESSMENT — PAIN DESCRIPTION - PAIN TYPE: TYPE: ACUTE PAIN

## 2020-12-01 ASSESSMENT — PAIN SCALES - WONG BAKER: WONGBAKER_NUMERICALRESPONSE: DOESN'T HURT AT ALL

## 2020-12-01 ASSESSMENT — PATIENT HEALTH QUESTIONNAIRE - PHQ9
1. LITTLE INTEREST OR PLEASURE IN DOING THINGS: NOT AT ALL
2. FEELING DOWN, DEPRESSED, IRRITABLE, OR HOPELESS: NOT AT ALL
SUM OF ALL RESPONSES TO PHQ9 QUESTIONS 1 AND 2: 0

## 2020-12-01 ASSESSMENT — ACTIVITIES OF DAILY LIVING (ADL): TOILET_TRANSFER_DESCRIPTION: GRAB BAR

## 2020-12-01 NOTE — THERAPY
Occupational Therapy  Daily Treatment     Patient Name: Sushma Bowden  Age:  86 y.o., Sex:  female  Medical Record #: [id number]  Today's Date: 12/1/2020     Precautions  Precautions: (P) Fall Risk, Swallow Precautions ( See Comments)  Comments: (P) dementia, confused with          Subjective    Patient sitting in w/c in room. Declined offer to shower and declined the need to use the restroom.  Stated she already brushed her teeth this morning.     Objective       12/01/20 1031   Precautions   Precautions Fall Risk;Swallow Precautions ( See Comments)   Comments dementia, confused with    Sitting Upper Body Exercises   Shoulder Press 2 sets of 10;Right ;Left;Weight (See Comments for lbs)   Bicep Curls 2 sets of 10;Right ;Left;Weight (See Comments for lbs)   Pronation / Supination 2 sets of 10;Right ;Left;Weight (See Comments for lbs)   Upper Extremity Bike Minutes / Rest Breaks (See Comments)  (6.5 minutes level 3 on motomed)   Comments 2 lb weights   Interdisciplinary Plan of Care Collaboration   Patient Position at End of Therapy Seated;Chair Alarm On;Self Releasing Lap Belt Applied;Tray Table within Reach;Call Light within Reach   OT Total Time Spent   OT Individual Total Time Spent (Mins) 60   OT Charge Group   OT Therapy Activity 2   OT Therapeutic Exercise  2     Completed sit to stand x 10 x 2 sets for LE strengthening and endurance building.  Functional mobility with FWW with CGA/SBA x 140'.    Assessment    Patient fatigues quickly with use of UB motomed cycle.  Does well with mass sit to stands.  Dynamic standing balance continues to slowly improve.  Strengths: Adequate strength, Willingly participates in therapeutic activities, Supportive family, Pleasant and cooperative, Motivated for self care and independence  Barriers: Confused, Decreased endurance, Dementia, Difficulty following instructions, Fatigue, Generalized weakness, Hearing impairment, Impaired activity  tolerance, Impaired balance, Impaired carryover of learning, Impaired functional cognition, Language barrier, non-fluent English, Limited mobility, Pain    Plan    Continue with adls, functional mobility, strength/endurance    Occupational Therapy Goals     Problem: OT Long Term Goals     Dates: Start: 11/25/20       Goal: LTG-By discharge, patient will complete basic self care tasks     Dates: Start: 11/25/20       Description: 1) Individualized Goal:  Set-up for self-feeding, grooming, and UB dressing tasks, supervision for toileting, LB dressing and bathing.   2) Interventions:  OT Self Care/ADL, OT Cognitive Skill Dev, OT Community Reintegration, OT Manual Ther Technique, OT Neuro Re-Ed/Balance, OT Sensory Int Techniques, OT Therapeutic Activity, OT Evaluation, and OT Therapeutic Exercise            Goal: LTG-By discharge, patient will perform bathroom transfers     Dates: Start: 11/25/20       Description: 1) Individualized Goal:  Supervision   2) Interventions:  OT Self Care/ADL, OT Cognitive Skill Dev, OT Community Reintegration, OT Manual Ther Technique, OT Neuro Re-Ed/Balance, OT Sensory Int Techniques, OT Therapeutic Activity, OT Evaluation, and OT Therapeutic Exercise

## 2020-12-01 NOTE — CARE PLAN
Problem: Safety  Goal: Will remain free from injury  Outcome: PROGRESSING SLOWER THAN EXPECTED  Note: Pt does not use call light and very impulsive. Reinforced to her the importance of calling before all transfers. Does not follow or demonstrate at all due to pt is Arabic speaking only.Frequent rounding and bed alarms for safety precautions.     Problem: Pain Management  Goal: Pain level will decrease to patient's comfort goal  Outcome: PROGRESSING AS EXPECTED  Note: Pt denies pain or discomfort tonight. Sleeps good. Will continue to monitor.

## 2020-12-01 NOTE — THERAPY
Physical Therapy   Daily Treatment     Patient Name: Sushma Bowden  Age:  86 y.o., Sex:  female  Medical Record #: [id number]  Today's Date: 12/1/2020     Precautions  Precautions: Fall Risk, Swallow Precautions ( See Comments)  Comments: dementia, confused with     Subjective    Pt seated in room, agreeable to PT.     Objective       12/01/20 1301   Precautions   Precautions Fall Risk;Swallow Precautions ( See Comments)   Comments dementia, confused with    Gait Functional Level of Assist    Gait Level Of Assist Stand by Assist   Assistive Device Front Wheel Walker   Distance (Feet)   (125 x2 and 250 x1 )   Deviation Bradykinetic;Shuffled Gait;Decreased Base Of Support   Interdisciplinary Plan of Care Collaboration   IDT Collaboration with  Speech Therapist   Patient Position at End of Therapy Seated;Call Light within Reach;Tray Table within Reach   Collaboration Comments pt care passed to SLP    PT Total Time Spent   PT Individual Total Time Spent (Mins) 30   PT Charge Group   PT Gait Training 1   PT Therapeutic Activities 1       Assessment    Pt pleasant and cooperative. Safe use of FWW this session with slow gait speed.     Strengths: Supportive family, Independent prior level of function  Barriers: Decreased endurance, Fatigue, Home accessibility, Impaired balance, Limited mobility    Plan    Gait with FWW, general strength and conditioning, balance.     Physical Therapy Problems     Problem: Mobility Transfers     Dates: Start: 11/25/20       Goal: STG-Within one week, patient will transfer bed to chair     Dates: Start: 11/25/20       Description: 1) Individualized goal:  Patient will transfer SBA reach pivot consistently wc <> bed,  STS  2) Interventions:  PT Gait Training, PT Self Care/Home Eval, PT Therapeutic Exercises, PT Neuro Re-Ed/Balance, PT Aquatic Therapy, PT Therapeutic Activity, PT Manual Therapy, and PT Evaluation      Note:     Goal Note filed on 11/30/20 1111  by Tracy Patricio, PT    CGA                        Problem: PT-Long Term Goals     Dates: Start: 11/25/20       Goal: LTG-By discharge, patient will ambulate     Dates: Start: 11/25/20       Description: 1) Individualized goal:  Patient will amb in home with LRAD >150 ft over various surfaces SPV  2) Interventions:  PT Gait Training, PT Self Care/Home Eval, PT Therapeutic Exercises, PT Neuro Re-Ed/Balance, PT Aquatic Therapy, PT Therapeutic Activity, PT Manual Therapy, and PT Evaluation            Goal: LTG-By discharge, patient will transfer one surface to another     Dates: Start: 11/25/20       Description: 1) Individualized goal: Patient will transfer SPV with LRAD STS/SPT  2) Interventions:  PT Gait Training, PT Self Care/Home Eval, PT Therapeutic Exercises, PT Neuro Re-Ed/Balance, PT Aquatic Therapy, PT Therapeutic Activity, PT Manual Therapy, and PT Evaluation            Goal: LTG-By discharge, patient will ambulate up/down 4-6 stairs     Dates: Start: 11/25/20       Description: 1) Individualized goal:  Patient will amb up/down 2 stairs without HR, min A/ HHA  2) Interventions:  PT Gait Training, PT Self Care/Home Eval, PT Therapeutic Exercises, PT Neuro Re-Ed/Balance, PT Aquatic Therapy, PT Therapeutic Activity, PT Manual Therapy, and PT Evaluation

## 2020-12-01 NOTE — THERAPY
Speech Language Pathology  Daily Treatment     Patient Name: Sushma Bowden  Age:  86 y.o., Sex:  female  Medical Record #: [id number]  Today's Date: 11/30/2020     Precautions  Precautions: Fall Risk, Swallow Precautions ( See Comments)  Comments: dementia, confused with     Subjective    Patient pleasant and cooperative.  Therapy conducted with the assistance of online interpretation services.     Objective       11/30/20 1301   Receptive Language / Auditory Comprehension   Follows One Unit Commands Supervision (5)   Follows Two Unit Commands Moderate (3)   Expressive Language   Naming Minimal (4)   Word Finding Deficits Minimal (4)   Cognition   Auditory Math Severe (2)   Dysphagia    Other Treatments Therapeutic trial of (6) soft and bite sized  and thin liquids.   Diet / Liquid Recommendation Thin (0);Soft & Bite-Sized (6) - (Dysphagia III)   Nutritional Liquid Intake Rating Scale Non thickened beverages   Nutritional Food Intake Rating Scale Total oral diet with multiple consistencies but requiring special preparation or compensations   SLP Total Time Spent   SLP Individual Total Time Spent (Mins) 60   Treatment Charges   SLP Treatment - Individual Speech Language Treatment - Individual   SLP Swallowing Dysfunction Treatment Swallowing Dysfunction Treatment       Assessment    11:31  With video online interpretation agent #[id number] (Mary) Patient assessed with therapeutic trial of (6) soft and bite sized diet texture and thin liquids.  She tolerated this trial with no overt s/sx of aspiration.  She does require a mild amount of extra time to masticate but tolerated well overall.  Recommend advance to (6) Soft and bite sized.    Patient continues to show poor appetite and indicated that her stomach is still bothering her.  She continues to need many repetitions via  and  had to as mulitple questions to clarify responses.   Inquired with  if she felt patient was  "having trouble with word finding or verbal expression.  The interepter stated that she did not think this was the case but that the patient indicated that she is embarrassed and doesn't want to ask questions or ask for things out of embarassment.  Reassured patient that she should ask for what she needs and not be embarrased if she has questions.    13:01  With video online interpretation.  However, txst forgot to record the agent name and #. Patient appeared confused about how to use and the need for interpretation services. She often began to respond to questions or directives from the txst ( with I don't speak english) before the  could complete translation.  Patient continued to need repetitions and questions of clarification of responses from . Patient indicated that she once had hearing aids but they no longer work and she doesn't use them.  Difficult to determine if her struggles with comprehension and interpretation service is related to hearing loss, confusion, limited world knowledge, receptive language impairment or a combination.  Patient stated \"I can not talk\"  However, she was speaking at the conversational level with the .  Patient stated \"you ( the txst) cannot understand me\".  This is a patient who would greatly benefit from having family present during therapy sessions, if it were possible.    Educated patient on how interpretation works and that I would speak, the  would translate.  When patient spoke, then the  would translate. Extra time was needed to help patient understand this process, but did perform a little better after this discussion.  There after patient was able to follow 2 step simple directives with body part movement with 60% acc and extra time and repetitions needed.  Patient has been reported to be previously independent with money management.  She participated in auditory money math questions.   She was not able to " accurately add or subtract 3 digit money amounts with 2 figures.  She was not able to identify Coins needed for given amounts.      Strengths: Independent prior level of function, Pleasant and cooperative, Supportive family  Barriers: Aphasia expressive, Aspiration risk, Confused, Impaired appetite/intake, Impaired carryover of learning, Impaired insight/denial of deficits, Impaired functional cognition, Hearing impairment    Plan    Target 2 step directives, simple med and financial management.     Speech Therapy Problems     Problem: Comprehension STGs     Dates: Start: 11/25/20       Goal: STG-Within one week, patient will     Dates: Start: 11/25/20       Description: 1) Individualized goal:  follow 2 step verbal commands with 80% accuracy provided MOD A.  2) Interventions:  SLP Aphasia Evaluation, SLP Speech Language Treatment, SLP Self Care / ADL Training , SLP Cognitive Skill Development, and SLP Group Treatment                Problem: Expression STGs     Dates: Start: 11/30/20       Goal: STG-Within one week, patient will     Dates: Start: 11/30/20       Description: 1) Individualized goal:  respond to open ended questions related to self and situation with 80% accuracy provided MIN cues.   2) Interventions:  SLP Aphasia Evaluation, SLP Speech Language Treatment, SLP Self Care / ADL Training , SLP Cognitive Skill Development, and SLP Group Treatment                    Problem: Problem Solving STGs     Dates: Start: 11/30/20       Goal: STG-Within one week, patient will     Dates: Start: 11/30/20       Description: 1) Individualized goal:  complete simple medication management and financial management task with 80% accuracy provided MIN cues.   2) Interventions:  SLP Self Care / ADL Training , SLP Cognitive Skill Development, and SLP Group Treatment                    Problem: Speech/Swallowing LTGs     Dates: Start: 11/25/20       Goal: LTG-By discharge, patient will safely swallow     Dates: Start: 11/25/20        Description: 1) Individualized goal:  regular textures with thin liquids with no overt s/sx of asp/pen noted across meals with 100% accuracy provided MOD I.  2) Interventions:  SLP Swallowing Dysfunction Treatment, SLP Oral Pharyngeal Evaluation, SLP Video Swallow Evaluation, SLP Self Care / ADL Training , SLP Cognitive Skill Development, and SLP Group Treatment              Goal: LTG-By discharge, patient will express     Dates: Start: 11/25/20       Description: 1) Individualized goal:  basic wants and needs across environments with 80% accuracy provided SPV.  2) Interventions:  SLP Aphasia Evaluation, SLP Speech Language Treatment, SLP Self Care / ADL Training , SLP Cognitive Skill Development, and SLP Group Treatment                    Problem: Swallowing STGs     Dates: Start: 11/30/20       Goal: STG-Within one week, patient will     Dates: Start: 11/30/20       Description: 1) Individualized goal:  tolerate trials of soft and bite size textures with thin liquids with no overt s/sx of asp/pen noted provided MIN cues from SLP  2) Interventions:  SLP Swallowing Dysfunction Treatment, SLP Oral Pharyngeal Evaluation, SLP Video Swallow Evaluation, SLP Self Care / ADL Training , SLP Cognitive Skill Development, and SLP Group Treatment

## 2020-12-01 NOTE — THERAPY
"Speech Language Pathology  Daily Treatment     Patient Name: Sushma Bowden  Age:  86 y.o., Sex:  female  Medical Record #: [id number]  Today's Date: 12/1/2020     Precautions  Precautions: Fall Risk, Swallow Precautions ( See Comments)  Comments: dementia, confused with     Subjective    Pt initially refusing PO intake due to constipation and fear of eating. Pt requesting hot water.      Objective       12/01/20 0803   SLP Total Time Spent   SLP Individual Total Time Spent (Mins) 90   Treatment Charges   SLP Swallowing Dysfunction Treatment Swallowing Dysfunction Treatment   SLP Cognitive Skill Development First 15 Minutes 1   SLP Cognitive Skill Development Additional 15 Minutes 1       Assessment    Pt assessed with regular texture trials and thin liquids. Pt noted to consume more food when left on her own, when SLP present pt often resisting and refusing intake. Pt consumed approx 75% of meal, no overt s/sx of asp/pen noted throughout meal. Rec advance to regular textures. Discussed medication and financial management tasks as for pt was indep prior. Pt presenting with poor insight as to POC with d/c home and assistance needed. Pt indep aware of dementia and reported that she would sometimes forget. Reinforced recommendations for assistance with all medication and financial management tasks at time of d/c to be provided by family. Simple medication error identification task was presented however had to be abandoned due to pt not understanding instructions and able to complete. Pt again reported no prior schooling or \"studying\" and that bc of this she was unable to complete activity.     Strengths: Independent prior level of function, Pleasant and cooperative, Supportive family  Barriers: Aphasia expressive, Aspiration risk, Confused, Impaired appetite/intake, Impaired carryover of learning, Impaired insight/denial of deficits, Impaired functional cognition, Hearing impairment    Plan    Advance to " regular textures, address functional problem solving related to safety awareness to prepare for d/c on Saturday.     Speech Therapy Problems     Problem: Comprehension STGs     Dates: Start: 11/25/20       Goal: STG-Within one week, patient will     Dates: Start: 11/25/20       Description: 1) Individualized goal:  follow 2 step verbal commands with 80% accuracy provided MOD A.  2) Interventions:  SLP Aphasia Evaluation, SLP Speech Language Treatment, SLP Self Care / ADL Training , SLP Cognitive Skill Development, and SLP Group Treatment                Problem: Expression STGs     Dates: Start: 11/30/20       Goal: STG-Within one week, patient will     Dates: Start: 11/30/20       Description: 1) Individualized goal:  respond to open ended questions related to self and situation with 80% accuracy provided MIN cues.   2) Interventions:  SLP Aphasia Evaluation, SLP Speech Language Treatment, SLP Self Care / ADL Training , SLP Cognitive Skill Development, and SLP Group Treatment                    Problem: Problem Solving STGs     Dates: Start: 11/30/20       Goal: STG-Within one week, patient will     Dates: Start: 11/30/20       Description: 1) Individualized goal:  complete simple medication management and financial management task with 80% accuracy provided MIN cues.   2) Interventions:  SLP Self Care / ADL Training , SLP Cognitive Skill Development, and SLP Group Treatment                    Problem: Speech/Swallowing LTGs     Dates: Start: 11/25/20       Goal: LTG-By discharge, patient will safely swallow     Dates: Start: 11/25/20       Description: 1) Individualized goal:  regular textures with thin liquids with no overt s/sx of asp/pen noted across meals with 100% accuracy provided MOD I.  2) Interventions:  SLP Swallowing Dysfunction Treatment, SLP Oral Pharyngeal Evaluation, SLP Video Swallow Evaluation, SLP Self Care / ADL Training , SLP Cognitive Skill Development, and SLP Group Treatment               Goal: LTG-By discharge, patient will express     Dates: Start: 11/25/20       Description: 1) Individualized goal:  basic wants and needs across environments with 80% accuracy provided SPV.  2) Interventions:  SLP Aphasia Evaluation, SLP Speech Language Treatment, SLP Self Care / ADL Training , SLP Cognitive Skill Development, and SLP Group Treatment                    Problem: Swallowing STGs     Dates: Start: 11/30/20       Goal: STG-Within one week, patient will     Dates: Start: 11/30/20       Description: 1) Individualized goal:  tolerate trials of soft and bite size textures with thin liquids with no overt s/sx of asp/pen noted provided MIN cues from SLP  2) Interventions:  SLP Swallowing Dysfunction Treatment, SLP Oral Pharyngeal Evaluation, SLP Video Swallow Evaluation, SLP Self Care / ADL Training , SLP Cognitive Skill Development, and SLP Group Treatment

## 2020-12-02 LAB — COVID ORDER STATUS COVID19: NORMAL

## 2020-12-02 PROCEDURE — 92526 ORAL FUNCTION THERAPY: CPT

## 2020-12-02 PROCEDURE — 97116 GAIT TRAINING THERAPY: CPT

## 2020-12-02 PROCEDURE — 97530 THERAPEUTIC ACTIVITIES: CPT

## 2020-12-02 PROCEDURE — A9270 NON-COVERED ITEM OR SERVICE: HCPCS | Performed by: PHYSICAL MEDICINE & REHABILITATION

## 2020-12-02 PROCEDURE — 97110 THERAPEUTIC EXERCISES: CPT

## 2020-12-02 PROCEDURE — U0003 INFECTIOUS AGENT DETECTION BY NUCLEIC ACID (DNA OR RNA); SEVERE ACUTE RESPIRATORY SYNDROME CORONAVIRUS 2 (SARS-COV-2) (CORONAVIRUS DISEASE [COVID-19]), AMPLIFIED PROBE TECHNIQUE, MAKING USE OF HIGH THROUGHPUT TECHNOLOGIES AS DESCRIBED BY CMS-2020-01-R: HCPCS

## 2020-12-02 PROCEDURE — 97535 SELF CARE MNGMENT TRAINING: CPT

## 2020-12-02 PROCEDURE — 97129 THER IVNTJ 1ST 15 MIN: CPT

## 2020-12-02 PROCEDURE — 770010 HCHG ROOM/CARE - REHAB SEMI PRIVAT*

## 2020-12-02 PROCEDURE — 99232 SBSQ HOSP IP/OBS MODERATE 35: CPT | Performed by: PHYSICAL MEDICINE & REHABILITATION

## 2020-12-02 PROCEDURE — 700102 HCHG RX REV CODE 250 W/ 637 OVERRIDE(OP): Performed by: PHYSICAL MEDICINE & REHABILITATION

## 2020-12-02 RX ORDER — ACETAMINOPHEN 500 MG
1000 TABLET ORAL EVERY 6 HOURS PRN
Status: DISCONTINUED | OUTPATIENT
Start: 2020-12-02 | End: 2020-12-06 | Stop reason: HOSPADM

## 2020-12-02 RX ADMIN — ATORVASTATIN CALCIUM 40 MG: 40 TABLET, FILM COATED ORAL at 21:32

## 2020-12-02 RX ADMIN — ACETAMINOPHEN 1000 MG: 500 TABLET, FILM COATED ORAL at 08:02

## 2020-12-02 RX ADMIN — ACETAMINOPHEN 1000 MG: 500 TABLET, FILM COATED ORAL at 14:49

## 2020-12-02 RX ADMIN — CLOPIDOGREL BISULFATE 75 MG: 75 TABLET ORAL at 08:02

## 2020-12-02 RX ADMIN — ESCITALOPRAM OXALATE 10 MG: 10 TABLET, FILM COATED ORAL at 08:02

## 2020-12-02 RX ADMIN — DOCUSATE SODIUM 50 MG AND SENNOSIDES 8.6 MG 2 TABLET: 8.6; 5 TABLET, FILM COATED ORAL at 08:02

## 2020-12-02 ASSESSMENT — GAIT ASSESSMENTS
DISTANCE (FEET): 250
DEVIATION: BRADYKINETIC;SHUFFLED GAIT
ASSISTIVE DEVICE: FRONT WHEEL WALKER
GAIT LEVEL OF ASSIST: STAND BY ASSIST

## 2020-12-02 ASSESSMENT — ACTIVITIES OF DAILY LIVING (ADL)
BED_CHAIR_WHEELCHAIR_TRANSFER_DESCRIPTION: SET-UP OF EQUIPMENT;SUPERVISION FOR SAFETY
TOILET_TRANSFER_DESCRIPTION: GRAB BAR
TOILETING_LEVEL_OF_ASSIST_DESCRIPTION: GRAB BAR;SET-UP OF EQUIPMENT;SUPERVISION FOR SAFETY
TOILET_TRANSFER_DESCRIPTION: GRAB BAR;SUPERVISION FOR SAFETY;SET-UP OF EQUIPMENT

## 2020-12-02 NOTE — PROGRESS NOTES
Rehab Progress Note     Date of Service: 12/2/2020  Chief Complaint: follow up stroke    Interval Events (Subjective)    Patient seen and examined today in her room.  Using the  she is reporting a mild headache.  We again discussed the plan for discharge on Saturday which she has forgotten about.  Patient wants to know why she cannot get up and walk around the room herself.  Advised patient she is a fall risk.  She has no other complaints today.    Objective:  VITAL SIGNS: /80   Pulse 96   Temp 36.2 °C (97.2 °F) (Temporal)   Resp 18   Ht 1.524 m (5')   Wt 60.1 kg (132 lb 7.9 oz)   SpO2 96%   BMI 25.88 kg/m²   Gen: alert, no apparent distress  Neuro: notable for short-term memory deficits, hard of hearing      Recent Results (from the past 72 hour(s))   Basic Metabolic Panel    Collection Time: 11/30/20  6:10 AM   Result Value Ref Range    Sodium 134 (L) 135 - 145 mmol/L    Potassium 3.6 3.6 - 5.5 mmol/L    Chloride 103 96 - 112 mmol/L    Co2 28 20 - 33 mmol/L    Glucose 84 65 - 99 mg/dL    Bun 11 8 - 22 mg/dL    Creatinine 0.52 0.50 - 1.40 mg/dL    Calcium 9.1 8.5 - 10.5 mg/dL    Anion Gap 3.0 (L) 7.0 - 16.0   ESTIMATED GFR    Collection Time: 11/30/20  6:10 AM   Result Value Ref Range    GFR If African American >60 >60 mL/min/1.73 m 2    GFR If Non African American >60 >60 mL/min/1.73 m 2       Current Facility-Administered Medications   Medication Frequency   • butalbital/apap/caffeine -40 mg (Fioricet) per tablet 1 Tab Q6HRS PRN   • QUEtiapine (Seroquel) tablet 25 mg TID PRN   • melatonin tablet 3 mg HS PRN   • hydrOXYzine HCl (ATARAX) tablet 50 mg Q6HRS PRN   • Respiratory Therapy Consult Continuous RT   • hydrALAZINE (APRESOLINE) tablet 10 mg Q8HRS PRN   • acetaminophen (Tylenol) tablet 650 mg Q4HRS PRN   • artificial tears ophthalmic solution 1 Drop PRN   • benzocaine-menthol (CEPACOL) lozenge 1 Lozenge Q2HRS PRN   • mag hydrox-al hydrox-simeth (MAALOX PLUS ES or MYLANTA  DS) suspension 20 mL Q2HRS PRN   • ondansetron (ZOFRAN ODT) dispertab 4 mg 4X/DAY PRN    Or   • ondansetron (ZOFRAN) syringe/vial injection 4 mg 4X/DAY PRN   • traZODone (DESYREL) tablet 50 mg QHS PRN   • sodium chloride (OCEAN) 0.65 % nasal spray 2 Spray PRN   • lactulose 20 GM/30ML solution 30 mL QDAY PRN   • docusate sodium (ENEMEEZ) enema 283 mg QDAY PRN   • fleet enema 133 mL QDAY PRN   • senna-docusate (PERICOLACE or SENOKOT S) 8.6-50 MG per tablet 2 Tab BID    And   • polyethylene glycol/lytes (MIRALAX) PACKET 1 Packet QDAY PRN    And   • magnesium hydroxide (MILK OF MAGNESIA) suspension 30 mL QDAY PRN    And   • bisacodyl (DULCOLAX) suppository 10 mg QDAY PRN   • acetaminophen (TYLENOL) tablet 1,000 mg TID   • atorvastatin (LIPITOR) tablet 40 mg Q EVENING   • clopidogrel (PLAVIX) tablet 75 mg DAILY   • escitalopram (Lexapro) tablet 10 mg DAILY       Orders Placed This Encounter   Procedures   • Diet Order Diet: Regular     Standing Status:   Standing     Number of Occurrences:   1     Order Specific Question:   Diet:     Answer:   Regular [1]       Assessment:  Active Hospital Problems    Diagnosis   • *Stroke (cerebrum) (HCC)   • Anemia   • Essential hypertension   • Vascular dementia without behavioral disturbance (HCC)   • Depression   • Urinary incontinence   • Urinary retention   • Hypokalemia     This patient is a 85 y.o. female admitted for acute inpatient rehabilitation with Stroke (cerebrum) (HCC).    I led and attended the weekly conference, and agree with the IDT conference documentation and plan of care as noted below.    Date of conference: 11/30/2020    Goals and barriers: See IDT note.    Biggest barriers: cognitive deficits, hard of hearing, poor appetite (doesn't like the food here), impaired balance    Goals in next week: discharge    CM/social support: family supportive    Anticipated DC date: 12/5    Home health: PT/OT/SLP/RN    Equip: FWW    Follow up: PCP, stroke bridge clinic,   Jeevan      Medical Decision Making and Plan:    Left parietal/occipital stroke  Expressive and receptive aphasia, improved  No paresis  Cognitive deficits, continues  Continue full rehab program  PT/OT/SLP, 1 hr each discipline, 5 days per week  Continue Plavix and statin for secondary stroke prophylaxis  Follow up with neurology and Dr. Chew, referrals made    Dementia  May benefit from starting Aricept, defer to outpatient neurology     Chronic neck pain  Intermittent headache  PRN Tylenol     Depression  Continue Lexapro  Mood currently stable     Macrocytic anemia, improved  Now with macrocytosis without anemia  Vitamin B12 and folic acid normal    Hypokalemia, resolved  S/p supplementation    Hyponatremia  Mild    Bowel program  Continue bowel medications  Scheduled Sennakot  PRN Miralax, MOM, bisacodyl suppository  Last BM 12/1    Bladder program  Check PVRs - 65, 90, 57, okay to discontinue  Bladder scan for no voids  ICP for over 400 cc  Scheduled toileting    DVT prophylaxis  Discontinue Lovenox as patient is ambulating long distances    Last COVID negative on 11/22, Rechecked on 11/27 - also negative    Total time:  27 minutes.  I spent greater than 50% of the time for patient care, counseling, and coordination on this date, including patient face-to face time, unit/floor time with review of records/pertinent lab data and studies, as well as discussing diagnostic evaluation/work up, planned therapeutic interventions, and future disposition of care, as per the interval events/subjective and the assessment and plan as noted above.    I have performed a physical exam, reviewed and updated ROS, as well as the assessment and plan today 12/2/2020. In review of note from 12/1/2020 there are no new changes except as documented above.          Bharti Brady M.D.   Physical Medicine and Rehabilitation

## 2020-12-02 NOTE — THERAPY
Physical Therapy   Daily Treatment     Patient Name: Sushma Bowden  Age:  86 y.o., Sex:  female  Medical Record #: [id number]  Today's Date: 12/1/2020     Precautions  Precautions: Fall Risk, Swallow Precautions ( See Comments)  Comments: dementia, confused with     Subjective    Pt was sitting in w/c upon arrival and agreeable tx. Requested to use the restroom.      Objective       12/01/20 1431   Precautions   Precautions Fall Risk;Swallow Precautions ( See Comments)   Comments dementia, confused with    Gait Functional Level of Assist    Gait Level Of Assist Stand by Assist   Assistive Device Front Wheel Walker   Distance (Feet) 65  (65ft x2)   # of Times Distance was Traveled 2   Deviation Bradykinetic   Wheelchair Functional Level of Assist   Wheelchair Assist Stand by Assist   Distance Wheelchair (Feet or Distance) 12  (12ft x2 VCs for technique)   Wheelchair Description Extra time   Transfer Functional Level of Assist   Toilet Transfers Stand by Assist   Toilet Transfer Description Grab bar   Sitting Lower Body Exercises   Nustep Resistance Level 2  (5min B UE, B LE)   Interdisciplinary Plan of Care Collaboration   Patient Position at End of Therapy Seated;Call Light within Reach;Tray Table within Reach;Phone within Reach   PT Total Time Spent   PT Individual Total Time Spent (Mins) 30   PT Charge Group   PT Therapeutic Activities 2       Assessment    Pt with improved endurance and stability in AMB with FWW this session. Requires VCs for safety with FWW and with transfers.     Strengths: Supportive family, Independent prior level of function  Barriers: Decreased endurance, Fatigue, Home accessibility, Impaired balance, Limited mobility    Plan    Gait with FWW, general strength and conditioning, balance.        Physical Therapy Problems     Problem: Mobility Transfers     Dates: Start: 11/25/20       Goal: STG-Within one week, patient will transfer bed to chair     Dates:  Start: 11/25/20       Description: 1) Individualized goal:  Patient will transfer SBA reach pivot consistently wc <> bed,  STS  2) Interventions:  PT Gait Training, PT Self Care/Home Eval, PT Therapeutic Exercises, PT Neuro Re-Ed/Balance, PT Aquatic Therapy, PT Therapeutic Activity, PT Manual Therapy, and PT Evaluation      Note:     Goal Note filed on 11/30/20 1111 by Tracy Patricio, PT    CGA                        Problem: PT-Long Term Goals     Dates: Start: 11/25/20       Goal: LTG-By discharge, patient will ambulate     Dates: Start: 11/25/20       Description: 1) Individualized goal:  Patient will amb in home with LRAD >150 ft over various surfaces SPV  2) Interventions:  PT Gait Training, PT Self Care/Home Eval, PT Therapeutic Exercises, PT Neuro Re-Ed/Balance, PT Aquatic Therapy, PT Therapeutic Activity, PT Manual Therapy, and PT Evaluation            Goal: LTG-By discharge, patient will transfer one surface to another     Dates: Start: 11/25/20       Description: 1) Individualized goal: Patient will transfer SPV with LRAD STS/SPT  2) Interventions:  PT Gait Training, PT Self Care/Home Eval, PT Therapeutic Exercises, PT Neuro Re-Ed/Balance, PT Aquatic Therapy, PT Therapeutic Activity, PT Manual Therapy, and PT Evaluation            Goal: LTG-By discharge, patient will ambulate up/down 4-6 stairs     Dates: Start: 11/25/20       Description: 1) Individualized goal:  Patient will amb up/down 2 stairs without HR, min A/ HHA  2) Interventions:  PT Gait Training, PT Self Care/Home Eval, PT Therapeutic Exercises, PT Neuro Re-Ed/Balance, PT Aquatic Therapy, PT Therapeutic Activity, PT Manual Therapy, and PT Evaluation

## 2020-12-02 NOTE — THERAPY
Physical Therapy   Daily Treatment     Patient Name: Sushma Bowden  Age:  86 y.o., Sex:  female  Medical Record #: [id number]  Today's Date: 12/2/2020     Precautions  Precautions: Fall Risk, Swallow Precautions ( See Comments)  Comments: dementia, confused with     Subjective    Pt was seated in w/c upon arrival and agreeable to treatment.  Pt reported she was having some constipation.  Austrian language services provided through [employer] translation services.     Objective       12/02/20 0831   Precautions   Precautions Fall Risk;Swallow Precautions ( See Comments)   Gait Functional Level of Assist    Gait Level Of Assist Stand by Assist   Assistive Device Front Wheel Walker   Distance (Feet) 250   # of Times Distance was Traveled 1   Deviation Bradykinetic;Shuffled Gait   Stairs Functional Level of Assist   Level of Assist with Stairs Contact Guard Assist   # of Stairs Climbed 12   Stairs Description Hand rails;Safety concerns;Supervision for safety;Verbal cueing   Transfer Functional Level of Assist   Toilet Transfers Supervised   Toilet Transfer Description Grab bar   Interdisciplinary Plan of Care Collaboration   Patient Position at End of Therapy Seated;Chair Alarm On;Call Light within Reach;Tray Table within Reach;Phone within Reach   PT Total Time Spent   PT Individual Total Time Spent (Mins) 60   PT Charge Group   PT Gait Training 2   PT Therapeutic Activities 2     Discussion of D/C planning and home set up.  Pt reported 3-4 KEVIN at her son's home.     Assessment    Pt continue to improve in her activity tolerance with good safety noted with stair training this session.     Strengths: Supportive family, Independent prior level of function  Barriers: Decreased endurance, Fatigue, Home accessibility, Impaired balance, Limited mobility    Plan    Gait with FWW, general strength and conditioning, balance, car transfer, amb on uneven surfaces, curb training       Physical Therapy Problems      Problem: Mobility Transfers     Dates: Start: 11/25/20       Goal: STG-Within one week, patient will transfer bed to chair     Dates: Start: 11/25/20       Description: 1) Individualized goal:  Patient will transfer SBA reach pivot consistently wc <> bed,  STS  2) Interventions:  PT Gait Training, PT Self Care/Home Eval, PT Therapeutic Exercises, PT Neuro Re-Ed/Balance, PT Aquatic Therapy, PT Therapeutic Activity, PT Manual Therapy, and PT Evaluation      Note:     Goal Note filed on 11/30/20 1111 by Tracy Patricio, PT    CGA                        Problem: PT-Long Term Goals     Dates: Start: 11/25/20       Goal: LTG-By discharge, patient will ambulate     Dates: Start: 11/25/20       Description: 1) Individualized goal:  Patient will amb in home with LRAD >150 ft over various surfaces SPV  2) Interventions:  PT Gait Training, PT Self Care/Home Eval, PT Therapeutic Exercises, PT Neuro Re-Ed/Balance, PT Aquatic Therapy, PT Therapeutic Activity, PT Manual Therapy, and PT Evaluation            Goal: LTG-By discharge, patient will transfer one surface to another     Dates: Start: 11/25/20       Description: 1) Individualized goal: Patient will transfer SPV with LRAD STS/SPT  2) Interventions:  PT Gait Training, PT Self Care/Home Eval, PT Therapeutic Exercises, PT Neuro Re-Ed/Balance, PT Aquatic Therapy, PT Therapeutic Activity, PT Manual Therapy, and PT Evaluation            Goal: LTG-By discharge, patient will ambulate up/down 4-6 stairs     Dates: Start: 11/25/20       Description: 1) Individualized goal:  Patient will amb up/down 2 stairs without HR, min A/ HHA  2) Interventions:  PT Gait Training, PT Self Care/Home Eval, PT Therapeutic Exercises, PT Neuro Re-Ed/Balance, PT Aquatic Therapy, PT Therapeutic Activity, PT Manual Therapy, and PT Evaluation

## 2020-12-02 NOTE — PROGRESS NOTES
Rehab Progress Note     Date of Service: 12/1/2020  Chief Complaint: follow up stroke    Interval Events (Subjective)    Patient seen and examined today in the gym working with occupational therapy.  Using her nurse as an  we discussed the plan for discharge home on Saturday.  Patient is complaining of some dry eyes.  She has no other complaints.    Objective:  VITAL SIGNS: /60   Pulse 70   Temp 36.4 °C (97.6 °F) (Temporal)   Resp 18   Ht 1.524 m (5')   Wt 60.1 kg (132 lb 7.9 oz)   SpO2 93%   BMI 25.88 kg/m²   Gen: alert, no apparent distress  Neuro: notable for hard of hearing, short-term memory impairment      Recent Results (from the past 72 hour(s))   Basic Metabolic Panel    Collection Time: 11/30/20  6:10 AM   Result Value Ref Range    Sodium 134 (L) 135 - 145 mmol/L    Potassium 3.6 3.6 - 5.5 mmol/L    Chloride 103 96 - 112 mmol/L    Co2 28 20 - 33 mmol/L    Glucose 84 65 - 99 mg/dL    Bun 11 8 - 22 mg/dL    Creatinine 0.52 0.50 - 1.40 mg/dL    Calcium 9.1 8.5 - 10.5 mg/dL    Anion Gap 3.0 (L) 7.0 - 16.0   ESTIMATED GFR    Collection Time: 11/30/20  6:10 AM   Result Value Ref Range    GFR If African American >60 >60 mL/min/1.73 m 2    GFR If Non African American >60 >60 mL/min/1.73 m 2       Current Facility-Administered Medications   Medication Frequency   • butalbital/apap/caffeine -40 mg (Fioricet) per tablet 1 Tab Q6HRS PRN   • QUEtiapine (Seroquel) tablet 25 mg TID PRN   • melatonin tablet 3 mg HS PRN   • hydrOXYzine HCl (ATARAX) tablet 50 mg Q6HRS PRN   • Respiratory Therapy Consult Continuous RT   • hydrALAZINE (APRESOLINE) tablet 10 mg Q8HRS PRN   • acetaminophen (Tylenol) tablet 650 mg Q4HRS PRN   • artificial tears ophthalmic solution 1 Drop PRN   • benzocaine-menthol (CEPACOL) lozenge 1 Lozenge Q2HRS PRN   • mag hydrox-al hydrox-simeth (MAALOX PLUS ES or MYLANTA DS) suspension 20 mL Q2HRS PRN   • ondansetron (ZOFRAN ODT) dispertab 4 mg 4X/DAY PRN    Or   • ondansetron  (ZOFRAN) syringe/vial injection 4 mg 4X/DAY PRN   • traZODone (DESYREL) tablet 50 mg QHS PRN   • sodium chloride (OCEAN) 0.65 % nasal spray 2 Spray PRN   • lactulose 20 GM/30ML solution 30 mL QDAY PRN   • docusate sodium (ENEMEEZ) enema 283 mg QDAY PRN   • fleet enema 133 mL QDAY PRN   • senna-docusate (PERICOLACE or SENOKOT S) 8.6-50 MG per tablet 2 Tab BID    And   • polyethylene glycol/lytes (MIRALAX) PACKET 1 Packet QDAY PRN    And   • magnesium hydroxide (MILK OF MAGNESIA) suspension 30 mL QDAY PRN    And   • bisacodyl (DULCOLAX) suppository 10 mg QDAY PRN   • acetaminophen (TYLENOL) tablet 1,000 mg TID   • atorvastatin (LIPITOR) tablet 40 mg Q EVENING   • clopidogrel (PLAVIX) tablet 75 mg DAILY   • escitalopram (Lexapro) tablet 10 mg DAILY       Orders Placed This Encounter   Procedures   • Diet Order Diet: Regular     Standing Status:   Standing     Number of Occurrences:   1     Order Specific Question:   Diet:     Answer:   Regular [1]       Assessment:  Active Hospital Problems    Diagnosis   • *Stroke (cerebrum) (HCC)   • Anemia   • Essential hypertension   • Vascular dementia without behavioral disturbance (HCC)   • Depression   • Urinary incontinence   • Urinary retention   • Hypokalemia     This patient is a 85 y.o. female admitted for acute inpatient rehabilitation with Stroke (cerebrum) (HCC).    I led and attended the weekly conference, and agree with the IDT conference documentation and plan of care as noted below.    Date of conference: 11/30/2020    Goals and barriers: See IDT note.    Biggest barriers: cognitive deficits, hard of hearing, poor appetite (doesn't like the food here), impaired balance    Goals in next week: discharge    CM/social support: family supportive    Anticipated DC date: 12/5    Home health: PT/OT/SLP/RN    Equip: FWW    Follow up: PCP, stroke bridge clinic, Dr. Chew      Medical Decision Making and Plan:    Left parietal/occipital stroke  Expressive and receptive  aphasia, improved  No paresis  Cognitive deficits, continues  Continue full rehab program  PT/OT/SLP, 1 hr each discipline, 5 days per week  Continue Plavix and statin for secondary stroke prophylaxis  Follow up with neurology and Dr. Chew, referrals made    Dementia  May benefit from starting Aricept, defer to outpatient neurology     Chronic neck pain  Scheduled Tylenol  Currently without any pain complaints     Depression  Continue Lexapro  Mood currently stable     Macrocytic anemia, improved  Now with macrocytosis without anemia  Vitamin B12 and folic acid normal    Hypokalemia, resolved  S/p supplementation    Hyponatremia  Mild    Bowel program  Continue bowel medications  Scheduled Sennakot  PRN Miralax, MOM, bisacodyl suppository  Last BM 11/30    Bladder program  Check PVRs - 65, 90, 57, okay to discontinue  Bladder scan for no voids  ICP for over 400 cc  Scheduled toileting    DVT prophylaxis  Discontinue Lovenox as patient is ambulating long distances    Last COVID negative on 11/22, Rechecked on 11/27 - also negative    Total time:  16 minutes.  I spent greater than 50% of the time for patient care, counseling, and coordination on this date, including patient face-to face time, unit/floor time with review of records/pertinent lab data and studies, as well as discussing diagnostic evaluation/work up, planned therapeutic interventions, and future disposition of care, as per the interval events/subjective and the assessment and plan as noted above.    I have performed a physical exam, reviewed and updated ROS, as well as the assessment and plan today 12/1/2020. In review of note from 11/30/2020 there are no new changes except as documented above.        Bharti Brady M.D.   Physical Medicine and Rehabilitation

## 2020-12-02 NOTE — THERAPY
Occupational Therapy  Daily Treatment     Patient Name: Sushma Bowden  Age:  86 y.o., Sex:  female  Medical Record #: [id number]  Today's Date: 12/2/2020     Precautions  Precautions: Fall Risk, Swallow Precautions ( See Comments)  Comments: dementia, confused with          Subjective    Patient lying in bed asleep.  Agreeable to get out of bed and participate in OT.     Objective       12/02/20 0701   Precautions   Precautions Fall Risk;Swallow Precautions ( See Comments)   Functional Level of Assist   Grooming Independent   Grooming Description Seated in wheelchair at sink   Bathing   (refused)   Upper Body Dressing   (refused to change)   Lower Body Dressing Supervision   Lower Body Dressing Description Set-up of equipment  (setup to don/doff socks and don slippers)   Toileting Supervision   Toileting Description Grab bar;Set-up of equipment;Supervision for safety   Bed, Chair, Wheelchair Transfer Stand by Assist   Bed Chair Wheelchair Transfer Description Set-up of equipment;Supervision for safety   Toilet Transfers Supervised   Toilet Transfer Description Grab bar;Supervision for safety;Set-up of equipment   Tub / Shower Transfers Refused   Sitting Lower Body Exercises   Nustep Resistance Level 2  (x 10 minutes with UE/LE)   Bed Mobility    Supine to Sit Supervised   Scooting Supervised   Interdisciplinary Plan of Care Collaboration   Patient Position at End of Therapy Seated;Self Releasing Lap Belt Applied;Chair Alarm On;Call Light within Reach;Tray Table within Reach   OT Total Time Spent   OT Individual Total Time Spent (Mins) 60   OT Charge Group   OT Self Care / ADL 2   OT Therapy Activity 1   OT Therapeutic Exercise  1     Functional mobility and endurance training with FWW x 250' SBA.    Assessment    Patient continues to improve with her mobility, endurance and balance.  Required only SBA for mobility today.  Supervised with toileting and toilet transfer.  Strengths: Adequate strength,  Willingly participates in therapeutic activities, Supportive family, Pleasant and cooperative, Motivated for self care and independence  Barriers: Confused, Decreased endurance, Dementia, Difficulty following instructions, Fatigue, Generalized weakness, Hearing impairment, Impaired activity tolerance, Impaired balance, Impaired carryover of learning, Impaired functional cognition, Language barrier, non-fluent English, Limited mobility, Pain    Plan    ADLs, transfers, functional mobility, strength/endurance    Occupational Therapy Goals     Problem: OT Long Term Goals     Dates: Start: 11/25/20       Goal: LTG-By discharge, patient will complete basic self care tasks     Dates: Start: 11/25/20       Description: 1) Individualized Goal:  Set-up for self-feeding, grooming, and UB dressing tasks, supervision for toileting, LB dressing and bathing.   2) Interventions:  OT Self Care/ADL, OT Cognitive Skill Dev, OT Community Reintegration, OT Manual Ther Technique, OT Neuro Re-Ed/Balance, OT Sensory Int Techniques, OT Therapeutic Activity, OT Evaluation, and OT Therapeutic Exercise            Goal: LTG-By discharge, patient will perform bathroom transfers     Dates: Start: 11/25/20       Description: 1) Individualized Goal:  Supervision   2) Interventions:  OT Self Care/ADL, OT Cognitive Skill Dev, OT Community Reintegration, OT Manual Ther Technique, OT Neuro Re-Ed/Balance, OT Sensory Int Techniques, OT Therapeutic Activity, OT Evaluation, and OT Therapeutic Exercise

## 2020-12-02 NOTE — DISCHARGE PLANNING
CM spoke with patients jhon Gordon to update him on IDT and DC date of 12/5/2020.  Answered questions.  CM will continue to follow for DC needs.

## 2020-12-02 NOTE — THERAPY
Speech Language Pathology  Daily Treatment     Patient Name: Sushma Bowden  Age:  86 y.o., Sex:  female  Medical Record #: [id number]  Today's Date: 12/2/2020     Precautions  Precautions: Fall Risk, Swallow Precautions ( See Comments)  Comments: dementia, confused with     Subjective    Pt continues to report ongoing discomfort secondary to constipation. Attempting BM during session, however, was unsuccessful.      Objective     12/02/20 1104   Dysphagia    Diet / Liquid Recommendation Thin (0);Regular (7)   Nutritional Liquid Intake Rating Scale Non thickened beverages   Nutritional Food Intake Rating Scale Total oral diet with no restrictions   Interdisciplinary Plan of Care Collaboration   IDT Collaboration with  Other (See Comments)  ( services)   Patient Position at End of Therapy Seated;Chair Alarm On;Call Light within Reach   Collaboration Comments  services, Ramon ID #: [id number]   SLP Total Time Spent   SLP Individual Total Time Spent (Mins) 60   Treatment Charges   SLP Swallowing Dysfunction Treatment Swallowing Dysfunction Treatment   SLP Cognitive Skill Development First 15 Minutes 1       Assessment    Functional transfer sequences to toilet from w/c - SBA, pt did not pull call light when completed, transferring herself independently. Pt encouraged via use of ipad  to wait for staff to ensure safety with transfers. Pt confirmed understanding, however, carryover was limited.   Pt agreeable to consume 240mL prune juice, however, refused all solid PO. No overt s/sx of pen/asp, pt tolerating without issue. Continue to encourage intake with food items of preference.     Strengths: Independent prior level of function, Pleasant and cooperative, Supportive family  Barriers: Aphasia expressive, Aspiration risk, Confused, Impaired appetite/intake, Impaired carryover of learning, Impaired insight/denial of deficits, Impaired functional cognition, Hearing  impairment    Plan    Continue to reinforce functional problem solving, recall of safety precautions.     Speech Therapy Problems     Problem: Comprehension STGs     Dates: Start: 11/25/20       Goal: STG-Within one week, patient will     Dates: Start: 11/25/20       Description: 1) Individualized goal:  follow 2 step verbal commands with 80% accuracy provided MOD A.  2) Interventions:  SLP Aphasia Evaluation, SLP Speech Language Treatment, SLP Self Care / ADL Training , SLP Cognitive Skill Development, and SLP Group Treatment                Problem: Expression STGs     Dates: Start: 11/30/20       Goal: STG-Within one week, patient will     Dates: Start: 11/30/20       Description: 1) Individualized goal:  respond to open ended questions related to self and situation with 80% accuracy provided MIN cues.   2) Interventions:  SLP Aphasia Evaluation, SLP Speech Language Treatment, SLP Self Care / ADL Training , SLP Cognitive Skill Development, and SLP Group Treatment                    Problem: Problem Solving STGs     Dates: Start: 11/30/20       Goal: STG-Within one week, patient will     Dates: Start: 11/30/20       Description: 1) Individualized goal:  complete simple medication management and financial management task with 80% accuracy provided MIN cues.   2) Interventions:  SLP Self Care / ADL Training , SLP Cognitive Skill Development, and SLP Group Treatment                    Problem: Speech/Swallowing LTGs     Dates: Start: 11/25/20       Goal: LTG-By discharge, patient will safely swallow     Dates: Start: 11/25/20       Description: 1) Individualized goal:  regular textures with thin liquids with no overt s/sx of asp/pen noted across meals with 100% accuracy provided MOD I.  2) Interventions:  SLP Swallowing Dysfunction Treatment, SLP Oral Pharyngeal Evaluation, SLP Video Swallow Evaluation, SLP Self Care / ADL Training , SLP Cognitive Skill Development, and SLP Group Treatment              Goal: LTG-By  discharge, patient will express     Dates: Start: 11/25/20       Description: 1) Individualized goal:  basic wants and needs across environments with 80% accuracy provided SPV.  2) Interventions:  SLP Aphasia Evaluation, SLP Speech Language Treatment, SLP Self Care / ADL Training , SLP Cognitive Skill Development, and SLP Group Treatment                    Problem: Swallowing STGs     Dates: Start: 11/30/20       Goal: STG-Within one week, patient will     Dates: Start: 11/30/20       Description: 1) Individualized goal:  tolerate trials of soft and bite size textures with thin liquids with no overt s/sx of asp/pen noted provided MIN cues from SLP  2) Interventions:  SLP Swallowing Dysfunction Treatment, SLP Oral Pharyngeal Evaluation, SLP Video Swallow Evaluation, SLP Self Care / ADL Training , SLP Cognitive Skill Development, and SLP Group Treatment

## 2020-12-02 NOTE — CARE PLAN
Problem: Safety  Goal: Will remain free from falls  Description: Patient is alert to self, oriented to unit and unit routine, patient oriented to safety precautions that are in place and how to call for assistance when needed. Patient has call light close by, non-skid socks in place, alarms on bed and w/c. Frequently rounded on to make sure needs are being met. Will continue to educate as needed.  Outcome: PROGRESSING AS EXPECTED  Intervention: Implement fall precautions  Flowsheets (Taken 12/2/2020 0122)  Bed Alarm: Yes - Alarm On  Environmental Precautions:   Treaded Slipper Socks on Patient   Bed in Low Position   Transferred to Stronger Side   Personal Belongings, Wastebasket, Call Bell etc. in Easy Reach   Report Given to Other Health Care Providers Regarding Fall Risk  Chair/Bed Strip Alarm: Yes - Alarm On  Note: Safety measures enforced, call light within easy reach, needs anticipated . Pt free from fall and injury.     Problem: Infection  Goal: Will remain free from infection  Outcome: PROGRESSING AS EXPECTED     Problem: Venous Thromboembolism (VTW)/Deep Vein Thrombosis (DVT) Prevention:  Goal: Patient will participate in Venous Thrombosis (VTE)/Deep Vein Thrombosis (DVT)Prevention Measures  Outcome: PROGRESSING AS EXPECTED     Problem: Bowel/Gastric:  Goal: Normal bowel function is maintained or improved  Outcome: PROGRESSING AS EXPECTED

## 2020-12-03 LAB
SARS-COV-2 RNA RESP QL NAA+PROBE: NOTDETECTED
SPECIMEN SOURCE: NORMAL

## 2020-12-03 PROCEDURE — 92526 ORAL FUNCTION THERAPY: CPT

## 2020-12-03 PROCEDURE — 700102 HCHG RX REV CODE 250 W/ 637 OVERRIDE(OP): Performed by: PHYSICAL MEDICINE & REHABILITATION

## 2020-12-03 PROCEDURE — 97530 THERAPEUTIC ACTIVITIES: CPT

## 2020-12-03 PROCEDURE — A9270 NON-COVERED ITEM OR SERVICE: HCPCS | Performed by: PHYSICAL MEDICINE & REHABILITATION

## 2020-12-03 PROCEDURE — 770010 HCHG ROOM/CARE - REHAB SEMI PRIVAT*

## 2020-12-03 PROCEDURE — 99232 SBSQ HOSP IP/OBS MODERATE 35: CPT | Performed by: PHYSICAL MEDICINE & REHABILITATION

## 2020-12-03 PROCEDURE — 97535 SELF CARE MNGMENT TRAINING: CPT

## 2020-12-03 PROCEDURE — 97116 GAIT TRAINING THERAPY: CPT

## 2020-12-03 PROCEDURE — 97129 THER IVNTJ 1ST 15 MIN: CPT

## 2020-12-03 RX ADMIN — ESCITALOPRAM OXALATE 10 MG: 10 TABLET, FILM COATED ORAL at 08:04

## 2020-12-03 RX ADMIN — DOCUSATE SODIUM 50 MG AND SENNOSIDES 8.6 MG 2 TABLET: 8.6; 5 TABLET, FILM COATED ORAL at 21:43

## 2020-12-03 RX ADMIN — DOCUSATE SODIUM 50 MG AND SENNOSIDES 8.6 MG 2 TABLET: 8.6; 5 TABLET, FILM COATED ORAL at 08:04

## 2020-12-03 RX ADMIN — ACETAMINOPHEN 1000 MG: 500 TABLET, FILM COATED ORAL at 14:30

## 2020-12-03 RX ADMIN — ATORVASTATIN CALCIUM 40 MG: 40 TABLET, FILM COATED ORAL at 21:41

## 2020-12-03 RX ADMIN — CLOPIDOGREL BISULFATE 75 MG: 75 TABLET ORAL at 08:04

## 2020-12-03 RX ADMIN — POLYVINYL ALCOHOL 1 DROP: 14 SOLUTION/ DROPS OPHTHALMIC at 14:33

## 2020-12-03 ASSESSMENT — GAIT ASSESSMENTS
ASSISTIVE DEVICE: FRONT WHEEL WALKER
DISTANCE (FEET): 375
DEVIATION: BRADYKINETIC;SHUFFLED GAIT
GAIT LEVEL OF ASSIST: STAND BY ASSIST

## 2020-12-03 NOTE — CARE PLAN
Problem: Safety  Goal: Will remain free from falls  Description: Patient is alert to self, oriented to unit and unit routine, patient oriented to safety precautions that are in place and how to call for assistance when needed. Patient has call light close by, non-skid socks in place, alarms on bed and w/c. Frequently rounded on to make sure needs are being met. Will continue to educate as needed.  Outcome: PROGRESSING AS EXPECTED  Intervention: Implement fall precautions  Flowsheets (Taken 12/3/2020 3871)  Bed Alarm: Yes - Alarm On  Environmental Precautions:   Treaded Slipper Socks on Patient   Transferred to Stronger Side   Bed in Low Position   Personal Belongings, Wastebasket, Call Bell etc. in Easy Reach   Report Given to Other Health Care Providers Regarding Fall Risk     Problem: Infection  Goal: Will remain free from infection  Outcome: PROGRESSING AS EXPECTED     Problem: Pain Management  Goal: Pain level will decrease to patient's comfort goal  Outcome: PROGRESSING AS EXPECTED

## 2020-12-03 NOTE — THERAPY
Speech Language Pathology  Daily Treatment     Patient Name: Sushma Bowden  Age:  86 y.o., Sex:  female  Medical Record #: [id number]  Today's Date: 12/3/2020     Precautions  Precautions: Fall Risk, Swallow Precautions ( See Comments)  Comments: dementia, confused with     Subjective    Pt pleasant and cooperative, pt cont to complain of constipation and discomfort meds given as well as prunes and prune juice.      Objective       12/03/20 0803   SLP Total Time Spent   SLP Individual Total Time Spent (Mins) 60   Treatment Charges   SLP Swallowing Dysfunction Treatment Swallowing Dysfunction Treatment   SLP Cognitive Skill Development First 15 Minutes 1       Assessment    Pt assessed with regular textures and thin liquids, pt tolerated well with no overt s/sx of asp/pen noted. Pt benefited from assistance cutting up chicken into bite sizes. Pt also required MOD encouragement for continued PO intake as for pt was only wanting to consume hot water. Pt required MOD A overall for functional problem solving related to d/c planning as for pt to d/c with family this weekend. Pt to require assistance with all IADLS including cooking, meds and financial management tasks.    Strengths: Independent prior level of function, Pleasant and cooperative, Supportive family  Barriers: Aphasia expressive, Aspiration risk, Confused, Impaired appetite/intake, Impaired carryover of learning, Impaired insight/denial of deficits, Impaired functional cognition, Hearing impairment    Plan    Dc planning, outcome assessment?    Speech Therapy Problems     Problem: Comprehension STGs     Dates: Start: 11/25/20       Goal: STG-Within one week, patient will     Dates: Start: 11/25/20       Description: 1) Individualized goal:  follow 2 step verbal commands with 80% accuracy provided MOD A.  2) Interventions:  SLP Aphasia Evaluation, SLP Speech Language Treatment, SLP Self Care / ADL Training , SLP Cognitive Skill Development, and  SLP Group Treatment                Problem: Expression STGs     Dates: Start: 11/30/20       Goal: STG-Within one week, patient will     Dates: Start: 11/30/20       Description: 1) Individualized goal:  respond to open ended questions related to self and situation with 80% accuracy provided MIN cues.   2) Interventions:  SLP Aphasia Evaluation, SLP Speech Language Treatment, SLP Self Care / ADL Training , SLP Cognitive Skill Development, and SLP Group Treatment                    Problem: Problem Solving STGs     Dates: Start: 11/30/20       Goal: STG-Within one week, patient will     Dates: Start: 11/30/20       Description: 1) Individualized goal:  complete simple medication management and financial management task with 80% accuracy provided MIN cues.   2) Interventions:  SLP Self Care / ADL Training , SLP Cognitive Skill Development, and SLP Group Treatment                    Problem: Speech/Swallowing LTGs     Dates: Start: 11/25/20       Goal: LTG-By discharge, patient will safely swallow     Dates: Start: 11/25/20       Description: 1) Individualized goal:  regular textures with thin liquids with no overt s/sx of asp/pen noted across meals with 100% accuracy provided MOD I.  2) Interventions:  SLP Swallowing Dysfunction Treatment, SLP Oral Pharyngeal Evaluation, SLP Video Swallow Evaluation, SLP Self Care / ADL Training , SLP Cognitive Skill Development, and SLP Group Treatment              Goal: LTG-By discharge, patient will express     Dates: Start: 11/25/20       Description: 1) Individualized goal:  basic wants and needs across environments with 80% accuracy provided SPV.  2) Interventions:  SLP Aphasia Evaluation, SLP Speech Language Treatment, SLP Self Care / ADL Training , SLP Cognitive Skill Development, and SLP Group Treatment                    Problem: Swallowing STGs     Dates: Start: 11/30/20       Goal: STG-Within one week, patient will     Dates: Start: 11/30/20       Description: 1)  Individualized goal:  tolerate trials of soft and bite size textures with thin liquids with no overt s/sx of asp/pen noted provided MIN cues from SLP  2) Interventions:  SLP Swallowing Dysfunction Treatment, SLP Oral Pharyngeal Evaluation, SLP Video Swallow Evaluation, SLP Self Care / ADL Training , SLP Cognitive Skill Development, and SLP Group Treatment

## 2020-12-03 NOTE — DISCHARGE PLANNING
DME referral sent to [facility].  HH referral sent to [facility] per choice form.   Awaiting responses.

## 2020-12-03 NOTE — THERAPY
"Occupational Therapy  Daily Treatment     Patient Name: Sushma Bowden  Age:  86 y.o., Sex:  female  Medical Record #: [id number]  Today's Date: 12/3/2020     Precautions  Precautions: Fall Risk, Swallow Precautions ( See Comments)  Comments: dementia, confused with          Subjective    Pt supine in bed asleep upon arrival, pleasant and cooperative, agreeable to therapy and shower.  used throughout session.    Objective       12/03/20 0701   Functional Level of Assist   Grooming Independent   Bathing Supervision   Upper Body Dressing Supervision   Lower Body Dressing Supervision   Tub / Shower Transfers Stand by Assist  (with FWW)   Interdisciplinary Plan of Care Collaboration   Patient Position at End of Therapy Seated;Call Light within Reach;Tray Table within Reach;Chair Alarm On;Self Releasing Lap Belt Applied   OT Total Time Spent   OT Individual Total Time Spent (Mins) 60   OT Charge Group   OT Self Care / ADL 4       Assessment    Pt completed shower routine at supervision overall using FWW, shower bench, GB's. Pt's functional performance was impacted by [place], confusion, impaired safety/insights. Pt attempted to ambulate bathroom to bed without her walker, mild LOB occurred, and required hand-held assist only to safely reach bed. Pt reports that her eyes have been hurting the last few days and she typically takes eyedrops, but here she hasn't. Made RN aware. Pt also reported that she felt like she was \"a little drunk\" when ambulating, when questioned - she reported mild dizziness, but that it was going away.     Strengths: Adequate strength, Willingly participates in therapeutic activities, Supportive family, Pleasant and cooperative, Motivated for self care and independence  Barriers: Confused, Decreased endurance, Dementia, Difficulty following instructions, Fatigue, Generalized weakness, Hearing impairment, Impaired activity tolerance, Impaired balance, Impaired carryover " of learning, Impaired functional cognition, Language barrier, non-fluent English, Limited mobility, Pain    Plan    Refer to Primary OT POC/goals    Occupational Therapy Goals     Problem: OT Long Term Goals     Dates: Start: 11/25/20       Goal: LTG-By discharge, patient will complete basic self care tasks     Dates: Start: 11/25/20       Description: 1) Individualized Goal:  Set-up for self-feeding, grooming, and UB dressing tasks, supervision for toileting, LB dressing and bathing.   2) Interventions:  OT Self Care/ADL, OT Cognitive Skill Dev, OT Community Reintegration, OT Manual Ther Technique, OT Neuro Re-Ed/Balance, OT Sensory Int Techniques, OT Therapeutic Activity, OT Evaluation, and OT Therapeutic Exercise            Goal: LTG-By discharge, patient will perform bathroom transfers     Dates: Start: 11/25/20       Description: 1) Individualized Goal:  Supervision   2) Interventions:  OT Self Care/ADL, OT Cognitive Skill Dev, OT Community Reintegration, OT Manual Ther Technique, OT Neuro Re-Ed/Balance, OT Sensory Int Techniques, OT Therapeutic Activity, OT Evaluation, and OT Therapeutic Exercise

## 2020-12-03 NOTE — PROGRESS NOTES
Rehab Progress Note     Date of Service: 12/3/2020  Chief Complaint: follow up stroke    Interval Events (Subjective)    Patient seen and examined today in her room.  Using  she reports her right eye is irritated and feels like it is burning.  She also has a mild headache.  She has no other complaints.  Discussed with nurse need to give her eyedrops as well as Tylenol.    Objective:  VITAL SIGNS: /71   Pulse 60   Temp 36.4 °C (97.5 °F) (Temporal)   Resp 16   Ht 1.524 m (5')   Wt 60.1 kg (132 lb 7.9 oz)   SpO2 96%   BMI 25.88 kg/m²   Gen: alert, no apparent distress  HEENT: Right eye with mild scleral injection  Neuro: notable for hard of hearing, short-term memory deficits        Recent Results (from the past 72 hour(s))   COVID/SARS CoV-2 PCR    Collection Time: 12/02/20  1:40 PM    Specimen: Nasopharyngeal; Respirate   Result Value Ref Range    COVID Order Status Received    SARS-CoV-2, PCR (In-House)    Collection Time: 12/02/20  1:40 PM   Result Value Ref Range    SARS-CoV-2 Source NP Swab        Current Facility-Administered Medications   Medication Frequency   • acetaminophen (TYLENOL) tablet 1,000 mg Q6HRS PRN   • butalbital/apap/caffeine -40 mg (Fioricet) per tablet 1 Tab Q6HRS PRN   • QUEtiapine (Seroquel) tablet 25 mg TID PRN   • melatonin tablet 3 mg HS PRN   • hydrOXYzine HCl (ATARAX) tablet 50 mg Q6HRS PRN   • Respiratory Therapy Consult Continuous RT   • hydrALAZINE (APRESOLINE) tablet 10 mg Q8HRS PRN   • acetaminophen (Tylenol) tablet 650 mg Q4HRS PRN   • artificial tears ophthalmic solution 1 Drop PRN   • benzocaine-menthol (CEPACOL) lozenge 1 Lozenge Q2HRS PRN   • mag hydrox-al hydrox-simeth (MAALOX PLUS ES or MYLANTA DS) suspension 20 mL Q2HRS PRN   • ondansetron (ZOFRAN ODT) dispertab 4 mg 4X/DAY PRN    Or   • ondansetron (ZOFRAN) syringe/vial injection 4 mg 4X/DAY PRN   • traZODone (DESYREL) tablet 50 mg QHS PRN   • sodium chloride (OCEAN) 0.65 % nasal spray 2 Spray  PRN   • lactulose 20 GM/30ML solution 30 mL QDAY PRN   • docusate sodium (ENEMEEZ) enema 283 mg QDAY PRN   • fleet enema 133 mL QDAY PRN   • senna-docusate (PERICOLACE or SENOKOT S) 8.6-50 MG per tablet 2 Tab BID    And   • polyethylene glycol/lytes (MIRALAX) PACKET 1 Packet QDAY PRN    And   • magnesium hydroxide (MILK OF MAGNESIA) suspension 30 mL QDAY PRN    And   • bisacodyl (DULCOLAX) suppository 10 mg QDAY PRN   • atorvastatin (LIPITOR) tablet 40 mg Q EVENING   • clopidogrel (PLAVIX) tablet 75 mg DAILY   • escitalopram (Lexapro) tablet 10 mg DAILY       Orders Placed This Encounter   Procedures   • Diet Order Diet: Regular     Standing Status:   Standing     Number of Occurrences:   1     Order Specific Question:   Diet:     Answer:   Regular [1]       Assessment:  Active Hospital Problems    Diagnosis   • *Stroke (cerebrum) (HCC)   • Anemia   • Essential hypertension   • Vascular dementia without behavioral disturbance (HCC)   • Depression   • Urinary incontinence   • Urinary retention   • Hypokalemia     This patient is a 85 y.o. female admitted for acute inpatient rehabilitation with Stroke (cerebrum) (HCC).    I led and attended the weekly conference, and agree with the IDT conference documentation and plan of care as noted below.    Date of conference: 11/30/2020    Goals and barriers: See IDT note.    Biggest barriers: cognitive deficits, hard of hearing, poor appetite (doesn't like the food here), impaired balance    Goals in next week: discharge    CM/social support: family supportive    Anticipated DC date: 12/5    Home health: PT/OT/SLP/RN    Equip: FWW    Follow up: PCP, stroke bridge clinic, Dr. Chew      Medical Decision Making and Plan:    Left parietal/occipital stroke  Expressive and receptive aphasia, improved  No paresis  Cognitive deficits, continues  Continue full rehab program  PT/OT/SLP, 1 hr each discipline, 5 days per week  Continue Plavix and statin for secondary stroke  prophylaxis  Follow up with neurology and Dr. Chew, referrals made    Dementia  May benefit from starting Aricept, defer to outpatient neurology     Chronic neck pain  Intermittent headache  PRN Tylenol     Depression  Continue Lexapro  Mood currently stable     Macrocytic anemia, improved  Now with macrocytosis without anemia  Vitamin B12 and folic acid normal    Hypokalemia, resolved  S/p supplementation    Hyponatremia  Mild    Bowel program  Continue bowel medications  Scheduled [place]  PRN Miralax, MOM, bisacodyl suppository  Last BM 12/2    Bladder program  Check PVRs - 65, 90, 57, okay to discontinue  Bladder scan for no voids  ICP for over 400 cc  Scheduled toileting    DVT prophylaxis  Discontinue Lovenox as patient is ambulating long distances    Last COVID negative on 11/22, Rechecked on 11/27 - also negative, another re-screen pending    Total time:  25 minutes.  I spent greater than 50% of the time for patient care, counseling, and coordination on this date, including patient face-to face time, unit/floor time with review of records/pertinent lab data and studies, as well as discussing diagnostic evaluation/work up, planned therapeutic interventions, and future disposition of care, as per the interval events/subjective and the assessment and plan as noted above.    I have performed a physical exam, reviewed and updated ROS, as well as the assessment and plan today 12/3/2020. In review of note from 12/2/2020 there are no new changes except as documented above.              Bharti Brady M.D.   Physical Medicine and Rehabilitation

## 2020-12-03 NOTE — THERAPY
Physical Therapy   Daily Treatment     Patient Name: Sushma Bowden  Age:  86 y.o., Sex:  female  Medical Record #: [id number]  Today's Date: 12/3/2020     Precautions  Precautions: Fall Risk, Swallow Precautions ( See Comments)  Comments: dementia, confused with     Subjective    Pt was seated in w/c upon arrival and agreeable to treatment.  Pt reported she feel ready to go home on Saturday. [place] translation provided by translation services.      Objective       12/03/20 0931   Precautions   Precautions Fall Risk;Swallow Precautions ( See Comments)   Gait Functional Level of Assist    Gait Level Of Assist Stand by Assist   Assistive Device Front Wheel Walker   Distance (Feet) 375   # of Times Distance was Traveled 1   Deviation Bradykinetic;Shuffled Gait   Interdisciplinary Plan of Care Collaboration   Patient Position at End of Therapy Seated;Chair Alarm On;Call Light within Reach;Tray Table within Reach;Phone within Reach   PT Total Time Spent   PT Individual Total Time Spent (Mins) 60   PT Charge Group   PT Gait Training 2   PT Therapeutic Activities 2     Completed car transfer with FWW and SBA with VCing on sequencing and safety.  AMB on uneven surfaces x 30 feet with VCing for safety with FWW.  Discussion of POC and D/C planning.    Assessment    Pt with increased shuffled gait and forward flexed posture with fatigue with ambulation.  Pt continues to demonstrate intermittent confusion with instructions and decreased safety with FWW, but with improving overall mobility.     Strengths: Supportive family, Independent prior level of function  Barriers: Decreased endurance, Fatigue, Home accessibility, Impaired balance, Limited mobility    Plan    Complete D/C IRF ADAM items in preparation for D/C Saturday 12/5/2020.    Physical Therapy Problems     Problem: Mobility Transfers     Dates: Start: 11/25/20       Goal: STG-Within one week, patient will transfer bed to chair     Dates: Start: 11/25/20        Description: 1) Individualized goal:  Patient will transfer SBA reach pivot consistently wc <> bed,  STS  2) Interventions:  PT Gait Training, PT Self Care/Home Eval, PT Therapeutic Exercises, PT Neuro Re-Ed/Balance, PT Aquatic Therapy, PT Therapeutic Activity, PT Manual Therapy, and PT Evaluation      Note:     Goal Note filed on 11/30/20 1111 by Tracy Patricio, PT    CGA                        Problem: PT-Long Term Goals     Dates: Start: 11/25/20       Goal: LTG-By discharge, patient will ambulate     Dates: Start: 11/25/20       Description: 1) Individualized goal:  Patient will amb in home with LRAD >150 ft over various surfaces SPV  2) Interventions:  PT Gait Training, PT Self Care/Home Eval, PT Therapeutic Exercises, PT Neuro Re-Ed/Balance, PT Aquatic Therapy, PT Therapeutic Activity, PT Manual Therapy, and PT Evaluation            Goal: LTG-By discharge, patient will transfer one surface to another     Dates: Start: 11/25/20       Description: 1) Individualized goal: Patient will transfer SPV with LRAD STS/SPT  2) Interventions:  PT Gait Training, PT Self Care/Home Eval, PT Therapeutic Exercises, PT Neuro Re-Ed/Balance, PT Aquatic Therapy, PT Therapeutic Activity, PT Manual Therapy, and PT Evaluation            Goal: LTG-By discharge, patient will ambulate up/down 4-6 stairs     Dates: Start: 11/25/20       Description: 1) Individualized goal:  Patient will amb up/down 2 stairs without HR, min A/ HHA  2) Interventions:  PT Gait Training, PT Self Care/Home Eval, PT Therapeutic Exercises, PT Neuro Re-Ed/Balance, PT Aquatic Therapy, PT Therapeutic Activity, PT Manual Therapy, and PT Evaluation

## 2020-12-04 PROBLEM — Z78.9 IMPAIRED MOBILITY AND ADLS: Status: ACTIVE | Noted: 2020-12-04

## 2020-12-04 PROBLEM — R33.9 URINARY RETENTION: Status: RESOLVED | Noted: 2020-11-24 | Resolved: 2020-12-04

## 2020-12-04 PROBLEM — Z74.09 IMPAIRED MOBILITY AND ADLS: Status: ACTIVE | Noted: 2020-12-04

## 2020-12-04 PROBLEM — E87.6 HYPOKALEMIA: Status: RESOLVED | Noted: 2020-11-14 | Resolved: 2020-12-04

## 2020-12-04 PROBLEM — R32 URINARY INCONTINENCE: Status: RESOLVED | Noted: 2020-11-24 | Resolved: 2020-12-04

## 2020-12-04 PROBLEM — D64.9 ANEMIA: Status: RESOLVED | Noted: 2020-11-24 | Resolved: 2020-12-04

## 2020-12-04 PROCEDURE — 770010 HCHG ROOM/CARE - REHAB SEMI PRIVAT*

## 2020-12-04 PROCEDURE — 97116 GAIT TRAINING THERAPY: CPT

## 2020-12-04 PROCEDURE — 97110 THERAPEUTIC EXERCISES: CPT

## 2020-12-04 PROCEDURE — 97535 SELF CARE MNGMENT TRAINING: CPT

## 2020-12-04 PROCEDURE — 97129 THER IVNTJ 1ST 15 MIN: CPT

## 2020-12-04 PROCEDURE — 97130 THER IVNTJ EA ADDL 15 MIN: CPT

## 2020-12-04 PROCEDURE — 700102 HCHG RX REV CODE 250 W/ 637 OVERRIDE(OP): Performed by: PHYSICAL MEDICINE & REHABILITATION

## 2020-12-04 PROCEDURE — 92526 ORAL FUNCTION THERAPY: CPT

## 2020-12-04 PROCEDURE — A9270 NON-COVERED ITEM OR SERVICE: HCPCS | Performed by: PHYSICAL MEDICINE & REHABILITATION

## 2020-12-04 PROCEDURE — 97530 THERAPEUTIC ACTIVITIES: CPT

## 2020-12-04 PROCEDURE — 99232 SBSQ HOSP IP/OBS MODERATE 35: CPT | Performed by: PHYSICAL MEDICINE & REHABILITATION

## 2020-12-04 RX ORDER — AMLODIPINE BESYLATE 5 MG/1
5 TABLET ORAL DAILY
Qty: 30 TAB | Refills: 2 | Status: SHIPPED | OUTPATIENT
Start: 2020-12-05 | End: 2021-12-04

## 2020-12-04 RX ORDER — ACETAMINOPHEN 325 MG/1
650 TABLET ORAL EVERY 4 HOURS PRN
Qty: 30 TAB | Refills: 0 | Status: SHIPPED | OUTPATIENT
Start: 2020-12-04 | End: 2021-12-04

## 2020-12-04 RX ORDER — CLOPIDOGREL BISULFATE 75 MG/1
75 TABLET ORAL DAILY
Qty: 30 TAB | Refills: 2 | Status: SHIPPED | OUTPATIENT
Start: 2020-12-05

## 2020-12-04 RX ORDER — ESCITALOPRAM OXALATE 10 MG/1
10 TABLET ORAL DAILY
Qty: 30 TAB | Refills: 2 | Status: SHIPPED | OUTPATIENT
Start: 2020-12-05

## 2020-12-04 RX ORDER — AMOXICILLIN 250 MG
2 CAPSULE ORAL 2 TIMES DAILY PRN
Qty: 120 TAB | Refills: 2 | COMMUNITY
Start: 2020-12-04 | End: 2021-12-04

## 2020-12-04 RX ORDER — AMLODIPINE BESYLATE 5 MG/1
5 TABLET ORAL
Status: DISCONTINUED | OUTPATIENT
Start: 2020-12-04 | End: 2020-12-06 | Stop reason: HOSPADM

## 2020-12-04 RX ORDER — ATORVASTATIN CALCIUM 40 MG/1
40 TABLET, FILM COATED ORAL EVERY EVENING
Qty: 30 TAB | Refills: 2 | Status: SHIPPED | OUTPATIENT
Start: 2020-12-04

## 2020-12-04 RX ADMIN — AMLODIPINE BESYLATE 5 MG: 5 TABLET ORAL at 09:37

## 2020-12-04 RX ADMIN — ATORVASTATIN CALCIUM 40 MG: 40 TABLET, FILM COATED ORAL at 19:54

## 2020-12-04 RX ADMIN — ESCITALOPRAM OXALATE 10 MG: 10 TABLET, FILM COATED ORAL at 09:33

## 2020-12-04 RX ADMIN — CLOPIDOGREL BISULFATE 75 MG: 75 TABLET ORAL at 09:33

## 2020-12-04 ASSESSMENT — GAIT ASSESSMENTS
GAIT LEVEL OF ASSIST: SUPERVISED
DEVIATION: BRADYKINETIC
DISTANCE (FEET): 375
ASSISTIVE DEVICE: FRONT WHEEL WALKER

## 2020-12-04 ASSESSMENT — PAIN DESCRIPTION - PAIN TYPE
TYPE: ACUTE PAIN
TYPE: ACUTE PAIN

## 2020-12-04 ASSESSMENT — ACTIVITIES OF DAILY LIVING (ADL)
TOILETING_LEVEL_OF_ASSIST_DESCRIPTION: GRAB BAR;SUPERVISION FOR SAFETY;SET-UP OF EQUIPMENT
SHOWER_TRANSFER_LEVEL_OF_ASSIST: REQUIRES SUPERVISION WITH SHOWER TRANSFER
BED_CHAIR_WHEELCHAIR_TRANSFER_DESCRIPTION: ADAPTIVE EQUIPMENT;SET-UP OF EQUIPMENT;SUPERVISION FOR SAFETY;VERBAL CUEING
TOILET_TRANSFER_DESCRIPTION: GRAB BAR;SET-UP OF EQUIPMENT;SUPERVISION FOR SAFETY
TOILET_TRANSFER_LEVEL_OF_ASSIST: REQUIRES SUPERVISION WITH TOILET TRANSFER
TOILETING_LEVEL_OF_ASSIST: REQUIRES SUPERVISION WITH TOILETING

## 2020-12-04 NOTE — DISCHARGE PLANNING
Per José Manuel at [facility], DME referral accepted.  Per Eloise at [facility], referral accepted.

## 2020-12-04 NOTE — THERAPY
Occupational Therapy  Daily Treatment     Patient Name: Sushma Bowden  Age:  86 y.o., Sex:  female  Medical Record #: [id number]  Today's Date: 12/4/2020     Precautions  Precautions: Fall Risk, Swallow Precautions ( See Comments)  Comments: dementia, confused with          Subjective    Patient sitting in w/c in room.  Agreeable to OT.  Stated that she has been having diarrhea this morning.     Objective       12/04/20 0931   Precautions   Precautions Fall Risk;Swallow Precautions ( See Comments)   Functional Level of Assist   Grooming Independent   Grooming Description Seated in wheelchair at sink  (to wash hands)   Toileting Supervision   Toileting Description Grab bar;Supervision for safety;Set-up of equipment   Toilet Transfers Supervised   Toilet Transfer Description Grab bar;Set-up of equipment;Supervision for safety   Sitting Upper Body Exercises   Front Arm Raise 3 sets of 10;Bilateral;Weight (See Comments for lbs)  (2 lb weighted bar)   Sitting Lower Body Exercises   Nustep Resistance Level 3  (level 3 x 12 minutes with B UE/LE)   Interdisciplinary Plan of Care Collaboration   Patient Position at End of Therapy Seated;Call Light within Reach;Tray Table within Reach;Phone within Reach;Self Releasing Lap Belt Applied;Chair Alarm On   OT Total Time Spent   OT Individual Total Time Spent (Mins) 60   OT Charge Group   OT Self Care / ADL 1   OT Therapy Activity 1   OT Therapeutic Exercise  2     Functional mobility with FWW and '.    Assessment    Patient tolerated all activities well.  Ready to d/c home with family tomorrow.  Strengths: Adequate strength, Willingly participates in therapeutic activities, Supportive family, Pleasant and cooperative, Motivated for self care and independence  Barriers: Confused, Decreased endurance, Dementia, Difficulty following instructions, Fatigue, Generalized weakness, Hearing impairment, Impaired activity tolerance, Impaired balance, Impaired carryover  of learning, Impaired functional cognition, Language barrier, non-fluent English, Limited mobility, Pain    Plan  D/c home      Occupational Therapy Goals     Problem: OT Long Term Goals     Dates: Start: 11/25/20       Goal: LTG-By discharge, patient will complete basic self care tasks     Dates: Start: 11/25/20       Description: 1) Individualized Goal:  Set-up for self-feeding, grooming, and UB dressing tasks, supervision for toileting, LB dressing and bathing.   2) Interventions:  OT Self Care/ADL, OT Cognitive Skill Dev, OT Community Reintegration, OT Manual Ther Technique, OT Neuro Re-Ed/Balance, OT Sensory Int Techniques, OT Therapeutic Activity, OT Evaluation, and OT Therapeutic Exercise            Goal: LTG-By discharge, patient will perform bathroom transfers     Dates: Start: 11/25/20       Description: 1) Individualized Goal:  Supervision   2) Interventions:  OT Self Care/ADL, OT Cognitive Skill Dev, OT Community Reintegration, OT Manual Ther Technique, OT Neuro Re-Ed/Balance, OT Sensory Int Techniques, OT Therapeutic Activity, OT Evaluation, and OT Therapeutic Exercise

## 2020-12-04 NOTE — DISCHARGE PLANNING
Case management Summary:   I spoke with son Evan prior to discharge.     Reviewed all follow up appointments.     Referral made to [facility] and they have accepted referral and are ready to follow.      [facility] has delivered FWW and shower chair to patient.      During hospitalization, I have provided support and education and have been available for questions and information during hours of operation, communicated with therapy team and MD along with providing links/resources  to outside services.    Patient verbalizes agreement with all plans and has an understanding of the next steps within the post acute services.     Individualized Goals:   1. We will need to confirm with patient's daughter who her PCP is.  2.  Will need to resume referral to [facility].    Outcome:   1. Met  2. Met

## 2020-12-04 NOTE — CARE PLAN
Problem: OT Long Term Goals  Goal: LTG-By discharge, patient will complete basic self care tasks  Description: 1) Individualized Goal:  Set-up for self-feeding, grooming, and UB dressing tasks, supervision for toileting, LB dressing and bathing.   2) Interventions:  OT Self Care/ADL, OT Cognitive Skill Dev, OT Community Reintegration, OT Manual Ther Technique, OT Neuro Re-Ed/Balance, OT Sensory Int Techniques, OT Therapeutic Activity, OT Evaluation, and OT Therapeutic Exercise    Outcome: MET  Goal: LTG-By discharge, patient will perform bathroom transfers  Description: 1) Individualized Goal:  Supervision   2) Interventions:  OT Self Care/ADL, OT Cognitive Skill Dev, OT Community Reintegration, OT Manual Ther Technique, OT Neuro Re-Ed/Balance, OT Sensory Int Techniques, OT Therapeutic Activity, OT Evaluation, and OT Therapeutic Exercise    Outcome: MET

## 2020-12-04 NOTE — THERAPY
Physical Therapy   Daily Treatment     Patient Name: Sushma Bowden  Age:  86 y.o., Sex:  female  Medical Record #: [id number]  Today's Date: 12/4/2020     Precautions  Precautions: Fall Risk, Swallow Precautions ( See Comments)  Comments: dementia, confused with     Subjective    Pt was supine in bed upon arrival and agreeable to treatment.  Pt with report of increased back pain.  Irish language translation provided through Irish language services.      Objective       12/04/20 1301   Precautions   Precautions Fall Risk;Swallow Precautions ( See Comments)   Gait Functional Level of Assist    Gait Level Of Assist Supervised   Assistive Device Front Wheel Walker   Distance (Feet) 375   # of Times Distance was Traveled 1   Deviation Bradykinetic  (FW flexed posture)   Wheelchair Functional Level of Assist   Wheelchair Assist Minimal Assist   Distance Wheelchair (Feet or Distance) 100   Wheelchair Description Assistance with steering;Limited by fatigue;Safety concerns;Supervision for safety;Verbal cueing   Stairs Functional Level of Assist   Level of Assist with Stairs Contact Guard Assist   # of Stairs Climbed 12   Stairs Description Hand rails;Safety concerns;Supervision for safety;Verbal cueing   Transfer Functional Level of Assist   Bed, Chair, Wheelchair Transfer Supervised   Bed Chair Wheelchair Transfer Description Adaptive equipment;Set-up of equipment;Supervision for safety;Verbal cueing   Bed Mobility    Supine to Sit Supervised   Sit to Supine Supervised   Sit to Stand Supervised   Scooting Supervised   Rolling Supervised   Interdisciplinary Plan of Care Collaboration   Patient Position at End of Therapy In Bed;Bed Alarm On;Call Light within Reach;Tray Table within Reach;Phone within Reach   PT Total Time Spent   PT Individual Total Time Spent (Mins) 60   PT Charge Group   PT Gait Training 2   PT Therapeutic Activities 2     Completed D/C IRF ADAM items in preparation for D/C Saturday  12/5/2020.  Pt given and reviewed seated HEP.      Assessment    Pt has made steady progress in activity tolerance and mobility throughout her stay within the facility and can now complete at SPV level.  Pt continues to require SPV for safety.   Strengths: Supportive family, Independent prior level of function  Barriers: Decreased endurance, Fatigue, Home accessibility, Impaired balance, Limited mobility    Plan    Pt to D/C tomorrow    Physical Therapy Problems     Problem: Mobility Transfers     Dates: Start: 11/25/20       Goal: STG-Within one week, patient will transfer bed to chair     Dates: Start: 11/25/20       Description: 1) Individualized goal:  Patient will transfer SBA reach pivot consistently wc <> bed,  STS  2) Interventions:  PT Gait Training, PT Self Care/Home Eval, PT Therapeutic Exercises, PT Neuro Re-Ed/Balance, PT Aquatic Therapy, PT Therapeutic Activity, PT Manual Therapy, and PT Evaluation      Note:     Goal Note filed on 11/30/20 1111 by Tracy Patricio, PT    CGA                        Problem: PT-Long Term Goals     Dates: Start: 11/25/20       Goal: LTG-By discharge, patient will ambulate     Dates: Start: 11/25/20       Description: 1) Individualized goal:  Patient will amb in home with LRAD >150 ft over various surfaces SPV  2) Interventions:  PT Gait Training, PT Self Care/Home Eval, PT Therapeutic Exercises, PT Neuro Re-Ed/Balance, PT Aquatic Therapy, PT Therapeutic Activity, PT Manual Therapy, and PT Evaluation            Goal: LTG-By discharge, patient will transfer one surface to another     Dates: Start: 11/25/20       Description: 1) Individualized goal: Patient will transfer SPV with LRAD STS/SPT  2) Interventions:  PT Gait Training, PT Self Care/Home Eval, PT Therapeutic Exercises, PT Neuro Re-Ed/Balance, PT Aquatic Therapy, PT Therapeutic Activity, PT Manual Therapy, and PT Evaluation            Goal: LTG-By discharge, patient will ambulate up/down 4-6 stairs     Dates: Start:  11/25/20       Description: 1) Individualized goal:  Patient will amb up/down 2 stairs without HR, min A/ HHA  2) Interventions:  PT Gait Training, PT Self Care/Home Eval, PT Therapeutic Exercises, PT Neuro Re-Ed/Balance, PT Aquatic Therapy, PT Therapeutic Activity, PT Manual Therapy, and PT Evaluation

## 2020-12-04 NOTE — THERAPY
"Speech Language Pathology  Daily Treatment     Patient Name: Sushma Bowden  Age:  86 y.o., Sex:  female  Medical Record #: [id number]  Today's Date: 12/4/2020     Precautions  Precautions: Fall Risk, Swallow Precautions ( See Comments)  Comments: dementia, confused with     Subjective    Pt pleasant and cooperative, verbalized that she is hopeful \"everything will go okay and I can go home tomorrow.\"     Objective       12/04/20 1103   SLP Total Time Spent   SLP Individual Total Time Spent (Mins) 60   Treatment Charges   SLP Swallowing Dysfunction Treatment Swallowing Dysfunction Treatment   SLP Cognitive Skill Development First 15 Minutes 1   SLP Cognitive Skill Development Additional 15 Minutes 1       Assessment    Outcome assessment attempted at this time with use of MOCA. At initial evaluation pt completed with score of 1/30 achieved, today pt increased to 8/30. Pt indep oriented to month, KARMEN, location city and facility, assistance required with date. Pt with increased ease of expressive language with minimal word finding noted. When presented with variety of cognitive tasks pt often responding with \"I dont know because I didn't study in school.\" (Pt attended school up until 2nd grade). Functional problem solving completed related to d/c, pt required mod-max a for completion. Reinforcement provided re: assistance needed with ambulation with use of walker, assistance with all medication and financial tasks as well as assistance with all cooking skills. Pt receptive and in agreement. Pt assessed with regular textures and thin liquids, tolerated well with no overt s/sx of asp/pen noted. Pt did require alternative options for PO intake otherwise pt refusing at this time. Pt to d/c tomorrow with family support.     Strengths: Independent prior level of function, Pleasant and cooperative, Supportive family  Barriers: Aphasia expressive, Aspiration risk, Confused, Impaired appetite/intake, Impaired " carryover of learning, Impaired insight/denial of deficits, Impaired functional cognition, Hearing impairment    Plan    Pt to d/c tomorrow with family support.     Speech Therapy Problems     Problem: Comprehension STGs     Dates: Start: 11/25/20       Goal: STG-Within one week, patient will     Dates: Start: 11/25/20       Description: 1) Individualized goal:  follow 2 step verbal commands with 80% accuracy provided MOD A.  2) Interventions:  SLP Aphasia Evaluation, SLP Speech Language Treatment, SLP Self Care / ADL Training , SLP Cognitive Skill Development, and SLP Group Treatment                Problem: Expression STGs     Dates: Start: 11/30/20       Goal: STG-Within one week, patient will     Dates: Start: 11/30/20       Description: 1) Individualized goal:  respond to open ended questions related to self and situation with 80% accuracy provided MIN cues.   2) Interventions:  SLP Aphasia Evaluation, SLP Speech Language Treatment, SLP Self Care / ADL Training , SLP Cognitive Skill Development, and SLP Group Treatment                    Problem: Problem Solving STGs     Dates: Start: 11/30/20       Goal: STG-Within one week, patient will     Dates: Start: 11/30/20       Description: 1) Individualized goal:  complete simple medication management and financial management task with 80% accuracy provided MIN cues.   2) Interventions:  SLP Self Care / ADL Training , SLP Cognitive Skill Development, and SLP Group Treatment                    Problem: Speech/Swallowing LTGs     Dates: Start: 11/25/20       Goal: LTG-By discharge, patient will safely swallow     Dates: Start: 11/25/20       Description: 1) Individualized goal:  regular textures with thin liquids with no overt s/sx of asp/pen noted across meals with 100% accuracy provided MOD I.  2) Interventions:  SLP Swallowing Dysfunction Treatment, SLP Oral Pharyngeal Evaluation, SLP Video Swallow Evaluation, SLP Self Care / ADL Training , SLP Cognitive Skill  Development, and SLP Group Treatment              Goal: LTG-By discharge, patient will express     Dates: Start: 11/25/20       Description: 1) Individualized goal:  basic wants and needs across environments with 80% accuracy provided SPV.  2) Interventions:  SLP Aphasia Evaluation, SLP Speech Language Treatment, SLP Self Care / ADL Training , SLP Cognitive Skill Development, and SLP Group Treatment                    Problem: Swallowing STGs     Dates: Start: 11/30/20       Goal: STG-Within one week, patient will     Dates: Start: 11/30/20       Description: 1) Individualized goal:  tolerate trials of soft and bite size textures with thin liquids with no overt s/sx of asp/pen noted provided MIN cues from SLP  2) Interventions:  SLP Swallowing Dysfunction Treatment, SLP Oral Pharyngeal Evaluation, SLP Video Swallow Evaluation, SLP Self Care / ADL Training , SLP Cognitive Skill Development, and SLP Group Treatment

## 2020-12-04 NOTE — PROGRESS NOTES
Received patient during shift change, report rec'd from day shift RN. Resting in bed, VS stable on room air. Per report continent of B&B, mod assist for transfers. A&O x 3, able to make needs known. Bed in low position, call light within reach.

## 2020-12-04 NOTE — CARE PLAN
Problem: Speech/Swallowing LTGs  Goal: LTG-By discharge, patient will safely swallow  Description: 1) Individualized goal:  regular textures with thin liquids with no overt s/sx of asp/pen noted across meals with 100% accuracy provided MOD I.  2) Interventions:  SLP Swallowing Dysfunction Treatment, SLP Oral Pharyngeal Evaluation, SLP Video Swallow Evaluation, SLP Self Care / ADL Training , SLP Cognitive Skill Development, and SLP Group Treatment      Outcome: MET  Goal: LTG-By discharge, patient will express  Description: 1) Individualized goal:  basic wants and needs across environments with 80% accuracy provided SPV.  2) Interventions:  SLP Aphasia Evaluation, SLP Speech Language Treatment, SLP Self Care / ADL Training , SLP Cognitive Skill Development, and SLP Group Treatment      Outcome: MET     Problem: Swallowing STGs  Goal: STG-Within one week, patient will  Description: 1) Individualized goal:  tolerate trials of soft and bite size textures with thin liquids with no overt s/sx of asp/pen noted provided MIN cues from SLP  2) Interventions:  SLP Swallowing Dysfunction Treatment, SLP Oral Pharyngeal Evaluation, SLP Video Swallow Evaluation, SLP Self Care / ADL Training , SLP Cognitive Skill Development, and SLP Group Treatment      Outcome: MET     Problem: Expression STGs  Goal: STG-Within one week, patient will  Description: 1) Individualized goal:  respond to open ended questions related to self and situation with 80% accuracy provided MIN cues.   2) Interventions:  SLP Aphasia Evaluation, SLP Speech Language Treatment, SLP Self Care / ADL Training , SLP Cognitive Skill Development, and SLP Group Treatment      Outcome: MET     Problem: Comprehension STGs  Goal: STG-Within one week, patient will  Description: 1) Individualized goal:  follow 2 step verbal commands with 80% accuracy provided MOD A.  2) Interventions:  SLP Aphasia Evaluation, SLP Speech Language Treatment, SLP Self Care / ADL Training , SLP  Cognitive Skill Development, and SLP Group Treatment  Outcome: DISCHARGED-GOAL NOT MET     Problem: Problem Solving STGs  Goal: STG-Within one week, patient will  Description: 1) Individualized goal:  complete simple medication management and financial management task with 80% accuracy provided MIN cues.   2) Interventions:  SLP Self Care / ADL Training , SLP Cognitive Skill Development, and SLP Group Treatment      Outcome: DISCHARGED-GOAL NOT MET

## 2020-12-04 NOTE — CARE PLAN
Problem: PT-Long Term Goals  Goal: LTG-By discharge, patient will ambulate  Description: 1) Individualized goal:  Patient will amb in home with LRAD >150 ft over various surfaces SPV  2) Interventions:  PT Gait Training, PT Self Care/Home Eval, PT Therapeutic Exercises, PT Neuro Re-Ed/Balance, PT Aquatic Therapy, PT Therapeutic Activity, PT Manual Therapy, and PT Evaluation    Outcome: MET  Goal: LTG-By discharge, patient will transfer one surface to another  Description: 1) Individualized goal: Patient will transfer SPV with LRAD STS/SPT  2) Interventions:  PT Gait Training, PT Self Care/Home Eval, PT Therapeutic Exercises, PT Neuro Re-Ed/Balance, PT Aquatic Therapy, PT Therapeutic Activity, PT Manual Therapy, and PT Evaluation    Outcome: MET  Goal: LTG-By discharge, patient will ambulate up/down 4-6 stairs  Description: 1) Individualized goal:  Patient will amb up/down 2 stairs without HR, min A/ HHA  2) Interventions:  PT Gait Training, PT Self Care/Home Eval, PT Therapeutic Exercises, PT Neuro Re-Ed/Balance, PT Aquatic Therapy, PT Therapeutic Activity, PT Manual Therapy, and PT Evaluation    Outcome: MET

## 2020-12-04 NOTE — PROGRESS NOTES
Rehab Progress Note     Date of Service: 12/4/2020  Chief Complaint: follow up stroke    Interval Events (Subjective)    Patient seen and examined today in the therapy gym.  She is walking with her walker with occupational therapy.  She is going to be discharged tomorrow morning.  Noted to be slightly hypertensive this week for which amlodipine was started.    Objective:  VITAL SIGNS: /80   Pulse 73   Temp 36.4 °C (97.6 °F) (Temporal)   Resp 17   Ht 1.524 m (5')   Wt 60.1 kg (132 lb 7.9 oz)   SpO2 95%   BMI 25.88 kg/m²   Gen: alert, no apparent distress  Neuro: notable for hard of hearing, short-term memory deficits    Recent Results (from the past 72 hour(s))   COVID/SARS CoV-2 PCR    Collection Time: 12/02/20  1:40 PM    Specimen: Nasopharyngeal; Respirate   Result Value Ref Range    COVID Order Status Received    SARS-CoV-2, PCR (In-House)    Collection Time: 12/02/20  1:40 PM   Result Value Ref Range    SARS-CoV-2 Source NP Swab     SARS-CoV-2 by PCR NotDetected        Current Facility-Administered Medications   Medication Frequency   • amLODIPine (NORVASC) tablet 5 mg Q DAY   • acetaminophen (TYLENOL) tablet 1,000 mg Q6HRS PRN   • butalbital/apap/caffeine -40 mg (Fioricet) per tablet 1 Tab Q6HRS PRN   • QUEtiapine (Seroquel) tablet 25 mg TID PRN   • melatonin tablet 3 mg HS PRN   • hydrOXYzine HCl (ATARAX) tablet 50 mg Q6HRS PRN   • Respiratory Therapy Consult Continuous RT   • hydrALAZINE (APRESOLINE) tablet 10 mg Q8HRS PRN   • acetaminophen (Tylenol) tablet 650 mg Q4HRS PRN   • artificial tears ophthalmic solution 1 Drop PRN   • benzocaine-menthol (CEPACOL) lozenge 1 Lozenge Q2HRS PRN   • mag hydrox-al hydrox-simeth (MAALOX PLUS ES or MYLANTA DS) suspension 20 mL Q2HRS PRN   • ondansetron (ZOFRAN ODT) dispertab 4 mg 4X/DAY PRN    Or   • ondansetron (ZOFRAN) syringe/vial injection 4 mg 4X/DAY PRN   • traZODone (DESYREL) tablet 50 mg QHS PRN   • sodium chloride (OCEAN) 0.65 % nasal spray 2  Spray PRN   • lactulose 20 GM/30ML solution 30 mL QDAY PRN   • docusate sodium (ENEMEEZ) enema 283 mg QDAY PRN   • fleet enema 133 mL QDAY PRN   • senna-docusate (PERICOLACE or SENOKOT S) 8.6-50 MG per tablet 2 Tab BID    And   • polyethylene glycol/lytes (MIRALAX) PACKET 1 Packet QDAY PRN    And   • magnesium hydroxide (MILK OF MAGNESIA) suspension 30 mL QDAY PRN    And   • bisacodyl (DULCOLAX) suppository 10 mg QDAY PRN   • atorvastatin (LIPITOR) tablet 40 mg Q EVENING   • clopidogrel (PLAVIX) tablet 75 mg DAILY   • escitalopram (Lexapro) tablet 10 mg DAILY       Orders Placed This Encounter   Procedures   • Diet Order Diet: Regular     Standing Status:   Standing     Number of Occurrences:   1     Order Specific Question:   Diet:     Answer:   Regular [1]       Assessment:  Active Hospital Problems    Diagnosis   • *Stroke (cerebrum) (HCC)   • Anemia   • Essential hypertension   • Vascular dementia without behavioral disturbance (HCC)   • Depression   • Urinary incontinence   • Urinary retention   • Hypokalemia     This patient is a 85 y.o. female admitted for acute inpatient rehabilitation with Stroke (cerebrum) (HCC).    I led and attended the weekly conference, and agree with the IDT conference documentation and plan of care as noted below.    Date of conference: 11/30/2020    Goals and barriers: See IDT note.    Biggest barriers: cognitive deficits, hard of hearing, poor appetite (doesn't like the food here), impaired balance    Goals in next week: discharge    CM/social support: family supportive    Anticipated DC date: 12/5    Home health: PT/OT/SLP/RN    Equip: FWW    Follow up: PCP, [facility], Dr. Chew      Medical Decision Making and Plan:    Left parietal/occipital stroke  Expressive and receptive aphasia, improved  No paresis  Cognitive deficits, continues  Continue full rehab program  PT/OT/SLP, 1 hr each discipline, 5 days per week    Continue Plavix and statin for secondary stroke  prophylaxis  Follow up with neurology and Dr. Chew, referrals made    Dementia  May benefit from starting Aricept, defer to outpatient neurology     Chronic neck pain, stable  Intermittent headache, stable  PRN Tylenol     Depression  Continue Lexapro  Mood currently stable     Macrocytic anemia, improved  Now with macrocytosis without anemia  Vitamin B12 and folic acid normal    Hypokalemia, resolved  S/p supplementation    Hyponatremia  Mild    Hypertension  Start amlodipine 5 mg  Outpatient follow up with PCP    Bowel program  Continue bowel medications  Scheduled Sennakot  PRN Miralax, MOM, bisacodyl suppository  Last BM 12/3    Bladder program  Check PVRs - 65, 90, 57, okay to discontinue  Bladder scan for no voids  ICP for over 400 cc  Scheduled toileting    DVT prophylaxis  Discontinue Lovenox as patient is ambulating long distances    Last COVID negative on 11/22, Rechecked on 11/27 - also negative, another re-screen 12/3 negative    Total time:  26 minutes.  I spent greater than 50% of the time for patient care, counseling, and coordination on this date, including patient face-to face time, unit/floor time with review of records/pertinent lab data and studies, as well as discussing diagnostic evaluation/work up, planned therapeutic interventions, and future disposition of care, as per the interval events/subjective and the assessment and plan as noted above.    I have performed a physical exam, reviewed and updated ROS, as well as the assessment and plan today 12/4/2020. In review of note from 12/3/2020 there are no new changes except as documented above.    Bharti Brady M.D.   Physical Medicine and Rehabilitation

## 2020-12-04 NOTE — DISCHARGE PLANNING
CM confirmed with patients son Evan that he will be picking up patient tomorrow.  Not sure what time yet.  CM available as need arise.

## 2020-12-04 NOTE — DISCHARGE PLANNING
Case Management Discharge Instructions        Discharge Location: Home with Home Health.     Agency Name / Phone: [facility]: [phone].     Home Health: Registered Nurse, Occupational Therapist, Physical Therapist, Speech Therapist.     DME Provider / Phone: [facility]: [phone].     Medical Equipment Ordered: Front Wheel Walker, Shower chair.     Follow-up Information:    MIGUEL [facility]NGEL Benavides.P.R.N.-Primary Care  [address] [facility]  [address]  [facility] [id number] [phone]  On 12/28/2020  10:30am     Nette Chew D.O.-Physiatry  [address] [facility] [place] [address] [phone]  On 12/28/2020  1:50pm-see map    On 1/11/2021  [place] for at [facility]: [address] [place] [address] [address], [place]  [address].  [phone].

## 2020-12-05 ENCOUNTER — APPOINTMENT (OUTPATIENT)
Dept: RADIOLOGY | Facility: REHABILITATION | Age: 85
DRG: 057 | End: 2020-12-05
Attending: PHYSICAL MEDICINE & REHABILITATION
Payer: MEDICARE

## 2020-12-05 LAB
BASOPHILS # BLD AUTO: 0.4 % (ref 0–1.8)
BASOPHILS # BLD: 0.03 K/UL (ref 0–0.12)
EOSINOPHIL # BLD AUTO: 0.09 K/UL (ref 0–0.51)
EOSINOPHIL NFR BLD: 1.1 % (ref 0–6.9)
ERYTHROCYTE [DISTWIDTH] IN BLOOD BY AUTOMATED COUNT: 50.8 FL (ref 35.9–50)
HCT VFR BLD AUTO: 39.3 % (ref 37–47)
HGB BLD-MCNC: 12.9 G/DL (ref 12–16)
IMM GRANULOCYTES # BLD AUTO: 0.1 K/UL (ref 0–0.11)
IMM GRANULOCYTES NFR BLD AUTO: 1.2 % (ref 0–0.9)
LYMPHOCYTES # BLD AUTO: 0.99 K/UL (ref 1–4.8)
LYMPHOCYTES NFR BLD: 11.9 % (ref 22–41)
MCH RBC QN AUTO: 33.5 PG (ref 27–33)
MCHC RBC AUTO-ENTMCNC: 32.8 G/DL (ref 33.6–35)
MCV RBC AUTO: 102.1 FL (ref 81.4–97.8)
MONOCYTES # BLD AUTO: 0.63 K/UL (ref 0–0.85)
MONOCYTES NFR BLD AUTO: 7.6 % (ref 0–13.4)
NEUTROPHILS # BLD AUTO: 6.46 K/UL (ref 2–7.15)
NEUTROPHILS NFR BLD: 77.8 % (ref 44–72)
NRBC # BLD AUTO: 0 K/UL
NRBC BLD-RTO: 0 /100 WBC
PLATELET # BLD AUTO: 309 K/UL (ref 164–446)
PMV BLD AUTO: 10.5 FL (ref 9–12.9)
RBC # BLD AUTO: 3.85 M/UL (ref 4.2–5.4)
WBC # BLD AUTO: 8.3 K/UL (ref 4.8–10.8)

## 2020-12-05 PROCEDURE — 700102 HCHG RX REV CODE 250 W/ 637 OVERRIDE(OP): Performed by: PHYSICAL MEDICINE & REHABILITATION

## 2020-12-05 PROCEDURE — 80053 COMPREHEN METABOLIC PANEL: CPT

## 2020-12-05 PROCEDURE — 770010 HCHG ROOM/CARE - REHAB SEMI PRIVAT*

## 2020-12-05 PROCEDURE — 99232 SBSQ HOSP IP/OBS MODERATE 35: CPT | Performed by: PHYSICAL MEDICINE & REHABILITATION

## 2020-12-05 PROCEDURE — 85025 COMPLETE CBC W/AUTO DIFF WBC: CPT

## 2020-12-05 PROCEDURE — 74018 RADEX ABDOMEN 1 VIEW: CPT

## 2020-12-05 PROCEDURE — A9270 NON-COVERED ITEM OR SERVICE: HCPCS | Performed by: PHYSICAL MEDICINE & REHABILITATION

## 2020-12-05 RX ADMIN — DOCUSATE SODIUM 50 MG AND SENNOSIDES 8.6 MG 2 TABLET: 8.6; 5 TABLET, FILM COATED ORAL at 09:24

## 2020-12-05 RX ADMIN — ATORVASTATIN CALCIUM 40 MG: 40 TABLET, FILM COATED ORAL at 19:42

## 2020-12-05 RX ADMIN — ESCITALOPRAM OXALATE 10 MG: 10 TABLET, FILM COATED ORAL at 09:24

## 2020-12-05 RX ADMIN — ALUMINUM HYDROXIDE, MAGNESIUM HYDROXIDE, AND DIMETHICONE 20 ML: 400; 400; 40 SUSPENSION ORAL at 11:37

## 2020-12-05 RX ADMIN — ACETAMINOPHEN 650 MG: 325 TABLET, FILM COATED ORAL at 19:42

## 2020-12-05 RX ADMIN — AMLODIPINE BESYLATE 5 MG: 5 TABLET ORAL at 05:17

## 2020-12-05 RX ADMIN — DOCUSATE SODIUM 50 MG AND SENNOSIDES 8.6 MG 2 TABLET: 8.6; 5 TABLET, FILM COATED ORAL at 19:42

## 2020-12-05 RX ADMIN — CLOPIDOGREL BISULFATE 75 MG: 75 TABLET ORAL at 09:24

## 2020-12-05 ASSESSMENT — PAIN DESCRIPTION - PAIN TYPE
TYPE: ACUTE PAIN
TYPE: ACUTE PAIN

## 2020-12-05 NOTE — PROGRESS NOTES
Rehab Progress Note     Date of Service: 12/5/2020  Chief Complaint: follow up stroke    Interval Events (Subjective)  Sushma was seen at the bedside with nurse and use of a .     She reports that she is having abdominal discomfort.  She also reports that she has some itching when she urinates. This has been going on for a few days.      No other acute complaints.  She slept well.     Objective:  VITAL SIGNS: /74   Pulse 70   Temp 36.5 °C (97.7 °F) (Oral)   Resp 16   Ht 1.524 m (5')   Wt 60.1 kg (132 lb 7.9 oz)   SpO2 97%   BMI 25.88 kg/m²   Gen: alert, no apparent distress  CV: regular rate and rhythm  Resp: Clear to auscultation bilaterally  Abdomen: positive bowel sounds, mild pain with palpation in the lower quadrant.  No distension or rebound tenderness  Neuro: notable for hard of hearing, short-term memory deficits  Ext: no calf tenderness bilaterally    No results found for this or any previous visit (from the past 72 hour(s)).    Current Facility-Administered Medications   Medication Frequency   • amLODIPine (NORVASC) tablet 5 mg Q DAY   • acetaminophen (TYLENOL) tablet 1,000 mg Q6HRS PRN   • butalbital/apap/caffeine -40 mg (Fioricet) per tablet 1 Tab Q6HRS PRN   • QUEtiapine (Seroquel) tablet 25 mg TID PRN   • melatonin tablet 3 mg HS PRN   • hydrOXYzine HCl (ATARAX) tablet 50 mg Q6HRS PRN   • Respiratory Therapy Consult Continuous RT   • hydrALAZINE (APRESOLINE) tablet 10 mg Q8HRS PRN   • acetaminophen (Tylenol) tablet 650 mg Q4HRS PRN   • artificial tears ophthalmic solution 1 Drop PRN   • benzocaine-menthol (CEPACOL) lozenge 1 Lozenge Q2HRS PRN   • mag hydrox-al hydrox-simeth (MAALOX PLUS ES or MYLANTA DS) suspension 20 mL Q2HRS PRN   • ondansetron (ZOFRAN ODT) dispertab 4 mg 4X/DAY PRN    Or   • ondansetron (ZOFRAN) syringe/vial injection 4 mg 4X/DAY PRN   • traZODone (DESYREL) tablet 50 mg QHS PRN   • sodium chloride (OCEAN) 0.65 % nasal spray 2 Spray PRN   • lactulose  20 GM/30ML solution 30 mL QDAY PRN   • docusate sodium (ENEMEEZ) enema 283 mg QDAY PRN   • fleet enema 133 mL QDAY PRN   • senna-docusate (PERICOLACE or SENOKOT S) 8.6-50 MG per tablet 2 Tab BID    And   • polyethylene glycol/lytes (MIRALAX) PACKET 1 Packet QDAY PRN    And   • magnesium hydroxide (MILK OF MAGNESIA) suspension 30 mL QDAY PRN    And   • bisacodyl (DULCOLAX) suppository 10 mg QDAY PRN   • atorvastatin (LIPITOR) tablet 40 mg Q EVENING   • clopidogrel (PLAVIX) tablet 75 mg DAILY   • escitalopram (Lexapro) tablet 10 mg DAILY       Orders Placed This Encounter   Procedures   • Diet Order Diet: Regular     Standing Status:   Standing     Number of Occurrences:   1     Order Specific Question:   Diet:     Answer:   Regular [1]       Assessment:  Active Hospital Problems    Diagnosis   • *Stroke (cerebrum) (HCC)   • Anemia   • Essential hypertension   • Vascular dementia without behavioral disturbance (HCC)   • Depression   • Urinary incontinence   • Urinary retention   • Hypokalemia     This patient is a 86 y.o. female admitted for acute inpatient rehabilitation with Stroke (cerebrum) (HCC).    IDT conference:  Date of conference: 11/30/2020  Goals and barriers: See IDT note.  Biggest barriers: cognitive deficits, hard of hearing, poor appetite (doesn't like the food here), impaired balance  Goals in next week: discharge  CM/social support: family supportive  Anticipated DC date: 12/5  Home health: PT/OT/SLP/RN  Equip: FWW  Follow up: PCP, stroke bridge clinic, Dr. Chew      Medical Decision Making and Plan:    Left parietal/occipital stroke  Expressive and receptive aphasia, improved  No paresis  Cognitive deficits, continues  Continue full rehab program  PT/OT/SLP, 1 hr each discipline, 5 days per week    Continue Plavix and statin for secondary stroke prophylaxis  Follow up with neurology and Dr. Chew, referrals made    Dementia  May benefit from starting Aricept, defer to outpatient neurology      Chronic neck pain, stable  Intermittent headache, stable  PRN Tylenol     Depression  Continue Lexapro  Mood currently stable     Macrocytic anemia, improved  Now with macrocytosis without anemia  Vitamin B12 and folic acid normal    Hypokalemia, resolved  S/p supplementation    Hyponatremia  Mild    Hypertension  Start amlodipine 5 mg  Outpatient follow up with PCP    Bowel program  Continue bowel medications  Scheduled Sennakot  PRN Miralax, MOM, bisacodyl suppository  Last BM 12/3    Bladder program  Check PVRs - 65, 90, 57, okay to discontinue  Bladder scan for no voids  ICP for over 400 cc  Scheduled toileting    Abdominal discomfort/urinary symptoms  Check KUB  Check U/A, CBC, CMP    DVT prophylaxis  Discontinue Lovenox as patient is ambulating long distances    Hold discharge until 12/6/2020, pending evaluation of new abdominal/urinary symptoms    Last COVID negative on 11/22, Rechecked on 11/27 - also negative, another re-screen 12/3 negative    Total time:  28 minutes.  I spent greater than 50% of the time for patient care, counseling, and coordination on this date, including patient face-to face time, unit/floor time with review of records/pertinent lab data and studies, as well as discussing diagnostic evaluation/work up, planned therapeutic interventions, and future disposition of care, as per the interval events/subjective and the assessment and plan as noted above.        Abiel Addison M.D.   Physical Medicine and Rehabilitation

## 2020-12-05 NOTE — PROGRESS NOTES
Good uneventful day. Home tomorrow spoke with family member asked them to come between 1100 and 1130.

## 2020-12-05 NOTE — CARE PLAN
Problem: Safety  Goal: Will remain free from injury  Note: Pt remains free from accidental injury at this time. Pt uses call light appropriately. Call light button within reach. Hourly rounding in place.      Problem: Infection  Goal: Will remain free from infection  Note: Pt remains free from s/s if infection. VS stable. Skin is intact.

## 2020-12-06 VITALS
HEART RATE: 76 BPM | TEMPERATURE: 98.3 F | BODY MASS INDEX: 25.75 KG/M2 | SYSTOLIC BLOOD PRESSURE: 125 MMHG | HEIGHT: 60 IN | WEIGHT: 131.17 LBS | OXYGEN SATURATION: 95 % | DIASTOLIC BLOOD PRESSURE: 71 MMHG | RESPIRATION RATE: 16 BRPM

## 2020-12-06 LAB
ALBUMIN SERPL BCP-MCNC: 3.6 G/DL (ref 3.2–4.9)
ALBUMIN/GLOB SERPL: 1.2 G/DL
ALP SERPL-CCNC: 82 U/L (ref 30–99)
ALT SERPL-CCNC: 22 U/L (ref 2–50)
ANION GAP SERPL CALC-SCNC: 6 MMOL/L (ref 7–16)
APPEARANCE UR: ABNORMAL
AST SERPL-CCNC: 23 U/L (ref 12–45)
BACTERIA #/AREA URNS HPF: ABNORMAL /HPF
BILIRUB SERPL-MCNC: 0.5 MG/DL (ref 0.1–1.5)
BILIRUB UR QL STRIP.AUTO: NEGATIVE
BUN SERPL-MCNC: 9 MG/DL (ref 8–22)
CALCIUM SERPL-MCNC: 9.5 MG/DL (ref 8.5–10.5)
CHLORIDE SERPL-SCNC: 107 MMOL/L (ref 96–112)
CO2 SERPL-SCNC: 31 MMOL/L (ref 20–33)
COLOR UR: YELLOW
CREAT SERPL-MCNC: 0.46 MG/DL (ref 0.5–1.4)
EPI CELLS #/AREA URNS HPF: NEGATIVE /HPF
GLOBULIN SER CALC-MCNC: 3 G/DL (ref 1.9–3.5)
GLUCOSE SERPL-MCNC: 97 MG/DL (ref 65–99)
GLUCOSE UR STRIP.AUTO-MCNC: NEGATIVE MG/DL
HYALINE CASTS #/AREA URNS LPF: ABNORMAL /LPF
KETONES UR STRIP.AUTO-MCNC: NEGATIVE MG/DL
LEUKOCYTE ESTERASE UR QL STRIP.AUTO: ABNORMAL
MICRO URNS: ABNORMAL
NITRITE UR QL STRIP.AUTO: POSITIVE
PH UR STRIP.AUTO: 5.5 [PH] (ref 5–8)
POTASSIUM SERPL-SCNC: 3.5 MMOL/L (ref 3.6–5.5)
PROT SERPL-MCNC: 6.6 G/DL (ref 6–8.2)
PROT UR QL STRIP: NEGATIVE MG/DL
RBC # URNS HPF: ABNORMAL /HPF
RBC UR QL AUTO: ABNORMAL
SODIUM SERPL-SCNC: 144 MMOL/L (ref 135–145)
SP GR UR STRIP.AUTO: 1.02
UROBILINOGEN UR STRIP.AUTO-MCNC: 0.2 MG/DL
WBC #/AREA URNS HPF: ABNORMAL /HPF

## 2020-12-06 PROCEDURE — 97116 GAIT TRAINING THERAPY: CPT

## 2020-12-06 PROCEDURE — A9270 NON-COVERED ITEM OR SERVICE: HCPCS | Performed by: PHYSICAL MEDICINE & REHABILITATION

## 2020-12-06 PROCEDURE — 97110 THERAPEUTIC EXERCISES: CPT

## 2020-12-06 PROCEDURE — 97130 THER IVNTJ EA ADDL 15 MIN: CPT

## 2020-12-06 PROCEDURE — 700102 HCHG RX REV CODE 250 W/ 637 OVERRIDE(OP): Performed by: PHYSICAL MEDICINE & REHABILITATION

## 2020-12-06 PROCEDURE — 97530 THERAPEUTIC ACTIVITIES: CPT

## 2020-12-06 PROCEDURE — 92526 ORAL FUNCTION THERAPY: CPT

## 2020-12-06 PROCEDURE — 97535 SELF CARE MNGMENT TRAINING: CPT

## 2020-12-06 PROCEDURE — 81001 URINALYSIS AUTO W/SCOPE: CPT

## 2020-12-06 PROCEDURE — 97129 THER IVNTJ 1ST 15 MIN: CPT

## 2020-12-06 RX ORDER — POTASSIUM CHLORIDE 20 MEQ/1
20 TABLET, EXTENDED RELEASE ORAL ONCE
Status: COMPLETED | OUTPATIENT
Start: 2020-12-06 | End: 2020-12-06

## 2020-12-06 RX ORDER — CIPROFLOXACIN 500 MG/1
500 TABLET, FILM COATED ORAL EVERY 12 HOURS
Status: DISCONTINUED | OUTPATIENT
Start: 2020-12-06 | End: 2020-12-06 | Stop reason: HOSPADM

## 2020-12-06 RX ADMIN — CLOPIDOGREL BISULFATE 75 MG: 75 TABLET ORAL at 08:45

## 2020-12-06 RX ADMIN — POTASSIUM CHLORIDE 20 MEQ: 1500 TABLET, EXTENDED RELEASE ORAL at 10:06

## 2020-12-06 RX ADMIN — ESCITALOPRAM OXALATE 10 MG: 10 TABLET, FILM COATED ORAL at 08:45

## 2020-12-06 RX ADMIN — AMLODIPINE BESYLATE 5 MG: 5 TABLET ORAL at 05:00

## 2020-12-06 RX ADMIN — DOCUSATE SODIUM 50 MG AND SENNOSIDES 8.6 MG 2 TABLET: 8.6; 5 TABLET, FILM COATED ORAL at 08:45

## 2020-12-06 RX ADMIN — CIPROFLOXACIN HYDROCHLORIDE 500 MG: 500 TABLET, FILM COATED ORAL at 15:38

## 2020-12-06 ASSESSMENT — FIBROSIS 4 INDEX: FIB4 SCORE: 1.36

## 2020-12-06 ASSESSMENT — GAIT ASSESSMENTS
DISTANCE (FEET): 75
DEVIATION: BRADYKINETIC
ASSISTIVE DEVICE: FRONT WHEEL WALKER
GAIT LEVEL OF ASSIST: STAND BY ASSIST

## 2020-12-06 ASSESSMENT — ACTIVITIES OF DAILY LIVING (ADL)
TOILET_TRANSFER_DESCRIPTION: ADAPTIVE EQUIPMENT
BED_CHAIR_WHEELCHAIR_TRANSFER_DESCRIPTION: ADAPTIVE EQUIPMENT

## 2020-12-06 NOTE — THERAPY
Physical Therapy   Daily Treatment     Patient Name: Sushma Bowden  Age:  86 y.o., Sex:  female  Medical Record #: [id number]  Today's Date: 12/6/2020     Precautions  Precautions: (P) Fall Risk, Swallow Precautions ( See Comments)  Comments: (P) dementia, confused with ; positive for UTI 12/6    Subjective    Patient confused with discharge plan- asking to stay a few more days because discharge destination is not ready, she has c/o ears and bilateral shoulder pain (R>L); asking Dr. Addison to follow-up with patient regarding d/c plan     Objective       12/06/20 0600   Precautions   Precautions Fall Risk;Swallow Precautions ( See Comments)   Comments dementia, confused with ; positive for UTI 12/6   Sleep/Wake Cycle   Sleep & Rest Awake   Gait Functional Level of Assist    Gait Level Of Assist Stand by Assist   Assistive Device Front Wheel Walker  (modified own for height)   Distance (Feet) 75   # of Times Distance was Traveled 1   Deviation Bradykinetic  (forward posture)   Stairs Functional Level of Assist   Level of Assist with Stairs Contact Guard Assist   # of Stairs Climbed   (4in, 6in curb with FWW)   Stairs Description Verbal cueing  (unable to remember and follow sequencing cues)   Transfer Functional Level of Assist   Bed, Chair, Wheelchair Transfer Supervised   Bed Chair Wheelchair Transfer Description Adaptive equipment   Sitting Lower Body Exercises   Sitting Lower Body Exercises   (EOB for warm-up)   Ankle Pumps 1 set of 15;Bilateral   Long Arc Quad 1 set of 15;Bilateral   Marching 1 set of 15;Reciprocal   Interdisciplinary Plan of Care Collaboration   IDT Collaboration with  Nursing;Physician;Occupational Therapist;Other (See Comments)   Collaboration Comments discharge planning; Language Line  (Alyce) used throughout session   PT Total Time Spent   PT Individual Total Time Spent (Mins) 60   PT Charge Group   PT Gait Training 1   PT Therapeutic Exercise 1    PT Therapeutic Activities 2   patient was able to demonstrate toileting with SPV    Assessment    Patient was able to perform seated exercises with verbal cues and occasional tactile cueing, bed mobility with SPV, transfers with SPV, ambulation at SPV level, and stairs with CGA    Strengths: Supportive family, Independent prior level of function  Barriers: Decreased endurance, Fatigue, Home accessibility, Impaired balance, Limited mobility    Plan    D/c patient home with family at FWW level    Physical Therapy Problems     Problem: Mobility Transfers     Dates: Start: 11/25/20       Goal: STG-Within one week, patient will transfer bed to chair     Dates: Start: 11/25/20       Description: 1) Individualized goal:  Patient will transfer SBA reach pivot consistently wc <> bed,  STS  2) Interventions:  PT Gait Training, PT Self Care/Home Eval, PT Therapeutic Exercises, PT Neuro Re-Ed/Balance, PT Aquatic Therapy, PT Therapeutic Activity, PT Manual Therapy, and PT Evaluation      Note:     Goal Note filed on 11/30/20 1111 by Tracy Patricio, PT    CGA

## 2020-12-06 NOTE — DISCHARGE INSTRUCTIONS
[facility] NURSING DISCHARGE INSTRUCTIONS    Blood Pressure : 115/72  Weight: 60.1 kg (132 lb 7.9 oz)  Nursing recommendations for Sushma Bowden at time of discharge are as follows:  Client verbalized understanding of all discharge instructions and prescriptions.     Review all your home medications and newly ordered medications with your doctor and/or pharmacist. Follow medication instructions as directed by your doctor and/or pharmacist.    Pain Management:   Discharge Pain Medication Instructions:  Comfort Goal: Sleep Comfortably  Notify your primary care provider if pain is unrelieved with these measures, if the pain is new, or increased in intensity.    Discharge Skin Characteristics:    Discharge Skin Exam:       Skin / Wound Care Instructions: Please contact your primary care physician for any change in skin integrity.     If You Have Surgical Incisions / Wounds:  Monitor surgical site(s) for signs of increased swelling, redness or symptoms of drainage from the site or fever as this could indicate signs and symptoms of infection. If these symptoms are noted, notifiy your primary care provider.      Discharge Safety Instructions:       Discharge Safety Concerns:    The interdisciplinary team has made recommendation that you should not be left alone  in the house due to weakness and unsteady gait  Anti-embolic stockings are required during the day and off at night to increase circulation to the lower extremities.    Discharge Diet:       Discharge Liquids:    Discharge Bowel Function:    Please contact your primary care physician for any changes in bowel habits.  Discharge Bowel Program:    Discharge Bladder Function:    Discharge Urinary Devices:        Nursing Discharge Plan:   Influenza Vaccine Indication: Not indicated: Previously immunized this influenza season and > 8 years of age    Case Management Discharge Instructions:   Discharge Location: Home with Home Health  Agency  Name/Address/Phone: [facility]: [phone]  Home Health: Registered Nurse, Occupational Therapist, Physical Therapist, Speech Therapist  Outpatient Services:    DME Provider/Phone: [facility]: [phone]  Medical Equipment Ordered: Front Wheel Walker, Other   Prescription Faxed to:        Discharge Medication Instructions:  Below are the medications your physician expects you to take upon discharge:    Fall Prevention in the Home, Adult  Falls can cause injuries and can affect people from all age groups. There are many simple things that you can do to make your home safe and to help prevent falls. Ask for help when making these changes, if needed.  What actions can I take to prevent falls?  General instructions  · Use good lighting in all rooms. Replace any light bulbs that burn out.  · Turn on lights if it is dark. Use night-lights.  · Place frequently used items in easy-to-reach places. Lower the shelves around your home if necessary.  · Set up furniture so that there are clear paths around it. Avoid moving your furniture around.  · Remove throw rugs and other tripping hazards from the floor.  · Avoid walking on wet floors.  · Fix any uneven floor surfaces.  · Add color or contrast paint or tape to grab bars and handrails in your home. Place contrasting color strips on the first and last steps of stairways.  · When you use a stepladder, make sure that it is completely opened and that the sides are firmly locked. Have someone hold the ladder while you are using it. Do not climb a closed stepladder.  · Be aware of any and all pets.  What can I do in the bathroom?         · Keep the floor dry. Immediately clean up any water that spills onto the floor.  · Remove soap buildup in the tub or shower on a regular basis.  · Use non-skid mats or decals on the floor of the tub or shower.  · Attach bath mats securely with double-sided, non-slip rug tape.  · If you need to sit down while you are in the shower, use a  plastic, non-slip stool.  · Install grab bars by the toilet and in the tub and shower. Do not use towel bars as grab bars.  What can I do in the bedroom?  · Make sure that a bedside light is easy to reach.  · Do not use oversized bedding that drapes onto the floor.  · Have a firm chair that has side arms to use for getting dressed.  What can I do in the kitchen?  · Clean up any spills right away.  · If you need to reach for something above you, use a sturdy step stool that has a grab bar.  · Keep electrical cables out of the way.  · Do not use floor polish or wax that makes floors slippery. If you must use wax, make sure that it is non-skid floor wax.  What can I do in the stairways?  · Do not leave any items on the stairs.  · Make sure that you have a light switch at the top of the stairs and the bottom of the stairs. Have them installed if you do not have them.  · Make sure that there are handrails on both sides of the stairs. Fix handrails that are broken or loose. Make sure that handrails are as long as the stairways.  · Install non-slip stair treads on all stairs in your home.  · Avoid having throw rugs at the top or bottom of stairways, or secure the rugs with carpet tape to prevent them from moving.  · Choose a carpet design that does not hide the edge of steps on the stairway.  · Check any carpeting to make sure that it is firmly attached to the stairs. Fix any carpet that is loose or worn.  What can I do on the outside of my home?  · Use bright outdoor lighting.  · Regularly repair the edges of walkways and driveways and fix any cracks.  · Remove high doorway thresholds.  · Trim any shrubbery on the main path into your home.  · Regularly check that handrails are securely fastened and in good repair. Both sides of any steps should have handrails.  · Install guardrails along the edges of any raised decks or porches.  · Clear walkways of debris and clutter, including tools and rocks.  · Have leaves, snow, and  "ice cleared regularly.  · Use sand or salt on walkways during winter months.  · In the garage, clean up any spills right away, including grease or oil spills.  What other actions can I take?  · Wear closed-toe shoes that fit well and support your feet. Wear shoes that have rubber soles or low heels.  · Use mobility aids as needed, such as canes, walkers, scooters, and crutches.  · Review your medicines with your health care provider. Some medicines can cause dizziness or changes in blood pressure, which increase your risk of falling.  Talk with your health care provider about other ways that you can decrease your risk of falls. This may include working with a physical therapist or  to improve your strength, balance, and endurance.  Where to find more information  · Centers for Disease Control and Prevention, [place]: https://www.cdc.gov  · National [place] on Aging: https://sw9hoar.andrzej.nih.gov  Contact a health care provider if:  · You are afraid of falling at home.  · You feel weak, drowsy, or dizzy at home.  · You fall at home.  Summary  · There are many simple things that you can do to make your home safe and to help prevent falls.  · Ways to make your home safe include removing tripping hazards and installing grab bars in the bathroom.  · Ask for help when making these changes in your home.  This information is not intended to replace advice given to you by your health care provider. Make sure you discuss any questions you have with your health care provider.  Document Released: 12/08/2003 Document Revised: 11/30/2018 Document Reviewed: 08/02/2018  [employer] Patient Education © 2020 [employer]  Prevent Falls in Your Home    \"Falling once doubles your chance of falling again\"        -Center for Disease Control and Prevention    Falls in the home can lead to serious injury (fractures, brain injuries), hospitalizations, increased medical costs, and could even be fatal.  The good news is, there are many " "precautions you can take to avoid falls in your home and help keep you safe:     · If prescribed an assistive device (walker, crutches), use as instructed by the healthcare provider\"   · Remove any tripping hazards from your home, including loose cords, throw rugs and clutter  · Keep a nightlight on in dark (hallways, bathrooms, etc)   · Get up slowly, to make sure you feel okay before getting up  · Be aware of any side effects of your medications: some medications may make you dizzy  · Place a non-skid rubber mat in your shower or tub-consider a shower bench or chair if unsteady on your feet  · Wear supportive shoes or non-skid socks when moving around  · Start an exercise program once approved by your provider.  If you are feeling weak following a hospital stay, talk to your doctor about home health or outpatient therapy programs designed to help rebuild your strength and endurance    Depression / Suicide Risk    As [facility] are [facility], it is important to learn how to keep safe from harming yourself.    Recognize the warning signs:  · Abrupt changes in personality, positive or negative- including increase in energy   · Giving away possessions  · Change in eating patterns- significant weight changes-  positive or negative  · Change in sleeping patterns- unable to sleep or sleeping all the time   · Unwillingness or inability to communicate  · Depression  · Unusual sadness, discouragement and loneliness  · Talk of wanting to die  · Neglect of personal appearance   · Rebelliousness- reckless behavior  · Withdrawal from people/activities they love  · Confusion- inability to concentrate     If you or a loved one observes any of these behaviors or has concerns about self-harm, here's what you can do:  · Talk about it- your feelings and reasons for harming yourself  · Remove any means that you might use to hurt yourself (examples: pills, rope, extension cords, firearm)  · Get professional " help from the community (Mental Health, Substance Abuse, psychological counseling)  · Do not be alone:Call your Safe Contact- someone whom you trust who will be there for you.  · Call your local CRISIS HOTLINE 709-2681 or 099-117-6467  · Call your local Children's Mobile Crisis Response Team [place] (247) 055-4622 or www.[employer]  · Call the toll free National Suicide Prevention Hotlines   · National Suicide Prevention Lifeline 505-914-OPCF (8212)  · National Hope Line Network 800-SUICIDE (371-1094)      Physical Therapy Discharge Instructions for Sushma Bowden    12/4/2020    Level of Assist Required for Ambulation: Supervision on Flat Surfaces, Supervision on Curbs, Supervision on Stairs  Distance Patient May Ambulate: Up to 350 feet or more as tolerated  Device Recommended for Ambulation: Front-Wheeled Walker  Level of Assist Required to Propel Wheelchair: Intermittent Physical Assist  Level of Assist Required for Transfers: Supervision  Device Recommended for Transfers: Front-Wheeled Walker  Home Exercise Program: Refer to Home Exercise Program Handout for Details    Speech Therapy Discharge Instructions for Sushma Bowden    12/4/2020    Diet: Regular (7), Thin (0)  Swallow Strategies: Eat slowly, small bites and sips, upright in chair for safe PO intake.  Supervision: 24 hour supervision for safety  Cognition / Communication: Pt has demonstrated progress while at [facility]. Pt has been advanced to a regular diet with thin liquids. Tolerating well with no further swallow intervention recommneded nor warranted. Pt has increased overall expressive language skills and has been able to express her wants and needs to those around her. At the time of d/c it is recommended that pt have assistance with all medication management and financial management skills due to pt with history of dementia and cognitive decline. Ongoing cognitive intervention would be beneficial to address safety and functional problem  solving in the home in the form of home health therapy.    Occupational Therapy Discharge Instructions for Sushma Bowden    12/4/2020    Level of Assist Required for Eating: Requires Supervision with Eating  Level of Assist Required for Grooming: Able to Complete Grooming without Assist  Level of Assist Required for Dressing: Requires Supervision with Dressing  Level of Assist Required for Toileting: Requires Supervision with Toileting  Level of Assist Required for Toilet Transfer: Requires Supervision with Toilet Transfer  Equipment for Toilet Transfer: Grab Bars by Toilet  Level of Assist Required for Bathing: Requires Supervision with Bathing  Equipment for Bathing: Shower Chair, Grab Bars in Tub / Shower  Level of Assist Required for Shower Transfer: Requires Supervision with Shower Transfer  Equipment for Shower Transfer: Shower Chair, Grab Bars in Tub / Shower  Level of Assist Required for Home Mgmt: Requires Physical Assist with Home Management  Level of Assist Required for Meal Prep: Requires Physical Assist with Meal Preparation  Driving: May not Drive, Please Contact Physician for Further Information  Home Exercise Program: None Issued      Plan de alimentación después de un accidente cerebrovascular  Eating Plan After Stroke  Un accidente cerebrovascular produce daño en las células del cerebro, lo cual puede afectar la capacidad de caminar, hablar e incluso comer. El impacto de un accidente cerebrovascular es distinto en cada persona, y lo mismo ocurre para downs recuperación. Un buen plan de nutrición es importante para la recuperación. También puede reducir el riesgo de sufrir otro accidente cerebrovascular.  Si tiene dificultad para masticar y tragar los alimentos, un nutricionista o downs equipo de atención especializado en accidentes cerebrovasculares puede ayudar para que usted pueda disfrutar de comer alimentos saludables.  ¿Cuáles son algunos consejos para seguir lisa plan?          Jennyfer las etiquetas  de los alimentos  · Elija alimentos que contengan menos de 300 miligramos (mg) de sodio por porción. Limite el consumo de sodio a menos de 1500 mg por día.  · Evite los alimentos que contengan grasas saturadas y grasas trans.  · Elija alimentos que tengan bajo contenido de colesterol. Limite la cantidad de colesterol que come por día a menos de 200 mg.  · Elija alimentos que nisreen ricos en fibra. Consuma entre 20 y 30 gramos (g) de fibra todos los días.  · Evite los alimentos con azúcar agregada. Revise la etiqueta de los alimentos para srikanth si contienen ingredientes vitaliy azúcar, jarabe de maíz, miel, fructosa, melaza y jugo de caña.  Al ir de compras  · En el supermercado, compre la mayoría de los alimentos en las áreas cercanas a las guaman del edificio. Friedens incluye:  ? Frutas y verduras frescas.  ? Cereales, frijoles, leisa secos y semillas.  ? Leisa de mar frescos, aves, jorden magras y huevos.  ? Productos lácteos descremados.  · Compre ingredientes frescos en lugar de alimentos preenvasados.  · Compre frutas y verduras de estación frescas en mercados de granjeros locales.  · Compre frutas y verduras congeladas en [facility] herm[facility]ticas.  Al cocinar  · Prepare los alimentos con muy poca sal. Use hierbas o especias sin sal en downs lugar.  · Cocine con aceites cardiosaludables, vitaliy newman, aguacate, canola, soja o girasol.  · Evite freír los alimentos. En downs lugar, hornee, cocine a la marcia o ase los alimentos.  · Retire la grasa visible y la piel de las jorden juares y de ave antes de comer.  · Modifique la textura de los alimentos vitaliy se lo haya indicado el médico.  Planificación de las comidas  · Consuma bora amplia variedad de frutas y verduras coloridas. Asegúrese de llenar la mitad del plato con frutas y verduras en cada comida.  · Consuma frutas y verduras con alto contenido de potasio, por ejemplo:  ? Manzanas, bananas, naranjas y melón.  ? Camotes, espinaca, calabacín y tomates.  · Coma pescados que  contengan grasas cardiosaludables (grasas omega-3) al menos dos veces por semana. Estos incluyen el salmón, el atún, la caballa y las naz.  · Consuma alimentos de origen vegetal ricos en grasas omega-3, vitaliy las semillas de zhen y las nueces. Agregue estos a los cereales, el yogur o los platos de pastas.  · Consuma varias porciones de alimentos ricos en fibra cada día, vitaliy frutas, verduras, cereales integrales y frijoles.  · No coloque la sal en la zimmerman para las comidas.  · Cuando coma en un restaurante:  ? Pregúntele al christine sobre las opciones de alimentos sin sal o con bajo contenido de sal.  ? Evite las comidas fritas. Busque ingredientes en el menú hechos a la marcia, al vapor, a las brasas o asados.  ? Pregunte si downs comida puede prepararse sin mantequilla.  ? Pida que los condimentos, vitaliy aderezos para ensaladas, jugos de carne o salsas, se sirvan al costado.  · Si tiene dificultad para tragar:  ? Elija los alimentos que nisreen más blandos y más fáciles de masticar y tragar.  ? Akira los alimentos en trozos pequeños y mastique bee antes de tragar.  ? Espese los líquidos según las indicaciones de downs médico o nutricionista.  ? Hágale saber a downs médico si downs afección no mejora con el tiempo. Es posible que deba consultar a un fonoaudiólogo para volver a entrenar los músculos que se usan para comer.  Recomendaciones generales  · Involucre a mark familiares y amigos en downs recuperación, de ser posible. Puede ser útil extender la hora de la comida y planificar comidas que incluyen alimentos que todas las personas de la bladimir puedan comer.  · Cepíllese los dientes con bora pasta dental con flúor dos veces por día y pásese el hilo dental bora vez por día. Mantener la boca limpia puede ayudarlo a tragar y también puede ayudar a downs apetito.  · Karissa suficiente agua todos los días para mantener la orina de color amarillo pálido. Si es necesario, establezca recordatorios o pida a mark familiares que lo ayuden a  recordar beber agua.  · Limite el consumo de alcohol a no más de 1 medida por día si es judy y no está embarazada, y a 2 medidas por día si es hombre. Xin medida equivale a 12 oz de cerveza, 5 oz de vino o 1½ oz de bebidas alcohólicas de silvia graduación.  Resumen  · Seguir lisa plan de alimentación puede ayudarlo en la recuperación de downs accidente cerebrovascular y puede disminuir downs riesgo de sufrir otro.  · Informe a downs médico si tiene problemas para tragar. Godwin vez deba consultar a un fonoaudiólogo.  Esta información no tiene vitaliy fin reemplazar el consejo del médico. Asegúrese de hacerle al médico cualquier pregunta que tenga.  Document Released: 03/30/2019 Document Revised: 03/30/2019 Document Reviewed: 03/30/2019  [employer] Patient Education © 2020 [employer]      Rehabilitación cognitiva después de un accidente cerebrovascular  Cognitive Rehabilitation After a Stroke  Después de un accidente cerebrovascular, puede tener diversos problemas con el pensamiento (discapacidad cognitiva). Los tipos de problemas que tenga dependerán de la gravedad del accidente cerebrovascular y de la ubicación en el cerebro. Estos problemas pueden ser:  · Problemas con la memoria de corto plazo.  · Dificultad para prestar atención.  · Dificultad para comunicarse o comprender el lenguaje (afasia).  · Disminución de la capacidad mental que puede interferir con la alma delia cotidiana (demencia).  · Dificultad para resolver problemas y procesar la información.  · Problemas para leer, escribir o realizar operaciones matemáticas.  · Problemas con la capacidad para planificar y realizar actividades en secuencia (función ejecutiva).  Estos problemas pueden parecer abrumadores. No obstante, con rehabilitación y tiempo para curar, muchas personas presentan mejoras en los síntomas.  ¿Cuáles son las causas de la discapacidad cognitiva?  El accidente cerebrovascular ocurre cuando la ebony no puede circular hasta determinadas regiones del  cerebro. Cuando esto sucede, se mueren las células cerebrales en las regiones afectadas, ya que no reciben el oxígeno y los nutrientes provenientes de la ebony. La causa de la discapacidad cognitiva es la muerte de las células en las regiones del cerebro que controlan el pensamiento.  ¿Qué es la rehabilitación cognitiva?  La rehabilitación cognitiva es un programa que ayuda a mejorar las habilidades del pensamiento después de un accidente cerebrovascular. La rehabilitación no puede revertir por completo los efectos de un accidente cerebrovascular, tri puede ayudarlo con las habilidades para la memoria, la resolución de problemas y la comunicación. El tratamiento se centra en lo siguiente:  · Mejorar la actividad encefálica. [place] puede implicar actividades, vitaliy aprender a dividir tareas en pasos simples.  · Ayudarlo a que aprenda maneras para sobrellevar los problemas con el pensamiento. Por ejemplo, podría aprender trucos de memoria o hacer actividades para estimular la memoria, vitaliy nombrar objetos o describir imágenes.  La rehabilitación cognitiva puede incluir lo siguiente:  · Terapia del habla y del lenguaje para ayudarlo a comprender y a usar el lenguaje para comunicarse.  · Terapia ocupacional para ayudarlo a realizar las actividades cotidianas.  · Musicoterapia para ayudarlo a aliviar el estrés, la ansiedad y la depresión. [place] podría incluir escuchar música, cantar o tocar instrumentos.  · Fisioterapia para ayudarlo a mejorar la capacidad para moverse y realizar acciones que involucren a los músculos (funciones motrices).  ¿Cuándo comenzará el tratamiento y dónde lo recibiré?  El médico decidirá cuándo es el mejor momento para comenzar el tratamiento. En algunos casos, las personas comienzan la rehabilitación tan pronto vitaliy downs estado de jane está estable, que puede ser en el plazo de 24 a 48 horas después del accidente cerebrovascular.  La rehabilitación se puede realizar en algunos lugares diferentes,  en función de mark necesidades. Se puede realizar en:  · El hospital o un centro de rehabilitación con hospitalización.  · Un centro de rehabilitación ambulatoria.  · Un centro de atención a anita plazo.  · Bora clínica de rehabilitación comunitaria.  · Downs casa.  ¿Cuáles son algunas de las herramientas para ayudar después de un accidente cerebrovascular?  Existen diversas herramientas y aplicaciones que puede usar en downs teléfono inteligente, computadora personal o tableta a fin de ayudar a mejorar la actividad encefálica. Entre ellas, se incluyen las siguientes:  · Recordatorios de calendario o aplicaciones de alarmas para ayudarlo con la memoria.  · Aplicaciones para charlie notas o bloc de bocetos para ayudarlo con la memoria o la comunicación.  · Aplicaciones de conversión de texto a voz que le permiten escuchar lo que está escrito, que lo ayuda con downs capacidad para comprender el texto.  · Diccionario ilustrado o aplicaciones de mensajes con imágenes para ayudarlo con la comunicación.  · Reproductores de libros electrónicos. En estos dispositivos se puede resaltar el texto mientras se avani en voz silvia, lo que ayuda con las habilidades para escuchar y leer.  ¿De qué manera mis amigos o familiares me pueden ayudar francie la rehabilitación?  Francie la recuperación, es importante que mark amigos y familiares lo ayuden a fin de lograr bora mayor independencia. Los cuidadores deben hablar con downs médico para saber cuál es la mejor manera de ayudarlo francie la recuperación. [employer] podría incluir trabajar en ejercicios de fonoaudiología o memoria en casa, o ayudar con las tareas y las obligaciones cotidianas.  Si tiene discapacidad cognitiva, es posible que corra riesgo de sufrir lesiones o accidentes en casa, vitaliy olvidarse de apagar el horno. Los amigos y familiares pueden ayudar a garantizar la seguridad en downs casa al charlie medidas, vitaliy adquirir electrodomésticos con funciones de cierre automático o almacenar los objetos  peligrosos en un lugar seguro.  ¿Qué más nathan saber sobre la rehabilitación cognitiva después de un accidente cerebrovascular?  Tener problemas con la memoria y dificultad para resolver problemas puede hacer que se sienta solo. Es posible que también tenga cambios en el estado de ánimo, ansiedad o depresión después de un accidente cerebrovascular. Es importante:  · Mantenerse conectado con otras personas a través de grupos sociales, grupos de apoyo en línea o la comunidad.  · Hablar con mark amigos, familiares y cuidadores sobre cualquier problema emocional que tenga.  · Asistir a terapia individual o grupal, según se lo aconseje el médico.  · Mantenerse físicamente activo y hacer ejercicio con la frecuencia que le sugiera el médico.  Resumen  · Después de un accidente cerebrovascular, algunas personas tienen problemas con el pensamiento que afectan la atención, la memoria, el lenguaje, la comunicación y la resolución de problemas.  · La rehabilitación cognitiva es un programa para ayudar a recuperar la actividad encefálica y adquirir habilidades a fin de sobrellevar los problemas con el pensamiento.  · La rehabilitación no puede revertir por completo los efectos de un accidente cerebrovascular, tri puede ayudar a mejorar la calidad de alma delia.  · La rehabilitación cognitiva puede incluir terapia del habla y del lenguaje, terapia ocupacional, musicoterapia y fisioterapia.  Esta información no tiene vitaliy fin reemplazar el consejo del médico. Asegúrese de hacerle al médico cualquier pregunta que tenga.  Document Released: 07/27/2018 Document Revised: 07/27/2018 Document Reviewed: 07/27/2018  Elsevier Patient Education © 2020 Elsevier Inc.      Accidente cerebrovascular isquémico  Ischemic Stroke    Un accidente cerebrovascular isquémico es la muerte súbita del tejido cerebral. La ebony transporta oxígeno a todas las zonas del cuerpo. Ivanna tipo de accidente cerebrovascular ocurre cuando el flujo sanguíneo hacia el  cerebro no es normal. El cerebro no puede recibir el oxígeno que necesita. Comfort es bora emergencia. Se debe tratar a la persona de inmediato.  Los síntomas de un accidente cerebrovascular, por lo general, ocurren de repente. Es posible que los advierta cuando se despierte. Pueden incluir los siguientes:  · Debilidad o pérdida de la sensibilidad en el jeremy, el brazo o la pierna. Comfort suele pasar de un solo lado del cuerpo.  · Dificultad para caminar.  · Dificultad para  los brazos o las piernas.  · Pérdida del equilibrio o de la coordinación.  · Sentirse confundido.  · Dificultad para hablar o comprender lo que las personas dicen.  · Hablar arrastrando las palabras.  · Dificultad para srikanth.  · Srikanth dos imágenes de un único objeto (diplopia).  · Sensación de mareo.  · Malestar estomacal (náuseas) y vómitos.  · Dolor de candace intenso sin razón aparente.  Obtenga ayuda apenas note cualquiera de estos problemas. Comfort es importante. Algunos tratamientos son más eficaces si se aplican de inmediato. Estos incluyen los siguientes:  · Aspirina.  · Medicamentos para controlar la presión arterial.  · Bora inyección de medicamentos para romper el coágulo de ebony.  · Tratamientos en los vasos sanguíneos (arterias) para eliminar el coágulo o romperlo.  Otros tratamientos pueden incluir los siguientes:  · Oxígeno.  · Recibir líquidos por un tubo (catéter) intravenoso.  · Medicamentos para diluir la ebony.  · Procedimientos para ayudar a que la ebony circule con mayor facilidad.  ¿Qué incrementa el riesgo?  Hay algunos factores que lo hacen más propenso a tener un accidente cerebrovascular. Algunos de estos factores los puede controlar, vitaliy por ejemplo:  · Tener mucho sobrepeso (obesidad).  · Consumo de cigarrillos.  · Usar anticonceptivos orales.  · Ser sedentario.  · Beber alcohol en exceso.  · Consumir drogas.  Otros factores de riesgo son los siguientes:  · Presión arterial silvia.  · Colesterol  alto.  · Diabetes.  · Enfermedad cardíaca.  · Ser afroamericano, norteamericano nativo, hispano o nativo de [place].  · Ser mayor de 60 años.  · Antecedentes familiares de accidente cerebrovascular.  · Tener antecedentes de coágulos de ebony, accidente cerebrovascular o accidente cerebrovascular de advertencia (accidente isquémico transitorio, AIT).  · Anemia drepanocítica.  · Ser judy con antecedentes de hipertensión arterial en el embarazo (preeclampsia).  · Cefalea migrañosa.  · Apnea del sueño.  · Tener latidos cardíacos irregulares (fibrilación auricular).  · Enfermedades a anita plazo (crónicas) que causan dolor e hinchazón (inflamación).  · Trastornos que afectan la coagulación de la ebony.  Siga estas indicaciones en downs casa:  Medicamentos  · [place] los medicamentos de venta alec y los recetados solamente vitaliy se lo haya indicado el médico.  · Si le indicaron que tome aspirinas u otros medicamentos para diluir la ebony, tómelos exactamente vitaliy se lo haya indicado el médico.  ? El exceso de estos medicamentos puede provocar bora hemorragia.  ? Si no maris la cantidad suficiente, es posible que no nisreen tan eficaces.  · Conozca los efectos secundarios de los medicamentos. Si maris anticoagulantes, asegúrese de lo siguiente:  ? Ejercer presión sobre las heridas por más tiempo que lo habitual.  ? Informe a downs dentista y a otros médicos que maris estos medicamentos.  ? Evite actividades que puedan causarle daños o lesiones en el cuerpo.  Comida y bebida  · Siga las indicaciones del médico respecto de lo que no puede comer o beber.  · Consuma alimentos saludables.  · Si tiene dificultades para tragar, clare lo siguiente para evitar ahogarse:  ? Coma de a porciones pequeñas.  ? Coma comidas blandas o en puré.  Seguridad  · Siga las indicaciones del equipo médico con respecto a la actividad física.  · Use un andador o un bastón según las indicaciones del médico.  · Clare de downs hogar un lugar seguro para evitar caídas.  "Puede incluir:  ? Hacer que profesionales inspeccionen downs casa para asegurarse de que sea jules.  ? Colocar barras para sostén en la habitación y el baño.  ? Elevar el inodoro.  ? Colocar un asiento en la ducha.  Indicaciones generales  · No use productos que contengan tabaco.  ? Estos incluyen cigarrillos, tabaco para mascar y cigarrillos electrónicos.  ? Si necesita ayuda para dejar de fumar, consulte al médico.  · Limite la cantidad de alcohol que consume. [place] significa no más de 1 medida por día si es judy y no está embarazada, y de 2 medidas por día si es hombre. Xin medida equivale a 12 oz de cerveza, 5 oz de vino o 1½ oz de bebidas alcohólicas de silvia graduación.  · Si necesita ayuda para dejar de consumir drogas o alcohol, pídale al médico que le recomiende un programa o que lo derive a un especialista.  · Manténgase activo. [place] ejercicios rosa vitaliy le indicó el médico.  · Concurra a todas las visitas de control vitaliy se lo haya indicado el médico. [place] es importante.  Solicite ayuda inmediatamente si:    · Tiene algún signo de accidente cerebrovascular. \"BE FAST\" es xin manera fácil de recordar las principales señales de advertencia:  ? B - Balance (equilibrio). Los signos son mareos, dificultad repentina para caminar o pérdida del equilibrio.  ? E - Eyes (ojos). Los signos son dificultad para srikanth o un cambio en la visión.  ? F - Face (jeremy). Las señales son debilidad repentina o pérdida de la sensibilidad en la brayan, o que la brayan o el párpado se caigan hacia un lado.  ? A - Arms (brazos). Los signos son debilidad o pérdida de la sensibilidad en un brazo. [place] sucede de repente y generalmente en un lado del cuerpo.  ? S - Speech (habla). Los signos son dificultad para hablar, hablar arrastrando las palabras o dificultad para comprender lo que la gente dice.  ? T - Time (tiempo). Es tiempo de llamar al servicio de emergencias. [place] la hora a la que comenzaron los síntomas.  · Presenta otros signos de " un accidente cerebrovascular, vitaliy los siguientes:  ? Dolor de candace repentino y muy intenso sin causa aparente.  ? Malestar estomacal (náuseas).  ? Ganas de devolver (vómitos).  ? Movimientos espasmódicos que no puede controlar (convulsiones).  Estos síntomas pueden indicar bora emergencia. No espere a srikanth si los síntomas desaparecen. Solicite atención médica de inmediato. Comuníquese con el servicio de emergencias de downs localidad (911 en los [place]). No conduzca por mark propios medios hasta el hospital.  Resumen  · Un accidente cerebrovascular isquémico es la muerte súbita del tejido cerebral.  · Los síntomas de un accidente cerebrovascular, por lo general, ocurren de repente. Es posible que los advierta cuando se despierte.  · Si tiene signos de un accidente cerebrovascular, obtenga ayuda de inmediato. [place] es importante. Algunos tratamientos son más eficaces si se aplican de inmediato.  Esta información no tiene vitaliy fin reemplazar el consejo del médico. Asegúrese de hacerle al médico cualquier pregunta que tenga.  Document Released: 12/06/2012 Document Revised: 08/02/2019 Document Reviewed: 08/02/2019  Elsevier Patient Education © 2020 Elsevier Inc.      Demencia vascular  Vascular Dementia  La demencia es bora afección por la cual la persona tiene problemas de pensamiento, memoria y conducta, que son lo suficientemente graves vitaliy para interferir en downs alma delia cotidiana. La demencia vascular es un tipo de demencia. Se debe a un daño en el cerebro causado por la falta de ebony que recibe lisa órgano. Esta afección también se conoce vitaliy deterioro cognitivo vascular.  ¿Cuáles son las causas?  La demencia vascular es causada por trastornos que disminuyen el flujo de ebony hacia el cerebro. Las causas más frecuentes de esta afección incluyen las siguientes:  · Diversos accidentes cerebrovasculares pequeños. Estos ocurren sin síntomas (accidente cerebrovascular silencioso).  · Accidente cerebrovascular  grave.  · Daño a los vasos sanguíneos pequeños del cerebro (enfermedad cerebral que afecta a los pequeños vasos sanguíneos del cerebro).  ¿Qué incrementa el riesgo?  Los siguientes factores pueden hacer que sea más propenso a desarrollar esta afección:  · [place] sufrido un accidente cerebrovascular.  · Tener presión arterial silvia (hipertensión) o colesterol alto.  · Tener bora enfermedad que afecta al corazón o a los vasos sanguíneos.  · Fumar.  · Tener diabetes.  · Tener síndrome metabólico.  · Ser vilma.  · Ser sedentario.  · Tener depresión.  · Ser mayor de 65 años.  ¿Cuáles son los signos o los síntomas?  Los síntomas pueden variar de bora persona a otra. Los síntomas pueden ser leves o graves según la proporción del daño y las partes del cerebro que fueron afectadas. Los síntomas pueden comenzar repentinamente o desarrollarse gradualmente.  Los síntomas mentales de la demencia vascular pueden incluir entre otros:  · Confusión.  · Problemas de memoria.  · Falta de atención y concentración.  · Dificultad para comprender el lenguaje.  · Depresión.  · Cambios en la personalidad.  · Problemas para reconocer a personas que conoce.  · Agitación o agresión.  · Paranoia.  · Delirios o alucinaciones.  Los síntomas físicos de la demencia vascular incluyen entre otros:  · Debilidad.  · Falta de equilibrio.  · Pérdida del control de la vejiga o los intestinos (incontinencia).  · Marcha insegura (forma de caminar).  · Problemas del habla.  Los síntomas conductuales de la demencia vascular incluyen entre otros:  · Perderse en lugares conocidos.  · Problemas de planificación y juicio.  · Dificultad para seguir instrucciones.  · Problemas sociales.  · Arrebatos emocionales.  · Dificultad para realizar las actividades cotidianas y de cuidado personal.  · Problemas para manejar el dinero.  Los síntomas pueden mantenerse estables o empeorar con el tiempo. Los síntomas de la demencia vascular pueden ser similares a los de la  enfermedad de Alzheimer. Las dos afecciones pueden presentarse juntas (demencia mixta).  ¿Cómo se diagnostica?  El médico deberá considerar mark antecedentes médicos y los síntomas o cambios informados por los amigos y la bladimir. El médico realizará un examen físico e indicará análisis de laboratorio u otros estudios para examinar el funcionamiento del cerebro y del sistema nervioso. Podrán solicitarle otros estudios, por ejemplo:  · Análisis de ebony.  · Estudios de diagnóstico por imágenes del cerebro.  · Pruebas de movimiento, habla y otras actividades diarias (examen neurológico).  · Pruebas de memoria, pensamiento y resolución de problemas (evaluaciones neurosicológicas o neurocognitivas).  No hay broa prueba específica para diagnosticar la demencia vascular. El diagnóstico puede involucrar a varios especialistas. Estos pueden incluir:  · Un médico que se especializa en las enfermedades del cerebro y el sistema nervioso central (neurólogo).  · Un médico que se especializa en comprender cómo los problemas en el cerebro pueden alterar la conducta y la función cognitiva (neuropsicólogo).  ¿Cómo se trata?  La demencia vascular no tiene neal. No se puede revertir el daño cerebral que se haya producido. El tratamiento depende de lo siguiente:  · La gravedad de la enfermedad.  · Las partes del cerebro afectadas.  · Quispe estado de jane general.  El tratamiento está orientado a lo siguiente:  · Tratar la causa preexistente de la demencia vascular y controlar los factores de riesgos. Puede incluir:  ? Controlar la presión arterial.  ? Reducir el colesterol.  ? Tratar la diabetes.  ? Dejar de fumar.  ? Bajar de peso o mantener un peso saludable.  ? Siga bora dieta saludable y equilibrada.  ? Practicar actividad física con regularidad.  · Controlar los síntomas.  · Evitar un daño cerebral mayor.  · Mejorar la jane y la calidad de alma delia de la persona.  Varios especialistas pueden estar involucrados en el tratamiento de la  demencia, por ejemplo:  · Un neurólogo.  · Un médico que se especializa en los trastornos de la mente (psiquiatra).  · Un médico que se especializa en ayudar a las personas a aprender las habilidades de la alma delia diaria (terapeuta ocupacional).  · Un médico que se centra en los cambios en el habla y el lenguaje (patólogo del habla).  · Un especialista en corazón (cardiólogo).  · Un médico que ayuda a las personas a aprender a controlar los cambios físicos, vitaliy los movimientos y caminar (fisiólogo del ejercicio o fisioterapeuta).  Siga estas instrucciones en downs casa:  Estilo de alma delia    Las personas con demencia vascular pueden necesitar ayuda de manera regular en el hogar o la atención diaria de un miembro de la bladimir o un trabajador de atención médica en el hogar.  El cuidado en el hogar para bora persona con demencia vascular depende de la causa de la afección y de la gravedad de los síntomas. Las pautas generales para los cuidadores incluyen:  · Ayudar a la persona con demencia a recordar gente, citas y actividades cotidianas.  · Ayudar a la persona con demencia a administrar mark medicamentos.  · Ayudar a la bladimir y a los amigos a aprender maneras de comunicarse con bora persona que tiene demencia.  · Crear un lugar seguro para vivir a fin de reducir el riesgo de lesiones o caídas.  · Encontrar un srinivas de apoyo que ayude a los cuidadores y familiares a enfrentar los efectos de la demencia.    Instrucciones generales  · Ayudar a la persona a charlie los medicamentos de venta alec y los recetados solamente vitaliy se lo haya indicado el médico.  · Seguir las indicaciones del médico para tratar la afección que causó la demencia.  · Asegurarse de que la persona cumpla con todas las visitas de seguimiento vitaliy se lo haya indicado el médico. [place] es importante.  Comuníquese con un médico si:  · Comienza a tener fiebre.  · Se manifiestan nuevos problemas de conducta.  · Tiene dificultad para tragar.  · La confusión  empeora.  · La somnolencia empeora.  Solicite ayuda inmediatamente si:  · Pierde la conciencia.  · Tiene bora pérdida repentina del habla, el equilibrio o la capacidad de pensar.  · Se le paraliza o entumece alguna parte del cuerpo.  · Padece un dolor de candace repentino e intenso.  · Pierde la visión o la visión de carol o ambos ojos empeora repentinamente.  Resumen  · La demencia vascular es un tipo de demencia. Se debe a un daño en el cerebro causado por la falta de ebony que recibe lisa órgano.  · La demencia vascular es causada por trastornos que disminuyen el flujo de ebony hacia el cerebro. Las causas frecuentes de esta afección incluyen accidente cerebrovascular y daño a los vasos sanguíneos pequeños del cerebro.  · El tratamiento se centra en tratar la causa subyacente de la demencia vascular y controlar cualquier factor de riesgo.  · Las personas con demencia vascular pueden necesitar ayuda de manera regular en el hogar o la atención diaria de un miembro de la bladimir o un trabajador de atención médica en el hogar.  · Comuníquese con downs médico si usted o downs cuidador observan cualquier síntoma nuevo.  Esta información no tiene vitaliy fin reemplazar el consejo del médico. Asegúrese de hacerle al médico cualquier pregunta que tenga.  Document Released: 10/08/2014 Document Revised: 11/04/2019 Document Reviewed: 11/04/2019  Elsevier Patient Education © 2020 Elsevier Inc.    Hipertensión en los adultos  Hypertension, Adult  El término hipertensión es otra forma de denominar a la presión arterial elevada. La presión arterial elevada fuerza al corazón a trabajar más para bombear la ebony. [place] puede causar problemas con el paso del tiempo.  Bora lectura de presión arterial está compuesta por 2 números. Hay un número superior (sistólico) sobre un número inferior (diastólico). Lo ideal es tener la presión arterial por debajo de 120/80. Las elecciones saludables pueden ayudar a bajar la presión arterial, o rosa vez  necesite medicamentos para bajarla.  ¿Cuáles son las causas?  Se desconoce la causa de esta afección. Algunas afecciones pueden estar relacionadas con la presión arterial silvia.  ¿Qué incrementa el riesgo?  · Fumar.  · Tener diabetes mellitus tipo 2, colesterol alto, o ambos.  · No hacer la cantidad suficiente de actividad física o ejercicio.  · Tener sobrepeso.  · Consumir mucha grasa, azúcar, calorías o sal (sodio) en downs dieta.  · Beber alcohol en exceso.  · Tener bora enfermedad renal a anita plazo (crónica).  · Tener antecedentes familiares de presión arterial silvia.  · Edad. Los riesgos aumentan con la edad.  · [place]. El riesgo es mayor para las personas afroamericanas.  · Sexo. Antes de los 45 años, los hombres corren más riesgo que las mujeres. Después de los 65 años, las mujeres corren más riesgo que los hombres.  · Tener apnea obstructiva del sueño.  · Estrés.  ¿Cuáles son los signos o los síntomas?  · Es posible que la presión arterial silvia puede no cause síntomas. La presión arterial muy silvia (crisis hipertensiva) puede provocar:  ? Dolor de candace.  ? Sensaciones de preocupación o nerviosismo (ansiedad).  ? Falta de aire.  ? Hemorragia nasal.  ? Sensación de malestar en el estómago (náuseas).  ? Vómitos.  ? Cambios en la forma de srikanth.  ? Dolor muy intenso en el pecho.  ? Convulsiones.  ¿Cómo se trata?  · Esta afección se trata haciendo cambios saludables en el estilo de alma delia, por ejemplo:  ? Consumir alimentos saludables.  ? Hacer más ejercicio.  ? Beber menos alcohol.  · El médico puede recetarle medicamentos si los cambios en el estilo de alma delia no son suficientes para lograr controlar la presión arterial y si:  ? El número de arriba está por encima de 130.  ? El número de abajo está por encima de 80.  · Downs presión arterial personal ideal puede variar.  Siga estas instrucciones en downs casa:  Comida y bebida    · Si se lo dicen, siga el plan de alimentación de DASH (Dietary Approaches to Stop Hypertension,  Maneras de alimentarse para detener la hipertensión). Para seguir lisa plan:  ? Llene la mitad del plato de cada comida con frutas y verduras.  ? Llene un cuarto del plato de cada comida con cereales integrales. Los cereales integrales incluyen pasta integral, arroz integral y pan integral.  ? Coma y yelena productos lácteos con bajo contenido de grasa, vitaliy leche descremada o yogur bajo en grasas.  ? Llene un cuarto del plato de cada comida con proteínas bajas en grasa (magras). Las proteínas bajas en grasa incluyen pescado, pam sin piel, huevos, frijoles y tofu.  ? Evite consumir carne grasa, carne curada y procesada, o pam con piel.  ? Evite consumir alimentos prehechos o procesados.  · Consuma menos de 1500 mg de sal por día.  · No yelena alcohol si:  ? El médico le indica que no lo clare.  ? Está embarazada, puede estar embarazada o está tratando de quedar embarazada.  · Si henok alcohol:  ? Limite la cantidad que henok a lo siguiente:  § De 0 a 1 medida por día para las mujeres.  § De 0 a 2 medidas por día para los hombres.  ? Esté atento a la cantidad de alcohol que hay en las bebidas que maris. En los [place], bora medida equivale a bora botella de cerveza de 12 oz (355 ml), un vaso de vino de 5 oz (148 ml) o un vaso de bora bebida alcohólica de silvia graduación de 1½ oz (44 ml).  Estilo de alma delia    · Trabaje con downs médico para mantenerse en un peso saludable o para perder peso. Pregúntele a downs médico cuál es el peso recomendable para usted.  · [place] al menos 30 minutos de ejercicio la mayoría de los días de la semana. Estos pueden incluir caminar, nadar o andar en bicicleta.  · Realice al menos 30 minutos de ejercicio que fortalezca mark músculos (ejercicios de resistencia) al menos 3 días a la semana. Estos pueden incluir levantar pesas o hacer [place].  · No consuma ningún producto que contenga nicotina o tabaco, vitaliy cigarrillos, cigarrillos electrónicos y tabaco de mascar. Si necesita ayuda para dejar de  fumar, consulte al médico.  · Controle downs presión arterial en downs casa rosa vitaliy le indicó el médico.  · Concurra a todas las visitas de seguimiento vitaliy se lo haya indicado el médico. [place] es importante.  Medicamentos  · [place] los medicamentos de venta alec y los recetados solamente vitaliy se lo haya indicado el médico. Siga cuidadosamente las indicaciones.  · No omita las dosis de medicamentos para la presión arterial. Los medicamentos pierden eficacia si omite dosis. El hecho de omitir las dosis también aumenta el riesgo de otros problemas.  · Pregúntele a downs médico a qué efectos secundarios o reacciones a los medicamentos debe prestar atención.  Comuníquese con un médico si:  · Piensa que tiene bora reacción a los medicamentos que está tomando.  · Tiene chan de candace frecuentes (recurrentes).  · Se siente mareado.  · Tiene hinchazón en los tobillos.  · Tiene problemas de visión.  Solicite ayuda inmediatamente si:  · Siente un dolor de candace muy intenso.  · Empieza a sentirse desorientado (confundido).  · Se siente débil o adormecido.  · Siente que va a desmayarse.  · Tiene un dolor muy intenso en las siguientes zonas:  ? Pecho.  ? Vientre (abdomen).  · Vomita más de bora vez.  · Tiene dificultad para respirar.  Resumen  · El término hipertensión es otra forma de denominar a la presión arterial elevada.  · La presión arterial elevada fuerza al corazón a trabajar más para bombear la ebony.  · Para la mayoría de las personas, bora presión arterial normal es keith que 120/80.  · Las decisiones saludables pueden ayudarle a disminuir downs presión arterial. Si no puede bajar downs presión arterial mediante decisiones saludables, es posible que deba charlie medicamentos.  Esta información no tiene vitaliy fin reemplazar el consejo del médico. Asegúrese de hacerle al médico cualquier pregunta que tenga.  Document Released: 06/07/2011 Document Revised: 10/03/2019 Document Reviewed: 10/03/2019  Elsevier Patient Education © 2020  [employer]      Depresión - Adultos  (Depression, Adult)  La depresión es un sentimiento de tristeza, decaimiento, sufrimiento espiritual, melancolía, pesimismo o vacío. Hay dos tipos de depresión:  · Tristeza o aflicción normal. Ocurre después de un suceso que provoca malestar. Generalmente desaparece sin tratamiento dentro de las 2 semanas. Después de perder un ser querido (duelo), la tristeza o aflicción normales pueden durar más de dos semanas. Generalmente mejora al pasar el tiempo.  · Depresión clínica. Dura más que la tristeza o aflicción normal. Impide realizar las cosas habituales de la alma delia. Causa dificultades para actuar en el hogar, el trabajo o la escuela. Puede afectar las relaciones con los demás. A menudo requiere tratamiento.  SOLICITE AYUDA DE INMEDIATO SI:  · Tiene pensamientos acerca de lastimarse o dañar a otras personas.  · Pierde el contacto con la realidad (síntomas psicóticos). Puede ocurrirle que:  ¨ [place] o escuche cosas que no existen.  ¨ Tenga creencias falsas sobre downs alma delia o sobre las personas que lo rodean.  · Los medicamentos le produzcan trastornos.  ASEGÚRESE DE QUE:  · Comprende estas instrucciones.  · Controlará downs afección.  · Recibirá ayuda de inmediato si no mejora o si empeora.     Esta información no tiene vitaliy fin reemplazar el consejo del médico. Asegúrese de hacerle al médico cualquier pregunta que tenga.     Document Released: 04/03/2012 Document Revised: 01/08/2016  [employer] Interactive Patient Education ©2016 [employer]        Amlodipine tablets  ¿Qué es lisa medicamento?  La AMLODIPINA es un bloqueador de los johnson de calcio. Afecta la cantidad de calcio en las células del corazón y de los músculos. [place] produce bora relajación de los vasos sanguíneos, lo que puede reducir la carga de trabajo del corazón. Lisa medicamento reduce la silvia presión sanguínea. Además se utiliza para prevenir el dolor en el pecho.  Lisa medicamento puede ser utilizado para otros usos;  si tiene alguna pregunta consulte con downs proveedor de atención médica o con downs farmacéutico.  MARCAS COMUNES: Norvasc  ¿Qué le nathan informar a mi profesional de la jane antes de charlie lisa medicamento?  Necesitan saber si usted presenta alguno de los siguientes problemas o situaciones:  enfermedad cardiaca enfermedad hepática bora reacción alérgica o inusual al amlodipino, a otros medicamentos, alimentos, colorantes o conservantes si está embarazada o buscando quedar embarazada si está amamantando a un bebé  ¿Cómo nathan utilizar lisa medicamento?  [place] lisa medicamento por vía oral con un vaso de agua. Siga las instrucciones de la etiqueta del medicamento. Puede tomarlo con o sin alimentos. Si el medicamento le produce malestar estomacal, tómelo con alimentos. Use downs medicamento a intervalos regulares. No lo use con bora frecuencia mayor a la indicada. No deje de usarlo, excepto si así lo indica downs médico.  Hable con downs pediatra para informarse acerca del uso de lisa medicamento en niños. Aunque lisa medicamento se puede recetar a niños tan pequeños vitaliy de 6 años de edad para ciertas afecciones, existen precauciones que deben tomarse.  Los pacientes mayores de 65 años de edad pueden presentar reacciones más harpal y necesitar dosis menores.  Sobredosis: Póngase en contacto inmediatamente con un centro toxicológico o bora brett de urgencia si usted jamil que haya tomado demasiado medicamento.  ATENCIÓN: Lisa medicamento es solo para usted. No comparta lisa medicamento con nadie.  ¿Qué sucede si me olvido de bora dosis?  Si olvida bora dosis, tómela lo antes posible. Si es ximena la hora de la próxima dosis, tome sólo angel dosis. No tome dosis adicionales o dobles.  ¿Qué puede interactuar con lisa medicamento?  No use lisa medicamento con ninguno de los siguientes fármacos:  tranilcipromina  Lisa medicamento también puede interactuar con los siguientes fármacos:  claritromicina ciclosporina diltiazem itraconazol simvastatina  tacrolimús  Puede ser que esta lista no menciona todas las posibles interacciones. Informe a downs profesional de la jane de todos los productos a base de hierbas, medicamentos de venta alec o suplementos nutritivos que esté tomando. Si usted fuma, consume bebidas alcohólicas o si utiliza drogas ilegales, indíqueselo también a downs profesional de la jane. Algunas sustancias pueden interactuar con downs medicamento.  ¿A qué nathan estar atento al usar lisa medicamento?  Visite a downs profesional de la jane para que revise regularmente downs evolución. Revise downs presión sanguínea vitaliy se le indique. Pregunte a downs profesional de la jane cuál debe ser downs presión sanguínea y cuándo deberá contactarlo.  No se trate usted mismo si tiene tos, resfrío o dolor mientras esté usando lisa medicamento sin consultar con downs profesional de la jane. Algunos medicamentos pueden aumentar downs presión sanguínea.  Puede experimentar mareos. No conduzca, no utilice maquinaria ni sharan nada que le exija permanecer en estado de alerta hasta que sepa cómo le afecta lisa medicamento. No se siente ni se ponga de pie con rapidez, especialmente si es un paciente de edad avanzada. [place] reduce el riesgo de mareos o desmayos. Evite consumir bebidas alcohólicas; pueden hacer que se sienta más mareado.  ¿Qué efectos secundarios puedo tener al utilizar lisa medicamento?  Efectos secundarios que debe informar a downs médico o a downs profesional de la jane tan pronto vitaliy sea posible:  reacciones alérgicas, vitaliy erupción cutánea, comezón/picazón o urticaria; hinchazón de la brayan, los labios o la lengua ritmo cardiaco rápido, irregular signos y síntomas de presión sanguínea baja, tales vitaliy mareos, sensación de desmayo o aturdimiento, caídas, cansancio o debilidad inusual hinchazón de tobillos, pies, danilo  Efectos secundarios que generalmente no requieren atención médica (debe informarlos a downs médico o a downs profesional de la jane si persisten o si son molestos):  boca  seca enrojecimiento del jeremy dolor de candace dolor estomacal cansancio  Puede ser que esta lista no menciona todos los posibles efectos secundarios. Comuníquese a downs médico por asesoramiento médico sobre los efectos secundarios. [place] puede informar los efectos secundarios a la FDA por teléfono al 1-800-[facility]-3073.  ¿Dónde nathan guardar mi medicina?  Mantenga fuera del alcance de los niños.  Guarde a temperatura ambiente, entre 59 y 86 grados (15 y 30 grados Celsius).  Deseche todo el medicamento que no haya utilizado después de la fecha de vencimiento.  ATENCIÓN: Lisa folleto es un resumen. Puede ser que no cubra toda la posible información. Si usted tiene preguntas acerca de esta medicina, consulte con downs médico, downs farmacéutico o downs profesional de la jane.  © 2020 Elsevier/Gold Standard (2019-10-03 00:0        Atorvastatin tablets  ¿Qué es lisa medicamento?  La ATORVASTATINA se conoce vitaliy un inhibidor de la HMG-CoA reductasa o 'estatina'. Disminuye el nivel de colesterol y triglicéridos en la ebony. Lisa fármaco también puede reducir el riesgo de ataque cardiaco, derrame cerebral, u otros problemas de jane en pacientes con factores de riesgo para enfermedad cardiaca. Lisa fármaco se usa con frecuencia en combinación con bora dieta y cambios en el estilo de alma delia.  Lisa medicamento puede ser utilizado para otros usos; si tiene alguna pregunta consulte con downs proveedor de atención médica o con downs farmacéutico.  MARCAS COMUNES: Lipitor  ¿Qué le nathan informar a mi profesional de la jane antes de charlie lisa medicamento?  Necesitan saber si usted presenta alguno de los siguientes problemas o situaciones:  diabetes si henok alcohol con frecuencia antecedentes de accidente cerebrovascular enfermedad renal enfermedad hepática chan o debilidades musculares enfermedad tiroidea bora reacción alérgica o inusual a la atorvastatina, a otros medicamentos, alimentos, colorantes o conservantes si está embarazada o buscando  quedar embarazada si está amamantando a un bebé  ¿Cómo nathan utilizar lisa medicamento?  [place] lisa medicamento por vía oral con un vaso de agua. Siga las instrucciones de la etiqueta del medicamento. Puede tomarlo con o sin alimentos. Si el medicamento le produce malestar estomacal, tómelo con alimentos. No lo tome con jugo de toronja. [place] downs medicamento a intervalos regulares. No lo tome con bora frecuencia mayor a la indicada. No deje de tomarlo, excepto si así lo indica downs médico.  Hable con downs pediatra para informarse acerca del uso de lisa medicamento en niños. Aunque lisa medicamento se puede recetar a niños tan pequeños vitaliy de 10 años de edad con ciertas afecciones, existen precauciones que deben tomarse.  Sobredosis: Póngase en contacto inmediatamente con un centro toxicológico o bora brett de urgencia si usted jamil que haya tomado demasiado medicamento.  ATENCIÓN: Lisa medicamento es solo para usted. No comparta lisa medicamento con nadie.  ¿Qué sucede si me olvido de bora dosis?  Si olvida bora dosis, tómela lo antes posible. Si debe charlie downs próxima dosis en menos de 12 horas, entonces no tome la dosis que olvidó. [place] la próxima dosis a la hora habitual. No tome dosis adicionales o dobles.  ¿Qué puede interactuar con lisa medicamento?  No use lisa medicamento con ninguno de los siguientes fármacos:  dasabuvir; ombitasvir; paritaprevir; ritonavir ombitasvir; paritaprevir; ritonavir posaconazol arroz de levadura violeta  Lisa medicamento también puede interactuar con los siguientes fármacos:  alcohol píldoras anticonceptivas ciertos antibióticos, tales vitaliy eritromicina y claritromicina ciertos medicamentos antivirales para VIH o hepatitis ciertos medicamentos para el colesterol, tales vitaliy fenofibrato, gemfibrozil y niacina ciertos medicamentos para infecciones micóticas, tales vitaliy itraconazol y ketoconazol colchicina ciclosporina digoxina jugo de toronja (pomelo) rifampicina  Puede ser que esta lista no  menciona todas las posibles interacciones. Informe a downs profesional de la jane de todos los productos a base de hierbas, medicamentos de venta alec o suplementos nutritivos que esté tomando. Si usted fuma, consume bebidas alcohólicas o si utiliza drogas ilegales, indíqueselo también a downs profesional de la jane. Algunas sustancias pueden interactuar con donws medicamento.  ¿A qué nathan estar atento al usar ivanna medicamento?  Visite a downs médico o a downs profesional de la jane para revisar downs evolución periódicamente. Es posible que necesite realizarse pruebas periódicamente para asegurarse de que el hígado esté funcionando en forma correcta.  Downs profesional de la jane puede indicarle que deje de charlie ivanna medicamento si desarrolla problemas musculares. Si mark problemas musculares no desaparecen después de dejar de charlie ivanna medicamento, contacte a downs profesional de la jane.  No debe quedar embarazada mientras esté tomando ivanna medicamento. Las mujeres deben informar a downs profesional de la jane si están buscando quedar embarazadas o si creen que podrían estar embarazadas. Existe la posibilidad de efectos secundarios graves en un bebé sin nacer. Para obtener más información, hable con downs profesional de la jane o downs farmacéutico. No debe amamantar a un bebé mientras esté tomando ivanna medicamento.  Ivanna medicamento podría afectar los niveles de azúcar en la ebony. Si tiene diabetes, consulte a downs médico o a downs profesional de la jane antes de cambiar dwons dieta o la dosis de downs medicamento para la diabetes.  Si va a someterse a bora operación o a otro procedimiento, informe a downs médico que está usando ivanna medicamento.  Ivanna fármaco es solo parte de un programa completo para jane cardiaca. Downs médico o un dietista puede sugerir bora dieta baja en colesterol y en grasas para ayudar. Evite beber alcohol y fumar, y siga un programa de ejercicio físico adecuado.  Ivanna medicamento puede causar bora disminución de la Co-enzima  Q-10. Debe asegurarse de recibir suficiente Co-enzima Q-10 mientras maris lisa medicamento. [place] con downs profesional de la jane sobre los alimentos que come y las vitaminas que maris.  ¿Qué efectos secundarios puedo tener al utilizar lisa medicamento?  Efectos secundarios que debe informar a downs médico o a downs profesional de la jane tan pronto vitaliy sea posible:  reacciones alérgicas, vitaliy erupción cutánea, comezón/picazón o urticaria, e hinchazón de la brayan, los labios o la lengua fiebre dolor en las articulaciones pérdida de memoria enrojecimiento, formación de ampollas, descamación o distensión de la piel, incluso dentro de la boca signos y síntomas de lesión al hígado, vitaliy orina amarilla oscura o marrón; sensación general de estar enfermo o síntomas gripales; dolor leve en la región abdominal; cansancio o debilidad inusuales; color amarillento de los ojos o la piel signos y síntomas de lesión muscular vitaliy orina amarillo oscuro; problemas para orinar o cambios en la cantidad de orina; debilidad o cansancio inusual; dolor muscular o dolor en la espalda o en el costado  Efectos secundarios que generalmente no requieren atención médica (infórmelos a downs médico o a downs profesional de la jane si persisten o si son molestos):  diarrea náuseas dolor estomacal dificultad para conciliar el sueño malestar estomacal  Puede ser que esta lista no menciona todos los posibles efectos secundarios. Comuníquese a downs médico por asesoramiento médico sobre los efectos secundarios. Usted puede informar los efectos secundarios a la FDA por teléfono al 3-425-FDA-[phone].  ¿Dónde nathan guardar mi medicina?  [place] fuera del alcance de los niños.  Guarde a bora temperatura de entre 20 y 25 grados Celsius (68 y 77 grados Fahrenheit). Deseche todo el medicamento que no haya utilizado después de la fecha de vencimiento.  ATENCIÓN: Lisa folleto es un resumen. Puede ser que no cubra toda la posible información. Si usted tiene preguntas acerca de  esta medicina, consulte con downs médico, downs farmacéutico o downs profesional de la jane.  © 2020 Elsevier/Gold Standard (2019-10-03 00:00:00)      Clopidogrel tablets  ¿Qué es ivanna medicamento?  El CLOPIDOGREL ayuda a prevenir la formación de coágulos sanguíneos. Ivanna medicamento se utiliza para prevenir ataques cardiacos, derrames cerebrales u otros casos vasculares en ciertas personas de alto riesgo.  Ivanna medicamento puede ser utilizado para otros usos; si tiene alguna pregunta consulte con downs proveedor de atención médica o con downs farmacéutico.  MARCAS COMUNES: Plavix  ¿Qué le nathan informar a mi profesional de la jane antes de charlie ivanna medicamento?  Necesitan saber si usted presenta alguno de los siguientes problemas o situaciones:  trastornos de sangrado hemorragia cerebral realización de cirugías antecedentes de hemorragia estomacal bora reacción alérgica o inusual al clopidogrel, a otros medicamentos, alimentos, colorantes o conservantes si está embarazada o buscando quedar embarazada si está amamantando a un bebé  ¿Cómo nathan utilizar ivanna medicamento?  [place] ivanna medicamento por vía oral con un vaso de agua. Siga las instrucciones de la etiqueta del medicamento. Ivanna medicamento se puede charlie con o sin alimentos. Si el medicamento le produce malestar estomacal, tómelo con alimentos. [place] downs medicamento a intervalos regulares. No lo tome con bora frecuencia mayor a la indicada. No deje de tomarlo, excepto si así lo indica downs médico.  Downs farmacéutico le dará bora Guía del medicamento especial (MedGuide, nombre en inglés) con cada receta y en cada ocasión que la vuelva a surtir. Asegúrese de leer esta información cada vez cuidadosamente.  Hable con downs pediatra para informarse acerca del uso de ivanna medicamento en niños. Puede requerir atención especial.  Sobredosis: Póngase en contacto inmediatamente con un centro toxicológico o bora brett de urgencia si usted jamil que haya tomado demasiado medicamento.  ATENCIÓN:  Ivanna medicamento es solo para usted. No comparta ivanna medicamento con nadie.  ¿Qué sucede si me olvido de bora dosis?  Si olvida bora dosis, tómela lo antes posible. Si es ximena la hora de la próxima dosis, tome sólo angel dosis. No tome dosis adicionales o dobles.  ¿Qué puede interactuar con ivanna medicamento?  No tome esta medicina con los siguientes medicamentos:  dasabuvir; ombitasvir; paritaprevir; ritonavir defibrotida selexipag  Ivanna medicamento también puede interactuar con los siguientes fármacos:  ciertos medicamentos que tratan o previenen coágulos sanguíneos, vitaliy warfarina medicamentos narcóticos para el dolor SEEMA, medicamentos para el dolor y la inflamación, tales vitaliy ibuprofeno o naproxeno repaglinida IRSN, medicamentos para la depresión, tales vitaliy desvenlafaxina, duloxetina, levomilnaciprán, venlafaxina ISRS, medicamentos para la depresión, tales vitaliy citaloprám, escitaloprám, fluoxetina, fluvoxamina, paroxetina, sertralina bloqueadores del ácido del estómago, tales vitaliy cimetidina, esomeprazol u omeprazol  Puede ser que esta lista no menciona todas las posibles interacciones. Informe a downs profesional de la jane de todos los productos a base de hierbas, medicamentos de venta alec o suplementos nutritivos que esté tomando. Si usted fuma, consume bebidas alcohólicas o si utiliza drogas ilegales, indíqueselo también a downs profesional de la jane. Algunas sustancias pueden interactuar con downs medicamento.  ¿A qué nathan estar atento al usar ivanna medicamento?  Visite a downs médico o a downs profesional de la jane para que revise downs evolución periódicamente. No deje de charlie downs medicamento a menos que se lo indique downs médico.  Notifique a downs médico o a downs profesional de la jane y busque tratamiento médico de emergencia si desarrolla problemas respiratorios; cambios en la visión; dolor en el pecho; dolor de candace repentino e intenso; dolor, hinchazón, calor en la pierna; problemas para hablar; entumecimiento o  debilidad repentina en el jeremy, el brazo o la pierna. Estos pueden ser signos de que downs afección ha empeorado.  Si le van a realizar bora cirugía o trabajo dental, informe a downs médico o profesional de la jane que está tomando lisa medicamento.  Ciertos factores genéticos pueden reducir el efecto de lisa medicamento. Es posible que downs médico use pruebas genéticas para determinar el tratamiento.  [place] aspirina solamente si le indican hacerlo. Se usan dosis bajas de aspirina con lisa medicamento para tratar algunas afecciones. [place] aspirina con lisa medicamento puede aumentar downs riesgo de sangrado y por lo tanto debe ser cuidadoso. Hable con downs médico o downs farmacéutico si tiene alguna pregunta.  ¿Qué efectos secundarios puedo tener al utilizar lisa medicamento?  Efectos secundarios que debe informar a downs médico o a downs profesional de la jane tan pronto vitaliy sea posible:  reacciones alérgicas, vitaliy erupción cutánea, comezón/picazón o urticaria, e hinchazón de la brayan, los labios o la lengua signos y síntomas de sangrado, tales vitaliy heces con ebony o de color donald y aspecto alquitranado; orina de color milton o marrón oscuro; escupir ebony o material marrón que tiene el aspecto de granos de café molido; manchas juares en la piel; sangrado o moretones inusuales en los ojos, las encías o la nariz signos y síntomas de un coágulo sanguíneo, tales vitaliy problemas respiratorios; cambios en la visión; dolor en el pecho; dolor de candace grave, repentino; dolor, hinchazón, calor en la pierna; dificultad para hablar; entumecimiento o debilidad repentina de la brayan, el brazo o la pierna signos y síntomas de niveles bajos de azúcar en la ebony, tales vitaliy ansiedad; confusión; mareos; aumento del apetito; debilidad o cansancio inusuales; aumento de la sudoración; temblores; piel fría y sudorosa; irritabilidad; dolor de candace; visión borrosa; ritmo cardiaco rápido; pérdida del conocimiento  Efectos secundarios que generalmente no  requieren atención médica (infórmelos a downs médico o a downs profesional de la jane si persisten o si son molestos):  estreñimiento diarrea dolor de candace malestar estomacal  Puede ser que esta lista no menciona todos los posibles efectos secundarios. Comuníquese a downs médico por asesoramiento médico sobre los efectos secundarios. Usted puede informar los efectos secundarios a la FDA por teléfono al [phone].  ¿Dónde nathan guardar mi medicina?  [place] fuera del alcance de los niños.  Guárdela a temperatura ambiente, entre 15 y 30 grados C (59 y 86 grados F). Deseche todo el medicamento que no haya utilizado, después de la fecha de vencimiento.  ATENCIÓN: Ivanna folleto es un resumen. Puede ser que no cubra toda la posible información. Si usted tiene preguntas acerca de esta medicina, consulte con downs médico, downs farmacéutico o downs profesional de la jane.  © 2020 Elsevier/Gold Standard (2019-07-30 00:00:00)      Escitalopram tablets  ¿Qué es ivanna medicamento?  El ESCITALOPRAM se utiliza para el tratamiento de la depresión y ciertos tipos de ansiedad.  Ivanna medicamento puede ser utilizado para otros usos; si tiene alguna pregunta consulte con downs proveedor de atención médica o con downs farmacéutico.  MARCAS COMUNES: Lexapro  ¿Qué le nathan informar a mi profesional de la jane antes de charlie ivanna medicamento?  Necesita saber si usted presenta alguno de los siguientes problemas o situaciones:  · trastorno bipolar o antecedentes familiares del trastorno bipolar  · diabetes  · glaucoma  · enfermedad cardiaca  · enfermedad renal o hepática  · recibe tratamiento electroconvulsivo  · convulsiones  · ideas suicidas, planes o si usted o alguien de downs bladimir ha intentado un suicidio previo  · bora reacción alérgica o inusual al escitalopram, al medicamento relacionado citalopram, a otros medicamentos, alimentos, colorantes o conservadores  · si está embarazada o buscando quedar embarazada  · si está amamantando a un bebé  ¿Cómo  nathan utilizar lisa medicamento?  [place] lisa medicamento por vía oral con un vaso de agua. Siga las instrucciones de la etiqueta del medicamento. Puede tomarlo con o sin alimentos. Si el medicamento le produce malestar estomacal, tómelo con alimentos. [place] downs medicamento a intervalos regulares. No lo tome con bora frecuencia mayor a la indicada. No deje de charlie lisa medicamento de repente a menos que así lo indique downs médico. Dejar de utilizar lisa medicamento demasiado rápido puede causar efectos secundarios graves o podría empeorar downs afección.  Downs farmacéutico le dará bora Guía del medicamento especial (MedGuide, nombre en inglés) con cada receta y en cada ocasión que la vuelva a surtir. Asegúrese de leer esta información cada vez cuidadosamente.  Hable con downs pediatra para informarse acerca del uso de lisa medicamento en niños. Puede requerir atención especial.  Sobredosis: Póngase en contacto inmediatamente con un centro toxicológico o bora brett de urgencia si usted jamil que haya tomado demasiado medicamento.  ATENCIÓN: Lisa medicamento es solo para usted. No comparta lisa medicamento con nadie.  ¿Qué sucede si me olvido de bora dosis?  Si olvida bora dosis, tómela lo antes posible. Si es ximena la hora de la próxima dosis, tome sólo angel dosis. No tome dosis adicionales o dobles.  ¿Qué puede interactuar con lisa medicamento?  No tome lisa medicamento con ninguno de los siguientes fármacos:  ciertos medicamentos para infecciones micóticas, tales vitaliy fluconazol, itraconazol, ketoconazol, posaconazol y voriconazol cisaprida citalopram dofetilida dronedarona linezolida IMAO, tales vitaliy Carbex, Eldepryl, Marplan, [place]il y [place] elizabet de metileno (inyectado en bora vena) pimozida tioridazina ziprasidona  Lisa medicamento también puede interactuar con los siguientes medicamentos:  alcohol anfetaminas aspirina y medicamentos tipo aspirina carbamazepina ciertos medicamentos para la depresión, ansiedad o trastornos psicóticos  ciertos medicamentos para la migraña, tales vitaliy almotriptán, eletriptán, frovatriptán, naratriptán, rizatriptán, sumatriptán y zolmitriptán ciertos medicamentos para conciliar el sueño ciertos medicamentos que tratan o previenen coágulos sanguíneos, tales vitaliy warfarina, enoxaparina, dalteparina cimetidina diuréticos fentanilo furazolidona isoniazida litio metoprolol SEEMA, medicamentos para el dolor y la inflamación, vitaliy ibuprofeno o naproxeno otros medicamentos que prolongan el intervalo QT (causan un ritmo cardiaco anormal) procarbazina rasagilina suplementos tales vitaliy hierba de [place], kava kava y valeriana tramadol triptófano  Puede ser que esta lista no menciona todas las posibles interacciones. Informe a donws profesional de la jane de todos los productos a base de hierbas, medicamentos de venta alec o suplementos nutritivos que esté tomando. Si usted fuma, consume bebidas alcohólicas o si utiliza drogas ilegales, indíqueselo también a downs profesional de la jane. Algunas sustancias pueden interactuar con downs medicamento.  ¿A qué nathan estar atento al usar lisa medicamento?  Informe a downs médico si mark síntomas no mejoran o si empeoran. Visite a downs médico o a downs profesional de la jane para chequear downs evolución periódicamente. Debido que puede ser necesario charlie lisa medicamento jonh varias semanas para que sea posible observar mark efectos en forma completa, es importante que sigue downs tratamiento vitaliy recetado por downs médico.  Los pacientes y mark familias deben estar atentos si empeora la depresión o ideas suicidas. También esté atento a cambios repentinos o severos de emoción, tales vitaliy el sentirse ansioso, agitado, lleno de pánico, irritable, hostil, agresivo, impulsivo, inquietud severa, demasiado excitado y hiperactivo o dificultad para conciliar el sueño. Si esto ocurre, especialmente al comenzar con el tratamiento o al cambiar de dosis, comuníquese con downs profesional de la jane.  Puede experimentar  somnolencia o mareos. No conduzca ni utilice maquinaria, ni sharan nada que le exija permanecer en estado de alerta hasta que sepa cómo le afecta lisa medicamento. No se siente ni se ponga de pie con rapidez, especialmente si es un paciente de edad avanzada. [place] reduce el riesgo de mareos o desmayos. El alcohol puede interferir con el efecto de lisa medicamento. Evite consumir bebidas alcohólicas.  Se le podrá secar la boca. Masticar chicle sin azúcar, chupar caramelos duros y beber agua en abundancia le ayudará a mantener la boca húmeda. Si el problema no desaparece o es severo, consulte a downs médico.  ¿Qué efectos secundarios puedo tener al utilizar lisa medicamento?  Efectos secundarios que debe informar a downs médico o a downs profesional de la jane tan pronto vitaliy sea posible:  reacciones alérgicas, vitaliy erupción cutánea, comezón/picazón o urticarias, e hinchazón de la brayan, los labios o la lengua ansiedad heces de color donald y aspecto alquitranado cambios en la visión confusión estado de ánimo elevado, keith necesidad de dormir, pensamientos acelerados, conducta impulsiva dolor ocular ritmo cardiaco rápido, irregular sensación de desmayos o aturdimiento, caídas sensación de agitación, enojo o irritabilidad alucinaciones, pérdida del contacto con la realidad pérdida de equilibrio o coordinación pérdida de memoria erección dolorosa o prolongada inquietud, caminar de un lado a otro, incapacidad para quedarse quieto convulsiones rigidez de los músculos ideas suicidas u otros cambios en el estado de ánimo dificultad para conciliar el sueño sangrado o moretones inusuales cansancio o debilidad inusual vómito  Efectos secundarios que generalmente no requieren atención médica (infórmelos a downs médico o a downs profesional de la jane si persisten o si son molestos):  cambios en el apetito cambios en el deseo o desempeño sexual dolor de candace aumento de la sudoración indigestión, náuseas temblores  Puede ser que esta lista no  menciona todos los posibles efectos secundarios. Comuníquese a downs médico por asesoramiento médico sobre los efectos secundarios. Ling puede informar los efectos secundarios a la FDA por teléfono al 1-800FDA-9762.  ¿Dónde nathan guardar mi medicina?  Manténgala fuera del alcance de los niños.  Guárdela a temperatura ambiente, entre 15 y 30 grados C (59 y 86 grados F). Deseche todo el medicamento que no haya utilizado, después de la fecha de vencimiento.  ATENCIÓN: Lisa folleto es un resumen. Puede ser que no cubra toda la posible información. Si ling tiene preguntas acerca de esta medicina, consulte con downs médico, downs farmacéutico o downs profesional de la jane.  © 2020 Elsevier/[employer] (2018-01-18 00:00:00)      Vivir con ansiedad  Living With Anxiety    Después de thom sido diagnosticado con trastorno de ansiedad, podría sentirse aliviado por comprender por qué se había sentido o había actuado de cierto modo. Además, es natural sentirse abrumado por el tratamiento que tiene por amy y por lo que lisa significará para downs alma delia. Con atención y ayuda, puede manejar lisa trastorno y recuperarse.  Cómo hacer frente a la ansiedad  Enfrentar el estrés  El estrés es la reacción del cuerpo ante los cambios y los acontecimientos de la alma delia, tanto buenos vitaliy malos. El estrés puede durar solo algunas horas o puede ser permanente. El estrés puede influir mucho en la ansiedad, por lo que es importante aprender sobre cómo hacerle frente y cómo pensarlo de un modo nuevo.  Hable con el médico o un orientador psicológico para obtener más información sobre cómo reducir el estrés. Podrían sugerirle algunas técnicas para hacerlo, vitaliy:  · Musicoterapia. [place] podría incluir crear o escuchar música que disfrute y lo inspire.  · Meditación consciente. [place] implica prestar atención a la respiración normal más que intentar controlarla. Puede realizarse mientras está sentado o camina.  · Oración centrante. Lisa es un tipo de  meditación que implica centrarse en bora palabra, frase o imagen sagrada que le sea representativa y le genere zapata.  · Respiración profunda. Para hacer esto, expanda el estómago e inhale lentamente por la nariz. Mantenga el aire jonh un lapso de entre 3 y 5 segundos. Luego, exhale lentamente mientras yamila que los músculos del estómago se relajen.  · Diálogo interno. Se trata de bora habilidad por la que usted es capaz de identificar patrones de pensamiento que lo llevan a tener reacciones ansiosas y de corregir dichos pensamientos.  · Relajación muscular. [place] implica tensar los músculos y, luego, relajarlos.  Elija bora técnica para reducir el estrés que se adapte a downs estilo de alma delia y downs personalidad. Las técnicas para reducir el estrés llevan tiempo y práctica. Resérvese de 5 a 15 minutos por día para hacerlas. Algunos terapeutas pueden ofrecerle capacitación para aprenderlas. Es posible que algunos planes de seguro médico cubran la capacitación. Otras cosas que puede hacer para manejar el estrés:  · Llevar un registro del estrés. [place] puede ayudarlo a identificar qué le desencadena el estrés y modos de controlar downs reacción.  · Pensar en cómo reacciona ante ciertas situaciones. Es posible que no sea capaz de controlar todo, tri puede controlar downs reacción.  · Hacerse tiempo para las actividades que lo ayudan a relajarse y no sentir culpa por pasar downs tiempo de lisa modo.  La terapia en combinación con las habilidades para enfrentar y reducir el estrés proporciona la mejor alternativa para un tratamiento satisfactorio.  Medicamentos  Los medicamentos pueden ayudar a aliviar los síntomas. Algunos medicamentos para la ansiedad:  · Medicamentos contra la ansiedad.  · Antidepresivos.  · Betabloqueantes.  Es posible que se requieran medicamentos, junto con la terapia, si otros tratamientos no dieron resultados. Un médico debe recetar los medicamentos.  Las relaciones  Las relaciones interpersonales pueden ser muy  importantes para ayudar a downs recuperación. Intente pasar más tiempo interactuando con amigos y familiares de confianza. Considere la posibilidad de ir a terapia de shari, charlie clases de educación familiar o ir a terapia familiar. La terapia puede ayudarlos a usted y a los demás a comprender mejor el trastorno.  Cómo reconocer cambios en el trastorno  Todos tienen bora respuesta diferente al tratamiento de la ansiedad. Se dice que está recuperado de la ansiedad cuando los síntomas disminuyen y taylor de interferir en las actividades diarias en el hogar o el trabajo. [place] podría significar que usted comenzará a hacer lo siguiente:  · Tener mejor concentración y atención.  · Dormir mejor.  · Estar menos irritable.  · Tener más energía.  · Tener mejor memoria.  Es importante reconocer cuándo el trastorno empeora. Comuníquese con el médico si mark síntomas interfieren en downs hogar o downs trabajo, y usted no siente que el trastorno esté mejorando.  Dónde encontrar ayuda y apoyo:  Puede conseguir ayuda y apoyo en los siguientes lugares:  · Grupos de autoayuda.  · Organizaciones comunitarias y en línea.  · Un líder espiritual de confianza.  · Terapia de shari.  · Clases de educación familiar.  · Terapia familiar.  Siga estas instrucciones en downs casa:  · Consuma bora dieta saludable que incluya abundantes frutas, verduras, cereales integrales, productos lácteos descremados y proteínas magras. No consuma muchos alimentos con alto contenido de grasas sólidas, azúcares agregados o sal.  · Actividad física. La mayoría de los adultos debe hacer lo siguiente:  ? Realizar, al menos, 150 minutos de actividad física por semana. El ejercicio debe aumentar la frecuencia cardíaca y hacerlo transpirar (ejercicio de intensidad moderada).  ? Realizar ejercicios de fortalecimiento por lo menos dos veces por semana.  · Disminuir el consumo de cafeína, tabaco, alcohol y otras sustancias potencialmente dañinas.  · Dormir el tiempo adecuado y de la  forma correcta. La mayoría de los adultos necesitan entre 7 y 9 horas de sueño todas las noches.  · Opte por cosas que le simplifiquen la alma delia.  · Rumson los medicamentos de venta alec y los recetados solamente vitaliy se lo haya indicado el médico.  · Evite el consumo de cafeína, alcohol y ciertos medicamentos contra el resfrío de venta sin receta. Estos podrían hacerlo sentir peor. Pregúntele al farmacéutico qué medicamentos no debería annita.  · Concurra a todas las visitas de control vitaliy se lo haya indicado el médico. [place] es importante.  Preguntas para hacerle al médico  · ¿Me será útil la terapia?  · ¿Con qué frecuencia nathan visitar a un médico para el seguimiento?  · ¿Francie cuánto tiempo tendré que annita los medicamentos?  · ¿Tienen efectos secundarios a anita plazo los medicamentos que yanira?  · ¿Existe bora alternativa que remplace los medicamentos?  Comuníquese con un médico si:  · Le resulta difícil permanecer concentrado o finalizar las tareas diarias.  · Pasa muchas horas por día sintiéndose preocupado por la alma delia cotidiana.  · La preocupación le provoca un cansancio extremo.  · Comienza a tener chan de candace o náuseas, o a sentirse tenso.  · Orina más de lo normal.  · Tiene diarrea.  Solicite ayuda de inmediato si:  · Se le acelera la frecuencia cardíaca y le leila respirar.  · Tiene pensamientos acerca de lastimarse o dañar a otras personas.  Si alguna vez siente que puede lastimarse o lastimar a los demás, o tiene pensamientos de poner fin a downs alma delia, busque ayuda de inmediato. Puede dirigirse al servicio de urgencias más cercano o comunicarse con:  · El servicio de emergencias de downs localidad (911 en los [place]).  · Bora línea de asistencia al suicida y atención en crisis, vitaliy la Línea Nacional de Prevención del Suicidio (National Suicide Prevention Lifeline) al 1-944.629.2191. Está disponible las 24 horas del día.  Resumen  · Annita medidas para enfrentar el estrés puede calmarlo.  · Los  medicamentos no pueden curar los trastornos de ansiedad, tri pueden ayudar a aliviar los síntomas.  · Los familiares, los amigos y las parejas pueden tener un lugar importante en downs recuperación del trastorno de ansiedad.  Esta información no tiene vitaliy fin reemplazar el consejo del médico. Asegúrese de hacerle al médico cualquier pregunta que tenga.  Document Released: 03/27/2018 Document Revised: 03/27/2018 Document Reviewed: 03/27/2018  Elsevier Patient Education © 2020 Elsevier Inc.

## 2020-12-06 NOTE — PROGRESS NOTES
Family called to pick pt up. Discharge order received and clarified. Discharge instructions updated and printed (AVS)pt is aware.

## 2020-12-06 NOTE — CARE PLAN
Problem: Safety  Goal: Will remain free from injury  Note: Call light with in reach. Redirection to use call light for assistance. Non skid socks. Upper siderails up x2 while in bed.      Problem: Infection  Goal: Will remain free from infection  Note: For UA, clean catch. Took to restroom x 1 by CNA, missed urine to the specimen hat. Will try it again.     Problem: Discharge Barriers/Planning  Goal: Patient's continuum of care needs will be met  Note: For discharge home. Left CVA. Lao speaking. Continent of bladder and bowel. Aspiration and fall precautions. Regular, thin liquids diet.     Problem: Pain Management  Goal: Pain level will decrease to patient's comfort goal  Note: Educate patient of non-pharmacological comfort measures: repositioning, relaxation/breathing technique, cold compress and activities.       Yes

## 2020-12-06 NOTE — THERAPY
Speech Language Pathology  Daily Treatment     Patient Name: Sushma Bowden  Age:  86 y.o., Sex:  female  Medical Record #: [id number]  Today's Date: 12/6/2020     Precautions  Precautions: Fall Risk, Swallow Precautions ( See Comments)  Comments: dementia, confused with ; positive for UTI 12/6    Subjective    Patient did not discharge as planned, reason unknown.  She was pleasant and cooperative.  Session conducted with assistance of online video interpretation service with agents ( Demarcus [id number] and Cameron [id number])     Objective       12/06/20 0801   Receptive Language / Auditory Comprehension   Follows Two Unit Commands Moderate (3)   Written Language Expression   Dominant Hand Right   Cognition   Orientation  Moderate (3)   Dysphagia    Diet / Liquid Recommendation Thin (0);Regular (7)   Nutritional Liquid Intake Rating Scale Non thickened beverages   Nutritional Food Intake Rating Scale Total oral diet with no restrictions   SLP Total Time Spent   SLP Individual Total Time Spent (Mins) 60   Treatment Charges   SLP Swallowing Dysfunction Treatment Swallowing Dysfunction Treatment   SLP Cognitive Skill Development First 15 Minutes 1   SLP Cognitive Skill Development Additional 15 Minutes 1       Assessment    Patient demonstrates need for increased time to masticate but safely tolerates regular solids and thin liquids with no overt s/sx of aspiration.       Patient needed mod cues for orientation, max cues to identify fall risks and compensations trained, to minimize them.  Patient continues to have difficulty understanding how interpretation services are used, however, this time she struggled with turn taking.  Telling lengthy stories without giving , an opportunity to translate, and often interrupting him as he began to translate. Continued to educate patient on process for interpretation.    Strengths: Independent prior level of function, Pleasant and cooperative, Supportive  family  Barriers: Aphasia expressive, Aspiration risk, Confused, Impaired appetite/intake, Impaired carryover of learning, Impaired insight/denial of deficits, Impaired functional cognition, Hearing impairment    Plan    Anticipate patient discharge in the next 1-2 days.     Speech Therapy Problems (Active)      There are no active problems.

## 2020-12-06 NOTE — PROGRESS NOTES
Rehab Progress Note     Date of Service: 12/6/2020  Chief Complaint: follow up stroke    Interval Events (Subjective)    Sushma was seen at the bedside with physical therapy and a .      She feels like she is a little bit better than she was yesterday.  U/A showed a UTI and antibiotics were started.   No back pain.    She does feel like she is has pain in her shoulders.  The therapist indicated that she walked more than 350 feet with a walker yesterday.        Objective:  VITAL SIGNS: /71   Pulse 76   Temp 36.8 °C (98.3 °F) (Oral)   Resp 16   Ht 1.524 m (5')   Wt 60.1 kg (132 lb 7.9 oz)   SpO2 95%   BMI 25.88 kg/m²   Gen: alert, no apparent distress  CV: regular rate and rhythm  Resp: Clear to auscultation bilaterally  No flank tenderness  Mildly positive Hawkin's bilaterally.  Functional ROM of the shoulders bilaterally.  Negative supraspinatus test bilaterally  Neuro: notable for hard of hearing, short-term memory deficits  Ext: no calf tenderness bilaterally    Recent Results (from the past 72 hour(s))   Comp Metabolic Panel    Collection Time: 12/05/20  5:05 AM   Result Value Ref Range    Sodium 144 135 - 145 mmol/L    Potassium 3.5 (L) 3.6 - 5.5 mmol/L    Chloride 107 96 - 112 mmol/L    Co2 31 20 - 33 mmol/L    Anion Gap 6.0 (L) 7.0 - 16.0    Glucose 97 65 - 99 mg/dL    Bun 9 8 - 22 mg/dL    Creatinine 0.46 (L) 0.50 - 1.40 mg/dL    Calcium 9.5 8.5 - 10.5 mg/dL    AST(SGOT) 23 12 - 45 U/L    ALT(SGPT) 22 2 - 50 U/L    Alkaline Phosphatase 82 30 - 99 U/L    Total Bilirubin 0.5 0.1 - 1.5 mg/dL    Albumin 3.6 3.2 - 4.9 g/dL    Total Protein 6.6 6.0 - 8.2 g/dL    Globulin 3.0 1.9 - 3.5 g/dL    A-G Ratio 1.2 g/dL   ESTIMATED GFR    Collection Time: 12/05/20  5:05 AM   Result Value Ref Range    GFR If African American >60 >60 mL/min/1.73 m 2    GFR If Non African American >60 >60 mL/min/1.73 m 2   CBC WITH DIFFERENTIAL    Collection Time: 12/05/20  2:45 PM   Result Value Ref Range    WBC 8.3  4.8 - 10.8 K/uL    RBC 3.85 (L) 4.20 - 5.40 M/uL    Hemoglobin 12.9 12.0 - 16.0 g/dL    Hematocrit 39.3 37.0 - 47.0 %    .1 (H) 81.4 - 97.8 fL    MCH 33.5 (H) 27.0 - 33.0 pg    MCHC 32.8 (L) 33.6 - 35.0 g/dL    RDW 50.8 (H) 35.9 - 50.0 fL    Platelet Count 309 164 - 446 K/uL    MPV 10.5 9.0 - 12.9 fL    Neutrophils-Polys 77.80 (H) 44.00 - 72.00 %    Lymphocytes 11.90 (L) 22.00 - 41.00 %    Monocytes 7.60 0.00 - 13.40 %    Eosinophils 1.10 0.00 - 6.90 %    Basophils 0.40 0.00 - 1.80 %    Immature Granulocytes 1.20 (H) 0.00 - 0.90 %    Nucleated RBC 0.00 /100 WBC    Neutrophils (Absolute) 6.46 2.00 - 7.15 K/uL    Lymphs (Absolute) 0.99 (L) 1.00 - 4.80 K/uL    Monos (Absolute) 0.63 0.00 - 0.85 K/uL    Eos (Absolute) 0.09 0.00 - 0.51 K/uL    Baso (Absolute) 0.03 0.00 - 0.12 K/uL    Immature Granulocytes (abs) 0.10 0.00 - 0.11 K/uL    NRBC (Absolute) 0.00 K/uL   URINALYSIS    Collection Time: 12/06/20  4:45 AM    Specimen: Urine, Clean Catch   Result Value Ref Range    Color Yellow     Character Cloudy (A)     Specific Gravity 1.017 <1.035    Ph 5.5 5.0 - 8.0    Glucose Negative Negative mg/dL    Ketones Negative Negative mg/dL    Protein Negative Negative mg/dL    Bilirubin Negative Negative    Urobilinogen, Urine 0.2 Negative    Nitrite Positive (A) Negative    Leukocyte Esterase Small (A) Negative    Occult Blood Trace (A) Negative    Micro Urine Req Microscopic    URINE MICROSCOPIC (W/UA)    Collection Time: 12/06/20  4:45 AM   Result Value Ref Range    WBC  (A) /hpf    RBC 2-5 (A) /hpf    Bacteria Many (A) None /hpf    Epithelial Cells Negative /hpf    Hyaline Cast 6-10 (A) /lpf       Current Facility-Administered Medications   Medication Frequency   • ciprofloxacin (CIPRO) tablet 500 mg Q12HRS   • amLODIPine (NORVASC) tablet 5 mg Q DAY   • acetaminophen (TYLENOL) tablet 1,000 mg Q6HRS PRN   • butalbital/apap/caffeine -40 mg (Fioricet) per tablet 1 Tab Q6HRS PRN   • QUEtiapine (Seroquel) tablet 25  mg TID PRN   • melatonin tablet 3 mg HS PRN   • hydrOXYzine HCl (ATARAX) tablet 50 mg Q6HRS PRN   • Respiratory Therapy Consult Continuous RT   • hydrALAZINE (APRESOLINE) tablet 10 mg Q8HRS PRN   • acetaminophen (Tylenol) tablet 650 mg Q4HRS PRN   • artificial tears ophthalmic solution 1 Drop PRN   • benzocaine-menthol (CEPACOL) lozenge 1 Lozenge Q2HRS PRN   • mag hydrox-al hydrox-simeth (MAALOX PLUS ES or MYLANTA DS) suspension 20 mL Q2HRS PRN   • ondansetron (ZOFRAN ODT) dispertab 4 mg 4X/DAY PRN    Or   • ondansetron (ZOFRAN) syringe/vial injection 4 mg 4X/DAY PRN   • traZODone (DESYREL) tablet 50 mg QHS PRN   • sodium chloride (OCEAN) 0.65 % nasal spray 2 Spray PRN   • lactulose 20 GM/30ML solution 30 mL QDAY PRN   • docusate sodium (ENEMEEZ) enema 283 mg QDAY PRN   • fleet enema 133 mL QDAY PRN   • senna-docusate (PERICOLACE or SENOKOT S) 8.6-50 MG per tablet 2 Tab BID    And   • polyethylene glycol/lytes (MIRALAX) PACKET 1 Packet QDAY PRN    And   • magnesium hydroxide (MILK OF MAGNESIA) suspension 30 mL QDAY PRN    And   • bisacodyl (DULCOLAX) suppository 10 mg QDAY PRN   • atorvastatin (LIPITOR) tablet 40 mg Q EVENING   • clopidogrel (PLAVIX) tablet 75 mg DAILY   • escitalopram (Lexapro) tablet 10 mg DAILY       Orders Placed This Encounter   Procedures   • Diet Order Diet: Regular     Standing Status:   Standing     Number of Occurrences:   1     Order Specific Question:   Diet:     Answer:   Regular [1]       Assessment:  Active Hospital Problems    Diagnosis   • *Stroke (cerebrum) ([facility])   • Anemia   • Essential hypertension   • Vascular dementia without behavioral disturbance (HCC)   • Depression   • Urinary incontinence   • Urinary retention   • Hypokalemia         Urinary tract infection        Shoulder pain    This patient is a 86 y.o. female admitted for acute inpatient rehabilitation with Stroke (cerebrum) ([facility]).    IDT conference:  Date of conference: 11/30/2020  Goals and barriers: See IDT  note.  Biggest barriers: cognitive deficits, hard of hearing, poor appetite (doesn't like the food here), impaired balance  Goals in next week: discharge  CM/social support: family supportive  Anticipated DC date: 12/5  Home health: PT/OT/SLP/RN  Equip: FWW  Follow up: PCP, stroke bridge clinic, Dr. Chew      Medical Decision Making and Plan:    Left parietal/occipital stroke  Expressive and receptive aphasia, improved  No paresis  Cognitive deficits, continues  Continue full rehab program  PT/OT/SLP, 1 hr each discipline, 5 days per week    Continue Plavix and statin for secondary stroke prophylaxis  Follow up with neurology and Dr. Chew, referrals made    Dementia  May benefit from starting Aricept, defer to outpatient neurology     Chronic neck pain, stable  Intermittent headache, stable  PRN Tylenol     Depression  Continue Lexapro  Mood currently stable     Macrocytic anemia, improved  Now with macrocytosis without anemia  Vitamin B12 and folic acid normal    Hypokalemia, resolved  S/p supplementation    Hyponatremia  Mild    Hypertension  Start amlodipine 5 mg  Outpatient follow up with PCP    Bowel program  Continue bowel medications  Scheduled Sennakot  PRN Miralax, MOM, bisacodyl suppository  Last BM 12/5    Urinary tract infection/Bladder program  Check PVRs - 65, 90, 57, okay to discontinue  Bladder scan for no voids  ICP for over 400 cc  Scheduled toileting  Started on cipro 500mg po bid x7 days. Scripts updated for discharge by RN.    Abdominal discomfort  Check KUB- within normal.  No symptoms of nephrolisthiasis, plan to have her follow-up with her PCP.    DVT prophylaxis  Discontinue Lovenox as patient is ambulating long distances    Last COVID negative on 11/22, Rechecked on 11/27 - also negative, another re-screen 12/3 negative    Total time: 35 minutes.  I spent greater than 50% of the time for patient care, counseling, and coordination on this date, including patient face-to face time,  unit/floor time with review of records/pertinent lab data and studies, as well as discussing diagnostic evaluation/work up, planned therapeutic interventions, and future disposition of care, as per the interval events/subjective and the assessment and plan as noted above.  Date of discharge          Abiel Addison M.D.   Physical Medicine and Rehabilitation

## 2020-12-06 NOTE — THERAPY
"Occupational Therapy  Daily Treatment     Patient Name: Sushma Bowden  Age:  86 y.o., Sex:  female  Medical Record #: [id number]  Today's Date: 12/6/2020     Precautions  Precautions: (P) Fall Risk, Swallow Precautions ( See Comments)  Comments: (P) dementia, confused with ; positive for UTI 12/6         Subjective    \"I would like to stay here longer if I can, it's nice here, maybe another week or two.\"      Objective       12/06/20 1401   Precautions   Precautions Fall Risk;Swallow Precautions ( See Comments)   Comments dementia, confused with ; positive for UTI 12/6   Cognition    Level of Consciousness Alert   Functional Level of Assist   Toilet Transfers Supervised   Toilet Transfer Description Adaptive equipment  (FWW walking xfer)   Sitting Lower Body Exercises   Nustep Resistance Level 2;Time (See Comments)  (10 min; 63 spm; 450 total steps)   Comments multiple rest breaks taken throughout    Balance   Comments Pt ambulated from bed to bathroom using FWW ~30 ft, bathroom to end of hallway and then to therapy gym ~65 ft with supervision.    Interdisciplinary Plan of Care Collaboration   Patient Position at End of Therapy Seated;Chair Alarm On;Self Releasing Lap Belt Applied;Call Light within Reach;Tray Table within Reach;Phone within Reach   OT Total Time Spent   OT Individual Total Time Spent (Mins) 60   OT Charge Group   OT Self Care / ADL 1   OT Therapy Activity 2   OT Therapeutic Exercise  1       Assessment    Pt finishing up with PT upon arrival. Pt pleasant and agreeable to OT tx with use of . Pt confused on d/c plans and wanting to stay here longer 2/2 UTI. Pt was supposed to d/c yesterday. Pt refused shower this date, stating that she got one this morning, the chart did not indicate a shower by pt recently. Pt reported that they needed to help her with the shower but she was able to dress herself. Pt reported that her daughter will be home with her " 24/7 and can assist as needed with cooking/cleaning. The on-call doctor is going to talk to pt later today about d/c plans.     Strengths: Adequate strength, Willingly participates in therapeutic activities, Supportive family, Pleasant and cooperative, Motivated for self care and independence  Barriers: Confused, Decreased endurance, Dementia, Difficulty following instructions, Fatigue, Generalized weakness, Hearing impairment, Impaired activity tolerance, Impaired balance, Impaired carryover of learning, Impaired functional cognition, Language barrier, non-fluent English, Limited mobility, Pain    Plan    Potential d/c home soon?     Occupational Therapy Goals (Active)      There are no active problems.

## 2020-12-07 NOTE — PROGRESS NOTES
Discharged home with family members. All belongings packed and carried out for pt. Discharge instructions given to pt/ family page by page.. More info regarding meds also given to pt (all in Mauritian). Pt left facility at 1730. FWW and shower chair supplied for patent and taken home by family.

## 2020-12-07 NOTE — DISCHARGE SUMMARY
Admission Date: 11/24/2020    Discharge Date: 12/6/2020    Attending Provider: Bharti Brady MD    Admission Diagnosis:   Active Hospital Problems    Diagnosis   • *Stroke (cerebrum) (HCC)   • Impaired mobility and ADLs   • Essential hypertension   • Vascular dementia without behavioral disturbance (HCC)   • Depression       Discharge Diagnosis:  Active Hospital Problems    Diagnosis   • *Stroke (cerebrum) (HCC)   • Impaired mobility and ADLs   • Essential hypertension   • Vascular dementia without behavioral disturbance (HCC)   • Depression       HPI per H&P:  The patient is a 85 y.o. female with a past medical history of hypertension, depression, dementia; now admitted for acute inpatient rehabilitation with severe functional debility after an acute stroke.       On admission the patient and medical record report, she initially presented to the hospital on 11/12 with altered mental status, and was found to have an acute left parietal lobe stroke with small amount of hemorrhage, though patient did not receive tPA. She was discharged home on 11/16 on Plavix, with recommendations for acute rehab, but family wanted to take patient home. She returned to the ED on 11/22 due to complaints of altered mental status again. Patient without any new changes on her Head CT, MRI showed evolving left sided stroke. 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 Patient's COVID was negative on 11/22. Per review of records, patient had been seen in the past by cardiology, with negative Ziopatch for any atrial fibrillation. She also was diagnosed with moderate cognitive deficits, thought to be secondary to vascular dementia, due to findings of multiple other strokes on her MRI.     Using the interpretor, patient current reports she does not understand why she is in the hospital.  And wants to know if she can go home as she does not live very far away.  Patient denies any focal weakness but does report  generalized weakness all over her body.  She has some pain in her neck which she thinks is from the history of a fall.  She also reports abnormal sensation all over her body.  Patient appears to be a poor historian.  She does report and is noted on interview to have word finding difficulties.  She also notes difficulty with her memory.     Patient reports she only went to the second grade, she can do a little bit of reading but does not write very well.  I explained to patient that she went home initially and then returned to the hospital to get more therapies before she goes home with her son.  She expressed understanding.     Patient was evaluated by Rehab Medicine physician and Physical Therapy, Occupational Therapy and Speech Therapy and determined to be appropriate for acute inpatient rehab and was transferred to [facility] on 11/24/2020  2:44 PM.    Rehab Hospital Course by Problem List:    Left parietal/occipital stroke  Expressive and receptive aphasia, improved  No paresis  Cognitive deficits, continues     Continue Plavix and statin for secondary stroke prophylaxis  Follow up with neurology and Dr. Chew, referrals made     Dementia  May benefit from starting Aricept, defer to outpatient neurology      Chronic neck pain, stable  Intermittent headache, stable  PRN Tylenol     Depression  Continue Lexapro  Mood currently stable     Macrocytic anemia, improved  Now with macrocytosis without anemia  Vitamin B12 and folic acid normal     Hypokalemia, resolved  S/p supplementation     Hyponatremia  Mild     Hypertension  Started amlodipine 5 mg  Outpatient follow up with PCP     Bowel program  Continue bowel medications  Scheduled Sennakot  PRN Miralax, MOM, bisacodyl suppository  Last BM 12/4     Bladder program  Check PVRs - 65, 90, 57, okay to discontinue  Bladder scan for no voids  ICP for over 400 cc  Scheduled toileting     Abdominal discomfort/urinary symptoms  Check KUB -  negative  Check U/A  + UTI  CBC, CMP - normal    UTI   Started on cipro 500mg po bid x7 days, to be completed as an outpatient     DVT prophylaxis  Discontinue Lovenox as patient is ambulating long distances    Functional Status at Discharge  Eating:  Supervision  Eating Description:  Modified diet, Insert dentures, Increased time, Verbal cueing  Grooming:  Independent  Grooming Description:  Seated in wheelchair at sink(to wash hands)  Bathing:  Supervision  Bathing Description:     Upper Body Dressing:  Supervision  Upper Body Dressing Description:  Set-up of equipment, Supervision for safety  Lower Body Dressing:  Supervision  Lower Body Dressing Description:  Set-up of equipment(setup to don/doff socks and don slippers)  Discharge Location : Home  Patient Discharging with Assist of: Family   Level of Supervision Required: Intermittent Supervision  Recommended Equipment for Discharge: Front-Wheeled Walker;Grab Bars by Toilet;Grab Bars in Tub / Shower;Shower Chair  Recommended Services Upon Discharge: Home Health Occupational Therapy  Long Term Goals Met: 2  Long Term Goals Not Met: 0  Reason(s) for Goals Not Met: n/a  Criteria for Termination of Services: Maximum Function Achieved for Inpatient Rehabilitation  Walk:  Stand by Assist  Distance Walked:  75  Number of Times Distance Was Traveled:  1  Assistive Device:  Front Wheel Walker(modified own for height)  Gait Deviation:  Bradykinetic(forward posture)  Wheelchair:  Minimal Assist  Distance Propelled:  100   Wheelchair Description:  Assistance with steering, Limited by fatigue, Safety concerns, Supervision for safety, Verbal cueing  Stairs Contact Guard Assist  Stairs Description Verbal cueing(unable to remember and follow sequencing cues)  Discharge Location: Home  Patient Discharging with Assist of: Family  Level of Supervision Required Upon Discharge: Twenty Four Hour Supervision  Recommended Equipment for Discharge: Front-Wheeled Walker  Recommeded  Services Upon Discharge: Home Health Physical Therapy;Outpatient Physical Therapy  Long Term Goals Met: 3  Long Term Goals Not Met: 0  Reason(s) for Goals Not Met: N/A  Criteria for Termination of Services: Maximum Function Achieved for Inpatient Rehabilitation  Comprehension:  Minimal Assist  Comprehension Description:  Verbal cues, Hearing aids/amplifiers  Expression:  Minimal Assist  Expression Description:  Verbal cueing  Social Interaction:  Minimal Assist  Social Interaction Description:  Verbal cues, Increased time  Problem Solving:  Moderate Assist  Problem Solving Description:     Memory:  Maximal Assist  Memory Description:  Supervision, Therapy schedule, Verbal cueing  Discharge Location : Home  Patient Discharging with Assist of: Family   Level of Supervision Required: 24 Hour Supervision  Recommended Services Upon Discharge: Home Health Speech Therapy  Long Term Goals Met: 1/2  Long Term Goals Not Met: 2/2  Reason(s) for Goals Not Met: na  Criteria for Termination of Services: Maximum Function Achieved for Inpatient Rehabilitation    Bharti LANDRY M.D., personally performed a complete drug regimen review and no potential clinically significant medication issues were identified.     Discharge Medication:     Medication List      START taking these medications      Instructions   amLODIPine 5 MG Tabs  Commonly known as: NORVASC   Take 1 Tab by mouth every day.  Dose: 5 mg     senna-docusate 8.6-50 MG Tabs  Commonly known as: PERICOLACE or SENOKOT S   Take 2 Tabs by mouth 2 times a day as needed for Constipation.  Dose: 2 Tab        CHANGE how you take these medications      Instructions   acetaminophen 325 MG Tabs  What changed:   · medication strength  · how much to take  · when to take this  · reasons to take this  Commonly known as: Tylenol   Take 2 Tabs by mouth every four hours as needed for Mild Pain (headache).  Dose: 650 mg        CONTINUE taking these medications      Instructions    atorvastatin 40 MG Tabs  Commonly known as: LIPITOR   Take 1 Tab by mouth every evening.  Dose: 40 mg     clopidogrel 75 MG Tabs  Commonly known as: PLAVIX   Take 1 Tab by mouth every day.  Dose: 75 mg     escitalopram 10 MG Tabs  Commonly known as: Lexapro   Take 1 Tab by mouth every day.  Dose: 10 mg        STOP taking these medications    docusate calcium 240 MG Caps  Commonly known as: SURFAK     gabapentin 300 MG Caps  Commonly known as: NEURONTIN     magnesium oxide 400 MG Tabs tablet  Commonly known as: MAG-OX     omeprazole 20 MG delayed-release capsule  Commonly known as: PRILOSEC          Discharge Diet:  Regular    Discharge Activity:  Ambulate with walker     Disposition:  Patient to discharge home with family support and community resources.     Discharge Location: Home with Home Health.     Agency Name / Phone: [facility]: [phone].     Home Health: Registered Nurse, Occupational Therapist, Physical Therapist, Speech Therapist.     DME Provider / Phone: [facility]: [phone].     Medical Equipment Ordered: Front Wheel Walker, Shower chair.     Follow-up Information:     CONSUELO BenavidesPLuis FernandoRLuis FernandoNLuis Fernando-Primary Care  [address]  [address]  [facility] [address]  [phone]  On 12/28/2020  10:30am     Nette Chew D.O.-Physiatry  [address] [facility] [place] [address] [phone]  On 12/28/2020  1:50pm-see map     On 1/11/2021  [place] for at 2:40fw[facility]: [address] [place] [address] [address], [place], [place]  [address].  [phone].      Condition on Discharge:  Good    Bharti Brady M.D.    Date of Service: 12/7/2020

## 2020-12-07 NOTE — CARE PLAN
Problem: Communication  Goal: The ability to communicate needs accurately and effectively will improve  Outcome: NOT MET     Problem: Safety  Goal: Will remain free from injury  Outcome: MET     Problem: Infection  Goal: Will remain free from infection  Outcome: MET     Problem: Venous Thromboembolism (VTW)/Deep Vein Thrombosis (DVT) Prevention:  Goal: Patient will participate in Venous Thrombosis (VTE)/Deep Vein Thrombosis (DVT)Prevention Measures  Outcome: MET     Problem: Bowel/Gastric:  Goal: Normal bowel function is maintained or improved  Outcome: MET  Goal: Will not experience complications related to bowel motility  Outcome: MET     Problem: Bowel/Gastric:  Goal: Will not experience complications related to bowel motility  Outcome: MET     Problem: Discharge Barriers/Planning  Goal: Patient's continuum of care needs will be met  Outcome: MET     Problem: Skin Integrity  Goal: Risk for impaired skin integrity will decrease  Outcome: MET     Problem: Respiratory:  Goal: Respiratory status will improve  Outcome: MET     Problem: Psychosocial Needs:  Goal: Level of anxiety will decrease  Outcome: DISCHARGED-GOAL NOT MET     Problem: Pain Management  Goal: Pain level will decrease to patient's comfort goal  Outcome: MET

## 2021-01-14 DIAGNOSIS — Z23 NEED FOR VACCINATION: ICD-10-CM

## 2021-03-20 ENCOUNTER — APPOINTMENT (OUTPATIENT)
Dept: RADIOLOGY | Facility: MEDICAL CENTER | Age: 86
End: 2021-03-20
Attending: EMERGENCY MEDICINE
Payer: MEDICARE

## 2021-03-20 ENCOUNTER — HOSPITAL ENCOUNTER (EMERGENCY)
Facility: MEDICAL CENTER | Age: 86
End: 2021-03-20
Attending: EMERGENCY MEDICINE
Payer: MEDICARE

## 2021-03-20 VITALS
DIASTOLIC BLOOD PRESSURE: 74 MMHG | TEMPERATURE: 98 F | HEART RATE: 56 BPM | WEIGHT: 131 LBS | HEIGHT: 60 IN | OXYGEN SATURATION: 96 % | RESPIRATION RATE: 16 BRPM | SYSTOLIC BLOOD PRESSURE: 164 MMHG | BODY MASS INDEX: 25.72 KG/M2

## 2021-03-20 DIAGNOSIS — R51.9 NONINTRACTABLE HEADACHE, UNSPECIFIED CHRONICITY PATTERN, UNSPECIFIED HEADACHE TYPE: ICD-10-CM

## 2021-03-20 LAB
ALBUMIN SERPL BCP-MCNC: 3.8 G/DL (ref 3.2–4.9)
ALBUMIN/GLOB SERPL: 1.4 G/DL
ALP SERPL-CCNC: 112 U/L (ref 30–99)
ALT SERPL-CCNC: 29 U/L (ref 2–50)
ANION GAP SERPL CALC-SCNC: 7 MMOL/L (ref 7–16)
AST SERPL-CCNC: 25 U/L (ref 12–45)
BASOPHILS # BLD AUTO: 0.4 % (ref 0–1.8)
BASOPHILS # BLD: 0.03 K/UL (ref 0–0.12)
BILIRUB SERPL-MCNC: 0.3 MG/DL (ref 0.1–1.5)
BUN SERPL-MCNC: 21 MG/DL (ref 8–22)
CALCIUM SERPL-MCNC: 9.2 MG/DL (ref 8.5–10.5)
CHLORIDE SERPL-SCNC: 104 MMOL/L (ref 96–112)
CO2 SERPL-SCNC: 29 MMOL/L (ref 20–33)
CREAT SERPL-MCNC: 0.64 MG/DL (ref 0.5–1.4)
CRP SERPL HS-MCNC: <0.3 MG/DL (ref 0–0.75)
EKG IMPRESSION: NORMAL
EOSINOPHIL # BLD AUTO: 0.2 K/UL (ref 0–0.51)
EOSINOPHIL NFR BLD: 2.8 % (ref 0–6.9)
ERYTHROCYTE [DISTWIDTH] IN BLOOD BY AUTOMATED COUNT: 52.5 FL (ref 35.9–50)
ERYTHROCYTE [SEDIMENTATION RATE] IN BLOOD BY WESTERGREN METHOD: 5 MM/HOUR (ref 0–30)
GLOBULIN SER CALC-MCNC: 2.8 G/DL (ref 1.9–3.5)
GLUCOSE SERPL-MCNC: 126 MG/DL (ref 65–99)
HCT VFR BLD AUTO: 40.1 % (ref 37–47)
HGB BLD-MCNC: 13.2 G/DL (ref 12–16)
IMM GRANULOCYTES # BLD AUTO: 0.07 K/UL (ref 0–0.11)
IMM GRANULOCYTES NFR BLD AUTO: 1 % (ref 0–0.9)
LYMPHOCYTES # BLD AUTO: 0.84 K/UL (ref 1–4.8)
LYMPHOCYTES NFR BLD: 11.6 % (ref 22–41)
MCH RBC QN AUTO: 34.2 PG (ref 27–33)
MCHC RBC AUTO-ENTMCNC: 32.9 G/DL (ref 33.6–35)
MCV RBC AUTO: 103.9 FL (ref 81.4–97.8)
MONOCYTES # BLD AUTO: 0.47 K/UL (ref 0–0.85)
MONOCYTES NFR BLD AUTO: 6.5 % (ref 0–13.4)
NEUTROPHILS # BLD AUTO: 5.65 K/UL (ref 2–7.15)
NEUTROPHILS NFR BLD: 77.7 % (ref 44–72)
NRBC # BLD AUTO: 0 K/UL
NRBC BLD-RTO: 0 /100 WBC
PLATELET # BLD AUTO: 183 K/UL (ref 164–446)
PMV BLD AUTO: 11.1 FL (ref 9–12.9)
POTASSIUM SERPL-SCNC: 4.5 MMOL/L (ref 3.6–5.5)
PROT SERPL-MCNC: 6.6 G/DL (ref 6–8.2)
RBC # BLD AUTO: 3.86 M/UL (ref 4.2–5.4)
SODIUM SERPL-SCNC: 140 MMOL/L (ref 135–145)
WBC # BLD AUTO: 7.3 K/UL (ref 4.8–10.8)

## 2021-03-20 PROCEDURE — 99284 EMERGENCY DEPT VISIT MOD MDM: CPT

## 2021-03-20 PROCEDURE — 86140 C-REACTIVE PROTEIN: CPT

## 2021-03-20 PROCEDURE — 80053 COMPREHEN METABOLIC PANEL: CPT

## 2021-03-20 PROCEDURE — A9270 NON-COVERED ITEM OR SERVICE: HCPCS | Performed by: EMERGENCY MEDICINE

## 2021-03-20 PROCEDURE — 93005 ELECTROCARDIOGRAM TRACING: CPT

## 2021-03-20 PROCEDURE — 70450 CT HEAD/BRAIN W/O DYE: CPT

## 2021-03-20 PROCEDURE — 700102 HCHG RX REV CODE 250 W/ 637 OVERRIDE(OP): Performed by: EMERGENCY MEDICINE

## 2021-03-20 PROCEDURE — 93005 ELECTROCARDIOGRAM TRACING: CPT | Performed by: EMERGENCY MEDICINE

## 2021-03-20 PROCEDURE — 85652 RBC SED RATE AUTOMATED: CPT

## 2021-03-20 PROCEDURE — 85025 COMPLETE CBC W/AUTO DIFF WBC: CPT

## 2021-03-20 RX ORDER — ACETAMINOPHEN 325 MG/1
650 TABLET ORAL ONCE
Status: COMPLETED | OUTPATIENT
Start: 2021-03-20 | End: 2021-03-20

## 2021-03-20 RX ADMIN — ACETAMINOPHEN 650 MG: 325 TABLET, FILM COATED ORAL at 16:10

## 2021-03-20 ASSESSMENT — ENCOUNTER SYMPTOMS
NAUSEA: 0
HEADACHES: 1
SHORTNESS OF BREATH: 0
VOMITING: 0

## 2021-03-20 ASSESSMENT — FIBROSIS 4 INDEX: FIB4 SCORE: 1.36

## 2021-03-20 NOTE — ED NOTES
Family states that patient felt like she was going to pass out and that is why the brought her to the ED.   EKG ordered.

## 2021-03-20 NOTE — ED TRIAGE NOTES
Chief Complaint   Patient presents with   • Headache     x 3 days. generalized pressure.      Pt amb to triage with slow steady gait with cane for above complaint. Pt states she was nervous because she had a stroke a few months ago and it felt similar. Neuro is at baseline per family, disoriented to year. Walking at baseline with cane, no focal weakness/symptoms. Takes plavix.     Pt is alert and oriented, speaking in full sentences, follows commands and responds appropriately to questions. Resp are even and unlabored. No behavioral indicators of pain.   Pt placed in lobby. Pt educated on triage process. Pt encouraged to alert staff for any changes.    /62   Pulse 67   Temp 36.7 °C (98 °F) (Temporal)   Resp 16   Ht 1.524 m (5')   Wt 59.4 kg (131 lb)   SpO2 92%   BMI 25.58 kg/m²     
Breath sounds clear and equal bilaterally.

## 2021-03-20 NOTE — ED NOTES
D/C home with written and verbal instructions re: Rx, activity, f/u.  Verbalizes understanding.  Ambulated out with steady gait with granddaughter.

## 2021-03-20 NOTE — ED PROVIDER NOTES
ED Provider Note    Scribed for Rafa Peter M.D. by Josue Cortez-Reyes. 3/20/2021, 1:01 PM.    Primary care provider: BRANDYN Benavides  Means of arrival: Walk-in  History obtained from: Patient  History limited by: None    CHIEF COMPLAINT  Chief Complaint   Patient presents with   • Headache     x 3 days. generalized pressure.        HPI  Sushma Bowden is a 86 y.o. female who presents to the Emergency Department complaining of a headache onset 3 days ago. She describes her headache as a pressure on the top and sides of her head.The patients family member states that they brought her in because she was near syncope. The patient had a stroke a couple of months ago. She states that she became nervous because her symptoms are similar to the symptoms she experienced when she had a stroke. She states that she took aspirin in the morning which mildly alleviated her pain. She endorses additional tinnitus but denies any rhinorrhea, shortness of breath, nausea, vomiting, abdominal pain, chest pain, or pressured speech     REVIEW OF SYSTEMS  Review of Systems   HENT: Positive for tinnitus.    Respiratory: Negative for shortness of breath.    Gastrointestinal: Negative for nausea and vomiting.   Neurological: Positive for headaches.       PAST MEDICAL HISTORY   has a past medical history of Dementia (HCC), Depression, Hyperlipidemia, Hypertension, Stroke (HCC), and Urinary incontinence.    SURGICAL HISTORY   has a past surgical history that includes knee arthroscopy (Left) and oophorectomy.    SOCIAL HISTORY  Social History     Tobacco Use   • Smoking status: Never Smoker   • Smokeless tobacco: Never Used   Substance Use Topics   • Alcohol use: No   • Drug use: No      Social History     Substance and Sexual Activity   Drug Use No       FAMILY HISTORY  No family history on file.    CURRENT MEDICATIONS  Home Medications     Reviewed by Seble Baker R.N. (Registered Nurse) on 03/20/21 at 0532  Med List  Status: <None>   Medication Last Dose Status   acetaminophen (TYLENOL) 325 MG Tab  Active   amLODIPine (NORVASC) 5 MG Tab  Active   atorvastatin (LIPITOR) 40 MG Tab  Active   clopidogrel (PLAVIX) 75 MG Tab  Active   escitalopram (LEXAPRO) 10 MG Tab  Active   senna-docusate (PERICOLACE OR SENOKOT S) 8.6-50 MG Tab  Active                ALLERGIES  Allergies   Allergen Reactions   • Penicillins Unspecified     Unknown reaction  Tolerates cephalosporins       PHYSICAL EXAM  VITAL SIGNS: /62   Pulse 67   Temp 36.7 °C (98 °F) (Temporal)   Resp 16   Ht 1.524 m (5')   Wt 59.4 kg (131 lb)   SpO2 92%   BMI 25.58 kg/m²     Constitutional:   No acute distress  HENT:  Moist mucous membranes  Eyes:  No conjunctivitis or icterus  Neck:  trachea is midline, no palpable thyroid  Lymphatic:  No cervical lymphadenopathy  Cardiovascular:  Regular rate and rhythm, no murmurs  Thorax & Lungs:  Normal breath sounds, no rhonchi  Abdomen:  Soft, Non-tender  Skin:.  no rash  Back:  Non-tender, no CVA tenderness  Extremities:   no edema  Vascular:  symmetric radial pulse  Neurologic: Alert & oriented x 3, Answers questions appropriately, No asymmetry to motor or sensation of face, EOMI, No pronator drift, Normal visual fields confrontation, Normal finger to nose, Normal leg raise bilaterally, Normal sensation in all 4 extremities.      LABS  Labs Reviewed   CBC WITH DIFFERENTIAL - Abnormal; Notable for the following components:       Result Value    RBC 3.86 (*)     .9 (*)     MCH 34.2 (*)     MCHC 32.9 (*)     RDW 52.5 (*)     Neutrophils-Polys 77.70 (*)     Lymphocytes 11.60 (*)     Immature Granulocytes 1.00 (*)     Lymphs (Absolute) 0.84 (*)     All other components within normal limits    Narrative:     Release to patient->Immediate   COMP METABOLIC PANEL - Abnormal; Notable for the following components:    Glucose 126 (*)     Alkaline Phosphatase 112 (*)     All other components within normal limits    Narrative:      Release to patient->Immediate   CRP QUANTITIVE (NON-CARDIAC)    Narrative:     Release to patient->Immediate   SED RATE    Narrative:     Release to patient->Immediate   ESTIMATED GFR    Narrative:     Release to patient->Immediate     All labs reviewed by me.    EKG Interpretation  Results for orders placed or performed during the hospital encounter of 21   EKG   Result Value Ref Range    Report       [facility] Emergency Dept.    Test Date:  2021  Pt Name:    FABIANA LEMON               Department: ER  MRN:        [id number]                      Room:       [facility]  Gender:     Female                       Technician: [id number]  :        [id number]                   Requested By:ER TRIAGE PROTOCOL  Order #:    [id number]                    Reading MD:    Measurements  Intervals                                Axis  Rate:       57                           P:          -14  NE:         165                          QRS:        -35  QRSD:       100                          T:          16  QT:         470  QTc:        459    Interpretive Statements  Sinus bradycardia  Left ventricular hypertrophy  Compared to ECG 2020 21:12:42  Sinus rhythm no longer present  Left-axis deviation no longer present  Early repolarization no longer present           RADIOLOGY  CT-HEAD W/O   Final Result         1.  NO ACUTE ABNORMALITIES ARE NOTED ON CT SCAN OF THE HEAD.      2.  Increasing left parietal lobe encephalomalacia since prior exam.      Findings are consistent with atrophy.  Decreased attenuation in the periventricular white matter likely indicates microvascular ischemic disease.        The radiologist's interpretation of all radiological studies have been reviewed by me.    COURSE & MEDICAL DECISION MAKING  Pertinent Labs & Imaging studies reviewed. (See chart for details)    1:01 PM - Patient seen and examined at bedside. Patient will be treated with Tyelenol. Ordered Sed Rate, CBC with  differential, CMP, CRP qualitative and, EKG to evaluate her symptoms. The differential diagnoses include but are not limited to: CVA intracranial bleeding      Medical Decision Making:   Patient CT scan shows no acute abnormality she does have some worsening encephalomalacia goes along with her prior CVA.  She is in no distress she has a normal neuro exam her inflammatory markers are not indicative of a arteritis headache.  I do not think further evaluation is needed.  I am discharging her with over-the-counter pain medication return precautions and follow-up        FINAL IMPRESSION  1. Nonintractable headache, unspecified chronicity pattern, unspecified headache type          I, Josue Cortez-Reyes (Sonya), am scribing for, and in the presence of, Rafa Peter M.D..    Electronically signed by: Josue Cortez-Reyes (Bridgettibe), 3/20/2021    IRafa M.D. personally performed the services described in this documentation, as scribed by Josue Cortez-Reyes in my presence, and it is both accurate and complete.    The note accurately reflects work and decisions made by me.  Rafa Peter M.D.  3/20/2021  4:14 PM

## 2021-06-25 NOTE — PROGRESS NOTES
Monitor summary: SR 64-93, AZ .14, QRS .08, QT .42, with rare PVCs per strip from monitor room.      Followed protocol

## 2021-12-04 ENCOUNTER — APPOINTMENT (OUTPATIENT)
Dept: RADIOLOGY | Facility: MEDICAL CENTER | Age: 86
DRG: 522 | End: 2021-12-04
Attending: EMERGENCY MEDICINE
Payer: MEDICARE

## 2021-12-04 ENCOUNTER — HOSPITAL ENCOUNTER (INPATIENT)
Facility: MEDICAL CENTER | Age: 86
LOS: 5 days | DRG: 522 | End: 2021-12-09
Attending: EMERGENCY MEDICINE | Admitting: INTERNAL MEDICINE
Payer: MEDICARE
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
## 2021-12-04 DIAGNOSIS — R42 DIZZINESS: ICD-10-CM

## 2021-12-04 DIAGNOSIS — I10 ESSENTIAL HYPERTENSION: ICD-10-CM

## 2021-12-04 DIAGNOSIS — S72.002A CLOSED FRACTURE OF NECK OF LEFT FEMUR, INITIAL ENCOUNTER (HCC): ICD-10-CM

## 2021-12-04 DIAGNOSIS — F01.50 VASCULAR DEMENTIA WITHOUT BEHAVIORAL DISTURBANCE (HCC): ICD-10-CM

## 2021-12-04 DIAGNOSIS — E87.1 HYPONATREMIA: ICD-10-CM

## 2021-12-04 DIAGNOSIS — G47.00 INSOMNIA, UNSPECIFIED TYPE: ICD-10-CM

## 2021-12-04 DIAGNOSIS — I27.20 PULMONARY HTN (HCC): ICD-10-CM

## 2021-12-04 PROBLEM — E83.51 HYPOCALCEMIA: Status: ACTIVE | Noted: 2021-12-04

## 2021-12-04 PROBLEM — E87.0 HYPERNATREMIA: Status: ACTIVE | Noted: 2021-12-04

## 2021-12-04 PROBLEM — Z87.81 S/P LEFT HIP FRACTURE: Status: ACTIVE | Noted: 2021-12-04

## 2021-12-04 LAB
ALBUMIN SERPL BCP-MCNC: 2.3 G/DL (ref 3.2–4.9)
ALBUMIN/GLOB SERPL: 1.4 G/DL
ALP SERPL-CCNC: 58 U/L (ref 30–99)
ALT SERPL-CCNC: 10 U/L (ref 2–50)
ANION GAP SERPL CALC-SCNC: 8 MMOL/L (ref 7–16)
APTT PPP: 30.9 SEC (ref 24.7–36)
AST SERPL-CCNC: 12 U/L (ref 12–45)
BASOPHILS # BLD AUTO: 0.2 % (ref 0–1.8)
BASOPHILS # BLD: 0.03 K/UL (ref 0–0.12)
BILIRUB SERPL-MCNC: 0.3 MG/DL (ref 0.1–1.5)
BUN SERPL-MCNC: 8 MG/DL (ref 8–22)
CALCIUM SERPL-MCNC: 5.3 MG/DL (ref 8.5–10.5)
CALCIUM SERPL-MCNC: 9.1 MG/DL (ref 8.5–10.5)
CHLORIDE SERPL-SCNC: 125 MMOL/L (ref 96–112)
CO2 SERPL-SCNC: 16 MMOL/L (ref 20–33)
CREAT SERPL-MCNC: 0.24 MG/DL (ref 0.5–1.4)
EKG IMPRESSION: NORMAL
EOSINOPHIL # BLD AUTO: 0.02 K/UL (ref 0–0.51)
EOSINOPHIL NFR BLD: 0.2 % (ref 0–6.9)
ERYTHROCYTE [DISTWIDTH] IN BLOOD BY AUTOMATED COUNT: 47.9 FL (ref 35.9–50)
FERRITIN SERPL-MCNC: 120 NG/ML (ref 10–291)
FOLATE SERPL-MCNC: 10.5 NG/ML
GLOBULIN SER CALC-MCNC: 1.6 G/DL (ref 1.9–3.5)
GLUCOSE SERPL-MCNC: 79 MG/DL (ref 65–99)
HCT VFR BLD AUTO: 38.4 % (ref 37–47)
HGB BLD-MCNC: 12.9 G/DL (ref 12–16)
HGB RETIC QN AUTO: 37.5 PG/CELL (ref 29–35)
IMM GRANULOCYTES # BLD AUTO: 0.07 K/UL (ref 0–0.11)
IMM GRANULOCYTES NFR BLD AUTO: 0.5 % (ref 0–0.9)
IMM RETICS NFR: 9.1 % (ref 9.3–17.4)
INR PPP: 1.31 (ref 0.87–1.13)
IRON SATN MFR SERPL: 16 % (ref 15–55)
IRON SERPL-MCNC: 26 UG/DL (ref 40–170)
LYMPHOCYTES # BLD AUTO: 0.45 K/UL (ref 1–4.8)
LYMPHOCYTES NFR BLD: 3.5 % (ref 22–41)
MAGNESIUM SERPL-MCNC: 2.7 MG/DL (ref 1.5–2.5)
MCH RBC QN AUTO: 34.2 PG (ref 27–33)
MCHC RBC AUTO-ENTMCNC: 33.6 G/DL (ref 33.6–35)
MCV RBC AUTO: 101.9 FL (ref 81.4–97.8)
MONOCYTES # BLD AUTO: 0.66 K/UL (ref 0–0.85)
MONOCYTES NFR BLD AUTO: 5.1 % (ref 0–13.4)
NEUTROPHILS # BLD AUTO: 11.6 K/UL (ref 2–7.15)
NEUTROPHILS NFR BLD: 90.5 % (ref 44–72)
NRBC # BLD AUTO: 0 K/UL
NRBC BLD-RTO: 0 /100 WBC
PLATELET # BLD AUTO: 174 K/UL (ref 164–446)
PMV BLD AUTO: 11 FL (ref 9–12.9)
POTASSIUM SERPL-SCNC: 2.3 MMOL/L (ref 3.6–5.5)
PROT SERPL-MCNC: 3.9 G/DL (ref 6–8.2)
PROTHROMBIN TIME: 15.9 SEC (ref 12–14.6)
RBC # BLD AUTO: 3.77 M/UL (ref 4.2–5.4)
RETICS # AUTO: 0.08 M/UL (ref 0.04–0.06)
RETICS/RBC NFR: 2 % (ref 0.8–2.1)
SARS-COV+SARS-COV-2 AG RESP QL IA.RAPID: NOTDETECTED
SODIUM SERPL-SCNC: 149 MMOL/L (ref 135–145)
SPECIMEN SOURCE: NORMAL
TIBC SERPL-MCNC: 159 UG/DL (ref 250–450)
TROPONIN T SERPL-MCNC: 13 NG/L (ref 6–19)
UIBC SERPL-MCNC: 133 UG/DL (ref 110–370)
VIT B12 SERPL-MCNC: 1129 PG/ML (ref 211–911)
WBC # BLD AUTO: 12.8 K/UL (ref 4.8–10.8)

## 2021-12-04 PROCEDURE — 73552 X-RAY EXAM OF FEMUR 2/>: CPT | Mod: LT

## 2021-12-04 PROCEDURE — 71045 X-RAY EXAM CHEST 1 VIEW: CPT

## 2021-12-04 PROCEDURE — 36415 COLL VENOUS BLD VENIPUNCTURE: CPT

## 2021-12-04 PROCEDURE — 72170 X-RAY EXAM OF PELVIS: CPT

## 2021-12-04 PROCEDURE — 82746 ASSAY OF FOLIC ACID SERUM: CPT

## 2021-12-04 PROCEDURE — 700105 HCHG RX REV CODE 258: Performed by: INTERNAL MEDICINE

## 2021-12-04 PROCEDURE — 96375 TX/PRO/DX INJ NEW DRUG ADDON: CPT

## 2021-12-04 PROCEDURE — 770020 HCHG ROOM/CARE - TELE (206)

## 2021-12-04 PROCEDURE — 93005 ELECTROCARDIOGRAM TRACING: CPT | Performed by: EMERGENCY MEDICINE

## 2021-12-04 PROCEDURE — A9270 NON-COVERED ITEM OR SERVICE: HCPCS | Performed by: INTERNAL MEDICINE

## 2021-12-04 PROCEDURE — A9270 NON-COVERED ITEM OR SERVICE: HCPCS | Performed by: EMERGENCY MEDICINE

## 2021-12-04 PROCEDURE — 85610 PROTHROMBIN TIME: CPT

## 2021-12-04 PROCEDURE — 85730 THROMBOPLASTIN TIME PARTIAL: CPT

## 2021-12-04 PROCEDURE — 83735 ASSAY OF MAGNESIUM: CPT

## 2021-12-04 PROCEDURE — 83540 ASSAY OF IRON: CPT

## 2021-12-04 PROCEDURE — 80053 COMPREHEN METABOLIC PANEL: CPT

## 2021-12-04 PROCEDURE — 99223 1ST HOSP IP/OBS HIGH 75: CPT | Mod: 57 | Performed by: STUDENT IN AN ORGANIZED HEALTH CARE EDUCATION/TRAINING PROGRAM

## 2021-12-04 PROCEDURE — 96367 TX/PROPH/DG ADDL SEQ IV INF: CPT

## 2021-12-04 PROCEDURE — 82728 ASSAY OF FERRITIN: CPT

## 2021-12-04 PROCEDURE — 99285 EMERGENCY DEPT VISIT HI MDM: CPT

## 2021-12-04 PROCEDURE — 83550 IRON BINDING TEST: CPT

## 2021-12-04 PROCEDURE — 82607 VITAMIN B-12: CPT

## 2021-12-04 PROCEDURE — 84484 ASSAY OF TROPONIN QUANT: CPT

## 2021-12-04 PROCEDURE — 85025 COMPLETE CBC W/AUTO DIFF WBC: CPT

## 2021-12-04 PROCEDURE — 87426 SARSCOV CORONAVIRUS AG IA: CPT

## 2021-12-04 PROCEDURE — 85046 RETICYTE/HGB CONCENTRATE: CPT

## 2021-12-04 PROCEDURE — 96366 THER/PROPH/DIAG IV INF ADDON: CPT

## 2021-12-04 PROCEDURE — 82310 ASSAY OF CALCIUM: CPT

## 2021-12-04 PROCEDURE — 700111 HCHG RX REV CODE 636 W/ 250 OVERRIDE (IP): Performed by: EMERGENCY MEDICINE

## 2021-12-04 PROCEDURE — 700102 HCHG RX REV CODE 250 W/ 637 OVERRIDE(OP): Performed by: EMERGENCY MEDICINE

## 2021-12-04 PROCEDURE — 700102 HCHG RX REV CODE 250 W/ 637 OVERRIDE(OP): Performed by: INTERNAL MEDICINE

## 2021-12-04 PROCEDURE — 96365 THER/PROPH/DIAG IV INF INIT: CPT

## 2021-12-04 PROCEDURE — 99223 1ST HOSP IP/OBS HIGH 75: CPT | Mod: AI | Performed by: INTERNAL MEDICINE

## 2021-12-04 PROCEDURE — 70450 CT HEAD/BRAIN W/O DYE: CPT | Mod: MG

## 2021-12-04 RX ORDER — POTASSIUM CHLORIDE 7.45 MG/ML
10 INJECTION INTRAVENOUS
Status: COMPLETED | OUTPATIENT
Start: 2021-12-04 | End: 2021-12-04

## 2021-12-04 RX ORDER — ATENOLOL 50 MG/1
50 TABLET ORAL DAILY
Status: ON HOLD | COMMUNITY
End: 2021-12-08

## 2021-12-04 RX ORDER — POTASSIUM CHLORIDE 29.8 MG/ML
40 INJECTION INTRAVENOUS ONCE
Status: DISCONTINUED | OUTPATIENT
Start: 2021-12-04 | End: 2021-12-04

## 2021-12-04 RX ORDER — DOCUSATE SODIUM 100 MG/1
100 CAPSULE, LIQUID FILLED ORAL 2 TIMES DAILY
Status: ON HOLD | COMMUNITY
End: 2021-12-08

## 2021-12-04 RX ORDER — MORPHINE SULFATE 4 MG/ML
4 INJECTION INTRAVENOUS ONCE
Status: COMPLETED | OUTPATIENT
Start: 2021-12-04 | End: 2021-12-04

## 2021-12-04 RX ORDER — CALCIUM GLUCONATE 20 MG/ML
1 INJECTION, SOLUTION INTRAVENOUS ONCE
Status: COMPLETED | OUTPATIENT
Start: 2021-12-04 | End: 2021-12-05

## 2021-12-04 RX ORDER — OXYCODONE HYDROCHLORIDE 5 MG/1
5 TABLET ORAL
Status: DISCONTINUED | OUTPATIENT
Start: 2021-12-04 | End: 2021-12-09 | Stop reason: HOSPADM

## 2021-12-04 RX ORDER — SODIUM CHLORIDE 9 MG/ML
INJECTION, SOLUTION INTRAVENOUS CONTINUOUS
Status: ACTIVE | OUTPATIENT
Start: 2021-12-04 | End: 2021-12-04

## 2021-12-04 RX ORDER — ESCITALOPRAM OXALATE 10 MG/1
10 TABLET ORAL DAILY
Status: DISCONTINUED | OUTPATIENT
Start: 2021-12-05 | End: 2021-12-09 | Stop reason: HOSPADM

## 2021-12-04 RX ORDER — MAGNESIUM SULFATE HEPTAHYDRATE 40 MG/ML
2 INJECTION, SOLUTION INTRAVENOUS ONCE
Status: COMPLETED | OUTPATIENT
Start: 2021-12-04 | End: 2021-12-04

## 2021-12-04 RX ORDER — ONDANSETRON 2 MG/ML
4 INJECTION INTRAMUSCULAR; INTRAVENOUS EVERY 4 HOURS PRN
Status: DISCONTINUED | OUTPATIENT
Start: 2021-12-04 | End: 2021-12-09 | Stop reason: HOSPADM

## 2021-12-04 RX ORDER — HEPARIN SODIUM 5000 [USP'U]/ML
5000 INJECTION, SOLUTION INTRAVENOUS; SUBCUTANEOUS EVERY 8 HOURS
Status: DISCONTINUED | OUTPATIENT
Start: 2021-12-05 | End: 2021-12-07

## 2021-12-04 RX ORDER — ACETAMINOPHEN 325 MG/1
650 TABLET ORAL EVERY 6 HOURS PRN
Status: DISCONTINUED | OUTPATIENT
Start: 2021-12-04 | End: 2021-12-09 | Stop reason: HOSPADM

## 2021-12-04 RX ORDER — POTASSIUM CHLORIDE 20 MEQ/1
20 TABLET, EXTENDED RELEASE ORAL 3 TIMES DAILY
Status: DISPENSED | OUTPATIENT
Start: 2021-12-04 | End: 2021-12-06

## 2021-12-04 RX ORDER — DOCUSATE SODIUM 100 MG/1
100 CAPSULE, LIQUID FILLED ORAL 2 TIMES DAILY
Status: DISCONTINUED | OUTPATIENT
Start: 2021-12-04 | End: 2021-12-09 | Stop reason: HOSPADM

## 2021-12-04 RX ORDER — BISACODYL 10 MG
10 SUPPOSITORY, RECTAL RECTAL
Status: DISCONTINUED | OUTPATIENT
Start: 2021-12-04 | End: 2021-12-09 | Stop reason: HOSPADM

## 2021-12-04 RX ORDER — ENALAPRILAT 1.25 MG/ML
1.25 INJECTION INTRAVENOUS EVERY 6 HOURS PRN
Status: DISCONTINUED | OUTPATIENT
Start: 2021-12-04 | End: 2021-12-09 | Stop reason: HOSPADM

## 2021-12-04 RX ORDER — CALCIUM GLUCONATE 20 MG/ML
2 INJECTION, SOLUTION INTRAVENOUS ONCE
Status: COMPLETED | OUTPATIENT
Start: 2021-12-04 | End: 2021-12-04

## 2021-12-04 RX ORDER — POTASSIUM CHLORIDE 20 MEQ/1
20 TABLET, EXTENDED RELEASE ORAL ONCE
Status: COMPLETED | OUTPATIENT
Start: 2021-12-04 | End: 2021-12-04

## 2021-12-04 RX ORDER — OXYCODONE HYDROCHLORIDE 5 MG/1
2.5 TABLET ORAL
Status: DISCONTINUED | OUTPATIENT
Start: 2021-12-04 | End: 2021-12-09 | Stop reason: HOSPADM

## 2021-12-04 RX ORDER — AMOXICILLIN 250 MG
2 CAPSULE ORAL 2 TIMES DAILY
Status: DISCONTINUED | OUTPATIENT
Start: 2021-12-04 | End: 2021-12-09 | Stop reason: HOSPADM

## 2021-12-04 RX ORDER — LABETALOL HYDROCHLORIDE 5 MG/ML
10 INJECTION, SOLUTION INTRAVENOUS EVERY 4 HOURS PRN
Status: DISCONTINUED | OUTPATIENT
Start: 2021-12-04 | End: 2021-12-09 | Stop reason: HOSPADM

## 2021-12-04 RX ORDER — ATENOLOL 50 MG/1
50 TABLET ORAL DAILY
Status: DISCONTINUED | OUTPATIENT
Start: 2021-12-05 | End: 2021-12-09 | Stop reason: HOSPADM

## 2021-12-04 RX ORDER — HYDROMORPHONE HYDROCHLORIDE 1 MG/ML
0.25 INJECTION, SOLUTION INTRAMUSCULAR; INTRAVENOUS; SUBCUTANEOUS
Status: DISCONTINUED | OUTPATIENT
Start: 2021-12-04 | End: 2021-12-09 | Stop reason: HOSPADM

## 2021-12-04 RX ORDER — BUDESONIDE AND FORMOTEROL FUMARATE DIHYDRATE 80; 4.5 UG/1; UG/1
2 AEROSOL RESPIRATORY (INHALATION) 2 TIMES DAILY
Status: DISCONTINUED | OUTPATIENT
Start: 2021-12-04 | End: 2021-12-09 | Stop reason: HOSPADM

## 2021-12-04 RX ORDER — POLYETHYLENE GLYCOL 3350 17 G/17G
1 POWDER, FOR SOLUTION ORAL
Status: DISCONTINUED | OUTPATIENT
Start: 2021-12-04 | End: 2021-12-09 | Stop reason: HOSPADM

## 2021-12-04 RX ORDER — BUDESONIDE AND FORMOTEROL FUMARATE DIHYDRATE 80; 4.5 UG/1; UG/1
2 AEROSOL RESPIRATORY (INHALATION) 2 TIMES DAILY
Status: ON HOLD | COMMUNITY
End: 2021-12-08

## 2021-12-04 RX ORDER — ATORVASTATIN CALCIUM 40 MG/1
40 TABLET, FILM COATED ORAL EVERY EVENING
Status: DISCONTINUED | OUTPATIENT
Start: 2021-12-04 | End: 2021-12-09 | Stop reason: HOSPADM

## 2021-12-04 RX ORDER — ONDANSETRON 4 MG/1
4 TABLET, ORALLY DISINTEGRATING ORAL EVERY 4 HOURS PRN
Status: DISCONTINUED | OUTPATIENT
Start: 2021-12-04 | End: 2021-12-09 | Stop reason: HOSPADM

## 2021-12-04 RX ADMIN — POTASSIUM CHLORIDE 20 MEQ: 1500 TABLET, EXTENDED RELEASE ORAL at 17:43

## 2021-12-04 RX ADMIN — POTASSIUM CHLORIDE 20 MEQ: 1500 TABLET, EXTENDED RELEASE ORAL at 22:37

## 2021-12-04 RX ADMIN — POTASSIUM CHLORIDE 10 MEQ: 7.46 INJECTION, SOLUTION INTRAVENOUS at 20:43

## 2021-12-04 RX ADMIN — POTASSIUM CHLORIDE 10 MEQ: 7.46 INJECTION, SOLUTION INTRAVENOUS at 18:48

## 2021-12-04 RX ADMIN — BUDESONIDE AND FORMOTEROL FUMARATE DIHYDRATE 2 PUFF: 80; 4.5 AEROSOL RESPIRATORY (INHALATION) at 19:59

## 2021-12-04 RX ADMIN — CALCIUM GLUCONATE 2 G: 20 INJECTION, SOLUTION INTRAVENOUS at 22:40

## 2021-12-04 RX ADMIN — SODIUM CHLORIDE: 9 INJECTION, SOLUTION INTRAVENOUS at 18:45

## 2021-12-04 RX ADMIN — MORPHINE SULFATE 4 MG: 4 INJECTION INTRAVENOUS at 14:58

## 2021-12-04 RX ADMIN — MAGNESIUM SULFATE HEPTAHYDRATE 2 G: 2 INJECTION, SOLUTION INTRAVENOUS at 18:45

## 2021-12-04 RX ADMIN — ATORVASTATIN CALCIUM 40 MG: 40 TABLET, FILM COATED ORAL at 18:38

## 2021-12-04 RX ADMIN — POTASSIUM CHLORIDE 10 MEQ: 7.46 INJECTION, SOLUTION INTRAVENOUS at 17:43

## 2021-12-04 RX ADMIN — POTASSIUM CHLORIDE 10 MEQ: 7.46 INJECTION, SOLUTION INTRAVENOUS at 21:54

## 2021-12-04 RX ADMIN — DOCUSATE SODIUM 100 MG: 100 CAPSULE ORAL at 18:38

## 2021-12-04 RX ADMIN — OXYCODONE 5 MG: 5 TABLET ORAL at 18:38

## 2021-12-04 RX ADMIN — OXYCODONE 5 MG: 5 TABLET ORAL at 22:55

## 2021-12-04 ASSESSMENT — PAIN DESCRIPTION - PAIN TYPE
TYPE: ACUTE PAIN

## 2021-12-04 ASSESSMENT — LIFESTYLE VARIABLES
TOTAL SCORE: 0
HAVE PEOPLE ANNOYED YOU BY CRITICIZING YOUR DRINKING: NO
ON A TYPICAL DAY WHEN YOU DRINK ALCOHOL HOW MANY DRINKS DO YOU HAVE: 0
HOW MANY TIMES IN THE PAST YEAR HAVE YOU HAD 5 OR MORE DRINKS IN A DAY: 0
EVER FELT BAD OR GUILTY ABOUT YOUR DRINKING: NO
CONSUMPTION TOTAL: NEGATIVE
AVERAGE NUMBER OF DAYS PER WEEK YOU HAVE A DRINK CONTAINING ALCOHOL: 0
EVER HAD A DRINK FIRST THING IN THE MORNING TO STEADY YOUR NERVES TO GET RID OF A HANGOVER: NO
TOTAL SCORE: 0
HAVE YOU EVER FELT YOU SHOULD CUT DOWN ON YOUR DRINKING: NO
TOTAL SCORE: 0
ALCOHOL_USE: NO

## 2021-12-04 ASSESSMENT — ENCOUNTER SYMPTOMS
HEMOPTYSIS: 0
BRUISES/BLEEDS EASILY: 0
CONSTIPATION: 1
LOSS OF CONSCIOUSNESS: 0
DEPRESSION: 0
DIARRHEA: 1
FALLS: 1
DOUBLE VISION: 0
STRIDOR: 0
FEVER: 0
WEIGHT LOSS: 0
PALPITATIONS: 0
HEADACHES: 0
SORE THROAT: 0
INSOMNIA: 0
MYALGIAS: 0
DIZZINESS: 0
VOMITING: 0
COUGH: 0
NAUSEA: 0
BLURRED VISION: 0
NECK PAIN: 0

## 2021-12-04 ASSESSMENT — FIBROSIS 4 INDEX
FIB4 SCORE: 2.21
FIB4 SCORE: 1.897366596101027599

## 2021-12-04 NOTE — ED PROVIDER NOTES
ED Provider Note    Scribed for Funmilayo Winter M.D. by Fab Florentino. 12/4/2021, 2:06 PM.    Primary care provider: BRANDYN Benavides  Means of arrival: EMS  History obtained from: Patient  History limited by: None    CHIEF COMPLAINT  Chief Complaint   Patient presents with   • T-5000 GLF   • Head Injury     pt hit head and on plavix/denies loc    • Hip Injury     left hip pain      HPI  Sushma Bowden is a 87 y.o. female who presents to the Emergency Department for an acute head injury. She reports currently being on a blood thinner. Patient reports falling at home after loosing her balance  When she bent over to  something. She reports associated sharp severe left hip pain, and nausea.  She has a moderate throbbing headache as well.  There are no alleviating or exacerbating factors reported at this time. She denies associated loss of consciousness.      REVIEW OF SYSTEMS  Review of Systems   Constitutional: Negative for fever.   HENT:        Positive for acute head injury     Cardiovascular: Negative for chest pain.   Musculoskeletal: Positive for falls and joint pain (Left hip ).   Neurological: Negative for loss of consciousness.   All other systems reviewed and are negative.    PAST MEDICAL HISTORY   has a past medical history of Dementia (HCC), Depression, Hyperlipidemia, Hypertension, Stroke (HCC), and Urinary incontinence.    SURGICAL HISTORY   has a past surgical history that includes knee arthroscopy (Left) and oophorectomy.    SOCIAL HISTORY  Social History     Tobacco Use   • Smoking status: Never Smoker   • Smokeless tobacco: Never Used   Substance Use Topics   • Alcohol use: No   • Drug use: No      Social History     Substance and Sexual Activity   Drug Use No       FAMILY HISTORY  No pertinent family history reported.    CURRENT MEDICATIONS  Home Medications     Reviewed by Darlin Anguiano (Pharmacy Tech) on 12/04/21 at 1556  Med List Status: Complete   Medication Last Dose Status    atenolol (TENORMIN) 50 MG Tab 12/4/2021 Active   atorvastatin (LIPITOR) 40 MG Tab 12/3/2021 Active   budesonide-formoterol (SYMBICORT) 80-4.5 MCG/ACT Aerosol 12/4/2021 Active   clopidogrel (PLAVIX) 75 MG Tab 12/4/2021 Active   docusate sodium (COLACE) 100 MG Cap 12/4/2021 Active   escitalopram (LEXAPRO) 10 MG Tab 12/4/2021 Active                ALLERGIES  Allergies   Allergen Reactions   • Penicillins Unspecified     Unknown reaction  Tolerates cephalosporins       PHYSICAL EXAM  VITAL SIGNS: /72   Pulse 77   Temp 36.6 °C (97.8 °F) (Temporal)   Resp 16   Wt 59.4 kg (131 lb)   SpO2 96%   BMI 25.58 kg/m²   Vitals reviewed by myself.  Physical Exam  Nursing note and vitals reviewed.  Constitutional: Well-developed and well-nourished. No acute distress.   HENT: Head is normocephalic and atraumatic.  Eyes: extra-ocular movements intact  Cardiovascular: Regular rate and regular rhythm. No murmur heard.  Pulmonary/Chest: Breath sounds normal. No wheezes or rales.   Abdominal: Soft and non-tender. No distention.    Musculoskeletal: Tenderness to posterior scalp, no midline tenderness, left hip tenderness with left leg shortening,  2+ bilateral pedal pulses.    Neurological: sensation intact in all extremities  Skin: Skin is warm and dry. No rash.     DIAGNOSTIC STUDIES /  LABS  Labs Reviewed   CBC WITH DIFFERENTIAL - Abnormal; Notable for the following components:       Result Value    WBC 12.8 (*)     RBC 3.77 (*)     .9 (*)     MCH 34.2 (*)     Neutrophils-Polys 90.50 (*)     Lymphocytes 3.50 (*)     Neutrophils (Absolute) 11.60 (*)     Lymphs (Absolute) 0.45 (*)     All other components within normal limits    Narrative:     Collected By:  Indicate which anticoagulants the patient is on:->UNKNOWN   COMP METABOLIC PANEL - Abnormal; Notable for the following components:    Sodium 149 (*)     Potassium 2.3 (*)     Chloride 125 (*)     Co2 16 (*)     Creatinine 0.24 (*)     Calcium 5.3 (*)     Albumin  2.3 (*)     Total Protein 3.9 (*)     Globulin 1.6 (*)     All other components within normal limits    Narrative:     Collected By:  Indicate which anticoagulants the patient is on:->UNKNOWN   PROTHROMBIN TIME - Abnormal; Notable for the following components:    PT 15.9 (*)     INR 1.31 (*)     All other components within normal limits    Narrative:     Collected By:  Indicate which anticoagulants the patient is on:->UNKNOWN   VITAMIN B12 - Abnormal; Notable for the following components:    Vitamin B12 -True Cobalamin 1129 (*)     All other components within normal limits    Narrative:     Collected By:  Add on   RETICULOCYTES COUNT - Abnormal; Notable for the following components:    Retic, Absolute 0.08 (*)     Imm. Reticulocyte Fraction 9.1 (*)     Retic Hgb Equivalent 37.5 (*)     All other components within normal limits    Narrative:     Collected By:  Add on   IRON/TOTAL IRON BIND - Abnormal; Notable for the following components:    Iron 26 (*)     Total Iron Binding 159 (*)     All other components within normal limits    Narrative:     Collected By:  Indicate which anticoagulants the patient is on:->UNKNOWN   TROPONIN    Narrative:     Collected By:  Indicate which anticoagulants the patient is on:->UNKNOWN   APTT    Narrative:     Collected By:  Indicate which anticoagulants the patient is on:->UNKNOWN   SARS-COV ANTIGEN YUAN    Narrative:     Collected By:  Have you been in close contact with a person who is suspected  or known to be positive for COVID-19 within the last 30 days  (e.g. last seen that person < 30 days ago)->No   ESTIMATED GFR    Narrative:     Collected By:  Indicate which anticoagulants the patient is on:->UNKNOWN   FOLATE    Narrative:     Collected By:  Add on   FERRITIN    Narrative:     Collected By:  Add on   URINALYSIS,CULTURE IF INDICATED   OCCULT BLOOD STOOL       All labs reviewed by me.    EKG Interpretation:  Interpreted by myself    12 Lead EKG interpreted by me to show:  EKG  at 10:52 AM: Normal sinus rhythm, heart 64, normal axis, normal intervals, , QRS 84, QTc 409, no acute ST-T segment changes, no evidence of acute arrhythmia or ischemia  My impression of this EKG: Does not indicate ischemia or arrhythmia at this time.    RADIOLOGY  DX-FEMUR-2+ LEFT   Final Result      Left transcervical femoral neck fracture.      DX-CHEST-PORTABLE (1 VIEW)   Final Result      Enlarged cardia silhouette with bibasilar atelectasis.      DX-PELVIS-1 OR 2 VIEWS   Final Result      1.  Left transcervical femoral neck fracture.      CT-HEAD W/O   Final Result      1.  No acute intracranial process.      2.  Atrophy and small vessel ischemic change.      3.  Unchanged encephalomalacia in the left temporal occipital region.           The radiologist's interpretation of all radiological studies have been reviewed by me.    REASSESSMENT  2:06 PM - Patient evaluated at bedside. Discussed plan of care with patient. I informed them that labs and imaging will be ordered to evaluate symptoms. Furthermore, the patient Patient is understanding and agreeable with plan.      4:20 PM - Ordered further labs for the patient.     5:07 PM - Patient was reevaluated at bedside.     5:32 PM - Patient was reevaluated at bedside. Informed patient of plans for hospitalization.    5:41 PM - I discussed the patient's case and the above findings with Dr. Rodas (hospitalist) who agrees to assess the patient for hospitalization. Patient treated with medication.      COURSE & MEDICAL DECISION MAKING  Nursing notes, VS, PMSFHx reviewed in chart.    Patient is a 87-year-old female who comes in for evaluation of dizziness and fall with associated left hip pain.  Differential diagnosis include syncope, electrolyte disturbance, arrhythmia, dehydration, sprain, strain, fracture, dislocation.  Diagnostic work-up includes labs, head CT and left hip x-ray.    Patient's initial vitals are notable for hypertension.  Patient's head CT  returns and demonstrates no evidence of acute abnormalities.  Imaging of the head demonstrates a left femoral neck fracture which I discussed with on-call orthopedic surgeon Dr. Jenkins who will consult on patient for further management of this.  Rapid Covid is sent for surgical intervention and returns and is negative.  EKG returns demonstrates no evidence of acute arrhythmia or ischemia.  Labs returned are notable for hypokalemia with a potassium of 2.3 and hypocalcemia with a calcium of 5.3.  Patient is treated with oral/IV potassium, IV magnesium and IV calcium for this.  Patient will be hospitalized for ongoing management of electrolyte derangements and hip fracture.  Discussed case with Dr. Rodas who is accepted patient for hospitalization.  Patient is in guarded condition.      DISPOSITION:  5:41 PM Spoke with Dr. Rodas regarding the patient.  Patient will be admitted in guarded condition.    FINAL IMPRESSION  1. Fall, initial encounter    2. Hyponatremia    3. Dizziness    4. Closed fracture of neck of left femur, initial encounter ([facility])         Fab LANDRY (Scribe), am scribing for, and in the presence of, Funmilayo Winter M.D..    Electronically signed by: Fba Florentino (Scribe), 12/4/2021    Funmilayo LANDRY M.D. personally performed the services described in this documentation, as scribed by Fab Florentino in my presence, and it is both accurate and complete.    The note accurately reflects work and decisions made by me.  Funmilayo Winter M.D.  12/4/2021  8:12 PM

## 2021-12-04 NOTE — CONSULTS
Ortho consult  Aware of admission  Will require hemiarthroplasty hip after cleared by medical team  Chart, xrays all reviewed, spoke to ED, formal consult to follow  Jenkins

## 2021-12-04 NOTE — ED NOTES
Med rec updated and complete. Allergies reviewed. VIA prescription bottles and interview with son at bedside. No antibiotic use in last 30 days.      Home pharmacy [facility] [phone]      Last dose of clopidogrel 12/04/21 @ 0900

## 2021-12-04 NOTE — ED TRIAGE NOTES
Pt to B21H .  Chief Complaint   Patient presents with   • T-5000 GLF   • Head Injury     pt hit head and on plavix/denies loc    • Hip Injury     left hip pain    family at bedside

## 2021-12-05 ENCOUNTER — APPOINTMENT (OUTPATIENT)
Dept: RADIOLOGY | Facility: MEDICAL CENTER | Age: 86
DRG: 522 | End: 2021-12-05
Attending: INTERNAL MEDICINE
Payer: MEDICARE

## 2021-12-05 ENCOUNTER — ANESTHESIA EVENT (OUTPATIENT)
Dept: SURGERY | Facility: MEDICAL CENTER | Age: 86
DRG: 522 | End: 2021-12-05
Payer: MEDICARE

## 2021-12-05 ENCOUNTER — ANESTHESIA (OUTPATIENT)
Dept: SURGERY | Facility: MEDICAL CENTER | Age: 86
DRG: 522 | End: 2021-12-05
Payer: MEDICARE

## 2021-12-05 LAB
ANION GAP SERPL CALC-SCNC: 12 MMOL/L (ref 7–16)
BUN SERPL-MCNC: 10 MG/DL (ref 8–22)
CALCIUM SERPL-MCNC: 9.9 MG/DL (ref 8.5–10.5)
CHLORIDE SERPL-SCNC: 100 MMOL/L (ref 96–112)
CO2 SERPL-SCNC: 24 MMOL/L (ref 20–33)
CREAT SERPL-MCNC: 0.6 MG/DL (ref 0.5–1.4)
EKG IMPRESSION: NORMAL
ERYTHROCYTE [DISTWIDTH] IN BLOOD BY AUTOMATED COUNT: 48.3 FL (ref 35.9–50)
GLUCOSE SERPL-MCNC: 137 MG/DL (ref 65–99)
HCT VFR BLD AUTO: 40.9 % (ref 37–47)
HGB BLD-MCNC: 13.6 G/DL (ref 12–16)
MCH RBC QN AUTO: 34 PG (ref 27–33)
MCHC RBC AUTO-ENTMCNC: 33.3 G/DL (ref 33.6–35)
MCV RBC AUTO: 102.3 FL (ref 81.4–97.8)
PLATELET # BLD AUTO: 168 K/UL (ref 164–446)
PMV BLD AUTO: 11.8 FL (ref 9–12.9)
POTASSIUM SERPL-SCNC: 4.4 MMOL/L (ref 3.6–5.5)
RBC # BLD AUTO: 4 M/UL (ref 4.2–5.4)
SODIUM SERPL-SCNC: 136 MMOL/L (ref 135–145)
TROPONIN T SERPL-MCNC: 65 NG/L (ref 6–19)
WBC # BLD AUTO: 10.4 K/UL (ref 4.8–10.8)

## 2021-12-05 PROCEDURE — 700102 HCHG RX REV CODE 250 W/ 637 OVERRIDE(OP): Performed by: INTERNAL MEDICINE

## 2021-12-05 PROCEDURE — 84484 ASSAY OF TROPONIN QUANT: CPT

## 2021-12-05 PROCEDURE — 160035 HCHG PACU - 1ST 60 MINS PHASE I: Performed by: ORTHOPAEDIC SURGERY

## 2021-12-05 PROCEDURE — 160009 HCHG ANES TIME/MIN: Performed by: ORTHOPAEDIC SURGERY

## 2021-12-05 PROCEDURE — 99232 SBSQ HOSP IP/OBS MODERATE 35: CPT | Mod: 57 | Performed by: ORTHOPAEDIC SURGERY

## 2021-12-05 PROCEDURE — 770020 HCHG ROOM/CARE - TELE (206)

## 2021-12-05 PROCEDURE — 27125 PARTIAL HIP REPLACEMENT: CPT | Mod: LT | Performed by: ORTHOPAEDIC SURGERY

## 2021-12-05 PROCEDURE — 700111 HCHG RX REV CODE 636 W/ 250 OVERRIDE (IP): Performed by: ANESTHESIOLOGY

## 2021-12-05 PROCEDURE — 85027 COMPLETE CBC AUTOMATED: CPT

## 2021-12-05 PROCEDURE — 93005 ELECTROCARDIOGRAM TRACING: CPT | Performed by: STUDENT IN AN ORGANIZED HEALTH CARE EDUCATION/TRAINING PROGRAM

## 2021-12-05 PROCEDURE — 64447 NJX AA&/STRD FEMORAL NRV IMG: CPT | Performed by: ORTHOPAEDIC SURGERY

## 2021-12-05 PROCEDURE — 36415 COLL VENOUS BLD VENIPUNCTURE: CPT

## 2021-12-05 PROCEDURE — 160048 HCHG OR STATISTICAL LEVEL 1-5: Performed by: ORTHOPAEDIC SURGERY

## 2021-12-05 PROCEDURE — L8699 PROSTHETIC IMPLANT NOS: HCPCS | Performed by: ORTHOPAEDIC SURGERY

## 2021-12-05 PROCEDURE — 0SRS0J9 REPLACEMENT OF LEFT HIP JOINT, FEMORAL SURFACE WITH SYNTHETIC SUBSTITUTE, CEMENTED, OPEN APPROACH: ICD-10-PCS | Performed by: ORTHOPAEDIC SURGERY

## 2021-12-05 PROCEDURE — 700111 HCHG RX REV CODE 636 W/ 250 OVERRIDE (IP): Performed by: EMERGENCY MEDICINE

## 2021-12-05 PROCEDURE — 99232 SBSQ HOSP IP/OBS MODERATE 35: CPT | Performed by: STUDENT IN AN ORGANIZED HEALTH CARE EDUCATION/TRAINING PROGRAM

## 2021-12-05 PROCEDURE — 160002 HCHG RECOVERY MINUTES (STAT): Performed by: ORTHOPAEDIC SURGERY

## 2021-12-05 PROCEDURE — 700105 HCHG RX REV CODE 258: Performed by: ANESTHESIOLOGY

## 2021-12-05 PROCEDURE — 501838 HCHG SUTURE GENERAL: Performed by: ORTHOPAEDIC SURGERY

## 2021-12-05 PROCEDURE — C1776 JOINT DEVICE (IMPLANTABLE): HCPCS | Performed by: ORTHOPAEDIC SURGERY

## 2021-12-05 PROCEDURE — 160031 HCHG SURGERY MINUTES - 1ST 30 MINS LEVEL 5: Performed by: ORTHOPAEDIC SURGERY

## 2021-12-05 PROCEDURE — 502579 HCHG PACK, TOTAL KNEE: Performed by: ORTHOPAEDIC SURGERY

## 2021-12-05 PROCEDURE — 80048 BASIC METABOLIC PNL TOTAL CA: CPT

## 2021-12-05 PROCEDURE — 700111 HCHG RX REV CODE 636 W/ 250 OVERRIDE (IP): Performed by: INTERNAL MEDICINE

## 2021-12-05 PROCEDURE — 93010 ELECTROCARDIOGRAM REPORT: CPT | Performed by: INTERNAL MEDICINE

## 2021-12-05 PROCEDURE — 500367 HCHG DRAIN KIT, HEMOVAC: Performed by: ORTHOPAEDIC SURGERY

## 2021-12-05 PROCEDURE — 160042 HCHG SURGERY MINUTES - EA ADDL 1 MIN LEVEL 5: Performed by: ORTHOPAEDIC SURGERY

## 2021-12-05 PROCEDURE — 502000 HCHG MISC OR IMPLANTS RC 0278: Performed by: ORTHOPAEDIC SURGERY

## 2021-12-05 PROCEDURE — 3E0T3BZ INTRODUCTION OF ANESTHETIC AGENT INTO PERIPHERAL NERVES AND PLEXI, PERCUTANEOUS APPROACH: ICD-10-PCS | Performed by: ANESTHESIOLOGY

## 2021-12-05 PROCEDURE — 700101 HCHG RX REV CODE 250: Performed by: ANESTHESIOLOGY

## 2021-12-05 PROCEDURE — A9270 NON-COVERED ITEM OR SERVICE: HCPCS | Performed by: INTERNAL MEDICINE

## 2021-12-05 DEVICE — IMPLANTABLE DEVICE: Type: IMPLANTABLE DEVICE | Site: HIP | Status: FUNCTIONAL

## 2021-12-05 DEVICE — BONE CEMENT SIMPLEX FULL DOSE - (10EA/PK): Type: IMPLANTABLE DEVICE | Site: HIP | Status: FUNCTIONAL

## 2021-12-05 RX ORDER — HYDRALAZINE HYDROCHLORIDE 20 MG/ML
5 INJECTION INTRAMUSCULAR; INTRAVENOUS
Status: DISCONTINUED | OUTPATIENT
Start: 2021-12-05 | End: 2021-12-05 | Stop reason: HOSPADM

## 2021-12-05 RX ORDER — DEXAMETHASONE SODIUM PHOSPHATE 4 MG/ML
INJECTION, SOLUTION INTRA-ARTICULAR; INTRALESIONAL; INTRAMUSCULAR; INTRAVENOUS; SOFT TISSUE
Status: COMPLETED | OUTPATIENT
Start: 2021-12-05 | End: 2021-12-05

## 2021-12-05 RX ORDER — BUPIVACAINE HYDROCHLORIDE 5 MG/ML
INJECTION, SOLUTION EPIDURAL; INTRACAUDAL
Status: COMPLETED | OUTPATIENT
Start: 2021-12-05 | End: 2021-12-05

## 2021-12-05 RX ORDER — LABETALOL HYDROCHLORIDE 5 MG/ML
5 INJECTION, SOLUTION INTRAVENOUS
Status: DISCONTINUED | OUTPATIENT
Start: 2021-12-05 | End: 2021-12-05 | Stop reason: HOSPADM

## 2021-12-05 RX ORDER — OXYCODONE HCL 5 MG/5 ML
2.5 SOLUTION, ORAL ORAL
Status: DISCONTINUED | OUTPATIENT
Start: 2021-12-05 | End: 2021-12-05 | Stop reason: HOSPADM

## 2021-12-05 RX ORDER — ONDANSETRON 2 MG/ML
4 INJECTION INTRAMUSCULAR; INTRAVENOUS
Status: DISCONTINUED | OUTPATIENT
Start: 2021-12-05 | End: 2021-12-05 | Stop reason: HOSPADM

## 2021-12-05 RX ORDER — ONDANSETRON 2 MG/ML
INJECTION INTRAMUSCULAR; INTRAVENOUS PRN
Status: DISCONTINUED | OUTPATIENT
Start: 2021-12-05 | End: 2021-12-05 | Stop reason: SURG

## 2021-12-05 RX ORDER — TRANEXAMIC ACID 100 MG/ML
INJECTION, SOLUTION INTRAVENOUS PRN
Status: DISCONTINUED | OUTPATIENT
Start: 2021-12-05 | End: 2021-12-05 | Stop reason: SURG

## 2021-12-05 RX ORDER — OXYCODONE HCL 5 MG/5 ML
5 SOLUTION, ORAL ORAL
Status: DISCONTINUED | OUTPATIENT
Start: 2021-12-05 | End: 2021-12-05 | Stop reason: HOSPADM

## 2021-12-05 RX ORDER — SODIUM CHLORIDE, SODIUM LACTATE, POTASSIUM CHLORIDE, CALCIUM CHLORIDE 600; 310; 30; 20 MG/100ML; MG/100ML; MG/100ML; MG/100ML
INJECTION, SOLUTION INTRAVENOUS CONTINUOUS
Status: DISCONTINUED | OUTPATIENT
Start: 2021-12-05 | End: 2021-12-05 | Stop reason: HOSPADM

## 2021-12-05 RX ORDER — CEFAZOLIN SODIUM 1 G/3ML
INJECTION, POWDER, FOR SOLUTION INTRAMUSCULAR; INTRAVENOUS PRN
Status: DISCONTINUED | OUTPATIENT
Start: 2021-12-05 | End: 2021-12-05 | Stop reason: SURG

## 2021-12-05 RX ORDER — DEXAMETHASONE SODIUM PHOSPHATE 4 MG/ML
INJECTION, SOLUTION INTRA-ARTICULAR; INTRALESIONAL; INTRAMUSCULAR; INTRAVENOUS; SOFT TISSUE PRN
Status: DISCONTINUED | OUTPATIENT
Start: 2021-12-05 | End: 2021-12-05 | Stop reason: SURG

## 2021-12-05 RX ORDER — SODIUM CHLORIDE, SODIUM LACTATE, POTASSIUM CHLORIDE, CALCIUM CHLORIDE 600; 310; 30; 20 MG/100ML; MG/100ML; MG/100ML; MG/100ML
INJECTION, SOLUTION INTRAVENOUS
Status: DISCONTINUED | OUTPATIENT
Start: 2021-12-05 | End: 2021-12-05 | Stop reason: SURG

## 2021-12-05 RX ORDER — HALOPERIDOL 5 MG/ML
1 INJECTION INTRAMUSCULAR
Status: DISCONTINUED | OUTPATIENT
Start: 2021-12-05 | End: 2021-12-05 | Stop reason: HOSPADM

## 2021-12-05 RX ORDER — PROPOFOL 10 MG/ML
INJECTION, EMULSION INTRAVENOUS PRN
Status: DISCONTINUED | OUTPATIENT
Start: 2021-12-05 | End: 2021-12-05 | Stop reason: SURG

## 2021-12-05 RX ADMIN — CEFAZOLIN 2 G: 330 INJECTION, POWDER, FOR SOLUTION INTRAMUSCULAR; INTRAVENOUS at 07:56

## 2021-12-05 RX ADMIN — ESCITALOPRAM OXALATE 10 MG: 10 TABLET ORAL at 05:21

## 2021-12-05 RX ADMIN — PROPOFOL 20 MG: 10 INJECTION, EMULSION INTRAVENOUS at 09:02

## 2021-12-05 RX ADMIN — TRANEXAMIC ACID 1000 MG: 100 INJECTION, SOLUTION INTRAVENOUS at 07:56

## 2021-12-05 RX ADMIN — DOCUSATE SODIUM 50 MG AND SENNOSIDES 8.6 MG 2 TABLET: 8.6; 5 TABLET, FILM COATED ORAL at 05:21

## 2021-12-05 RX ADMIN — ATORVASTATIN CALCIUM 40 MG: 40 TABLET, FILM COATED ORAL at 17:53

## 2021-12-05 RX ADMIN — HEPARIN SODIUM 5000 UNITS: 5000 INJECTION, SOLUTION INTRAVENOUS; SUBCUTANEOUS at 23:25

## 2021-12-05 RX ADMIN — POTASSIUM CHLORIDE 20 MEQ: 1500 TABLET, EXTENDED RELEASE ORAL at 05:21

## 2021-12-05 RX ADMIN — PROPOFOL 20 MG: 10 INJECTION, EMULSION INTRAVENOUS at 09:06

## 2021-12-05 RX ADMIN — PROPOFOL 20 MG: 10 INJECTION, EMULSION INTRAVENOUS at 08:10

## 2021-12-05 RX ADMIN — BUPIVACAINE HYDROCHLORIDE 15 ML: 5 INJECTION, SOLUTION EPIDURAL; INTRACAUDAL; PERINEURAL at 07:49

## 2021-12-05 RX ADMIN — ONDANSETRON 4 MG: 2 INJECTION INTRAMUSCULAR; INTRAVENOUS at 09:02

## 2021-12-05 RX ADMIN — CALCIUM GLUCONATE 1 G: 20 INJECTION, SOLUTION INTRAVENOUS at 00:00

## 2021-12-05 RX ADMIN — DOCUSATE SODIUM 50 MG AND SENNOSIDES 8.6 MG 2 TABLET: 8.6; 5 TABLET, FILM COATED ORAL at 17:53

## 2021-12-05 RX ADMIN — DEXAMETHASONE SODIUM PHOSPHATE 4 MG: 4 INJECTION, SOLUTION INTRA-ARTICULAR; INTRALESIONAL; INTRAMUSCULAR; INTRAVENOUS; SOFT TISSUE at 07:53

## 2021-12-05 RX ADMIN — HEPARIN SODIUM 5000 UNITS: 5000 INJECTION, SOLUTION INTRAVENOUS; SUBCUTANEOUS at 16:03

## 2021-12-05 RX ADMIN — OXYCODONE 5 MG: 5 TABLET ORAL at 20:55

## 2021-12-05 RX ADMIN — DEXAMETHASONE SODIUM PHOSPHATE 8 MG: 4 INJECTION, SOLUTION INTRA-ARTICULAR; INTRALESIONAL; INTRAMUSCULAR; INTRAVENOUS; SOFT TISSUE at 07:56

## 2021-12-05 RX ADMIN — PROPOFOL 80 MG: 10 INJECTION, EMULSION INTRAVENOUS at 07:44

## 2021-12-05 RX ADMIN — DOCUSATE SODIUM 100 MG: 100 CAPSULE ORAL at 17:53

## 2021-12-05 RX ADMIN — HYDRALAZINE HYDROCHLORIDE 5 MG: 20 INJECTION INTRAMUSCULAR; INTRAVENOUS at 10:14

## 2021-12-05 RX ADMIN — DOCUSATE SODIUM 100 MG: 100 CAPSULE ORAL at 05:21

## 2021-12-05 RX ADMIN — SODIUM CHLORIDE, POTASSIUM CHLORIDE, SODIUM LACTATE AND CALCIUM CHLORIDE: 600; 310; 30; 20 INJECTION, SOLUTION INTRAVENOUS at 10:51

## 2021-12-05 RX ADMIN — OXYCODONE 2.5 MG: 5 TABLET ORAL at 05:31

## 2021-12-05 RX ADMIN — BUDESONIDE AND FORMOTEROL FUMARATE DIHYDRATE 2 PUFF: 80; 4.5 AEROSOL RESPIRATORY (INHALATION) at 05:35

## 2021-12-05 RX ADMIN — BUPIVACAINE HYDROCHLORIDE 15 ML: 5 INJECTION, SOLUTION EPIDURAL; INTRACAUDAL at 07:53

## 2021-12-05 RX ADMIN — POTASSIUM CHLORIDE 20 MEQ: 1500 TABLET, EXTENDED RELEASE ORAL at 17:53

## 2021-12-05 RX ADMIN — SODIUM CHLORIDE, POTASSIUM CHLORIDE, SODIUM LACTATE AND CALCIUM CHLORIDE: 600; 310; 30; 20 INJECTION, SOLUTION INTRAVENOUS at 07:38

## 2021-12-05 RX ADMIN — DEXAMETHASONE SODIUM PHOSPHATE 4 MG: 4 INJECTION, SOLUTION INTRA-ARTICULAR; INTRALESIONAL; INTRAMUSCULAR; INTRAVENOUS; SOFT TISSUE at 07:49

## 2021-12-05 RX ADMIN — ATENOLOL 50 MG: 50 TABLET ORAL at 05:21

## 2021-12-05 ASSESSMENT — PAIN SCALES - GENERAL: PAIN_LEVEL: 2

## 2021-12-05 ASSESSMENT — FIBROSIS 4 INDEX: FIB4 SCORE: 1.97

## 2021-12-05 NOTE — ANESTHESIA PREPROCEDURE EVALUATION
Case: [id number] Date/Time: 12/05/21 0730    Procedure: HEMIARTHROPLASTY, HIP (Left )    Pre-op diagnosis: S/p left hip fracture    Location: [facility] OR 16 / SURGERY [facility]    Surgeons: Cole Jenkins M.D.        86yo F with GLF, here for L hip jodi    Dementia; phone consent with son    Relevant Problems   NEURO   (positive) S/p left hip fracture   (positive) Stroke (cerebrum) (HCC)      CARDIAC   (positive) Essential hypertension      Other   (positive) Cerebral infarction (HCC)   (positive) Impaired mobility and ADLs   (positive) Vascular dementia without behavioral disturbance (HCC)       Physical Exam    Airway - unable to assess       Cardiovascular - normal exam  Rhythm: regular  Rate: normal  (-) murmur     Dental   (+) upper dentures, lower dentures           Pulmonary - normal exam  Breath sounds clear to auscultation     Abdominal    Neurological - abnormal exam                 Anesthesia Plan    ASA 3- EMERGENT   ASA physical status 3 criteria: CVA or TIA - history (> 3 months)ASA physical status emergent criteria: displaced fracture with possible neurovascular compromise    Plan - general and peripheral nerve block     Peripheral nerve block will be post-op pain control  Airway plan will be LMA          Induction: intravenous    Postoperative Plan: Postoperative administration of opioids is intended.    Pertinent diagnostic labs and testing reviewed    Informed Consent:    Anesthetic plan and risks discussed with healthcare power of  (son).    Use of blood products discussed with: healthcare power of  whom consented to blood products.

## 2021-12-05 NOTE — PROGRESS NOTES
Monitor Summary  Rhythm: SR 60-72  Rate: 60-72  Ectopy: R PAC  0.14 / 0.08 / 0.42

## 2021-12-05 NOTE — CONSULTS
12/5/    The patient was seen at the request of left hip fracture    HPI: Sushma Bowden is a 87 y.o. female who presents with complaints of pain to left hip  This started yesterday with fall,The pain is 4/10 with motaion and is described as sharp.  The pain is made worse by palpation of the area and made better by rest and immobilization.. I moved her hip and the left hip is painful    Past Medical History:   Diagnosis Date   • Dementia (HCC)    • Depression    • Hyperlipidemia    • Hypertension    • Stroke (HCC)    • Urinary incontinence        Past Surgical History:   Procedure Laterality Date   • KNEE ARTHROSCOPY Left    • OOPHORECTOMY         Medications  No current facility-administered medications on file prior to encounter.     Current Outpatient Medications on File Prior to Encounter   Medication Sig Dispense Refill   • docusate sodium (COLACE) 100 MG Cap Take 100 mg by mouth 2 times a day.     • atenolol (TENORMIN) 50 MG Tab Take 50 mg by mouth every day.     • budesonide-formoterol (SYMBICORT) 80-4.5 MCG/ACT Aerosol Inhale 2 Puffs 2 times a day.     • atorvastatin (LIPITOR) 40 MG Tab Take 1 Tab by mouth every evening. 30 Tab 2   • clopidogrel (PLAVIX) 75 MG Tab Take 1 Tab by mouth every day. 30 Tab 2   • escitalopram (LEXAPRO) 10 MG Tab Take 1 Tab by mouth every day. 30 Tab 2       Allergies  Penicillins    ROS  All other systems were reviewed and found to be negative- spoke to son, he did not ID any additional major medical problems to contraindicate  surgery    No family history on file.    Social History     Socioeconomic History   • Marital status:      Spouse name: Not on file   • Number of children: Not on file   • Years of education: Not on file   • Highest education level: Not on file   Occupational History   • Not on file   Tobacco Use   • Smoking status: Never Smoker   • Smokeless tobacco: Never Used   Substance and Sexual Activity   • Alcohol use: No   • Drug use: No   • Sexual activity:  Not on file   Other Topics Concern   • Not on file   Social History Narrative   • Not on file     Social Determinants of Health     Financial Resource Strain:    • Difficulty of Paying Living Expenses: Not on file   Food Insecurity:    • Worried About Running Out of Food in the Last Year: Not on file   • Ran Out of Food in the Last Year: Not on file   Transportation Needs:    • Lack of Transportation (Medical): Not on file   • Lack of Transportation (Non-Medical): Not on file   Physical Activity:    • Days of Exercise per Week: Not on file   • Minutes of Exercise per Session: Not on file   Stress:    • Feeling of Stress : Not on file   Social Connections:    • Frequency of Communication with Friends and Family: Not on file   • Frequency of Social Gatherings with Friends and Family: Not on file   • Attends [employer] Services: Not on file   • Active Member of Clubs or Organizations: Not on file   • Attends Club or Organization Meetings: Not on file   • Marital Status: Not on file   Intimate Partner Violence:    • Fear of Current or Ex-Partner: Not on file   • Emotionally Abused: Not on file   • Physically Abused: Not on file   • Sexually Abused: Not on file   Housing Stability:    • Unable to Pay for Housing in the Last Year: Not on file   • Number of Places Lived in the Last Year: Not on file   • Unstable Housing in the Last Year: Not on file       Physical Exam  Vitals  /67   Pulse 82   Temp 36.2 °C (97.1 °F) (Temporal)   Resp 17   Wt 65.3 kg (143 lb 15.4 oz)   SpO2 94%   General: Well Developed, Well Nourished, Age appropriate appearance  HEENT: Normocephalic, atraumatic  Psych: Normal mood and affect  Neck: Supple, nontender, no masses  Lungs: Breathing unlabored, No audible wheezing  Heart: Regular heart rate and rhythm  Abdomen: Soft, NT, ND  Neuro: Sensation grossly intact to BUE and BLE, moving all four extremities  Skin: Intact, no open wounds  Vascular: intact,atoes move  MSK: left hipsore to  anyrange of motion      Radiographs:  DX-FEMUR-2+ LEFT   Final Result      Left transcervical femoral neck fracture.      DX-CHEST-PORTABLE (1 VIEW)   Final Result      Enlarged cardia silhouette with bibasilar atelectasis.      DX-PELVIS-1 OR 2 VIEWS   Final Result      1.  Left transcervical femoral neck fracture.      CT-HEAD W/O   Final Result      1.  No acute intracranial process.      2.  Atrophy and small vessel ischemic change.      3.  Unchanged encephalomalacia in the left temporal occipital region.             Laboratory Values  Recent Labs     12/04/21  1620 12/05/21  0234   WBC 12.8* 10.4   RBC 3.77* 4.00*   HEMOGLOBIN 12.9 13.6   HEMATOCRIT 38.4 40.9   .9* 102.3*   MCH 34.2* 34.0*   MCHC 33.6 33.3*   RDW 47.9 48.3   PLATELETCT 174 168   MPV 11.0 11.8     Recent Labs     12/04/21  1620 12/05/21  0234   SODIUM 149* 136   POTASSIUM 2.3* 4.4   CHLORIDE 125* 100   CO2 16* 24   GLUCOSE 79 137*   BUN 8 10     Recent Labs     12/04/21  1620   APTT 30.9   INR 1.31*         Impression:left femoral neck fracture for jodi    Plan:We discussed the diagnosis and findings with the patient at length.  We reviewed possible non operative and operative interventions and the risks and benefits of each of these.  she had a chance to ask questions and all of these were answered to her satisfaction. The patient chose to proceed with  Hip jodi, spoke to son on the telephone and three staff in holding area confirmed intervention. Risks and benefits of surgery were discussed which include but are not limited to bleeding, infection, neurovascular damage, malunion, nonunion, instability, limb length discrepancy, DVT, PE, MI, Stroke and death. They understand these risks and wish to proceed.

## 2021-12-05 NOTE — PROGRESS NOTES
4 Eyes Skin Assessment Completed by SHEILA Manning and SHEILA Fontanez.    Head WDL  Ears Redness and Blanching  Nose WDL  Mouth WDL  Neck WDL  Breast/Chest WDL  Shoulder Blades patient refused to turn  Spine patient refused to turn  (R) Arm/Elbow/Hand Redness and Blanching  (L) Arm/Elbow/Hand Redness and Blanching  Abdomen WDL  Groin WDL  Scrotum/Coccyx/Buttocks Patient refused to turn  (R) Leg WDL  (L) Leg WDL  (R) Heel/Foot/Toe WDL  (L) Heel/Foot/Toe WDL          Devices In Places Tele Box, Blood Pressure Cuff, Pulse Ox and Nasal Cannula      Interventions In Place Gray Ear Foams, NC W/Ear Foams, Pillows, Q2 Turns, Barrier Cream and Pressure Redistribution Mattress    Possible Skin Injury No    Pictures Uploaded Into Epic N/A  Wound Consult Placed N/A  RN Wound Prevention Protocol Ordered No

## 2021-12-05 NOTE — OP REPORT
DATE OF SERVICE:  12/05/2021     PREOPERATIVE DIAGNOSIS:  Geriatric displaced femoral neck fracture, left.     POSTOPERATIVE DIAGNOSIS:  Geriatric displaced femoral neck fracture, left.     OPERATION PERFORMED:  Left Arabella Omnifit hemiarthroplasty.     COMPONENTS UTILIZED:  A cemented #6 stem, a 32 bipolar head, posterior   approach.     COMPLICATIONS:  None.     INDICATIONS FOR THE OPERATION:  Geriatric hip fracture.     BLOOD LOSS:  About 50 mL     APPROACH: Lateral beanbag, posterior approach.     DESCRIPTION OF PROCEDURE:  The patient brought to the operating room awake,   alert, placed on the operating table in supine position.  The left lower   extremity was then shaved, scrubbed, prepped and draped in normal sterile   routine fashion.  A timeout ____ utilized. The doors marked arthroplasty case.     A posterior approach, subcutaneous tissue dissected down to the tensor, which   was opened and then a posterior approach to the hip was utilized.  We found   the external rotators, made our capsular incision, easily identified the   fracture interface.  We tagged the rotators and capsule as one structure and   then made our femoral neck cut approximately 1.5 cm above the palpable lesser   trochanter and then used our power femoral head screw retriever to remove the   head.     We now checked the acetabulum.  There was very minimal wear.  We copiously   irrigated.  There was no bone debris.     Now, simple techniques for the femur, we hand broached, hand reamed and   eventually placed a 6 stem, trialled, equal leg lengths, good stability.  Now,   we prepared the femoral canal appropriately for our cementing techniques.    Copiously irrigated, placed a distal cement plug, irrigated again and then   placed our irrigating tampon and then prior to cementing, we completely dried   the intramedullary canal.  We filled the canal with cement.  We now placed our   stem in 20 degrees of anteversion, allowed the cement to  dry.  At the   completion of the procedure, the version appeared normal.  The cement   technique was hardened and dried.  We dried and prepared the [place] taper and   then assembled our bipolar and reduced the hip.  The hip had equal leg   lengths, good stability and we were satisfied with the reduction and   stability.     At this point, we brought the external rotators and capsule with #5 Ti-Cron   ____ through the tip of the trochanter and then sutured it with #1 Vicryl,   closed the tensor #1 Vicryl, 2-0 subcutaneous, skin staples, sterile   compression dressing and the patient was taken to recovery room in stable   condition.  No intraoperative or immediate postoperative complications.  The   patient tolerated the procedure well.        ______________________________  MD SIOMARA Palacios/SUSY/JERMAN    DD:  12/05/2021 09:09  DT:  12/05/2021 09:50    Job#:  [id number]

## 2021-12-05 NOTE — ANESTHESIA POSTPROCEDURE EVALUATION
Patient: Sushma Bowden    Procedure Summary     Date: 12/05/21 Room / Location: [place] [address] / SURGERY [facility]    Anesthesia Start: 0738 Anesthesia Stop: 0937    Procedure: HEMIARTHROPLASTY, HIP (Left Hip) Diagnosis: (Left hip fracture)    Surgeons: Cole Jenkins M.D. Responsible Provider: Martita Riojas M.D.    Anesthesia Type: general, peripheral nerve block ASA Status: 3 - Emergent          Final Anesthesia Type: general, peripheral nerve block  Last vitals  BP   Blood Pressure : 153/67    Temp   36.9 °C (98.5 °F)    Pulse   61   Resp   17    SpO2   95 %      Anesthesia Post Evaluation    Patient location during evaluation: PACU  Patient participation: complete - patient participated  Level of consciousness: awake and alert  Pain score: 2    Airway patency: patent  Anesthetic complications: no  Cardiovascular status: hemodynamically stable  Respiratory status: acceptable  Hydration status: euvolemic    PONV: none          No complications documented.     Discussed with son the inadvertent R side block and the subsequent L sided block for case. Answered all his questions regarding expected length of block, unlikely long term sequelae of the extra procedure, and expected rehab timeline. Surgeon also added to expected long term rehab expectations in regards to surgery itself

## 2021-12-05 NOTE — OR SURGEON
Immediate Post OP Note    PreOp Diagnosis: left hip hemiarthroplsty for femoralneck fracture      PostOp Diagnosis: same      Procedure(s):  HEMIARTHROPLASTY, HIP - Wound Class: Clean    Surgeon(s):  Cole Jenkins M.D.surgeon  Malvin Pierson M.D.    Anesthesiologist/Type of Anesthesia:  Anesthesiologist: Martita Riojas M.D./General    Surgical Staff:  Circulator: Nano Villela R.N.; Nette Stovall R.N.  Scrub Person: Andi Mclaughlin; Guanako Balbuena    Specimens removed if any:  * No specimens in log *    Estimated Blood Loss: 50ccs    Findings: as described    Complications: none        12/5/2021 9:02 AM Cole Jenkins M.D.

## 2021-12-05 NOTE — ED NOTES
Critical labs reported to MD.    Implemented All Universal Safety Interventions:  [place] to call system. Call bell, personal items and telephone within reach. Instruct patient to call for assistance. Room bathroom lighting operational. Non-slip footwear when patient is off stretcher. Physically safe environment: no spills, clutter or unnecessary equipment. Stretcher in lowest position, wheels locked, appropriate side rails in place.

## 2021-12-05 NOTE — ASSESSMENT & PLAN NOTE
Mechanical fall complicated by Plavix, hypokalemia and hypocalcemia. IV repletion ordered. Plavix on hold, follow up lab verification of electrolyte correction.  There is otherwise no immediately reversible cardiopulmonary risk factors present justifying delay in the surgery  12/5: Status post hemiarthroplasty.  As needed pain meds.  PT/OT.  12/6: PT and OT had to be delayed since patient continues to experience numbness from the nerve block.

## 2021-12-05 NOTE — ANESTHESIA PROCEDURE NOTES
Peripheral Block    Date/Time: 12/5/2021 7:53 AM  Performed by: Martita Riojas M.D.  Authorized by: Martita Riojas M.D.     Patient Location:  OR  Start Time:  12/5/2021 7:53 AM  End Time:  12/5/2021 7:55 AM  Reason for Block: at surgeon's request and post-op pain management ONLY    patient identified, IV checked, site marked, risks and benefits discussed, surgical consent, monitors and equipment checked, pre-op evaluation and timeout performed    Patient Position:  Supine  Prep: ChloraPrep    Monitoring:  Heart rate, continuous pulse ox and cardiac monitor  Block Region:  Lower Extremity  Lower Extremity - Block Type:  FEMORAL nerve block, Infra-Inguinal approach    Laterality:  Left  Procedures: ultrasound guided  Image captured, interpreted and electronically stored.  Block Type:  Single-shot  Needle Length:  100mm  Needle Gauge:  21 G  Needle Localization:  Ultrasound guidance  Injection Assessment:  Negative aspiration for heme, no paresthesia on injection, incremental injection and local visualized surrounding nerve on ultrasound  Evidence of intravascular injection: No     US Guided Femoral Nerve Block: block repeated on the L after realizing wrong side was previously blocked.  Discussed with surgical fellow beforehand and confirmed it was OK to block the correct side, despite the fact that patient will not be able to ambulate.  US probe placed at inguinal crease and the Femoral Nerve (FN) identified lateral to the Femoral Artery (FA).  Needle inserted lateral to probe in an in plane approach and advanced under direct visualization through the fascia chino and fascia iliaca remaining lateral to the FN.  After negative aspiration LA injected with ease and visualized surrounding the FN.

## 2021-12-05 NOTE — PROGRESS NOTES
Report received from SHEILA Moya. Patient is A & O x 2, 3 L O2 via NC, SR on the monitor. Son, Evan accompanied patient to the unit and assisted with translation.   IPAD currently in use. POC discussed. Fall precautions are in place. Call light is in reach.

## 2021-12-05 NOTE — CONSULTS
Orthopedic Surgery Consult Note        DATE OF SERVICE: 12/4     REASON FOR CONSULTATION:  left hip fx     HISTORY:  87F [place] citizen speaking hx HTN, Stroke, on blood thinners sustained GLF resulting in left femoral neck fx. Patient complaining of left hip pain only.      PAST MEDICAL HISTORY:   Past Medical History:   Diagnosis Date   • Dementia (HCC)    • Depression    • Hyperlipidemia    • Hypertension    • Stroke (HCC)    • Urinary incontinence         PAST SURGICAL HISTORY: unknown     CURRENT MEDICATIONS AND ALLERGIES:  All listed in the medical record.     PHYSICAL EXAMINATION:  GENERAL:  The patient is awake, alert, comfortable, afebrile.    EXTREMITIES:    LLE: pain with logroll of left leg. Sensation intact distally over foot. Foot warm. Wiggles toes.      IMAGING DATA:    AP pelvis - displaced femoral neck fracture left hip     LABORATORY DATA: Hgb 12.9      ASSESSMENT: 87F GLF left femoral neck fx     PLAN:  OR tomorrow am for left hip hemiarthroplasty  NPO at midnight  Stop plavix if able  NWB LLE  Pain control       Malvin Pierson MD  Orthopedic Trauma Fellow

## 2021-12-05 NOTE — ANESTHESIA TIME REPORT
Anesthesia Start and Stop Event Times     Date Time Event    12/5/2021 0725 Ready for Procedure     0738 Anesthesia Start     0937 Anesthesia Stop        Responsible Staff  12/05/21    Name Role Begin End    Martita Riojas M.D. Anesth 0738 0937        Preop Diagnosis (Free Text):  Pre-op Diagnosis     Left hip fracture        Preop Diagnosis (Codes):    Premium Reason  E. Weekend    Comments:

## 2021-12-05 NOTE — ANESTHESIA PROCEDURE NOTES
Peripheral Block    Date/Time: 12/5/2021 7:49 AM  Performed by: Martita Riojas M.D.  Authorized by: Martita Riojas M.D.     Patient Location:  OR  Start Time:  12/5/2021 7:49 AM  End Time:  12/5/2021 7:52 AM  Reason for Block: at surgeon's request and post-op pain management ONLY    patient identified, IV checked, site marked, risks and benefits discussed, surgical consent, monitors and equipment checked, pre-op evaluation and timeout performed    Patient Position:  Supine  Prep: ChloraPrep    Monitoring:  Heart rate, continuous pulse ox and cardiac monitor  Block Region:  Lower Extremity  Lower Extremity - Block Type:  FEMORAL nerve block, Infra-Inguinal approach    Laterality:  Right  Procedures: ultrasound guided  Image captured, interpreted and electronically stored.  Block Type:  Single-shot  Needle Length:  100mm  Needle Gauge:  21 G  Needle Localization:  Ultrasound guidance  Injection Assessment:  Negative aspiration for heme, no paresthesia on injection, incremental injection and local visualized surrounding nerve on ultrasound  Evidence of intravascular injection: No     US Guided Femoral Nerve Block:   US probe placed at inguinal crease and the Femoral Nerve (FN) identified lateral to the Femoral Artery (FA).  Needle inserted lateral to probe in an in plane approach and advanced under direct visualization through the fascia chino and fascia iliaca remaining lateral to the FN.  After negative aspiration LA injected with ease and visualized surrounding the FN.

## 2021-12-05 NOTE — PROGRESS NOTES
Hospital Medicine Daily Progress Note    Date of Service  12/5/2021    Chief Complaint  Susmha Bowden is a 87 y.o. female admitted 12/4/2021 with left hip fracture    Hospital Course  Sushma Bowden is a 87 y.o. Portuguese-speaking female female with history of vascular dementia, CVA on Plavix, with minimal residual deficit who presented 12/4/2021 to the Emergency Department for a fall, left hip pain and an acute head injury. Patient reports falling at home after loosing her balance  When she bent over to  something. She reports associated sharp severe left hip pain, and nausea.  She has a moderate throbbing headache as well.   Labs reveal hypokalemia, hypocalcemia.  CT head is unremarkable, x-ray reveals left femoral neck fracture. She receives symptomatic management, IV potassium, calcium, magnesium and referred to the hospitalist for admission. Patient denies previous complications to surgery.  There are no alleviating or exacerbating factors reported at this time. She denies associated loss of consciousness.  Interval Problem Update  Patient is now postop surgery.  Surgery had no complications.  Patient doing well.  Continue as needed pain meds.  Patient started on cardiac diet.  PT/OT ordered.  Patient will likely need placement.    I have personally seen and examined the patient at bedside. I discussed the plan of care with patient.    Consultants/Specialty  orthopedics    Code Status  Full Code    Disposition  Patient is not medically cleared.   Anticipate discharge to d.  I have placed the appropriate orders for post-discharge needs.    Review of Systems  Review of Systems   Unable to perform ROS: Other   Lethargic secondary to anesthesia and pain medications.    Physical Exam  Temp:  [36.2 °C (97.1 °F)-36.9 °C (98.5 °F)] 36.9 °C (98.5 °F)  Pulse:  [57-82] 61  Resp:  [15-27] 17  BP: (125-174)/(58-84) 153/67  SpO2:  [90 %-96 %] 95 %    Physical Exam  Constitutional:       General: She is sleeping. She  is not in acute distress.     Appearance: She is not ill-appearing.   HENT:      Head: Normocephalic and atraumatic.   Eyes:      General:         Right eye: No discharge.         Left eye: No discharge.   Cardiovascular:      Rate and Rhythm: Normal rate and regular rhythm.      Heart sounds: Normal heart sounds.   Pulmonary:      Effort: No respiratory distress.      Breath sounds: Normal breath sounds. No wheezing or rales.   Abdominal:      General: Abdomen is flat. There is no distension.      Tenderness: There is no abdominal tenderness. There is no guarding.   Musculoskeletal:         General: Tenderness (Left hip) present.      Right lower leg: No edema.      Left lower leg: Edema present.   Skin:     General: Skin is warm and dry.      Coloration: Skin is not jaundiced.   Neurological:      Cranial Nerves: No facial asymmetry.      Motor: No tremor.         Fluids    Intake/Output Summary (Last 24 hours) at 12/5/2021 1454  Last data filed at 12/5/2021 1033  Gross per 24 hour   Intake 900 ml   Output 100 ml   Net 800 ml       Laboratory  Recent Labs     12/04/21  1620 12/05/21  0234   WBC 12.8* 10.4   RBC 3.77* 4.00*   HEMOGLOBIN 12.9 13.6   HEMATOCRIT 38.4 40.9   .9* 102.3*   MCH 34.2* 34.0*   MCHC 33.6 33.3*   RDW 47.9 48.3   PLATELETCT 174 168   MPV 11.0 11.8     Recent Labs     12/04/21  1620 12/04/21  2109 12/05/21  0234   SODIUM 149*  --  136   POTASSIUM 2.3*  --  4.4   CHLORIDE 125*  --  100   CO2 16*  --  24   GLUCOSE 79  --  137*   BUN 8  --  10   CREATININE 0.24*  --  0.60   CALCIUM 5.3* 9.1 9.9     Recent Labs     12/04/21  1620   APTT 30.9   INR 1.31*               Imaging  DX-FEMUR-2+ LEFT   Final Result      Left transcervical femoral neck fracture.      DX-CHEST-PORTABLE (1 VIEW)   Final Result      Enlarged cardia silhouette with bibasilar atelectasis.      DX-PELVIS-1 OR 2 VIEWS   Final Result      1.  Left transcervical femoral neck fracture.      CT-HEAD W/O   Final Result      1.   No acute intracranial process.      2.  Atrophy and small vessel ischemic change.      3.  Unchanged encephalomalacia in the left temporal occipital region.              Assessment/Plan  * S/p left hip fracture- (present on admission)  Assessment & Plan  Mechanical fall complicated by Plavix, hypokalemia and hypocalcemia. IV repletion ordered. Plavix on hold, follow up lab verification of electrolyte correction.  There is otherwise no immediately reversible cardiopulmonary risk factors present justifying delay in the surgery  12/5: Status post hemiarthroplasty.  As needed pain meds.  PT/OT.    Hypocalcemia  Assessment & Plan  Partially secondary to hypoalbuminemia. Calcium ordered. Follow-up chemistry.  12/5:Repeat calcium was normal.    Hypernatremia  Assessment & Plan  Mild, half-normal saline challenge, follow-up BMP  Resolved    Vascular dementia without behavioral disturbance (HCC)- (present on admission)  Assessment & Plan  Supportive care    Macrocytosis- (present on admission)  Assessment & Plan  Unclear cause, follow-up anemia labs    Hypokalemia  Assessment & Plan  Unclear cause, possibly secondary to episodes of diarrhea. Follow-up magnesium level,  IV and p.o. potassium repletion, follow-up magnesium  12/5: Resolved         VTE prophylaxis: heparin ppx    I have performed a physical exam and reviewed and updated ROS and Plan today (12/5/2021). In review of yesterday's note (12/4/2021), there are no changes except as documented above.

## 2021-12-05 NOTE — OR NURSING
Pt drowsy post op, wakes to verbal stimuli. A&OX1, oriented to self. VSS. One dose of IV hydralazine administered per MAR for SBP > 170. Pt on 3 L of oxygen via nasal cannula. Post L hip hemiarthroplasty, dressing CDI. Ice pack in place. 1+ bilateral pedal pulses. Pt received bilateral femoral nerve blocks, unable to lift BLE off bed. Sensation intact to feet, some grimacing with plantar/dorsi flexion. Purewick remains in place. Report called to SHEILA Lacey. Pt transported back to [facility] via bed and on monitor by this RN and CHACHA Simeon.

## 2021-12-05 NOTE — ANESTHESIA PROCEDURE NOTES
Airway    Date/Time: 12/5/2021 7:44 AM  Performed by: Martita Riojas M.D.  Authorized by: Martita Riojas M.D.     Location:  OR  Urgency:  Elective  Indications for Airway Management:  Anesthesia      Spontaneous Ventilation: absent    Sedation Level:  Deep  Preoxygenated: Yes    Mask Difficulty Assessment:  0 - not attempted  Final Airway Type:  Supraglottic airway  Final Supraglottic Airway:  Standard LMA    SGA Size:  4  Airway Seal Pressure (cm H2O):  22  Number of Attempts at Approach:  1

## 2021-12-05 NOTE — H&P
Hospital Medicine History & Physical Note    Date of Service  12/4/2021    Primary Care Physician  EBONIE Benavides.    Consultants  orthopedics    Specialist Names: Dr. Seth    Code Status  Full Code    Chief Complaint  Chief Complaint   Patient presents with   • T-5000 GLF   • Head Injury     pt hit head and on plavix/denies loc    • Hip Injury     left hip pain        History of Presenting Illness  Sushma Bowden is a 87 y.o. [place]-speaking female female with history of vascular dementia, CVA on Plavix, with minimal residual deficit who presented 12/4/2021 to the Emergency Department for a fall, left hip pain and an acute head injury. Patient reports falling at home after loosing her balance  When she bent over to  something. She reports associated sharp severe left hip pain, and nausea.  She has a moderate throbbing headache as well.   Labs reveal hypokalemia, hypocalcemia.  CT head is unremarkable, x-ray reveals left femoral neck fracture. She receives symptomatic management, IV potassium, calcium, magnesium and referred to the hospitalist for admission. Patient denies previous complications to surgery.  There are no alleviating or exacerbating factors reported at this time. She denies associated loss of consciousness.    I discussed the plan of care with patient. And patient son at bedside    Review of Systems  Review of Systems   Constitutional: Negative for fever, malaise/fatigue and weight loss.   HENT: Negative for sore throat and tinnitus.    Eyes: Negative for blurred vision and double vision.   Respiratory: Negative for cough, hemoptysis and stridor.    Cardiovascular: Negative for chest pain and palpitations.   Gastrointestinal: Positive for constipation and diarrhea. Negative for nausea and vomiting.   Genitourinary: Negative for dysuria and urgency.   Musculoskeletal: Negative for myalgias and neck pain.   Skin: Negative for itching and rash.   Neurological: Negative for  dizziness and headaches.   Endo/Heme/Allergies: Does not bruise/bleed easily.   Psychiatric/Behavioral: Negative for depression. The patient does not have insomnia.        Past Medical History   has a past medical history of Dementia ([facility]), Depression, Hyperlipidemia, Hypertension, Stroke ([facility]), and Urinary incontinence.    Surgical History   has a past surgical history that includes knee arthroscopy (Left) and oophorectomy.     Family History  family history is not on file.   Family history reviewed with patient. There is no family history that is pertinent to the chief complaint.     Social History   reports that she has never smoked. She has never used smokeless tobacco. She reports that she does not drink alcohol and does not use drugs.    Allergies  Allergies   Allergen Reactions   • Penicillins Unspecified     Unknown reaction  Tolerates cephalosporins       Medications  Prior to Admission Medications   Prescriptions Last Dose Informant Patient Reported? Taking?   atenolol (TENORMIN) 50 MG Tab 12/4/2021 at 0900 Family Member Yes Yes   Sig: Take 50 mg by mouth every day.   atorvastatin (LIPITOR) 40 MG Tab 12/3/2021 at 1900 Family Member No No   Sig: Take 1 Tab by mouth every evening.   budesonide-formoterol (SYMBICORT) 80-4.5 MCG/ACT Aerosol 12/4/2021 at 0900 Family Member Yes Yes   Sig: Inhale 2 Puffs 2 times a day.   clopidogrel (PLAVIX) 75 MG Tab 12/4/2021 at 0900 Family Member No No   Sig: Take 1 Tab by mouth every day.   docusate sodium (COLACE) 100 MG Cap 12/4/2021 at 0900 Family Member Yes Yes   Sig: Take 100 mg by mouth 2 times a day.   escitalopram (LEXAPRO) 10 MG Tab 12/4/2021 at 0900 Family Member No No   Sig: Take 1 Tab by mouth every day.      Facility-Administered Medications: None       Physical Exam  Temp:  [36.4 °C (97.6 °F)-36.6 °C (97.8 °F)] 36.4 °C (97.6 °F)  Pulse:  [58-77] 65  Resp:  [15-27] 18  BP: (141-159)/(62-77) 142/77  SpO2:  [91 %-96 %] 91 %  Blood Pressure : 159/70   Temperature:  36.6 °C (97.8 °F)   Pulse: 61   Respiration: (!) 27   Pulse Oximetry: 93 %       Physical Exam  Vitals and nursing note reviewed.   Constitutional:       General: She is not in acute distress.     Appearance: Normal appearance. She is normal weight. She is not toxic-appearing.   HENT:      Head: Normocephalic and atraumatic.      Nose: Nose normal. No congestion or rhinorrhea.      Mouth/Throat:      Mouth: Mucous membranes are moist.      Pharynx: Oropharynx is clear.   Eyes:      Extraocular Movements: Extraocular movements intact.      Conjunctiva/sclera: Conjunctivae normal.      Pupils: Pupils are equal, round, and reactive to light.   Neck:      Vascular: No carotid bruit.   Cardiovascular:      Rate and Rhythm: Normal rate and regular rhythm.      Pulses: Normal pulses.      Heart sounds: Normal heart sounds. No murmur heard.  No gallop.    Pulmonary:      Effort: No respiratory distress.      Breath sounds: Normal breath sounds. No wheezing or rales.   Abdominal:      General: Abdomen is flat. Bowel sounds are normal. There is no distension.      Palpations: Abdomen is soft. There is no mass.      Tenderness: There is no abdominal tenderness.      Hernia: No hernia is present.   Musculoskeletal:         General: Swelling, tenderness and deformity present. No signs of injury.      Cervical back: Normal range of motion and neck supple. No muscular tenderness.      Comments: Left leg shortened and externally rotated   Lymphadenopathy:      Cervical: No cervical adenopathy.   Skin:     Capillary Refill: Capillary refill takes less than 2 seconds.      Coloration: Skin is not jaundiced or pale.      Findings: No bruising.   Neurological:      General: No focal deficit present.      Mental Status: She is alert and oriented to person, place, and time. Mental status is at baseline.      Cranial Nerves: No cranial nerve deficit.      Motor: No weakness.      Coordination: Coordination normal.   Psychiatric:          Mood and Affect: Mood normal.         Thought Content: Thought content normal.         Judgment: Judgment normal.         Laboratory:  Recent Labs     12/04/21  1620   WBC 12.8*   RBC 3.77*   HEMOGLOBIN 12.9   HEMATOCRIT 38.4   .9*   MCH 34.2*   MCHC 33.6   RDW 47.9   PLATELETCT 174   MPV 11.0     Recent Labs     12/04/21  1620   SODIUM 149*   POTASSIUM 2.3*   CHLORIDE 125*   CO2 16*   GLUCOSE 79   BUN 8   CREATININE 0.24*   CALCIUM 5.3*     Recent Labs     12/04/21  1620   ALTSGPT 10   ASTSGOT 12   ALKPHOSPHAT 58   TBILIRUBIN 0.3   GLUCOSE 79     Recent Labs     12/04/21  1620   APTT 30.9   INR 1.31*     No results for input(s): NTPROBNP in the last 72 hours.      Recent Labs     12/04/21  1620   TROPONINT 13       Imaging:  DX-FEMUR-2+ LEFT   Final Result      Left transcervical femoral neck fracture.      DX-CHEST-PORTABLE (1 VIEW)   Final Result      Enlarged cardia silhouette with bibasilar atelectasis.      DX-PELVIS-1 OR 2 VIEWS   Final Result      1.  Left transcervical femoral neck fracture.      CT-HEAD W/O   Final Result      1.  No acute intracranial process.      2.  Atrophy and small vessel ischemic change.      3.  Unchanged encephalomalacia in the left temporal occipital region.             X-Ray:  I have personally reviewed the images and compared with prior images.    Assessment/Plan:  I anticipate this patient will require at least two midnights for appropriate medical management, necessitating inpatient admission.    * S/p left hip fracture- (present on admission)  Assessment & Plan  Mechanical fall complicated by Plavix, hypokalemia and hypocalcemia. IV repletion ordered. Plavix on hold, follow up lab verification of electrolyte correction.  There is otherwise no immediately reversible cardiopulmonary risk factors present justifying delay in the surgery    Hypocalcemia  Assessment & Plan  Partially secondary to hypoalbuminemia. Calcium ordered. Follow-up  chemistry.    Hypernatremia  Assessment & Plan  Mild, half-normal saline challenge, follow-up BMP    Vascular dementia without behavioral disturbance (HCC)- (present on admission)  Assessment & Plan  Supportive care    Macrocytosis- (present on admission)  Assessment & Plan  Unclear cause, follow-up anemia labs    Hypokalemia  Assessment & Plan  Unclear cause, possibly secondary to episodes of diarrhea. Follow-up magnesium level,  IV and p.o. potassium repletion, follow-up magnesium      VTE prophylaxis: SCDs/TEDs

## 2021-12-05 NOTE — PROGRESS NOTES
Patient returned from OR s/p right hemiarthroplasty.  Remains on strict bedrest.  Spontaneously opens eyes but very drowsy.  Infrequent grimacing.  Otherwise no indication patient is in pain.  Bilateral femoral blocks performed for pain management.  Post-procedure vital signs initiated.  Bilat SCDs in place.  Son at bedside.  Call light in reach.

## 2021-12-05 NOTE — CONSULTS
Ortho note  Patient seen and examined, formal consult dictated  Left hip fracture for jodi, had trouble communicating  I speak [place] so I believe she has basic understanding of procedure  Jenkins

## 2021-12-06 ENCOUNTER — APPOINTMENT (OUTPATIENT)
Dept: CARDIOLOGY | Facility: MEDICAL CENTER | Age: 86
DRG: 522 | End: 2021-12-06
Attending: STUDENT IN AN ORGANIZED HEALTH CARE EDUCATION/TRAINING PROGRAM
Payer: MEDICARE

## 2021-12-06 PROBLEM — I27.20 PULMONARY HTN (HCC): Status: ACTIVE | Noted: 2021-12-06

## 2021-12-06 LAB
ANION GAP SERPL CALC-SCNC: 14 MMOL/L (ref 7–16)
BASOPHILS # BLD AUTO: 0.2 % (ref 0–1.8)
BASOPHILS # BLD: 0.03 K/UL (ref 0–0.12)
BUN SERPL-MCNC: 15 MG/DL (ref 8–22)
CALCIUM SERPL-MCNC: 9.4 MG/DL (ref 8.5–10.5)
CHLORIDE SERPL-SCNC: 102 MMOL/L (ref 96–112)
CO2 SERPL-SCNC: 20 MMOL/L (ref 20–33)
CREAT SERPL-MCNC: 0.72 MG/DL (ref 0.5–1.4)
EKG IMPRESSION: NORMAL
EKG IMPRESSION: NORMAL
EOSINOPHIL # BLD AUTO: 0 K/UL (ref 0–0.51)
EOSINOPHIL NFR BLD: 0 % (ref 0–6.9)
ERYTHROCYTE [DISTWIDTH] IN BLOOD BY AUTOMATED COUNT: 48.4 FL (ref 35.9–50)
GLUCOSE SERPL-MCNC: 190 MG/DL (ref 65–99)
HCT VFR BLD AUTO: 38.1 % (ref 37–47)
HGB BLD-MCNC: 12.5 G/DL (ref 12–16)
IMM GRANULOCYTES # BLD AUTO: 0.08 K/UL (ref 0–0.11)
IMM GRANULOCYTES NFR BLD AUTO: 0.6 % (ref 0–0.9)
LV EJECT FRACT  99904: 65
LV EJECT FRACT MOD 2C 99903: 59.75
LV EJECT FRACT MOD 4C 99902: 72.85
LV EJECT FRACT MOD BP 99901: 68.38
LYMPHOCYTES # BLD AUTO: 0.38 K/UL (ref 1–4.8)
LYMPHOCYTES NFR BLD: 2.7 % (ref 22–41)
MCH RBC QN AUTO: 34.1 PG (ref 27–33)
MCHC RBC AUTO-ENTMCNC: 32.8 G/DL (ref 33.6–35)
MCV RBC AUTO: 103.8 FL (ref 81.4–97.8)
MONOCYTES # BLD AUTO: 0.61 K/UL (ref 0–0.85)
MONOCYTES NFR BLD AUTO: 4.3 % (ref 0–13.4)
NEUTROPHILS # BLD AUTO: 13.03 K/UL (ref 2–7.15)
NEUTROPHILS NFR BLD: 92.2 % (ref 44–72)
NRBC # BLD AUTO: 0 K/UL
NRBC BLD-RTO: 0 /100 WBC
NT-PROBNP SERPL IA-MCNC: 1772 PG/ML (ref 0–125)
PLATELET # BLD AUTO: 159 K/UL (ref 164–446)
PMV BLD AUTO: 11.3 FL (ref 9–12.9)
POTASSIUM SERPL-SCNC: 4.6 MMOL/L (ref 3.6–5.5)
RBC # BLD AUTO: 3.67 M/UL (ref 4.2–5.4)
SODIUM SERPL-SCNC: 136 MMOL/L (ref 135–145)
TROPONIN T SERPL-MCNC: 123 NG/L (ref 6–19)
TROPONIN T SERPL-MCNC: 149 NG/L (ref 6–19)
TROPONIN T SERPL-MCNC: 169 NG/L (ref 6–19)
WBC # BLD AUTO: 14.1 K/UL (ref 4.8–10.8)

## 2021-12-06 PROCEDURE — 700102 HCHG RX REV CODE 250 W/ 637 OVERRIDE(OP): Performed by: INTERNAL MEDICINE

## 2021-12-06 PROCEDURE — 71275 CT ANGIOGRAPHY CHEST: CPT | Mod: ME

## 2021-12-06 PROCEDURE — 700111 HCHG RX REV CODE 636 W/ 250 OVERRIDE (IP): Performed by: INTERNAL MEDICINE

## 2021-12-06 PROCEDURE — 36415 COLL VENOUS BLD VENIPUNCTURE: CPT

## 2021-12-06 PROCEDURE — 84484 ASSAY OF TROPONIN QUANT: CPT | Mod: 91

## 2021-12-06 PROCEDURE — 99024 POSTOP FOLLOW-UP VISIT: CPT | Performed by: STUDENT IN AN ORGANIZED HEALTH CARE EDUCATION/TRAINING PROGRAM

## 2021-12-06 PROCEDURE — 92610 EVALUATE SWALLOWING FUNCTION: CPT

## 2021-12-06 PROCEDURE — 93306 TTE W/DOPPLER COMPLETE: CPT

## 2021-12-06 PROCEDURE — 700111 HCHG RX REV CODE 636 W/ 250 OVERRIDE (IP): Performed by: STUDENT IN AN ORGANIZED HEALTH CARE EDUCATION/TRAINING PROGRAM

## 2021-12-06 PROCEDURE — 80048 BASIC METABOLIC PNL TOTAL CA: CPT

## 2021-12-06 PROCEDURE — 85025 COMPLETE CBC W/AUTO DIFF WBC: CPT

## 2021-12-06 PROCEDURE — 700117 HCHG RX CONTRAST REV CODE 255: Performed by: INTERNAL MEDICINE

## 2021-12-06 PROCEDURE — A9270 NON-COVERED ITEM OR SERVICE: HCPCS | Performed by: INTERNAL MEDICINE

## 2021-12-06 PROCEDURE — 99232 SBSQ HOSP IP/OBS MODERATE 35: CPT | Performed by: STUDENT IN AN ORGANIZED HEALTH CARE EDUCATION/TRAINING PROGRAM

## 2021-12-06 PROCEDURE — 93005 ELECTROCARDIOGRAM TRACING: CPT | Performed by: STUDENT IN AN ORGANIZED HEALTH CARE EDUCATION/TRAINING PROGRAM

## 2021-12-06 PROCEDURE — 770020 HCHG ROOM/CARE - TELE (206)

## 2021-12-06 PROCEDURE — 93010 ELECTROCARDIOGRAM REPORT: CPT | Performed by: INTERNAL MEDICINE

## 2021-12-06 PROCEDURE — 93306 TTE W/DOPPLER COMPLETE: CPT | Mod: 26 | Performed by: INTERNAL MEDICINE

## 2021-12-06 PROCEDURE — 83880 ASSAY OF NATRIURETIC PEPTIDE: CPT

## 2021-12-06 RX ORDER — FUROSEMIDE 10 MG/ML
40 INJECTION INTRAMUSCULAR; INTRAVENOUS DAILY
Status: DISCONTINUED | OUTPATIENT
Start: 2021-12-06 | End: 2021-12-09

## 2021-12-06 RX ADMIN — ACETAMINOPHEN 650 MG: 325 TABLET, FILM COATED ORAL at 05:22

## 2021-12-06 RX ADMIN — OXYCODONE 5 MG: 5 TABLET ORAL at 11:09

## 2021-12-06 RX ADMIN — IOHEXOL 41 ML: 350 INJECTION, SOLUTION INTRAVENOUS at 00:30

## 2021-12-06 RX ADMIN — HEPARIN SODIUM 5000 UNITS: 5000 INJECTION, SOLUTION INTRAVENOUS; SUBCUTANEOUS at 15:57

## 2021-12-06 RX ADMIN — HEPARIN SODIUM 5000 UNITS: 5000 INJECTION, SOLUTION INTRAVENOUS; SUBCUTANEOUS at 22:00

## 2021-12-06 RX ADMIN — ACETAMINOPHEN 650 MG: 325 TABLET, FILM COATED ORAL at 22:42

## 2021-12-06 RX ADMIN — ATORVASTATIN CALCIUM 40 MG: 40 TABLET, FILM COATED ORAL at 17:31

## 2021-12-06 RX ADMIN — OXYCODONE 5 MG: 5 TABLET ORAL at 22:42

## 2021-12-06 RX ADMIN — HEPARIN SODIUM 5000 UNITS: 5000 INJECTION, SOLUTION INTRAVENOUS; SUBCUTANEOUS at 05:23

## 2021-12-06 RX ADMIN — DOCUSATE SODIUM 100 MG: 100 CAPSULE ORAL at 05:26

## 2021-12-06 RX ADMIN — ATENOLOL 50 MG: 50 TABLET ORAL at 05:24

## 2021-12-06 RX ADMIN — DOCUSATE SODIUM 100 MG: 100 CAPSULE ORAL at 17:32

## 2021-12-06 RX ADMIN — DOCUSATE SODIUM 50 MG AND SENNOSIDES 8.6 MG 2 TABLET: 8.6; 5 TABLET, FILM COATED ORAL at 17:32

## 2021-12-06 RX ADMIN — BUDESONIDE AND FORMOTEROL FUMARATE DIHYDRATE 2 PUFF: 80; 4.5 AEROSOL RESPIRATORY (INHALATION) at 20:07

## 2021-12-06 RX ADMIN — FUROSEMIDE 40 MG: 10 INJECTION, SOLUTION INTRAMUSCULAR; INTRAVENOUS at 15:57

## 2021-12-06 RX ADMIN — BUDESONIDE AND FORMOTEROL FUMARATE DIHYDRATE 2 PUFF: 80; 4.5 AEROSOL RESPIRATORY (INHALATION) at 05:30

## 2021-12-06 RX ADMIN — ESCITALOPRAM OXALATE 10 MG: 10 TABLET ORAL at 05:26

## 2021-12-06 RX ADMIN — DOCUSATE SODIUM 50 MG AND SENNOSIDES 8.6 MG 2 TABLET: 8.6; 5 TABLET, FILM COATED ORAL at 05:24

## 2021-12-06 ASSESSMENT — ENCOUNTER SYMPTOMS
CHILLS: 0
SHORTNESS OF BREATH: 0
NAUSEA: 0
FEVER: 0
ABDOMINAL PAIN: 0
VOMITING: 0

## 2021-12-06 ASSESSMENT — FIBROSIS 4 INDEX: FIB4 SCORE: 2.08

## 2021-12-06 NOTE — THERAPY
Missed Therapy Evaluation     Patient Name: Sushma Bowden  Age:  87 y.o., Sex:  female  Medical Record #: [id number]  Today's Date: 12/6/2021 12/06/21 0863   Interdisciplinary Plan of Care Collaboration   IDT Collaboration with  Nursing   Collaboration Comments PT order received, pt with elevated troponins and awaiting echo. RN asked to hold PT evaluation at this time. PT will follow up as appropriate       Irina Madison PT, DPT

## 2021-12-06 NOTE — PROGRESS NOTES
Rapid response was called for chest pain;  Patient was evaluated and examined at bedside, patient was alert and oriented x4, she has pleuritic chest pain on the right side, not radiated, increasing with breathing, also patient was noticed to have hypoxia and needed 3 L nasal cannula, her baseline room air, on exam no crackles or wheezing, EKG shows sinus rhythm with no ischemic changes and troponin was mildly elevated 65, patient had recent hip surgery and she is on risk for PE, CT scan was ordered to rule out PE.  Trending troponin and order CTA.     Kiana Gar M.D.

## 2021-12-06 NOTE — PROGRESS NOTES
Monitor Summary     Rhythm: SB-ST  touched 47  Interval: .18/.08/.39  Ectopy: (R) PVC with (R) Coup, (O) PAC

## 2021-12-06 NOTE — PROGRESS NOTES
POD 1 left hip hemiarthroplasty  Pain well controlled  LLE block still in affect, awaiting motor function to ambulate  Dressings c/d/i    Plan  Mobilize when block wears off hopefully today  WBAT LLE  SNF planning  Ok for discharge when able  ASA DVT ppx    Malvin Pierson MD  Orthopedic Trauma Fellow

## 2021-12-06 NOTE — PROGRESS NOTES
Received a critical lab of troponin 123. Dr. Owens notified. Monitor techs called to review EKG. STAT EKG ordered. Pt denies any chest pain. Will notify MD if pt has any complaints of new symptoms or chest pain. Will notify MD of next troponin lab results. No other orders at this time.

## 2021-12-06 NOTE — THERAPY
Missed Therapy     Patient Name: Sushma Bowedn  Age:  87 y.o., Sex:  female  Medical Record #: [id number]  Today's Date: 12/6/2021    Discussed missed therapy with RN/PT    OT consult rec'd. Pt with elevated troponins and awaiting echo. RN asked to hold OT eval at this time. Will re-attempt as appropriate/able.

## 2021-12-06 NOTE — PROGRESS NOTES
Troponin resulted and came back 169. Pt denies any new symptoms of chest pain Dr. Owens notified. Orders for another EKG in place.

## 2021-12-06 NOTE — PROGRESS NOTES
Patient's granddaughter reports patient complaint of difficulty swallowing while eating [place] toast this morning.  States patient had similar complaint when swallowing applesauce last night.  Notified Dr Lopez.  Order for SLP received.  Explained this to family who assisted with translating to patient.

## 2021-12-06 NOTE — PROGRESS NOTES
Hospital Medicine Daily Progress Note    Date of Service  12/6/2021    Chief Complaint  Sushma Bowden is a 87 y.o. female admitted 12/4/2021 with left hip fracture    Hospital Course  Sushma Bowden is a 87 y.o. Lithuanian-speaking female female with history of vascular dementia, CVA on Plavix, with minimal residual deficit who presented 12/4/2021 to the Emergency Department for a fall, left hip pain and an acute head injury. Patient reports falling at home after loosing her balance  When she bent over to  something. She reports associated sharp severe left hip pain, and nausea.  She has a moderate throbbing headache as well.   Labs reveal hypokalemia, hypocalcemia.  CT head is unremarkable, x-ray reveals left femoral neck fracture. She receives symptomatic management, IV potassium, calcium, magnesium and referred to the hospitalist for admission. Patient denies previous complications to surgery.  There are no alleviating or exacerbating factors reported at this time. She denies associated loss of consciousness.  Interval Problem Update  Patient is now postop surgery.  Surgery had no complications.  Patient doing well.  Continue as needed pain meds.  Patient started on cardiac diet.  PT/OT ordered.  Patient will likely need placement.  12/6: Overnight reportedly patient was having chest pain though she denies this at this time.  Echocardiogram showed a normal EF but a elevated RVSP indicative of pulmonary hypertension.  Lasix started.  Patient was unable to work with PT and OT today since her nerve block appears to still be in effect.  Vitals are stable.    I have personally seen and examined the patient at bedside. I discussed the plan of care with patient.    Consultants/Specialty  orthopedics    Code Status  Full Code    Disposition  Patient is not medically cleared.   Anticipate discharge to d.  I have placed the appropriate orders for post-discharge needs.    Review of Systems  Review of Systems    Constitutional: Negative for chills and fever.   Respiratory: Negative for shortness of breath.    Cardiovascular: Negative for chest pain.   Gastrointestinal: Negative for abdominal pain, nausea and vomiting.   All other systems reviewed and are negative.  Lethargic secondary to anesthesia and pain medications.    Physical Exam  Temp:  [36.2 °C (97.1 °F)-36.8 °C (98.2 °F)] 36.2 °C (97.1 °F)  Pulse:  [55-85] 60  Resp:  [15-18] 15  BP: (113-160)/(52-80) 128/52  SpO2:  [93 %-96 %] 96 %    Physical Exam  Constitutional:       General: She is sleeping. She is not in acute distress.     Appearance: She is not ill-appearing.   HENT:      Head: Normocephalic and atraumatic.   Eyes:      General:         Right eye: No discharge.         Left eye: No discharge.   Cardiovascular:      Rate and Rhythm: Normal rate and regular rhythm.      Heart sounds: Normal heart sounds.   Pulmonary:      Effort: No respiratory distress.      Breath sounds: Normal breath sounds. No wheezing or rales.   Abdominal:      General: Abdomen is flat. There is no distension.      Tenderness: There is no abdominal tenderness. There is no guarding.   Musculoskeletal:         General: Tenderness (Left hip) present.      Right lower leg: Edema present.      Left lower leg: Edema present.   Skin:     General: Skin is warm and dry.      Coloration: Skin is not jaundiced.   Neurological:      Cranial Nerves: No facial asymmetry.      Motor: No tremor.         Fluids    Intake/Output Summary (Last 24 hours) at 12/6/2021 1534  Last data filed at 12/6/2021 0501  Gross per 24 hour   Intake --   Output 250 ml   Net -250 ml       Laboratory  Recent Labs     12/04/21  1620 12/05/21  0234 12/06/21  0044   WBC 12.8* 10.4 14.1*   RBC 3.77* 4.00* 3.67*   HEMOGLOBIN 12.9 13.6 12.5   HEMATOCRIT 38.4 40.9 38.1   .9* 102.3* 103.8*   MCH 34.2* 34.0* 34.1*   MCHC 33.6 33.3* 32.8*   RDW 47.9 48.3 48.4   PLATELETCT 174 168 159*   MPV 11.0 11.8 11.3     Recent Labs      12/04/21  1620 12/04/21  1620 12/04/21  2109 12/05/21  0234 12/06/21  0044   SODIUM 149*  --   --  136 136   POTASSIUM 2.3*  --   --  4.4 4.6   CHLORIDE 125*  --   --  100 102   CO2 16*  --   --  24 20   GLUCOSE 79  --   --  137* 190*   BUN 8  --   --  10 15   CREATININE 0.24*  --   --  0.60 0.72   CALCIUM 5.3*   < > 9.1 9.9 9.4    < > = values in this interval not displayed.     Recent Labs     12/04/21  1620   APTT 30.9   INR 1.31*               Imaging  EC-ECHOCARDIOGRAM COMPLETE W/O CONT   Final Result      CT-CTA CHEST PULMONARY ARTERY W/ RECONS   Final Result         1.  No pulmonary embolus appreciated.   2.  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   3.  Trace pericardial effusion   4.  Cholelithiasis   5.  10 mm dilatation the common bile duct, consider causes of biliary obstruction with additional workup as clinically appropriate.   6.  10 mm splenic artery aneurysm   7.  Bibasilar atelectasis   8.  Atherosclerosis.      DX-FEMUR-2+ LEFT   Final Result      Left transcervical femoral neck fracture.      DX-CHEST-PORTABLE (1 VIEW)   Final Result      Enlarged cardia silhouette with bibasilar atelectasis.      DX-PELVIS-1 OR 2 VIEWS   Final Result      1.  Left transcervical femoral neck fracture.      CT-HEAD W/O   Final Result      1.  No acute intracranial process.      2.  Atrophy and small vessel ischemic change.      3.  Unchanged encephalomalacia in the left temporal occipital region.              Assessment/Plan  * S/p left hip fracture- (present on admission)  Assessment & Plan  Mechanical fall complicated by Plavix, hypokalemia and hypocalcemia. IV repletion ordered. Plavix on hold, follow up lab verification of electrolyte correction.  There is otherwise no immediately reversible cardiopulmonary risk factors present justifying delay in the surgery  12/5: Status post hemiarthroplasty.  As needed pain meds.   PT/OT.  12/6: PT and OT had to be delayed since patient continues to experience numbness from the nerve block.    Pulmonary HTN (HCC)  Assessment & Plan  Patient with pulmonary hypertension RVSP was 55 on echocardiogram.  Lasix ordered    Hypocalcemia  Assessment & Plan  Partially secondary to hypoalbuminemia. Calcium ordered. Follow-up chemistry.  12/5:Repeat calcium was normal.    Hypernatremia  Assessment & Plan  Mild, half-normal saline challenge, follow-up BMP  Resolved    Vascular dementia without behavioral disturbance (HCC)- (present on admission)  Assessment & Plan  Supportive care    Macrocytosis- (present on admission)  Assessment & Plan  Unclear cause, follow-up anemia labs    Hypokalemia  Assessment & Plan  Unclear cause, possibly secondary to episodes of diarrhea. Follow-up magnesium level,  IV and p.o. potassium repletion, follow-up magnesium  12/5: Resolved       VTE prophylaxis: heparin ppx    I have performed a physical exam and reviewed and updated ROS and Plan today (12/6/2021). In review of yesterday's note (12/5/2021), there are no changes except as documented above.

## 2021-12-06 NOTE — PROGRESS NOTES
Report received.  Patient awake and alert. Oxygen off.  Replaced and explained importance of keeping on.  Family at bedside.  Dressing to left hip CDI with fresh ice pack.  Bed in low position.  Call light in reach.

## 2021-12-06 NOTE — THERAPY
Speech Language Pathology   Clinical Swallow Evaluation     Patient Name: Sushma Bowden  AGE:  87 y.o., SEX:  female  Medical Record #: [id number]  Today's Date: 12/6/2021     Precautions  Precautions: (P) Swallow Precautions ( See Comments)    Assessment    Pt seen this date for clinical swallow evaluation 2/2 reports of difficulty with Kinyarwanda toast on breakfast tray. Pt's granddaughter reporting pt endorsed difficulty with applesauce during PM med pass. Family reporting pt tolerated regular/thins at home without difficulty. Translation to Kazakh provided by family who declined Language Line  Services. Oral mech exam complete, no gross oral motor deficits appreciated. Vocal quality appreciated to be hoarse. Upper and lower dentures in place. PO trials of ice, MTL, liquidized, puree, soft and bite size, easy to chew, regular and thins assessed. Laryngeal elevation achieved to palpation, timely swallow initiation appreciated and vocal quality remained clear. Intermittent throat clearing appreciated throughout trials (pt reporting pt throat clears throughout PO at baseline), vocal quality remained clear. Pt demonstrated functional mastication with no oral residue appreciated, mildly prolonged with regular. Pt reporting discomfort in her throat when swallowing. Query if related to supraglottic airway during hip surgery. No other s/sx of aspiration appreciated.     Recommend diet of easy to chew/thins with adherence to the following: assistance with meal tray setup, monitoring during meals, slow rate, small bites/sips, no straws. Meds as tolerated. HOLD PO with any difficulty or s/sx of aspiration. SLP following.     Plan    Recommend Speech Therapy 3 times per week until therapy goals are met for the following treatments:  Dysphagia Training and Patient / Family / Caregiver Education.    Discharge Recommendations: (P) Anticipate that the patient will have no further speech therapy needs after discharge from  "the hospital    Subjective    Pt alert, followed directions and participated in evaluation. Pt's family present and supportive. Translation to Scottish provided by family who declined Language Line  Services.      Objective       12/06/21 1032   Oral Motor Eval    Is Patient Able to Complete Oral Motor Eval Yes, Within Normal Limits   Laryngeal Function   Voice Quality Minimal   Volutional Cough Minimal   Excursion Upon Swallow Complete   Oral Food Presentation   Ice Chips Within Functional Limits   Single Swallow Mildly Thick (2) - (Nectar Thick)  Within Functional Limits   Single Swallow Thin (0) Within Functional Limits   Liquidised (3) Within Functional Limits   Pureed (4) Within Functional Limits   Soft & Bite-Sized (6) - (Dysphagia III) Within Functional Limits   Regular (7) Minimal   Regular-Easy to Chew (7) Within Functional Limits   Self Feeding Independent   Tracheostomy   Tracheostomy  No   Dysphagia Strategies / Recommendations   Strategies / Interventions Recommended (Yes / No) Yes   Compensatory Strategies Monitor During Meals;Assistance Needed for Meal Tray Set-up;Head of Bed 90 Degrees During Eating / Drinking;No Straws;Single Sips / Bites   Diet / Liquid Recommendation Thin (0);Regular - Easy to Chew (7)   Medication Administration  Whole with Liquid Wash;Float Whole with Puree   Therapy Interventions Dysphagia Therapy By Speech Language Pathologist   Dysphagia Rating   Nutritional Liquid Intake Rating Scale Non thickened beverages   Nutritional Food Intake Rating Scale Total oral diet with multiple consistencies without special preparation but with specific food limitations   Patient / Family Goals   Patient / Family Goal #1 \"she doesn't like chocolate\"   Short Term Goals   Short Term Goal # 1 Pt will consume a diet of EC7/TN0 with no s/sx of aspiration and min cues.          "

## 2021-12-06 NOTE — PROGRESS NOTES
Patient c/o chest pain and pressure to CNA.  Charge RN notified.  State EKG and troponin ordered.  Dr Gar notified.  At bedside assessing patient and speaking with family.    Pt 83% on room air.  MD aware.  Stat CTA ordered to rule out PE

## 2021-12-07 LAB
ANION GAP SERPL CALC-SCNC: 8 MMOL/L (ref 7–16)
BASOPHILS # BLD AUTO: 0.1 % (ref 0–1.8)
BASOPHILS # BLD: 0.01 K/UL (ref 0–0.12)
BUN SERPL-MCNC: 19 MG/DL (ref 8–22)
CALCIUM SERPL-MCNC: 9 MG/DL (ref 8.5–10.5)
CHLORIDE SERPL-SCNC: 102 MMOL/L (ref 96–112)
CO2 SERPL-SCNC: 27 MMOL/L (ref 20–33)
CREAT SERPL-MCNC: 0.75 MG/DL (ref 0.5–1.4)
EOSINOPHIL # BLD AUTO: 0.03 K/UL (ref 0–0.51)
EOSINOPHIL NFR BLD: 0.2 % (ref 0–6.9)
ERYTHROCYTE [DISTWIDTH] IN BLOOD BY AUTOMATED COUNT: 49.4 FL (ref 35.9–50)
GLUCOSE SERPL-MCNC: 116 MG/DL (ref 65–99)
HCT VFR BLD AUTO: 34.5 % (ref 37–47)
HGB BLD-MCNC: 11.4 G/DL (ref 12–16)
IMM GRANULOCYTES # BLD AUTO: 0.08 K/UL (ref 0–0.11)
IMM GRANULOCYTES NFR BLD AUTO: 0.6 % (ref 0–0.9)
LYMPHOCYTES # BLD AUTO: 1 K/UL (ref 1–4.8)
LYMPHOCYTES NFR BLD: 8.1 % (ref 22–41)
MCH RBC QN AUTO: 34.1 PG (ref 27–33)
MCHC RBC AUTO-ENTMCNC: 33 G/DL (ref 33.6–35)
MCV RBC AUTO: 103.3 FL (ref 81.4–97.8)
MONOCYTES # BLD AUTO: 1.4 K/UL (ref 0–0.85)
MONOCYTES NFR BLD AUTO: 11.3 % (ref 0–13.4)
NEUTROPHILS # BLD AUTO: 9.88 K/UL (ref 2–7.15)
NEUTROPHILS NFR BLD: 79.7 % (ref 44–72)
NRBC # BLD AUTO: 0 K/UL
NRBC BLD-RTO: 0 /100 WBC
PLATELET # BLD AUTO: 172 K/UL (ref 164–446)
PMV BLD AUTO: 11.6 FL (ref 9–12.9)
POTASSIUM SERPL-SCNC: 4 MMOL/L (ref 3.6–5.5)
RBC # BLD AUTO: 3.34 M/UL (ref 4.2–5.4)
SODIUM SERPL-SCNC: 137 MMOL/L (ref 135–145)
WBC # BLD AUTO: 12.4 K/UL (ref 4.8–10.8)

## 2021-12-07 PROCEDURE — 97162 PT EVAL MOD COMPLEX 30 MIN: CPT

## 2021-12-07 PROCEDURE — 36415 COLL VENOUS BLD VENIPUNCTURE: CPT

## 2021-12-07 PROCEDURE — 80048 BASIC METABOLIC PNL TOTAL CA: CPT

## 2021-12-07 PROCEDURE — 97166 OT EVAL MOD COMPLEX 45 MIN: CPT

## 2021-12-07 PROCEDURE — 700111 HCHG RX REV CODE 636 W/ 250 OVERRIDE (IP): Performed by: STUDENT IN AN ORGANIZED HEALTH CARE EDUCATION/TRAINING PROGRAM

## 2021-12-07 PROCEDURE — A9270 NON-COVERED ITEM OR SERVICE: HCPCS | Performed by: INTERNAL MEDICINE

## 2021-12-07 PROCEDURE — 99232 SBSQ HOSP IP/OBS MODERATE 35: CPT | Performed by: STUDENT IN AN ORGANIZED HEALTH CARE EDUCATION/TRAINING PROGRAM

## 2021-12-07 PROCEDURE — 85025 COMPLETE CBC W/AUTO DIFF WBC: CPT

## 2021-12-07 PROCEDURE — 700102 HCHG RX REV CODE 250 W/ 637 OVERRIDE(OP): Performed by: INTERNAL MEDICINE

## 2021-12-07 PROCEDURE — 770020 HCHG ROOM/CARE - TELE (206)

## 2021-12-07 PROCEDURE — 700111 HCHG RX REV CODE 636 W/ 250 OVERRIDE (IP): Performed by: INTERNAL MEDICINE

## 2021-12-07 RX ADMIN — ACETAMINOPHEN 650 MG: 325 TABLET, FILM COATED ORAL at 05:30

## 2021-12-07 RX ADMIN — ENOXAPARIN SODIUM 40 MG: 40 INJECTION SUBCUTANEOUS at 15:13

## 2021-12-07 RX ADMIN — ESCITALOPRAM OXALATE 10 MG: 10 TABLET ORAL at 05:28

## 2021-12-07 RX ADMIN — FUROSEMIDE 40 MG: 10 INJECTION, SOLUTION INTRAMUSCULAR; INTRAVENOUS at 05:28

## 2021-12-07 RX ADMIN — OXYCODONE 5 MG: 5 TABLET ORAL at 05:30

## 2021-12-07 RX ADMIN — DOCUSATE SODIUM 50 MG AND SENNOSIDES 8.6 MG 2 TABLET: 8.6; 5 TABLET, FILM COATED ORAL at 05:29

## 2021-12-07 RX ADMIN — DOCUSATE SODIUM 100 MG: 100 CAPSULE ORAL at 17:34

## 2021-12-07 RX ADMIN — ACETAMINOPHEN 650 MG: 325 TABLET, FILM COATED ORAL at 17:33

## 2021-12-07 RX ADMIN — OXYCODONE 5 MG: 5 TABLET ORAL at 17:33

## 2021-12-07 RX ADMIN — ACETAMINOPHEN 650 MG: 325 TABLET, FILM COATED ORAL at 15:13

## 2021-12-07 RX ADMIN — DOCUSATE SODIUM 50 MG AND SENNOSIDES 8.6 MG 2 TABLET: 8.6; 5 TABLET, FILM COATED ORAL at 17:33

## 2021-12-07 RX ADMIN — OXYCODONE 5 MG: 5 TABLET ORAL at 15:13

## 2021-12-07 RX ADMIN — HEPARIN SODIUM 5000 UNITS: 5000 INJECTION, SOLUTION INTRAVENOUS; SUBCUTANEOUS at 05:28

## 2021-12-07 RX ADMIN — BUDESONIDE AND FORMOTEROL FUMARATE DIHYDRATE 2 PUFF: 80; 4.5 AEROSOL RESPIRATORY (INHALATION) at 17:34

## 2021-12-07 RX ADMIN — DOCUSATE SODIUM 100 MG: 100 CAPSULE ORAL at 05:28

## 2021-12-07 RX ADMIN — BUDESONIDE AND FORMOTEROL FUMARATE DIHYDRATE 2 PUFF: 80; 4.5 AEROSOL RESPIRATORY (INHALATION) at 05:29

## 2021-12-07 RX ADMIN — ATORVASTATIN CALCIUM 40 MG: 40 TABLET, FILM COATED ORAL at 17:33

## 2021-12-07 ASSESSMENT — ENCOUNTER SYMPTOMS
COUGH: 0
NAUSEA: 0
VOMITING: 0
EYES NEGATIVE: 1
HEADACHES: 0
MYALGIAS: 0
FOCAL WEAKNESS: 0
ABDOMINAL PAIN: 0
FEVER: 0
CHILLS: 0

## 2021-12-07 ASSESSMENT — GAIT ASSESSMENTS: GAIT LEVEL OF ASSIST: UNABLE TO PARTICIPATE

## 2021-12-07 ASSESSMENT — COGNITIVE AND FUNCTIONAL STATUS - GENERAL
CLIMB 3 TO 5 STEPS WITH RAILING: TOTAL
EATING MEALS: A LITTLE
DRESSING REGULAR UPPER BODY CLOTHING: A LOT
DAILY ACTIVITIY SCORE: 13
MOVING FROM LYING ON BACK TO SITTING ON SIDE OF FLAT BED: UNABLE
PERSONAL GROOMING: A LOT
TOILETING: A LOT
MOBILITY SCORE: 6
WALKING IN HOSPITAL ROOM: TOTAL
SUGGESTED CMS G CODE MODIFIER DAILY ACTIVITY: CL
HELP NEEDED FOR BATHING: A LOT
MOVING TO AND FROM BED TO CHAIR: UNABLE
DRESSING REGULAR LOWER BODY CLOTHING: A LOT
SUGGESTED CMS G CODE MODIFIER MOBILITY: CN
TURNING FROM BACK TO SIDE WHILE IN FLAT BAD: UNABLE
STANDING UP FROM CHAIR USING ARMS: TOTAL

## 2021-12-07 ASSESSMENT — FIBROSIS 4 INDEX: FIB4 SCORE: 1.92

## 2021-12-07 ASSESSMENT — ACTIVITIES OF DAILY LIVING (ADL): TOILETING: INDEPENDENT

## 2021-12-07 NOTE — PROGRESS NOTES
Received report from previous nurse, Joleen, regarding prior 12 hours.  POC reviewed with pt, white board updated, pt verbalized understanding, call light within reach.  Pt encouraged to call before getting up.  Bed in lowest position, treaded slippers on.

## 2021-12-07 NOTE — PROGRESS NOTES
Patient awake and alert.  No s/s of pain or distress.  Oxygen off as was pulse ox probe.  Reattached probe with Dr Lopez in room and discovered pt to be 72% on room air.  Nasal cannula 2L reapplied.  O2 sats returned to 92%.  Educated family about importance of preventing patient from removing O2.  Son states patient's baseline is primarily A&Ox4.  He noticed confusion earlier today but is certain nasal cannula was off at that time.  Reiterated impact of oxygenation on mentation.  Son verbalized understanding and conveyed this message to the patient.

## 2021-12-07 NOTE — THERAPY
Missed Therapy     Patient Name: Sushma Bowden  Age:  87 y.o., Sex:  female  Medical Record #: [id number]  Today's Date: 12/7/2021    OT consult rec'd. Pt on strict bedrest; will hold and will re-attempt as appropriate/able once bedrest order d/c'd.

## 2021-12-07 NOTE — THERAPY
Physical Therapy   Initial Evaluation     Patient Name: Sushma Bowden  Age:  87 y.o., Sex:  female  Medical Record #: [id number]  Today's Date: 12/7/2021     Precautions  Precautions: Fall Risk;Swallow Precautions ( See Comments);Weight Bearing As Tolerated Left Lower Extremity  Comments: L hip hemiarthroplasty     Assessment  Patient is 87 y.o. female admitted post GLF with L hip pain and found to have L femoral neck fracture. PMH includes vascular dementia, CVA with minimal deficits. Pt is now POD 2 L hip hemiarthroplasty. Family at bedside provided home set up and prior level of function. In house  present to assist with communication. Pt currently with delayed responses and confused more than baseline per son. Mod assist for bed mobility and max assist required for STS. PT will cont while in acute care setting to address pain, strength, ROM, balance, and coordination, and activity tolerance.     Plan    Recommend Physical Therapy 4 times per week until therapy goals are met for the following treatments:  Bed Mobility, Gait Training, Neuro Re-Education / Balance, Self Care/Home Evaluation, Therapeutic Activities and Therapeutic Exercises    DC Equipment Recommendations: Unable to determine at this time  Discharge Recommendations: Recommend post-acute placement for additional physical therapy services prior to discharge home        12/07/21 1133   Vitals   O2 (LPM) 3   O2 Delivery Device Silicone Nasal Cannula   Prior Living Situation   Prior Services Intermittent Physical Support for ADL Per Family   Housing / Facility Mobile Home   Steps Into Home 1   Steps In Home 0   Equipment Owned Single Point Cane   Lives with - Patient's Self Care Capacity Adult Children   Comments pt lives with her daughter who can provide 24/7 assistance   Prior Level of Functional Mobility   Bed Mobility Independent   Transfer Status Independent   Ambulation Independent   Distance Ambulation (Feet)   (household)   Assistive  Devices Used Single Point Cane   Stairs Independent   Comments son reports independent prior    History of Falls   History of Falls Yes   Cognition    Cognition / Consciousness X   Level of Consciousness Alert   Ability To Follow Commands 1 Step   Safety Awareness Impaired   New Learning Impaired   Comments son reports she is below her cognitive baseline   Active ROM Lower Body    Active ROM Lower Body  X   Comments L LE limited by pain   Strength Lower Body   Lower Body Strength  X   Gross Strength   (LLE weaker 2/2 surgery)   Sensation Lower Body   Lower Extremity Sensation   WDL   Balance Assessment   Sitting Balance (Static) Poor   Sitting Balance (Dynamic) Poor -   Standing Balance (Static) Trace +   Standing Balance (Dynamic) Trace   Weight Shift Sitting Poor   Weight Shift Standing Poor   Comments HHA x2 in standing    Gait Analysis   Gait Level Of Assist Unable to Participate   Weight Bearing Status WBAT LLE   Bed Mobility    Supine to Sit Moderate Assist   Sit to Supine Moderate Assist   Scooting Moderate Assist   Functional Mobility   Sit to Stand Maximal Assist   Bed, Chair, Wheelchair Transfer Unable to Participate   Toilet Transfers Unable to Participate   Mobility EOB, STS   Short Term Goals    Short Term Goal # 1 pt will be able to complete supine<>Sitting with SPV in 6tx in order to return home   Short Term Goal # 2 pt will be able to complete functional transfers with FWW and min assist in 6tx in order to progress to home   Short Term Goal # 3 pt will be able to ambulate 50ft with FWW and min assist in 6tx in order to return home   Education Group   Education Provided Role of Physical Therapist;Weight Bearing Status   Role of Physical Therapist Patient Response Patient;Family;Acceptance;Explanation;;Reinforcement Needed;Action Demonstration   Weight Bearing Status Patient Response Patient;Family;Acceptance;Explanation;;Reinforcement Needed;Action Demonstration   Anticipated  Discharge Equipment and Recommendations   DC Equipment Recommendations Unable to determine at this time   Discharge Recommendations Recommend post-acute placement for additional physical therapy services prior to discharge home

## 2021-12-07 NOTE — CARE PLAN
The patient is Watcher - Medium risk of patient condition declining or worsening    Shift Goals  Clinical Goals: mobility when appropriate  Patient Goals: pain control    Progress made toward(s) clinical / shift goals:    Problem: Pain - Standard  Goal: Alleviation of pain or a reduction in pain to the patient’s comfort goal  Outcome: Progressing     Problem: Fall Risk  Goal: Patient will remain free from falls  Outcome: Progressing     Problem: Skin Integrity  Goal: Skin integrity is maintained or improved  Outcome: Progressing       Patient is not progressing towards the following goals:

## 2021-12-07 NOTE — HOSPITAL COURSE
An 87-year-old Telugu-speaking female female with history of vascular dementia, CVA on Plavix, with minimal residual deficit who presented 12/4/2021 to the Emergency Department for a fall, left hip pain and an acute head injury. Patient reports falling at home after loosing her balance, when she bent over to  something. She reports associated sharp severe left hip pain, and nausea. She has a moderate throbbing headache as well. Labs reveal hypokalemia, hypocalcemia. CT head is unremarkable, x-ray reveals left femoral neck fracture. She receives symptomatic management, IV potassium, calcium, magnesium.  Patient underwent left Arabella omnifit hemiarthroplasty for geriatric displaced left femoral neck fracture on 12/5/21.  PT/OT pending.  Patient will likely need placement.  Echocardiogram showed a normal EF but a elevated RVSP indicative of pulmonary hypertension. Lasix started.

## 2021-12-07 NOTE — DISCHARGE PLANNING
Received Choice form at 1510  Agency/Facility Name: [facility]  Referral sent per Choice form @ 1525     Received Choice form at 1511  Agency/Facility Name: [facility] [place]  Referral sent per Choice form @ 1527

## 2021-12-07 NOTE — DISCHARGE PLANNING
[facility]    Referral from: Dr Crump   Insurance Provider on Facesheet: Medicare  Potential Rehab Diagnosis: Ortho    Chart review indicates patient may need on going medical management and may have therapy needs to possibly meet inpatient rehab facility criteria with the goal of returning to community.    D/C support: Daughter and family     Physiatry consultation forwarded per protocol.     Last Covid test:  12/4 not detected    Hip fx - physiatry to eval, TCC will continue to follow.     Thank you for the referral.

## 2021-12-07 NOTE — PROGRESS NOTES
Hospital Medicine Daily Progress Note    Date of Service  12/7/2021    Chief Complaint  Sushma Bowden is a 87 y.o. female admitted 12/4/2021 with fall, left hip pain    Hospital Course  An 87-year-old [place]-speaking female female with history of vascular dementia, CVA on Plavix, with minimal residual deficit who presented 12/4/2021 to the Emergency Department for a fall, left hip pain and an acute head injury. Patient reports falling at home after loosing her balance, when she bent over to  something. She reports associated sharp severe left hip pain, and nausea. She has a moderate throbbing headache as well. Labs reveal hypokalemia, hypocalcemia. CT head is unremarkable, x-ray reveals left femoral neck fracture. She receives symptomatic management, IV potassium, calcium, magnesium.  Patient underwent left [place] omnifit hemiarthroplasty for geriatric displaced left femoral neck fracture on 12/5/21.  PT/OT pending.  Patient will likely need placement.  Echocardiogram showed a normal EF but a elevated RVSP indicative of pulmonary hypertension. Lasix started.      Interval Problem Update  Afebrile, hemodynamically stable.  WBC 12.4 improving.    I have personally seen and examined the patient at bedside. I discussed the plan of care with patient, bedside RN, charge RN,  and pharmacy.    Consultants/Specialty  orthopedics    Code Status  Full Code    Disposition  Patient is not medically cleared.   Anticipate discharge to D.  I have placed the appropriate orders for post-discharge needs.    Review of Systems  Review of Systems   Constitutional: Negative for chills and fever.   HENT: Negative.    Eyes: Negative.    Respiratory: Negative for cough.    Cardiovascular: Negative for chest pain.   Gastrointestinal: Negative for abdominal pain, nausea and vomiting.   Genitourinary: Negative for dysuria.   Musculoskeletal: Negative for myalgias.   Skin: Negative for rash.   Neurological: Negative for  focal weakness and headaches.        Physical Exam  Temp:  [36 °C (96.8 °F)-36.6 °C (97.8 °F)] 36.6 °C (97.8 °F)  Pulse:  [54-66] 55  Resp:  [16-18] 16  BP: (104-135)/(46-65) 135/60  SpO2:  [72 %-98 %] 95 %    Physical Exam  Vitals and nursing note reviewed.   Constitutional:       Appearance: She is ill-appearing.   HENT:      Head: Normocephalic.      Mouth/Throat:      Mouth: Mucous membranes are moist.      Pharynx: Oropharynx is clear.   Eyes:      Pupils: Pupils are equal, round, and reactive to light.   Cardiovascular:      Rate and Rhythm: Normal rate and regular rhythm.   Pulmonary:      Effort: Pulmonary effort is normal. No respiratory distress.      Breath sounds: No wheezing or rales.      Comments: On 2 L NC oxygen  Abdominal:      General: Abdomen is flat. Bowel sounds are normal. There is no distension.      Tenderness: There is no abdominal tenderness.   Musculoskeletal:         General: Tenderness (left hip) present. Normal range of motion.      Cervical back: Normal range of motion.      Right lower leg: Edema present.      Left lower leg: Edema present.   Skin:     General: Skin is warm.   Neurological:      General: No focal deficit present.      Mental Status: She is alert and oriented to person, place, and time.         Fluids    Intake/Output Summary (Last 24 hours) at 12/7/2021 1323  Last data filed at 12/6/2021 1800  Gross per 24 hour   Intake 240 ml   Output 800 ml   Net -560 ml       Laboratory  Recent Labs     12/05/21  0234 12/06/21  0044 12/07/21  0054   WBC 10.4 14.1* 12.4*   RBC 4.00* 3.67* 3.34*   HEMOGLOBIN 13.6 12.5 11.4*   HEMATOCRIT 40.9 38.1 34.5*   .3* 103.8* 103.3*   MCH 34.0* 34.1* 34.1*   MCHC 33.3* 32.8* 33.0*   RDW 48.3 48.4 49.4   PLATELETCT 168 159* 172   MPV 11.8 11.3 11.6     Recent Labs     12/05/21  0234 12/06/21  0044 12/07/21  0054   SODIUM 136 136 137   POTASSIUM 4.4 4.6 4.0   CHLORIDE 100 102 102   CO2 24 20 27   GLUCOSE 137* 190* 116*   BUN 10 15 19    CREATININE 0.60 0.72 0.75   CALCIUM 9.9 9.4 9.0     Recent Labs     12/04/21  1620   APTT 30.9   INR 1.31*               Imaging  EC-ECHOCARDIOGRAM COMPLETE W/O CONT   Final Result      CT-CTA CHEST PULMONARY ARTERY W/ RECONS   Final Result         1.  No pulmonary embolus appreciated.   2.  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   3.  Trace pericardial effusion   4.  Cholelithiasis   5.  10 mm dilatation the common bile duct, consider causes of biliary obstruction with additional workup as clinically appropriate.   6.  10 mm splenic artery aneurysm   7.  Bibasilar atelectasis   8.  Atherosclerosis.      DX-FEMUR-2+ LEFT   Final Result      Left transcervical femoral neck fracture.      DX-CHEST-PORTABLE (1 VIEW)   Final Result      Enlarged cardia silhouette with bibasilar atelectasis.      DX-PELVIS-1 OR 2 VIEWS   Final Result      1.  Left transcervical femoral neck fracture.      CT-HEAD W/O   Final Result      1.  No acute intracranial process.      2.  Atrophy and small vessel ischemic change.      3.  Unchanged encephalomalacia in the left temporal occipital region.              Assessment/Plan  * S/p left hip fracture- (present on admission)  Assessment & Plan  Mechanical fall complicated by Plavix, hypokalemia and hypocalcemia. IV repletion ordered. Plavix on hold, follow up lab verification of electrolyte correction.  There is otherwise no immediately reversible cardiopulmonary risk factors present justifying delay in the surgery  12/5: Status post hemiarthroplasty.  As needed pain meds.  PT/OT.  12/6: PT and OT had to be delayed since patient continues to experience numbness from the nerve block.    Pulmonary HTN (HCC)  Assessment & Plan  Patient with pulmonary hypertension RVSP was 55 on echocardiogram.  Lasix ordered    Hypocalcemia  Assessment & Plan  Resolved  Partially secondary to  hypoalbuminemia    Hypernatremia  Assessment & Plan  Resolved    Vascular dementia without behavioral disturbance (HCC)- (present on admission)  Assessment & Plan  Supportive care    Macrocytosis- (present on admission)  Assessment & Plan  Unclear cause  Monitor    Hypokalemia  Assessment & Plan  Resolved  Unclear cause, possibly secondary to episodes of diarrhea.       VTE prophylaxis: enoxaparin ppx    I have performed a physical exam and reviewed and updated ROS and Plan today (12/7/2021). In review of yesterday's note (12/6/2021), there are no changes except as documented above.

## 2021-12-07 NOTE — PROGRESS NOTES
Patient's hemoglobin dropped from 12.5 to 11.4.  Dr. Owens updated. No evidence of active bleed and no new orders at this time.

## 2021-12-07 NOTE — PROGRESS NOTES
Monitor summary:     0.16/0.10/0.42     Sinus Bradycardia/Sinus Rhythm 49 - 82 with a low of 45,     with PACs, PVCs

## 2021-12-07 NOTE — CARE PLAN
Problem: Pain - Standard  Goal: Alleviation of pain or a reduction in pain to the patient’s comfort goal  Outcome: Progressing     Problem: Fall Risk  Goal: Patient will remain free from falls  Outcome: Progressing     Problem: Skin Integrity  Goal: Skin integrity is maintained or improved  Outcome: Progressing   The patient is Watcher - Medium risk of patient condition declining or worsening    Shift Goals  Clinical Goals: PT/OT  Patient Goals: rest    Progress made toward(s) clinical / shift goals: Pt/OT    Patient is not progressing towards the following goals: Ambulation

## 2021-12-07 NOTE — DISCHARGE PLANNING
Anticipated Discharge Disposition: IRF vs SNF    Action: LSW spoke with Evan about SNF. Evan reported the pt was previously at a rehab facility close to [facility]. Per chart review pt was previously at [facility]. Evan requested a referral be sent to [facility] as the pt did very well there and that is their first choice. Evan also gave verbal consent to send additional SNF referrals to facilities with the highest ratings.    LSW sent voalte message to [facility] for PMR referral. LSW faxed choice forms to [facility].    Barriers to Discharge: IRF vs SNF acceptance.    Plan: Care coordination will follow up with IRF/SNF referrals.    Care Transition Team Assessment    LSW made phone call to pt's son Evan to obtain the information used in this assessment as pt is not A&O. Evan reported that the pt lives with her dtr in a single story house that has four small steps into it. Pt's dtr is the pt's care giver. Pt is normally independent with all ADLs and requires some assistance with IADLs. Pt uses a cane, but no other DME. Pt's dtr provides transportation for her as needed.    Evan reported the pt does not have any source of income, and all of her children support her financially. No MH or SA concerns at this time.    Information Source  Orientation Level: Oriented to person  Information Given By: Patient  Informant's Name: Evan Bella  Who is responsible for making decisions for patient? : Patient    Readmission Evaluation  Is this a readmission?: No    Elopement Risk  Legal Hold: No  Ambulatory or Self Mobile in Wheelchair: No-Not an Elopement Risk  Elopement Risk: Not at Risk for Elopement    Interdisciplinary Discharge Planning  Lives with - Patient's Self Care Capacity: Adult Children  Patient or legal guardian wants to designate a caregiver: No  Housing / Facility: Mobile Home  Prior Services: Intermittent Physical Support for ADL Per Family  Durable Medical Equipment: Other - Specify  (cane)    Discharge Preparedness  What is your plan after discharge?: Other (comment),Skilled nursing facility (acute rehab)  What are your discharge supports?: Child  Prior Functional Level: Ambulatory,Independent with Activities of Daily Living,Needs Assist with Medication Management,Uses Cane  Difficulity with ADLs: None  Difficulity with IADLs: Driving,Keeping track of finances,Managing medication,Using the telephone or computer    Functional Assesment  Prior Functional Level: Ambulatory,Independent with Activities of Daily Living,Needs Assist with Medication Management,Uses Cane    Finances  Financial Barriers to Discharge: No  Prescription Coverage: Yes    Vision / Hearing Impairment  Right Eye Vision: Impaired  Left Eye Vision: Impaired  Hearing Impairment: Both Ears    Domestic Abuse  Have you ever been the victim of abuse or violence?: No  Physical Abuse or Sexual Abuse: No  Verbal Abuse or Emotional Abuse: No  Possible Abuse/Neglect Reported to:: Not Applicable    Psychological Assessment  History of Substance Abuse: None  History of Psychiatric Problems: No  Non-compliant with Treatment: No  Newly Diagnosed Illness: No    Discharge Risks or Barriers  Discharge risks or barriers?: No    Anticipated Discharge Information  Discharge Disposition: Disch to  rehab facility or distinct part unit (62)

## 2021-12-08 ENCOUNTER — APPOINTMENT (OUTPATIENT)
Dept: RADIOLOGY | Facility: MEDICAL CENTER | Age: 86
DRG: 522 | End: 2021-12-08
Attending: STUDENT IN AN ORGANIZED HEALTH CARE EDUCATION/TRAINING PROGRAM
Payer: MEDICARE

## 2021-12-08 PROBLEM — E87.6 HYPOKALEMIA: Status: RESOLVED | Noted: 2020-11-14 | Resolved: 2021-12-08

## 2021-12-08 PROBLEM — E83.51 HYPOCALCEMIA: Status: RESOLVED | Noted: 2021-12-04 | Resolved: 2021-12-08

## 2021-12-08 PROBLEM — E87.0 HYPERNATREMIA: Status: RESOLVED | Noted: 2021-12-04 | Resolved: 2021-12-08

## 2021-12-08 PROCEDURE — A9270 NON-COVERED ITEM OR SERVICE: HCPCS | Performed by: INTERNAL MEDICINE

## 2021-12-08 PROCEDURE — 770020 HCHG ROOM/CARE - TELE (206)

## 2021-12-08 PROCEDURE — 700102 HCHG RX REV CODE 250 W/ 637 OVERRIDE(OP): Performed by: INTERNAL MEDICINE

## 2021-12-08 PROCEDURE — 99231 SBSQ HOSP IP/OBS SF/LOW 25: CPT | Performed by: STUDENT IN AN ORGANIZED HEALTH CARE EDUCATION/TRAINING PROGRAM

## 2021-12-08 PROCEDURE — 700111 HCHG RX REV CODE 636 W/ 250 OVERRIDE (IP): Performed by: STUDENT IN AN ORGANIZED HEALTH CARE EDUCATION/TRAINING PROGRAM

## 2021-12-08 RX ORDER — BUDESONIDE AND FORMOTEROL FUMARATE DIHYDRATE 160; 4.5 UG/1; UG/1
2 AEROSOL RESPIRATORY (INHALATION) 2 TIMES DAILY
Status: CANCELLED | DISCHARGE
Start: 2021-12-08

## 2021-12-08 RX ORDER — FUROSEMIDE 10 MG/ML
40 INJECTION INTRAMUSCULAR; INTRAVENOUS DAILY
Refills: 0 | Status: SHIPPED
Start: 2021-12-09

## 2021-12-08 RX ORDER — OXYCODONE HYDROCHLORIDE 5 MG/1
5 TABLET ORAL EVERY 6 HOURS PRN
Qty: 12 TABLET | Status: SHIPPED
Start: 2021-12-08 | End: 2021-12-09

## 2021-12-08 RX ORDER — ACETAMINOPHEN 325 MG/1
650 TABLET ORAL EVERY 6 HOURS PRN
Qty: 30 TABLET | Refills: 0 | Status: SHIPPED
Start: 2021-12-08

## 2021-12-08 RX ORDER — ATENOLOL 50 MG/1
50 TABLET ORAL DAILY
Qty: 30 TABLET | Status: SHIPPED
Start: 2021-12-09

## 2021-12-08 RX ADMIN — ENOXAPARIN SODIUM 40 MG: 40 INJECTION SUBCUTANEOUS at 04:24

## 2021-12-08 RX ADMIN — ATENOLOL 50 MG: 50 TABLET ORAL at 04:24

## 2021-12-08 RX ADMIN — ATORVASTATIN CALCIUM 40 MG: 40 TABLET, FILM COATED ORAL at 17:59

## 2021-12-08 RX ADMIN — OXYCODONE 5 MG: 5 TABLET ORAL at 04:40

## 2021-12-08 RX ADMIN — DOCUSATE SODIUM 100 MG: 100 CAPSULE ORAL at 04:24

## 2021-12-08 RX ADMIN — DOCUSATE SODIUM 50 MG AND SENNOSIDES 8.6 MG 2 TABLET: 8.6; 5 TABLET, FILM COATED ORAL at 17:59

## 2021-12-08 RX ADMIN — OXYCODONE 5 MG: 5 TABLET ORAL at 21:40

## 2021-12-08 RX ADMIN — DOCUSATE SODIUM 100 MG: 100 CAPSULE ORAL at 17:59

## 2021-12-08 RX ADMIN — DOCUSATE SODIUM 50 MG AND SENNOSIDES 8.6 MG 2 TABLET: 8.6; 5 TABLET, FILM COATED ORAL at 04:24

## 2021-12-08 RX ADMIN — OXYCODONE 5 MG: 5 TABLET ORAL at 09:25

## 2021-12-08 RX ADMIN — BUDESONIDE AND FORMOTEROL FUMARATE DIHYDRATE 2 PUFF: 80; 4.5 AEROSOL RESPIRATORY (INHALATION) at 17:57

## 2021-12-08 RX ADMIN — BUDESONIDE AND FORMOTEROL FUMARATE DIHYDRATE 2 PUFF: 80; 4.5 AEROSOL RESPIRATORY (INHALATION) at 04:49

## 2021-12-08 RX ADMIN — ESCITALOPRAM OXALATE 10 MG: 10 TABLET ORAL at 04:24

## 2021-12-08 RX ADMIN — FUROSEMIDE 40 MG: 10 INJECTION, SOLUTION INTRAMUSCULAR; INTRAVENOUS at 04:23

## 2021-12-08 ASSESSMENT — PAIN DESCRIPTION - PAIN TYPE
TYPE: ACUTE PAIN
TYPE: ACUTE PAIN

## 2021-12-08 ASSESSMENT — FIBROSIS 4 INDEX: FIB4 SCORE: 1.92

## 2021-12-08 NOTE — PROGRESS NOTES
Bedside report received. Pt A&Ox2. No reports of pain. POC discussed with pt. Pt verbalizes understanding. Call light and belongings within reach. Bed locked and in lowest position. Alarm and fall precautions in place. Daughter at bedside.

## 2021-12-08 NOTE — DISCHARGE PLANNING
Agency/Facility Name: [facility]  Spoke To: Nehal  Outcome: Facility will have bed available today, Nehal to contact DPA regarding transfer times and to confirm open bed status.    SW notified.     1040-  Agency/Facility Name: [facility]  Outcome: DPA left v-mail regarding updated bed status with request for callback.     1130-  Agency/Facility Name: [facility]  Spoke To: Nehal  Outcome: Facility is offering transport times of 1500 / 1600, DPA to follow up with preferred discharge time.     1140-  Agency/Facility Name: [facility]  Spoke To: Nehal  Outcome: DPA confirmed 1600 transport time with admissions via [facility] van.     1210-  Agency/Facility Name: [facility]  Outcome: DPA was notified that Pt will not be able to be accepted today as bed is no longer available.    Admissions to contact DPA regarding bed availability.

## 2021-12-08 NOTE — PROGRESS NOTES
Hospital Medicine Daily Progress Note    Date of Service  12/8/2021    Chief Complaint  Sushma Bowden is a 87 y.o. female admitted 12/4/2021 with fall, left hip pain    Hospital Course  An 87-year-old [place] citizen-speaking female female with history of vascular dementia, CVA on Plavix, with minimal residual deficit who presented 12/4/2021 to the Emergency Department for a fall, left hip pain and an acute head injury. Patient reports falling at home after loosing her balance, when she bent over to  something. She reports associated sharp severe left hip pain, and nausea. She has a moderate throbbing headache as well. Labs reveal hypokalemia, hypocalcemia. CT head is unremarkable, x-ray reveals left femoral neck fracture. She receives symptomatic management, IV potassium, calcium, magnesium.  Patient underwent left [place] omnifit hemiarthroplasty for geriatric displaced left femoral neck fracture on 12/5/21.  PT/OT pending.  Patient will likely need placement.  Echocardiogram showed a normal EF but a elevated RVSP indicative of pulmonary hypertension. Lasix started.      Interval Problem Update  Patient reports left hip pain. Pain well controlled with PRN medications. Afebrile, hemodynamically stable. On 3 L NC oxygen.  Pending SNF placement. Accepted by [facility], no beds available today.    I have personally seen and examined the patient at bedside. I discussed the plan of care with patient, family, bedside RN, charge RN,  and pharmacy.    Consultants/Specialty  orthopedics    Code Status  Full Code    Disposition  Patient is medically cleared pending SNF placement.   Anticipate discharge to to skilled nursing facility.  I have placed the appropriate orders for post-discharge needs.    Review of Systems  Review of Systems   Constitutional: Negative for chills and fever.   HENT: Negative.    Eyes: Negative.    Respiratory: Negative for cough.    Cardiovascular: Negative for chest pain.    Gastrointestinal: Negative for abdominal pain, nausea and vomiting.   Genitourinary: Negative for dysuria.   Musculoskeletal: Negative for myalgias.   Skin: Negative for rash.   Neurological: Negative for focal weakness and headaches.      Physical Exam  Temp:  [36 °C (96.8 °F)-36.8 °C (98.2 °F)] 36.3 °C (97.3 °F)  Pulse:  [48-67] 61  Resp:  [15-16] 16  BP: ()/(44-72) 141/68  SpO2:  [95 %-97 %] 96 %    Physical Exam  Vitals and nursing note reviewed.   Constitutional:       Appearance: She is ill-appearing.   HENT:      Head: Normocephalic.      Mouth/Throat:      Mouth: Mucous membranes are moist.      Pharynx: Oropharynx is clear.   Eyes:      Pupils: Pupils are equal, round, and reactive to light.   Cardiovascular:      Rate and Rhythm: Normal rate and regular rhythm.   Pulmonary:      Effort: Pulmonary effort is normal. No respiratory distress.      Breath sounds: No wheezing or rales.      Comments: On 2 L NC oxygen  Abdominal:      General: Abdomen is flat. Bowel sounds are normal. There is no distension.      Tenderness: There is no abdominal tenderness.   Musculoskeletal:         General: Tenderness (left hip) present. Normal range of motion.      Cervical back: Normal range of motion.      Right lower leg: Edema present.      Left lower leg: Edema present.   Skin:     General: Skin is warm.   Neurological:      General: No focal deficit present.      Mental Status: She is alert and oriented to person, place, and time.       Fluids    Intake/Output Summary (Last 24 hours) at 12/8/2021 1230  Last data filed at 12/8/2021 0400  Gross per 24 hour   Intake 120 ml   Output 700 ml   Net -580 ml       Laboratory  Recent Labs     12/06/21  0044 12/07/21  0054   WBC 14.1* 12.4*   RBC 3.67* 3.34*   HEMOGLOBIN 12.5 11.4*   HEMATOCRIT 38.1 34.5*   .8* 103.3*   MCH 34.1* 34.1*   MCHC 32.8* 33.0*   RDW 48.4 49.4   PLATELETCT 159* 172   MPV 11.3 11.6     Recent Labs     12/06/21  0044 12/07/21  0054   SODIUM  136 137   POTASSIUM 4.6 4.0   CHLORIDE 102 102   CO2 20 27   GLUCOSE 190* 116*   BUN 15 19   CREATININE 0.72 0.75   CALCIUM 9.4 9.0                   Imaging  EC-ECHOCARDIOGRAM COMPLETE W/O CONT   Final Result      CT-CTA CHEST PULMONARY ARTERY W/ RECONS   Final Result         1.  No pulmonary embolus appreciated.   2.  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   3.  Trace pericardial effusion   4.  Cholelithiasis   5.  10 mm dilatation the common bile duct, consider causes of biliary obstruction with additional workup as clinically appropriate.   6.  10 mm splenic artery aneurysm   7.  Bibasilar atelectasis   8.  Atherosclerosis.      DX-FEMUR-2+ LEFT   Final Result      Left transcervical femoral neck fracture.      DX-CHEST-PORTABLE (1 VIEW)   Final Result      Enlarged cardia silhouette with bibasilar atelectasis.      DX-PELVIS-1 OR 2 VIEWS   Final Result      1.  Left transcervical femoral neck fracture.      CT-HEAD W/O   Final Result      1.  No acute intracranial process.      2.  Atrophy and small vessel ischemic change.      3.  Unchanged encephalomalacia in the left temporal occipital region.              Assessment/Plan  * S/p left hip fracture- (present on admission)  Assessment & Plan  Mechanical fall complicated by Plavix, hypokalemia and hypocalcemia. IV repletion ordered. Plavix on hold, follow up lab verification of electrolyte correction.  There is otherwise no immediately reversible cardiopulmonary risk factors present justifying delay in the surgery  12/5: Status post hemiarthroplasty.  As needed pain meds.  PT/OT.  12/6: PT and OT had to be delayed since patient continues to experience numbness from the nerve block.    Pulmonary HTN (HCC)  Assessment & Plan  Patient with pulmonary hypertension RVSP was 55 on echocardiogram.  Lasix     Vascular dementia without behavioral disturbance (HCC)- (present on  admission)  Assessment & Plan  Supportive care    Macrocytosis- (present on admission)  Assessment & Plan  Unclear cause  Monitor       VTE prophylaxis: enoxaparin ppx    I have performed a physical exam and reviewed and updated ROS and Plan today (12/8/2021). In review of yesterday's note (12/7/2021), there are no changes except as documented above.

## 2021-12-08 NOTE — DISCHARGE SUMMARY
Discharge Summary    CHIEF COMPLAINT ON ADMISSION  Chief Complaint   Patient presents with   • T-5000 GLF   • Head Injury     pt hit head and on plavix/denies loc    • Hip Injury     left hip pain        Reason for Admission  TBI     CODE STATUS  Full Code    HPI & HOSPITAL COURSE    An 87-year-old Citizen of [place]-speaking female female with history of vascular dementia, CVA on Plavix, with minimal residual deficit who presented 12/4/2021 to the Emergency Department for a fall, left hip pain and an acute head injury. Patient reports falling at home after loosing her balance, when she bent over to  something. She reports associated sharp severe left hip pain, and nausea. She has a moderate throbbing headache as well. Labs reveal hypokalemia, hypocalcemia. CT head is unremarkable, x-ray reveals left femoral neck fracture. She receives symptomatic management, IV potassium, calcium, magnesium.  Patient underwent left Arabella omnifit hemiarthroplasty for geriatric displaced left femoral neck fracture on 12/5/21.  Echocardiogram showed a normal EF but a elevated RVSP indicative of pulmonary hypertension. Lasix started.  PT/OT recommended post-acute placement.  Ortho recommended more aggressive mobilization strategies to prevent deterioration; weight bearing status - AT, OOB all meals, mobilize/transfer practice at least TID; PT/OT-initiated; sutures/Staples out- 10-14 days post operatively.  Patient was accepted by SN[facility].  Flu from 12/9 negative. COVID 19 PCR from 12/9 negative.      Therefore, she is discharged in fair and stable condition to skilled nursing facility.    The patient met 2-midnight criteria for an inpatient stay at the time of discharge.      FOLLOW UP ITEMS POST DISCHARGE  Follow up with PCP  Follow up with orthopedic surgery. Needs appointment with Dr. Jenkins at [facility] at 10-14 days post-op for re-evaluation, staple removal and radiographs.      DISCHARGE DIAGNOSES  Principal  Problem:    S/p left hip fracture POA: Yes  Active Problems:    Macrocytosis POA: Yes    Vascular dementia without behavioral disturbance (HCC) POA: Yes    Pulmonary HTN (HCC) POA: Unknown  Resolved Problems:    Hypokalemia POA: Unknown    Hypernatremia POA: Unknown    Hypocalcemia POA: Unknown      FOLLOW UP  No future appointments.  CONSUELO BenavidesPLuis FernandoRLuis FernandoN.  [address] [facility] [address]  [facility] [address]  [phone]    In 1 week        MEDICATIONS ON DISCHARGE     Medication List      START taking these medications      Instructions   acetaminophen 325 MG Tabs  Commonly known as: Tylenol   Take 2 Tablets by mouth every 6 hours as needed.  Dose: 650 mg     * furosemide 10 MG/ML Soln  Commonly known as: LASIX   Infuse 4 mL into a venous catheter every day.  Dose: 40 mg     * furosemide 40 MG Tabs  Commonly known as: LASIX   Take 1 Tablet by mouth every day.  Dose: 40 mg     melatonin 5 mg Tabs   Take 1 Tablet by mouth every day.  Dose: 5 mg     OLANZapine 2.5 MG Tabs  Commonly known as: ZYPREXA   Take 1 Tablet by mouth every evening.  Dose: 2.5 mg     oxyCODONE immediate-release 5 MG Tabs  Commonly known as: ROXICODONE   Take 1 Tablet by mouth every 6 hours as needed for up to 3 days.  Dose: 5 mg         * This list has 2 medication(s) that are the same as other medications prescribed for you. Read the directions carefully, and ask your doctor or other care provider to review them with you.            CONTINUE taking these medications      Instructions   atenolol 50 MG Tabs  Commonly known as: TENORMIN   Take 1 Tablet by mouth every day.  Dose: 50 mg     atorvastatin 40 MG Tabs  Commonly known as: LIPITOR   Take 1 Tab by mouth every evening.  Dose: 40 mg     clopidogrel 75 MG Tabs  Commonly known as: PLAVIX   Take 1 Tab by mouth every day.  Dose: 75 mg     escitalopram 10 MG Tabs  Commonly known as: Lexapro   Take 1 Tab by mouth every day.  Dose: 10 mg        STOP taking these medications     budesonide-formoterol 80-4.5 MCG/ACT Aero  Commonly known as: SYMBICORT     docusate sodium 100 MG Caps  Commonly known as: COLACE            Allergies  Allergies   Allergen Reactions   • Penicillins Unspecified     Unknown reaction  Tolerates cephalosporins       DIET  Orders Placed This Encounter   Procedures   • Diet Order Diet: Level 7 - Easy to Chew; Liquid level: Level 0 - Thin; Second Modifier: (optional): Cardiac; Tray Modifications (optional): No Straws     Standing Status:   Standing     Number of Occurrences:   1     Order Specific Question:   Diet:     Answer:   Level 7 - Easy to Chew [22]     Order Specific Question:   Liquid level     Answer:   Level 0 - Thin     Order Specific Question:   Second Modifier: (optional)     Answer:   Cardiac [6]     Order Specific Question:   Tray Modifications (optional)     Answer:   No Straws       ACTIVITY  As tolerated.  Weight bearing as tolerated    LINES, DRAINS, AND WOUNDS  This is an automated list. Peripheral IVs will be removed prior to discharge.  Peripheral IV 20 G Anterior;Right Upper arm (Active)   Site Assessment Clean;Dry;Intact 12/07/21 2340   Dressing Type Transparent 12/07/21 2340   Line Status Saline locked 12/07/21 2340   Dressing Status Clean;Dry;Intact 12/07/21 2340   Dressing Intervention N/A 12/07/21 2340   Infiltration Grading ([facility]) 0 12/07/21 2340   Phlebitis Scale ([facility] Only) 0 12/07/21 2340       Wound 12/05/21 Incision Hip Left (Active)   Site Assessment SAHRA 12/07/21 2300   Periwound Assessment SAHRA 12/07/21 2300   Margins SAHRA 12/07/21 2300   Closure SAHRA 12/07/21 2300   Drainage Amount None 12/07/21 2300   Treatments Ice applied 12/06/21 0730   Dressing Options Mepilex Silver 12/07/21 2300   Dressing Changed Observed 12/07/21 2300   Dressing Status Clean;Dry;Intact 12/07/21 2300       Peripheral IV 20 G Anterior;Right Upper arm (Active)   Site Assessment Clean;Dry;Intact 12/07/21 2340   Dressing Type Transparent 12/07/21 2340    Line Status Saline locked 12/07/21 2340   Dressing Status Clean;Dry;Intact 12/07/21 2340   Dressing Intervention N/A 12/07/21 2340   Infiltration Grading ([facility] 0 12/07/21 2340   Phlebitis Scale ([facility] Only) 0 12/07/21 2340               MENTAL STATUS ON TRANSFER   Awake, alert, oriented        CONSULTATIONS  Orthopedic surgery  PMR    PROCEDURES  12/5/21: Left [place] omnifit hemiarthroplasty for geriatric displaced left femoral neck fracture.      LABORATORY  Lab Results   Component Value Date    SODIUM 137 12/07/2021    POTASSIUM 4.0 12/07/2021    CHLORIDE 102 12/07/2021    CO2 27 12/07/2021    GLUCOSE 116 (H) 12/07/2021    BUN 19 12/07/2021    CREATININE 0.75 12/07/2021        Lab Results   Component Value Date    WBC 7.1 12/09/2021    HEMOGLOBIN 12.1 12/09/2021    HEMATOCRIT 36.6 (L) 12/09/2021    PLATELETCT 225 12/09/2021        Total time of the discharge process exceeds 35 minutes.

## 2021-12-08 NOTE — THERAPY
Occupational Therapy   Initial Evaluation     Patient Name: Sushma Bowden  Age:  87 y.o., Sex:  female  Medical Record #: [id number]  Today's Date: 12/7/2021     Precautions  Precautions: Fall Risk,Swallow Precautions ( See Comments),Weight Bearing As Tolerated Left Lower Extremity  Comments: L hip hemiarthroplasty     Assessment  Patient is 87 y.o. Citizen of [place] speaking female admitted after GLF after loss of balance found to have L femur fx, s/p L hemiarthroplasty. Also found to have pulmonary HTN, hypokalemia, and hypocalcemia. PMHx of vascular dementia and CVA w/ minimal residual deficits. Per son's report, pt lives with dtr who is able to assist PRN 24/7. Pt confused and with delayed responses. Son reports pt was completing some IADLs, but unclear if accurate; recommend clarification with dtr. Currently limited by decreased functional mobility, activity tolerance, cognition, strength, balance, adherence to precautions, and pain which are currently affecting pt's ability to complete ADLs/IADLs at baseline. Will continue to follow.     Plan    Recommend Occupational Therapy 4 times per week until therapy goals are met for the following treatments:  Adaptive Equipment, Neuro Re-Education / Balance, Self Care/Activities of Daily Living, Therapeutic Activities and Therapeutic Exercises.    DC Equipment Recommendations: Unable to determine at this time  Discharge Recommendations: Recommend post-acute placement for additional occupational therapy services prior to discharge home      Objective     12/07/21 1110   Prior Living Situation   Prior Services Intermittent Physical Support for ADL Per Family   Housing / Facility Mobile Home   Steps Into Home 1   Bathroom Set up Bathtub / Shower Combination;Shower Chair   Equipment Owned Single Point Cane   Lives with - Patient's Self Care Capacity Adult Children   Comments Per son's report, pt lives with dtr who is able to assist PRN 24/7. Reports pt was completing some IADLs, but  "unclear if accurate   Prior Level of ADL Function   Self Feeding Independent   Grooming / Hygiene Independent   Bathing Requires Assist   Dressing Independent   Toileting Independent   Prior Level of IADL Function   Medication Management Requires Assist   Laundry Requires Assist   Kitchen Mobility Requires Assist   Finances Requires Assist   Home Management Requires Assist   Shopping Dependent   Prior Level Of Mobility Independent With Device in Home   History of Falls   History of Falls Yes   Date of Last Fall   (reason for admit)   Precautions   Precautions Fall Risk;Swallow Precautions ( See Comments);Weight Bearing As Tolerated Left Lower Extremity   Comments s/p L hip hemiarthroplasty    Vitals   O2 (LPM) 3   O2 Delivery Device Silicone Nasal Cannula   Pain 0 - 10 Group   Location Hip   Location Orientation Left   Therapist Pain Assessment Post Activity;During Activity;Nurse Notified  (not quantified)   Cognition    Cognition / Consciousness X   Speech/ Communication Delayed Responses;Hard of Hearing   Level of Consciousness Alert   Ability To Follow Commands 1 Step   Safety Awareness Impaired   New Learning Impaired   Comments Son reports she is below cognitive baseline, despite hx of dementia. Son reports no hx of dementia, only \"multiple small strokes\"   Passive ROM Upper Body   Passive ROM Upper Body WDL   Active ROM Upper Body   Active ROM Upper Body  WDL   Strength Upper Body   Upper Body Strength  X   Gross Strength Generalized Weakness, Equal Bilaterally.    Balance Assessment   Sitting Balance (Static) Poor +   Sitting Balance (Dynamic) Poor   Standing Balance (Static) Trace +   Standing Balance (Dynamic) Trace   Weight Shift Sitting Poor   Weight Shift Standing Poor   Comments HHA x2 in standing and intermittent physical assist for sitting balance w/ UUE support from rail. Posterior lean req max v/cs and physical assist to correct   Bed Mobility    Supine to Sit Moderate Assist   Sit to Supine " Moderate Assist   Scooting Moderate Assist   Comments HOB elevated   ADL Assessment   Grooming   (declined)   Lower Body Dressing Maximal Assist  (socks)   Toileting Maximal Assist  (incontinent; purewick and bedpan)   Comments limited by cognition   How much help from another person does the patient currently need...   Putting on and taking off regular lower body clothing? 2   Bathing (including washing, rinsing, and drying)? 2   Toileting, which includes using a toilet, bedpan, or urinal? 2   Putting on and taking off regular upper body clothing? 2   Taking care of personal grooming such as brushing teeth? 2   Eating meals? 3   6 Clicks Daily Activity Score 13   Functional Mobility   Sit to Stand Maximal Assist  (x2 ppl)   Mobility EOB and STS only   Edema / Skin Assessment   Edema / Skin  Not Assessed   Activity Tolerance   Comments limited by pain and fatigue; cognitive deficits   Patient / Family Goals   Patient / Family Goal #1 to go home   Short Term Goals   Short Term Goal # 1 LB dressing with min A   Short Term Goal # 2 BSC txf with min A   Short Term Goal # 3 seated g/h with SPV and no UE support   Education Group   Education Provided Role of Occupational Therapist;Weight Bearing Precautions;Pathology of bedrest   Role of Occupational Therapist Patient Response Patient;Family;Acceptance;Explanation;Reinforcement Needed;No Learning Evidence   Weight Bearing Precautions Patient Response Patient;Family;Acceptance;Explanation;Reinforcement Needed;No Learning Evidence   Pathology of Bedrest Patient Response Patient;Family;Acceptance;Explanation;Reinforcement Needed;No Learning Evidence

## 2021-12-08 NOTE — CARE PLAN
Problem: Pain - Standard  Goal: Alleviation of pain or a reduction in pain to the patient’s comfort goal  Outcome: Progressing  Note: Controlled per mar     Problem: Knowledge Deficit - Standard  Goal: Patient and family/care givers will demonstrate understanding of plan of care, disease process/condition, diagnostic tests and medications  Outcome: Progressing     Problem: Fall Risk  Goal: Patient will remain free from falls  Outcome: Progressing     Problem: Skin Integrity  Goal: Skin integrity is maintained or improved  Outcome: Progressing   The patient is Watcher - Medium risk of patient condition declining or worsening    Shift Goals  Clinical Goals: Sleep   Patient Goals: NA  Family Goals: NA    Progress made toward(s) clinical / shift goals:  yes    Patient is not progressing towards the following goals:

## 2021-12-08 NOTE — DISCHARGE PLANNING
Anticipated Discharge Disposition:  SNF-[facility]    Action: LSW notified that [facility] has a bed for this pt today and has 1600 transportation. LSW made phone call to pt's son, Evan to provide update. Evan in agreement with pt discharging to [facility] today and requested this LSW email him the address and phone number to [email or web address]. LSW sent [facility] information to above email. LSW notified MD Crump and SHEILA Ellis.    Addendum @1157  Pt will need gurney transportation. LSW notified SOFI Ann and faxed transportation forms to ride line to request 1600 transportation time.    Addendum @1222  LSW notified by SOFI Ann that [facility] no longer has a bed for this pt. No other accepting facility with a bed at this time. LSW notified RN and MD. LSW called pt's son to provide update.    Barriers to Discharge: SNF bed.    Plan: Care coordination will follow up with SNFs for a bed.

## 2021-12-08 NOTE — DISCHARGE PLANNING
Per physiatry, at current level of function and inability to tolerate a functional sit to stand patient is unlikely to be able to tolerate 3 hours of therapy 5 days a week in IRF level therapy. Patient could be a candidate for acute rehab if she has improved tolerance with therapies. TCC to continue to follow.

## 2021-12-08 NOTE — CONSULTS
Physical Medicine and Rehabilitation Consultation  Chart Review Only               Date of initial consultation: 12/7/2021  Requesting provider: Silvia Crump MD   Consulting provider: Sunitha Pablo D.O.  Reason for consultation: assess for acute inpatient rehab appropriateness  LOS: 3 Day(s)    Chief complaint: Hip pain    HPI: The patient is a 87 y.o.  female with a past medical history of vascular dementia, CVA on Plavix, prior [facility] rehab stay in December 2020 for her CVA and recent fall;  who presented on 12/4/2021  1:47 PM with hip pain. Per documentation, patient presented to the emergency department after a fall where she landed on her left hip and had an episode where she hit her head. Patient had been falling at home and losing her balance. After the fall she had noticed severe left hip pain. Images obtained within the emergency department revealed a CT head that was unremarkable however x-ray of the left hip showed a left femoral neck fracture. Orthopedic surgery was consulted, Dr. Pierson. With recommendations for left hip hemiarthroplasty. Patient was taken to the OR on 12/5 for a left hip hemiarthroplasty for femoral neck fracture which was performed by Dr. Jenkins. Postoperatively, patient has done well. She has a mild leukocytosis with WBC 12.4 as well as mild acute blood loss anemia with hemoglobin 11.4. Patient is currently WBAT LLE. Per documentation from orthopedic surgery her DVT prophylaxis is to be Lovenox 40 mg daily and then to transition to aspirin 81 mg twice daily at time of discharge. Per documentation, patient reports having a good experience at [facility] rehab and is requesting to return for rehabilitation services. Functionally, patient has been willing to participate with therapy. She is functioning at a mod assist for bed mobility and max assist for sit to stand. Patient has not been able to participate in any kind of functional transfer at this time. Additionally patient has  been max assist with toileting, she has been incontinent with use of pure wick and bedpan. She is now transferred to the toilet.        Social Hx:  Patient lives with her adult children in a mobile home with one-step to enter. Reportedly patient's daughter will be able to assist .  1 KEVIN  At prior level of function patient required assistance for all of her IADLs , she was independent for mobility with a single-point cane she only ambulated household distances.      THERAPY:  Restrictions: WBAT LLE fall risk  PT: Functional mobility    PT note: Max assist sit to stand, unable to participate in functional gait. Unable to participate in a functional transfer to transfer to the toilet    OT: ADLs   OT note: Mod assist bed mobility, max assist lower body dressing, max assist toileting patient declined to participate in grooming activities.    SLP:    SLP note: Easy to chew with thins dysphagia diet    IMAGIN/6 CT chest  IMPRESSION:        1.  No pulmonary embolus appreciated.  2.  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  3.  Trace pericardial effusion  4.  Cholelithiasis  5.  10 mm dilatation the common bile duct, consider causes of biliary obstruction with additional workup as clinically appropriate.  6.  10 mm splenic artery aneurysm  7.  Bibasilar atelectasis  8.  Atherosclerosis.     femur x-ray  IMPRESSION:     Left transcervical femoral neck fracture.    PROCEDURES:   left hip hemiarthroplasty for femoral neck fracture performed by Dr. Jenkins    PMH:  Past Medical History:   Diagnosis Date   • Dementia (HCC)    • Depression    • Hyperlipidemia    • Hypertension    • Stroke (HCC)    • Urinary incontinence        PSH:  Past Surgical History:   Procedure Laterality Date   • PB PARTIAL HIP REPLACEMENT Left 2021    Procedure: HEMIARTHROPLASTY, HIP;  Surgeon: Cole Jenkins M.D.;  Location: SURGERY [facility];  Service: Orthopedics   • KNEE ARTHROSCOPY Left    • OOPHORECTOMY         FHX:  History reviewed. No pertinent family history.    Medications:  Current Facility-Administered Medications   Medication Dose   • enoxaparin (LOVENOX) inj 40 mg  40 mg   • furosemide (LASIX) injection 40 mg  40 mg   • atenolol (TENORMIN) tablet 50 mg  50 mg   • atorvastatin (LIPITOR) tablet 40 mg  40 mg   • budesonide-formoterol (SYMBICORT) 80-4.5 MCG/ACT inhaler 2 Puff  2 Puff   • docusate sodium (COLACE) capsule 100 mg  100 mg   • escitalopram (Lexapro) tablet 10 mg  10 mg   • senna-docusate (PERICOLACE or SENOKOT S) 8.6-50 MG per tablet 2 Tablet  2 Tablet    And   • polyethylene glycol/lytes (MIRALAX) PACKET 1 Packet  1 Packet    And   • magnesium hydroxide (MILK OF MAGNESIA) suspension 30 mL  30 mL    And   • bisacodyl (DULCOLAX) suppository 10 mg  10 mg   • acetaminophen (Tylenol) tablet 650 mg  650 mg   • Pharmacy Consult Request ...Pain Management Review 1 Each  1 Each   • oxyCODONE immediate-release (ROXICODONE) tablet 2.5 mg  2.5 mg    Or   • oxyCODONE immediate-release (ROXICODONE) tablet 5 mg  5 mg    Or   • HYDROmorphone (Dilaudid) injection 0.25 mg  0.25 mg   • enalaprilat (Vasotec) injection 1.25 mg 1 mL  1.25 mg   • labetalol (NORMODYNE/TRANDATE) injection 10 mg  10 mg   • ondansetron (ZOFRAN) syringe/vial injection 4 mg  4 mg   • ondansetron (ZOFRAN ODT) dispertab 4 mg  4 mg       Allergies:  Allergies   Allergen Reactions   • Penicillins Unspecified     Unknown reaction  Tolerates cephalosporins         Physical Exam:  Vitals: /60   Pulse (!) 55   Temp 36.6 °C (97.8 °F) (Temporal)   Resp 16   Wt 69.4 kg (153 lb)   SpO2 95%       ASSESSMENT:  Patient is a 87 y.o. female admitted with left hip pain found to have a left femoral neck fracture.    [facility] Code / Diagnosis to Support: 0008.11 - Orthopaedic Disorders: Status Post Unilateral Hip Fracture    Rehabilitation: Impaired ADLs and mobility  Currently  patient is a moderate candidate for inpatient rehab stay. Patient will have adequate support at home however she appears to not be able to tolerate IRF level therapy, she has not been able to participate in a functional transfer which appears to be limited by pain. See disposition details below.    Barriers to transfer include: Insurance authorization, TCCs to verify disposition, medical clearance and bed availability     Additional Recommendations:  Left hip fracture  -Greatest impairments at this time are impaired strength endurance balance and stability, patient has not been able to tolerate sit to stand or mobility  -Status post left hip hemiarthroplasty performed on 12/6 by Dr. Jenkins  -WBAT LLE  -Patient has limited participation with PT/OT on 12/7 which may be limited by pain versus numbness from the nerve block.    Hypoxic event  -Patient with episodes of hypoxia, CT chest obtained without evidence of PE  -Suspected hypoxic events may be related to patient's pulmonary hypertension, updated echocardiogram with EF of 65    Leukocytosis  -Most recent labs reveal WBC 12.4, improved from 14.1 and 12.6    Disposition:   -Patient is currently functioning below her level of baseline, will need postacute rehab  -At current level of function and inability to tolerate a functional sit to stand patient is unlikely to be able to tolerate 3 hours of therapy 5 days a week in IRF level therapy  -Considering that patient has had a successful IRF level therapy at previous hospitalization, would be willing to reconsider patient for IRF level therapy if she has improved tolerance with therapies  -PM&R to follow peripherally    Medical Complexity:  Left hip fracture  History of CVA  Hypoxia  Hypertension  Dysphagia  Impaired mobility and ADLs  Leukocytosis  Acute blood loss anemia      DVT PPX: Lovenox      Thank you for allowing us to participate in the care of this patient.     I spent 37 mins non face-to-face today reviewing  progress notes, images, medications, therapy notes, H&P, images.. Visit start time 3pm, visit stop time 3:37p .      Sunitha Pablo D.O.   Physical Medicine and Rehabilitation     Please note that this dictation was created using voice recognition software. I have made every reasonable attempt to correct obvious errors, but there may be errors of grammar and possibly content that I did not discover before finalizing the note.

## 2021-12-08 NOTE — PROGRESS NOTES
Orthopaedic PA Progress Note    Interval changes:Did well overnight, slow to mobilize. Family at bedside.    ROS - Patient denies any new issues. No chest pain, dyspnea, or fever.  Pain well controlled.    /60   Pulse (!) 55   Temp 36.6 °C (97.8 °F) (Temporal)   Resp 16   Wt 69.4 kg (153 lb)   SpO2 95%     Patient seen and examined  No acute distress  Breathing non labored  RRR  Surgical dressing is clean, dry, and intact. Patient clearly fires tibialis anterior, EHL, and gastrocnemius/soleus. Sensation is intact to light touch throughout superficial peroneal, deep peroneal, tibial, saphenous, and sural nerve distributions. Strong and palpable 2+ dorsalis pedis and posterior tibial pulses with capillary refill less than 2 seconds. No lower leg tenderness or discomfort.    Recent Labs     12/05/21  0234 12/06/21  0044 12/07/21  0054   WBC 10.4 14.1* 12.4*   RBC 4.00* 3.67* 3.34*   HEMOGLOBIN 13.6 12.5 11.4*   HEMATOCRIT 40.9 38.1 34.5*   .3* 103.8* 103.3*   MCH 34.0* 34.1* 34.1*   MCHC 33.3* 32.8* 33.0*   RDW 48.3 48.4 49.4   PLATELETCT 168 159* 172   MPV 11.8 11.3 11.6     Active Hospital Problems    Diagnosis    • Pulmonary HTN (HCC) [I27.20]    • S/p left hip fracture [Z87.81]    • Hypernatremia [E87.0]    • Hypocalcemia [E83.51]    • Vascular dementia without behavioral disturbance (HCC) [F01.50]    • Hypokalemia [E87.6]    • Macrocytosis [D75.89]        Assessment/Plan:  POD#2 S/P :L hip jodi  Wt bearing status - AT  PT/OT-initiated  Wound care:dressing left in place  Drains - no  Churchill-no  Sutures/Staples out- 10-14 days post operatively  Antibiotics: complete  DVT Prophylaxis- TEDS/SCDs/Foot pumps.   Lovenox: Start 40 mg daily - may transition to ECASA 81 PO BID on DC  Future Procedures - none planned  Case Coordination for Discharge Planning - Disposition per Med/PT/OT  Clear for disposition (home/SNF/IRF) from Orthopaedic team standpoint.  Follow-Up: needs appointment with Dr. Jenkins at  [facility] at 10-14 days post-op for re-evaluation, staple removal and radiographs.

## 2021-12-09 ENCOUNTER — APPOINTMENT (OUTPATIENT)
Dept: RADIOLOGY | Facility: MEDICAL CENTER | Age: 86
DRG: 522 | End: 2021-12-09
Attending: STUDENT IN AN ORGANIZED HEALTH CARE EDUCATION/TRAINING PROGRAM
Payer: MEDICARE

## 2021-12-09 VITALS
HEART RATE: 59 BPM | TEMPERATURE: 96.7 F | RESPIRATION RATE: 18 BRPM | DIASTOLIC BLOOD PRESSURE: 57 MMHG | OXYGEN SATURATION: 94 % | BODY MASS INDEX: 30.18 KG/M2 | SYSTOLIC BLOOD PRESSURE: 125 MMHG | WEIGHT: 154.54 LBS

## 2021-12-09 LAB
BASOPHILS # BLD AUTO: 0.4 % (ref 0–1.8)
BASOPHILS # BLD: 0.03 K/UL (ref 0–0.12)
EOSINOPHIL # BLD AUTO: 0.19 K/UL (ref 0–0.51)
EOSINOPHIL NFR BLD: 2.7 % (ref 0–6.9)
ERYTHROCYTE [DISTWIDTH] IN BLOOD BY AUTOMATED COUNT: 47.9 FL (ref 35.9–50)
FLUAV RNA SPEC QL NAA+PROBE: NEGATIVE
FLUBV RNA SPEC QL NAA+PROBE: NEGATIVE
HCT VFR BLD AUTO: 36.6 % (ref 37–47)
HGB BLD-MCNC: 12.1 G/DL (ref 12–16)
IMM GRANULOCYTES # BLD AUTO: 0.1 K/UL (ref 0–0.11)
IMM GRANULOCYTES NFR BLD AUTO: 1.4 % (ref 0–0.9)
LYMPHOCYTES # BLD AUTO: 0.74 K/UL (ref 1–4.8)
LYMPHOCYTES NFR BLD: 10.5 % (ref 22–41)
MCH RBC QN AUTO: 33.7 PG (ref 27–33)
MCHC RBC AUTO-ENTMCNC: 33.1 G/DL (ref 33.6–35)
MCV RBC AUTO: 101.9 FL (ref 81.4–97.8)
MONOCYTES # BLD AUTO: 0.76 K/UL (ref 0–0.85)
MONOCYTES NFR BLD AUTO: 10.7 % (ref 0–13.4)
NEUTROPHILS # BLD AUTO: 5.25 K/UL (ref 2–7.15)
NEUTROPHILS NFR BLD: 74.3 % (ref 44–72)
NRBC # BLD AUTO: 0 K/UL
NRBC BLD-RTO: 0 /100 WBC
PLATELET # BLD AUTO: 225 K/UL (ref 164–446)
PMV BLD AUTO: 11.2 FL (ref 9–12.9)
RBC # BLD AUTO: 3.59 M/UL (ref 4.2–5.4)
RSV RNA SPEC QL NAA+PROBE: NEGATIVE
SARS-COV-2 RNA RESP QL NAA+PROBE: NOTDETECTED
SPECIMEN SOURCE: NORMAL
WBC # BLD AUTO: 7.1 K/UL (ref 4.8–10.8)

## 2021-12-09 PROCEDURE — 99239 HOSP IP/OBS DSCHRG MGMT >30: CPT | Performed by: STUDENT IN AN ORGANIZED HEALTH CARE EDUCATION/TRAINING PROGRAM

## 2021-12-09 PROCEDURE — 36415 COLL VENOUS BLD VENIPUNCTURE: CPT

## 2021-12-09 PROCEDURE — 90662 IIV NO PRSV INCREASED AG IM: CPT | Performed by: STUDENT IN AN ORGANIZED HEALTH CARE EDUCATION/TRAINING PROGRAM

## 2021-12-09 PROCEDURE — 700102 HCHG RX REV CODE 250 W/ 637 OVERRIDE(OP): Performed by: INTERNAL MEDICINE

## 2021-12-09 PROCEDURE — 90471 IMMUNIZATION ADMIN: CPT

## 2021-12-09 PROCEDURE — 0241U HCHG SARS-COV-2 COVID-19 NFCT DS RESP RNA 4 TRGT MIC: CPT

## 2021-12-09 PROCEDURE — A9270 NON-COVERED ITEM OR SERVICE: HCPCS | Performed by: INTERNAL MEDICINE

## 2021-12-09 PROCEDURE — 700102 HCHG RX REV CODE 250 W/ 637 OVERRIDE(OP): Performed by: STUDENT IN AN ORGANIZED HEALTH CARE EDUCATION/TRAINING PROGRAM

## 2021-12-09 PROCEDURE — 3E02340 INTRODUCTION OF INFLUENZA VACCINE INTO MUSCLE, PERCUTANEOUS APPROACH: ICD-10-PCS | Performed by: STUDENT IN AN ORGANIZED HEALTH CARE EDUCATION/TRAINING PROGRAM

## 2021-12-09 PROCEDURE — C9803 HOPD COVID-19 SPEC COLLECT: HCPCS | Performed by: STUDENT IN AN ORGANIZED HEALTH CARE EDUCATION/TRAINING PROGRAM

## 2021-12-09 PROCEDURE — 700111 HCHG RX REV CODE 636 W/ 250 OVERRIDE (IP): Performed by: STUDENT IN AN ORGANIZED HEALTH CARE EDUCATION/TRAINING PROGRAM

## 2021-12-09 PROCEDURE — 72170 X-RAY EXAM OF PELVIS: CPT

## 2021-12-09 PROCEDURE — 85025 COMPLETE CBC W/AUTO DIFF WBC: CPT

## 2021-12-09 PROCEDURE — A9270 NON-COVERED ITEM OR SERVICE: HCPCS | Performed by: STUDENT IN AN ORGANIZED HEALTH CARE EDUCATION/TRAINING PROGRAM

## 2021-12-09 RX ORDER — LORAZEPAM 2 MG/ML
0.5 INJECTION INTRAMUSCULAR ONCE
Status: DISCONTINUED | OUTPATIENT
Start: 2021-12-09 | End: 2021-12-09 | Stop reason: HOSPADM

## 2021-12-09 RX ORDER — OXYCODONE HYDROCHLORIDE 5 MG/1
5 TABLET ORAL EVERY 6 HOURS PRN
Qty: 12 TABLET | Refills: 0 | Status: SHIPPED | OUTPATIENT
Start: 2021-12-09 | End: 2021-12-12

## 2021-12-09 RX ORDER — OLANZAPINE 5 MG/1
2.5 TABLET ORAL EVERY EVENING
Status: DISCONTINUED | OUTPATIENT
Start: 2021-12-09 | End: 2021-12-09 | Stop reason: HOSPADM

## 2021-12-09 RX ORDER — CHOLECALCIFEROL (VITAMIN D3) 125 MCG
5 CAPSULE ORAL DAILY
Qty: 30 TABLET | Status: SHIPPED
Start: 2021-12-09

## 2021-12-09 RX ORDER — OXYCODONE HYDROCHLORIDE 5 MG/1
5 TABLET ORAL EVERY 6 HOURS PRN
Qty: 12 TABLET | Refills: 0 | Status: SHIPPED | OUTPATIENT
Start: 2021-12-09 | End: 2021-12-09

## 2021-12-09 RX ORDER — CHOLECALCIFEROL (VITAMIN D3) 125 MCG
5 CAPSULE ORAL NIGHTLY
Status: DISCONTINUED | OUTPATIENT
Start: 2021-12-09 | End: 2021-12-09 | Stop reason: HOSPADM

## 2021-12-09 RX ORDER — OLANZAPINE 2.5 MG/1
2.5 TABLET, FILM COATED ORAL EVERY EVENING
Qty: 30 TABLET | Status: SHIPPED
Start: 2021-12-09

## 2021-12-09 RX ORDER — FUROSEMIDE 40 MG/1
40 TABLET ORAL
Status: DISCONTINUED | OUTPATIENT
Start: 2021-12-09 | End: 2021-12-09 | Stop reason: HOSPADM

## 2021-12-09 RX ORDER — FUROSEMIDE 40 MG/1
40 TABLET ORAL DAILY
Qty: 30 TABLET | Status: SHIPPED
Start: 2021-12-09

## 2021-12-09 RX ADMIN — OXYCODONE 5 MG: 5 TABLET ORAL at 00:44

## 2021-12-09 RX ADMIN — INFLUENZA A VIRUS A/VICTORIA/2570/2019 IVR-215 (H1N1) ANTIGEN (FORMALDEHYDE INACTIVATED), INFLUENZA A VIRUS A/TASMANIA/503/2020 IVR-221 (H3N2) ANTIGEN (FORMALDEHYDE INACTIVATED), INFLUENZA B VIRUS B/PHUKET/3073/2013 ANTIGEN (FORMALDEHYDE INACTIVATED), AND INFLUENZA B VIRUS B/WASHINGTON/02/2019 ANTIGEN (FORMALDEHYDE INACTIVATED) 0.7 ML: 60; 60; 60; 60 INJECTION, SUSPENSION INTRAMUSCULAR at 10:11

## 2021-12-09 RX ADMIN — FUROSEMIDE 40 MG: 10 INJECTION, SOLUTION INTRAMUSCULAR; INTRAVENOUS at 07:45

## 2021-12-09 RX ADMIN — Medication 5 MG: at 00:44

## 2021-12-09 RX ADMIN — ESCITALOPRAM OXALATE 10 MG: 10 TABLET ORAL at 07:45

## 2021-12-09 RX ADMIN — ATENOLOL 50 MG: 50 TABLET ORAL at 07:45

## 2021-12-09 RX ADMIN — DOCUSATE SODIUM 100 MG: 100 CAPSULE ORAL at 07:45

## 2021-12-09 RX ADMIN — DOCUSATE SODIUM 50 MG AND SENNOSIDES 8.6 MG 2 TABLET: 8.6; 5 TABLET, FILM COATED ORAL at 07:45

## 2021-12-09 RX ADMIN — ENOXAPARIN SODIUM 40 MG: 40 INJECTION SUBCUTANEOUS at 07:46

## 2021-12-09 ASSESSMENT — PAIN DESCRIPTION - PAIN TYPE: TYPE: ACUTE PAIN

## 2021-12-09 NOTE — DISCHARGE PLANNING
Agency/Facility Name: [facility]  Spoke To: Nehal   Outcome: Per Nehal admission will have a meeting at 0930 and will call DPA back regarding bed.     @0906  Agency/Facility Name: [facility]  Spoke To: Nehal   Outcome: Per Nehal bed available today. Nehal would like a call back with transport time.

## 2021-12-09 NOTE — DISCHARGE PLANNING
Transport time pushed back to 1500 today per NAIDA Melgar. Olga Lidia wilcox [facility] said it would be around 1500 today

## 2021-12-09 NOTE — PROGRESS NOTES
Orthopaedic PA Progress Note    Interval changes:Did well overnight, slow to mobilize. Family at bedside.[place] at baseline per family    ROS - Patient denies any new issues. No chest pain, dyspnea, or fever.  Pain well controlled.    /74   Pulse 72   Temp 35.8 °C (96.5 °F) (Temporal)   Resp 18   Wt 70.1 kg (154 lb 8.7 oz)   SpO2 93%     Patient seen and examined  No acute distress  Breathing non labored  RRR  Surgical dressing is clean, dry, and intact. Patient does not fire tibialis anterior, EHL, nor gastrocnemius/soleus. Sensation is intact to light touch throughout superficial peroneal, deep peroneal, tibial, saphenous, and sural nerve distributions. Strong and palpable 2+ dorsalis pedis and posterior tibial pulses with capillary refill less than 2 seconds. No lower leg tenderness or discomfort.    Recent Labs     12/07/21  0054 12/09/21  0117   WBC 12.4* 7.1   RBC 3.34* 3.59*   HEMOGLOBIN 11.4* 12.1   HEMATOCRIT 34.5* 36.6*   .3* 101.9*   MCH 34.1* 33.7*   MCHC 33.0* 33.1*   RDW 49.4 47.9   PLATELETCT 172 225   MPV 11.6 11.2     Active Hospital Problems    Diagnosis    • Pulmonary HTN (HCC) [I27.20]    • S/p left hip fracture [Z87.81]    • Vascular dementia without behavioral disturbance (HCC) [F01.50]    • Macrocytosis [D75.89]      Assessment/Plan:  POD#4 S/P :L hip jodi  Flaccidity from med? Dementia?  Recommend more aggressive mobilization strategies to prevent deterioration.  Wt bearing status - AT, OOB all meals, mobilize/transfer practice at least TID  PT/OT-initiated  Wound care:dressing left in place  Drains - no  Churchill-no  Sutures/Staples out- 10-14 days post operatively  Antibiotics: complete  DVT Prophylaxis- TEDS/SCDs/Foot pumps.   Lovenox: Start 40 mg daily - may transition to ECASA 81 PO BID on DC  Future Procedures - none planned  Case Coordination for Discharge Planning - Disposition per Med/PT/OT  Clear for disposition (home/SNF/IRF) from Orthopaedic team  standpoint.  Follow-Up: needs appointment with Dr. Jenkins at [facility] at 10-14 days post-op for re-evaluation, staple removal and radiographs.

## 2021-12-09 NOTE — DISCHARGE PLANNING
DC Transport Scheduled    Received request at: 0907    Transport Company Scheduled:  [employer]  Spoke with Olga Lidia at [facility] to schedule transport.      Scheduled Date: 12/09/21  Scheduled Time: 1200    Destination: [address] [place]. Jaswinder Nolasco    Notified care team of scheduled transport via Voalte.     If there are any changes needed to the DC transportation scheduled, please contact [facility] at ext. [phone] between the hours of 3757-7373 Mon-Fri. If outside those hours, contact the ED Case Manager at ext. [phone].

## 2021-12-09 NOTE — CARE PLAN
"  Problem: Pain - Standard  Goal: Alleviation of pain or a reduction in pain to the patient’s comfort goal  Outcome: Progressing     Problem: Knowledge Deficit - Standard  Goal: Patient and family/care givers will demonstrate understanding of plan of care, disease process/condition, diagnostic tests and medications  Outcome: Progressing     Problem: Fall Risk  Goal: Patient will remain free from falls  Outcome: Progressing     Problem: Skin Integrity  Goal: Skin integrity is maintained or improved  Outcome: Progressing   The patient is Watcher - Medium risk of patient condition declining or worsening    Shift Goals  Clinical Goals: SNF placement  Patient Goals: per  \"I want to go home\"  Family Goals: comfort    Progress made toward(s) clinical / shift goals:  Goal for today is to transport pt to SNF.    Patient is not progressing towards the following goals:      "

## 2021-12-09 NOTE — PROGRESS NOTES
Assumed care of patient, bedside report received from Russ Vasquez RN. Updated on plan of care, call light within reach and fall precautions are in place and bed lock and in lowest position. Patient speaks Setswana and rarely responds to questions. Family is with patient at the moment. Goal for today is pain management and placement in a SNF.

## 2021-12-09 NOTE — DISCHARGE PLANNING
Anticipated Discharge Disposition: SNF-[facility]    Action: LSW notified by SOFI Awad that [facility] has a bed for the pt today. LSW sent [employer] message to ride line to request [facility] be scheduled for 1200. LSW notified bedside RNs and MD Crump.    Addendum @8512  COVID test resulted negative. Transportation scheduled for 1500 today. LSW made phone call to pt's son Evan to provide update. LSW gave COBRA to RN.    Barriers to Discharge: none.    Plan: Pt anticipated to DC to [facility] SNF at 1500 today.

## 2021-12-09 NOTE — DISCHARGE INSTRUCTIONS
Discharge Instructions    Discharged to other by medical transportation with escort. Discharged via ambulance, hospital escort: Yes.  Special equipment needed: Oxygen    Be sure to schedule a follow-up appointment with your primary care doctor or any specialists as instructed.     Discharge Plan:   Diet Plan: Discussed  Activity Level: Discussed  Confirmed Follow up Appointment: Appointment Scheduled  Medication Reconciliation Updated: Yes    I understand that a diet low in cholesterol, fat, and sodium is recommended for good health. Unless I have been given specific instructions below for another diet, I accept this instruction as my diet prescription.   Other diet: Heart Healthy    Special Instructions: None    · Is patient discharged on Warfarin / Coumadin?   No     Fractura de cadera  Hip Fracture       Bora fractura de cadera es bora rotura en la parte superior del hueso del muslo (fémur). Por lo general es el resultado de bora herida, comúnmente bora caída.  ¿Cuáles son las causas?  Esta afección puede ser causada por lo siguiente:  · Un golpe o herida directo (traumatismo) en el lado de la cadera, por ejemplo a causa de bora caída o accidente de auto.  ¿Qué incrementa el riesgo?  Es más probable que desarrolle esta afección si:  · Tiene poco equilibrio o un patrón de caminata irregular (marcha). Determinadas afecciones contribuyen con la falta de equilibrio, por ejemplo la enfermedad de Parkinson y la demencia.  · Tiene los huesos delgados o debilitados, por ejemplo por osteopenia u osteoporosis.  · Tiene un cáncer que se propaga a los huesos de las piernas.  · Tiene determinadas condiciones que pueden debilitar mark huesos, vitaliy trastornos tiroideos, trastornos intestinales o bora falta (deficiencia) de ciertos nutrientes.  · Fuma.  · Talia ciertos medicamentos, vitaliy corticoesteroides.  · Tiene antecedentes de fracturas.  ¿Cuáles son los signos o síntomas?  Los síntomas de esta afección incluyen lo  siguiente:  · Dolor en la cadera lesionada. Por lo general, esto se siente del lado de la cadera o en la kindra frontal de la jay.  · Entumecimiento, hematomas e hinchazón de la cadera.  · Dolor al  la pierna, especialmente al levantarla. Por lo general, el dolor disminuye con reposo.  · Dificultad o incapacidad para pararse, caminar o usar la pierna para soportar el peso de downs cuerpo (colocar el peso sobre la pierna).  · Al estar acostado, la pierna gira hacia afuera.  · La pierna afectada es más corta que la otra pierna.  ¿Cómo se diagnostica?  Esta afección se puede diagnosticar en función de lo siguiente:  · Audrey síntomas.  · Un examen físico.  · Radiografías. [place] se puede realizar por los siguientes motivos:  ? Para confirmar el diagnóstico.  ? Para determinar el tipo y ubicación de la fractura.  ? Para buscar otras lesiones.  · Resonancia magnética (RM) o exploración por tomografía computarizada (TC). [place] se puede hacer si la fractura no es visible mediante bora radiografía.  ¿Cómo se trata?  El tratamiento de esta afección depende de la gravedad y de la ubicación de la fractura. En la mayoría de los casos, se debe realizar bora cirugía. La cirugía consiste en lo siguiente:  · Se repara la fractura con un tornillo, clavo o varilla para mantener en downs lugar al hueso (reducción abierta y fijación interna, OPAL).  · Se reemplazan las partes dañadas del fémur con implantes de metal (hemiartroplastia o artroplastia).  Si downs fractura es menos grave, o si no cumple con los requisitos para bora cirugía, puede recibir tratamiento no quirúrgico. El tratamiento no quirúrgico puede incluir lo siguiente:  · El uso de muletas, un andador o silla de jim hasta que el médico lo autorice a colocar (apoyar) peso sobre la cadera.  · Medicamentos para reducir el dolor y la hinchazón.  · Radiografías regulares para controlar la fractura y garantizar que se esté curando.  · Fisioterapia. Es probable que necesite fisioterapia  luego de la cirugía.  Siga estas indicaciones en downs casa:  Actividad  · No apoye el peso del cuerpo sobre la pierna lesionada hasta tanto el médico lo autorice.  ? Siga las indicaciones del médico con respecto a pararse y caminar, y las restricciones.  ? Use muletas, un caminador o silla de jim según se le indique.  · Evite las actividades que le causan dolor o irritación en la cadera. Pregúntele al médico qué actividades son seguras para usted.  · No conduzca ni use maquinaria pesada hasta que el médico lo autorice.  · Si le indicaron fisioterapia, clare los ejercicios vitaliy se lo haya indicado el médico.  Instrucciones generales  · [place] los medicamentos de venta alec y los recetados solamente vitaliy se lo haya indicado el médico.  · Si se lo indican, aplique hielo sobre la kindra de la lesión:  ? Ponga el hielo en bora bolsa plástica.  ? Coloque bora toalla entre la piel y la bolsa de hielo.  ? Coloque el hielo jonh 20 minutos, 2 a 3 veces por día.  · No consuma ningún producto que contenga nicotina o tabaco, vitaliy cigarrillos y cigarrillos electrónicos. Estos pueden retrasar la consolidación del hueso. Si necesita ayuda para dejar de fumar, consulte al médico.  · Concurra a todas las visitas de control vitaliy se lo haya indicado el médico. [place] es importante.  ¿Cómo se nadine?  · Para evitar las caídas en el hogar:  ? Use un bastón, un caminador o silla de jim según se le indique.  ? Asegúrese de que las habitaciones y pasillos no estén desordenados y no presenten obstáculos ni cables.  ? Instale barras para sostén en la habitación y el baño.  ? Use pasamanos al subir y bajar las escaleras.  ? Use luces nocturnas en el hogar.  · [place] ejercicios regularmente. Consulte qué ejercicios son seguros para usted, tales vitaliy caminar y hacer ejercicios de fuerza y equilibrio.  · Visite al oftalmólogo regularmente para que le controle la vista. [place] puede ayudar a prevenir caídas.  · Asegúrese de consumir suficiente calcio y  vitamina D.  · No consuma ningún producto que contenga nicotina o tabaco, vitaliy cigarrillos y cigarrillos electrónicos. Si necesita ayuda para dejar de fumar, consulte al médico.  · Limite el consumo de bebidas alcohólicas.  · Si tiene xin enfermedad preexistente que le causó la fractura de cadera, trabaje con el médico para mantener esta enfermedad bajo control.  Comuníquese con un médico si:  · El dolor empeora o no mejora con reposo o medicamentos.  · Le aparecen algunos de los siguientes síntomas en la pierna o el pie:  ? Entumecimiento.  ? Hormigueo.  ? Cambio en el color de la piel (decoloración).  ? La piel se siente fría al tacto.  Solicite ayuda de inmediato si:  · El dolor empeora repentinamente.  · No puede  la cadera.  Resumen  · Xin fractura de cadera es xin rotura en la parte superior del hueso del muslo (fémur).  · Generalmente, el tratamiento requiere de xin cirugía para restablecer la estabilidad y función de la cadera.  · Los analgésicos y la colocación de hielo en la pierna afectada pueden ayudar a controlar el dolor y la hinchazón. Siga las indicaciones del médico.  Esta información no tiene vitaliy fin reemplazar el consejo del médico. Asegúrese de hacerle al médico cualquier pregunta que tenga.  Document Released: 12/18/2006 Document Revised: 04/25/2018 Document Reviewed: 04/25/2018  Elsevier Patient Education © 2020 [employer].       Hipertensión pulmonar  Pulmonary Hypertension  La hipertensión pulmonar es xin afección de larga duración (crónica) en la que existe hipertensión arterial en las arterias de los pulmones (arterias pulmonares). Esta afección ocurre cuando las arterias pulmonares se estrechan y tensan, lo que dificulta que la ebony fluya a través de los pulmones. [place] resultado, el corazón debe trabajar más para bombear ebony a través de los pulmones, lo que dificulta la respiración.  Con el paso del tiempo, la hipertensión pulmonar puede debilitar y dañar el músculo cardíaco,  en especial, downs lado derecho. La hipertensión pulmonar es bora afección grave que puede poner en riesgo la alma delia.  ¿Cuáles son las causas?  Lisa trastorno podría ser consecuencia de diferentes afecciones. Según downs causa, puede categorizarse en karie grupos:  · [place] 1: Hipertensión pulmonar causada por un crecimiento anormal de pequeños vasos sanguíneos en los pulmones (hipertensión arterial pulmonar). El crecimiento anormal de vasos sanguíneos puede tener causas desconocidas o puede deberse a lo siguiente:  ? Transmitido de padres a hijos (hereditario).  ? Otra enfermedad, vitaliy enfermedades del tejido conjuntivo (por ejemplo, lupus o esclerodermia), enfermedad cardíaca congénita, enfermedad hepática o VIH.  ? Ciertos medicamentos o sustancias venenosas (toxinas).  · [place] 2: Hipertensión pulmonar causada por debilidad de la cámara izquierda del corazón (ventrículo ela) o por bora enfermedad en las válvulas cardíacas.  · [place] 3: Hipertensión pulmonar causada por enfermedad pulmonar o niveles bajos de oxígeno. Las causas de lisa jarrell incluyen lo siguiente:  ? Enfisema o enfermedad pulmonar obstructiva crónica (EPOC).  ? Apnea del sueño sin tratar.  ? Fibrosis pulmonar.  ? Exposición prolongada a grandes [place] en ciertas personas que pudieran tener, desde antes, un mayor riesgo de sufrir hipertensión pulmonar.  · [place] 4: Hipertensión pulmonar causada por coágulos de ebony en los pulmones (embolia pulmonar).  · [place] 5: Otras causas de hipertensión pulmonar, vitaliy anemia drepanocítica, sarcoidosis, tumores que presionan las arterias pulmonares y muchas otras enfermedades.  ¿Cuáles son los signos o los síntomas?  Los síntomas de esta afección incluyen los siguientes:  · Falta de aire. Puede notar que le falta el aire:  ? Al realizar bora actividad, vitaliy caminar.  ? Al realizar bora actividad de muy poco esfuerzo, vitaliy vestirse.  ? Sin realizar actividad, cuando aún está sentado.  · Tos. En algunos casos, es  posible que al toser expulse mucosidad con ebony proveniente de los pulmones (hemoptisis).  · Cansancio o fatiga.  · Mareos, aturdimiento o desmayos, en especial, al realizar actividad física.  · Latidos cardíacos rápidos o sensación de que el corazón aletea o que los latidos son intermitentes (palpitaciones).  · Agrandamiento de las venas del den.  · Hinchazón del abdomen, en la parte inferior de las piernas o en ambos.  · Color azulado en los labios y en las puntas de los dedos.  · Dolor u opresión en el pecho.  · Dolor abdominal, especialmente, en la kindra superior del abdomen.  ¿Cómo se diagnostica?  Esta afección podría diagnosticarse a partir de carol o más de los siguientes estudios:  · Radiografía de tórax.  · Análisis de ebony.  · Exploración por tomografía computarizada (TC).  · Pruebas de la función pulmonar. Esta prueba mide la cantidad de aire que pueden contener los pulmones. También controla cómo entra y sale el aire de los pulmones.  · Prueba de caminata de 6 minutos. Con esta se evalúa la gravedad de la afección en relación con mark niveles de actividad.  · Electrocardiograma (ECG). Es un estudio que registra los impulsos eléctricos del corazón.  · Ecocardiograma. [place] estudio usa ondas sonoras (ecografía) para obtener bora imagen del corazón.  · Cateterismo cardíaco. En ivanna procedimiento, se introduce un tubo patterson (catéter) en la arteria pulmonar y se usa para evaluar la presión en esta y en el lado derecho del corazón.  · Biopsia de pulmón. [place] puede incluir un procedimiento para extraer bora muestra de tejido pulmonar (biopsia) para downs evaluación. Podría ayudar a determinar bora causa preexistente de la hipertensión pulmonar.  ¿Cómo se trata?  Esta afección no tiene neal, tri el tratamiento puede ayudar a aliviar los síntomas y a retrasar el progreso de la enfermedad. El tratamiento puede incluir lo siguiente:  · Rehabilitación cardíaca. [place] es un programa de tratamiento que involucra la  realización de ejercicios, educación y asesoramiento para ayudarlo a fortalecerse y retomar un estilo de alma delia activo.  · Oxigenoterapia.  · Medicamentos para:  ? Presión arterial más baja.  ? Relajar (dilatar) los vasos sanguíneos pulmonares.  ? Ayudar a los latidos cardíacos a ser más eficaces y bombear más ebony.  ? Ayudar al cuerpo a eliminar el exceso de líquidos (diuréticos).  ? Hacer menos espesa la ebony para prevenir la formación de coágulos de ebony en los pulmones.  · Cirugía de pulmón para aliviar la presión en el corazón, en los casos graves que no responden a los tratamientos médicos.  · Trasplante de pulmón y corazón, o trasplante de pulmón. Evaro podría realizarse en casos muy graves.  Siga estas indicaciones en downs casa:  Comida y bebida    · Siga bora dieta saludable que incluya abundantes frutas y verduras frescas, cereales integrales y frijoles.  · Limite el consumo de sal (sodio) a menos de 2300 mg por día.  Estilo de alma delia  · No consuma ningún producto que contenga nicotina o tabaco, vitaliy cigarrillos y cigarrillos electrónicos. Si necesita ayuda para dejar de fumar, consulte al médico.  · Evite ser fumador pasivo.  Actividad  · Descanse lo suficiente.  · Practique los ejercicios que le indiquen. Pregúntele al médico qué ejercicios son seguros para usted.  · Evite saunas y bañeras de hidromasajes.  · Evite los lugares muy altos.  Instrucciones generales  · [place] los medicamentos de venta alec y los recetados solamente vitaliy se lo haya indicado el médico. No cambie ni interrumpa los medicamentos sin consultar con el médico.  · Mantenga mark vacunas al día, especialmente, las vacunas contra la gripe y contra la neumonía anuales.  · Si es judy en edad fértil, evite quedar embarazada. Hable con downs médico sobre los métodos anticonceptivos.  · Considere modos de obtener ayuda para controlar la ansiedad y el estrés que le provoca vivir con hipertensión pulmonar. Hable con el médico sobre distintos grupos  de apoyo y recursos en línea.  · Use oxigenoterapia en downs hogar según las indicaciones.  · Lleve un registro de downs peso. El aumento de peso podría ser un indicio de que la afección está empeorando.  · Concurra a todas las visitas de control vitaliy se lo haya indicado el médico. [place] es importante.  Comuníquese con un médico si:  · La tos empeora.  · Le falta el aire más de lo habitual, o comienza a tener dificultades para realizar actividades que antes podía hacer.  · Debe usar los medicamentos o el oxígeno con mayor frecuencia o en dosis más altas de lo habitual.  Solicite ayuda de inmediato si:  · Le falta el aire de manera preocupante.  · Siente dolor u opresión en el pecho.  · Tose y escupe ebony.  · Tiene hinchazón en los pies o las piernas, y lisa cuadro empeora.  · Aumenta mucho de peso en el transcurso de 1 o 2 días.  · Los medicamentos o el oxígeno no dan resultado.  Resumen  · La hipertensión pulmonar es bora afección crónica en la que existe hipertensión arterial en las arterias de los pulmones (arterias pulmonares).  · La hipertensión pulmonar es bora afección grave que puede poner en riesgo la alma delia. Puede ser consecuencia de diversas enfermedades.  · El tratamiento podría consistir en charlie medicamentos y usar oxigenoterapia. Los casos graves podrían requerir la realización de bora cirugía o un trasplante.  Esta información no tiene vitaliy fin reemplazar el consejo del médico. Asegúrese de hacerle al médico cualquier pregunta que tenga.  Document Released: 04/05/2010 Document Revised: 08/29/2018 Document Reviewed: 08/29/2018  [employer] Patient Education © 2020 [employer]    Furosemide tablets  What is this medicine?  FUROSEMIDE (fyoor [place] se mide) is a diuretic. It helps you make more urine and to lose salt and excess water from your body. This medicine is used to treat high blood pressure, and edema or swelling from heart, kidney, or liver disease.  This medicine may be used for other purposes; ask your  health care provider or pharmacist if you have questions.  COMMON BRAND NAME(S): Active-Medicated Specimen Kit, Delone, Diuscreen, Lasix, RX Specimen Collection Kit, Specimen Collection Kit, URINX Medicated Specimen Collection  What should I tell my health care provider before I take this medicine?  They need to know if you have any of these conditions:  · abnormal blood electrolytes  · diarrhea or vomiting  · gout  · heart disease  · kidney disease, small amounts of urine, or difficulty passing urine  · liver disease  · thyroid disease  · an unusual or allergic reaction to furosemide, sulfa drugs, other medicines, foods, dyes, or preservatives  · pregnant or trying to get pregnant  · breast-feeding  How should I use this medicine?  Take this medicine by mouth with a glass of water. Follow the directions on the prescription label. You may take this medicine with or without food. If it upsets your stomach, take it with food or milk. Do not take your medicine more often than directed. Remember that you will need to pass more urine after taking this medicine. Do not take your medicine at a time of day that will cause you problems. Do not take at bedtime.  Talk to your pediatrician regarding the use of this medicine in children. While this drug may be prescribed for selected conditions, precautions do apply.  Overdosage: If you think you have taken too much of this medicine contact a poison control center or emergency room at once.  NOTE: This medicine is only for you. Do not share this medicine with others.  What if I miss a dose?  If you miss a dose, take it as soon as you can. If it is almost time for your next dose, take only that dose. Do not take double or extra doses.  What may interact with this medicine?  · aspirin and aspirin-like medicines  · certain antibiotics  · chloral hydrate  · cisplatin  · cyclosporine  · digoxin  · diuretics  · laxatives  · lithium  · medicines for blood pressure  · medicines that  relax muscles for surgery  · methotrexate  · NSAIDs, medicines for pain and inflammation like ibuprofen, naproxen, or indomethacin  · phenytoin  · steroid medicines like prednisone or cortisone  · sucralfate  · thyroid hormones  This list may not describe all possible interactions. Give your health care provider a list of all the medicines, herbs, non-prescription drugs, or dietary supplements you use. Also tell them if you smoke, drink alcohol, or use illegal drugs. Some items may interact with your medicine.  What should I watch for while using this medicine?  Visit your doctor or health care provider for regular checks on your progress. Check your blood pressure regularly. Ask your doctor or health care provider what your blood pressure should be, and when you should contact him or her. If you are a diabetic, check your blood sugar as directed.  This medicine may cause serious skin reactions. They can happen weeks to months after starting the medicine. Contact your health care provider right away if you notice fevers or flu-like symptoms with a rash. The rash may be red or purple and then turn into blisters or peeling of the skin. Or, you might notice a red rash with swelling of the face, lips or lymph nodes in your neck or under your arms.  You may need to be on a special diet while taking this medicine. Check with your doctor. Also, ask how many glasses of fluid you need to drink a day. You must not get dehydrated.  You may get drowsy or dizzy. Do not drive, use machinery, or do anything that needs mental alertness until you know how this drug affects you. Do not stand or sit up quickly, especially if you are an older patient. This reduces the risk of dizzy or fainting spells. Alcohol can make you more drowsy and dizzy. Avoid alcoholic drinks.  This medicine can make you more sensitive to the sun. Keep out of the sun. If you cannot avoid being in the sun, wear protective clothing and use sunscreen. Do not use  sun lamps or tanning beds/booths.  What side effects may I notice from receiving this medicine?  Side effects that you should report to your doctor or health care professional as soon as possible:  · blood in urine or stools  · dry mouth  · fever or chills  · hearing loss or ringing in the ears  · irregular heartbeat  · muscle pain or weakness, cramps  · rash, fever, and swollen lymph nodes  · redness, blistering, peeling or loosening of the skin, including inside the mouth  · skin rash  · stomach upset, pain, or nausea  · tingling or numbness in the hands or feet  · unusually weak or tired  · vomiting or diarrhea  · yellowing of the eyes or skin  Side effects that usually do not require medical attention (report to your doctor or health care professional if they continue or are bothersome):  · headache  · loss of appetite  · unusual bleeding or bruising  This list may not describe all possible side effects. Call your doctor for medical advice about side effects. You may report side effects to FDA at 1-280-FDA-3506.  Where should I keep my medicine?  Keep out of the reach of children.  Store at room temperature between 15 and 30 degrees C (59 and 86 degrees F). Protect from light. Throw away any unused medicine after the expiration date.  NOTE: This sheet is a summary. It may not cover all possible information. If you have questions about this medicine, talk to your doctor, pharmacist, or health care provider.  © 2020 [employer] (2020-03-20 14:04:13)    Olanzapine tablets  What is this medicine?  OLANZAPINE (oh JADE za peen) is used to treat schizophrenia, psychotic disorders, and bipolar disorder. Bipolar disorder is also known as manic-depression.  This medicine may be used for other purposes; ask your health care provider or pharmacist if you have questions.  COMMON BRAND NAME(S): Zyprexa  What should I tell my health care provider before I take this medicine?  They need to know if you have any of  these conditions:  · breast cancer or history of breast cancer  · cigarette smoker  · dementia  · diabetes mellitus, high blood sugar or a family history of diabetes  · difficulty swallowing  · glaucoma  · heart disease, irregular heartbeat, or previous heart attack  · history of brain tumor or head injury  · kidney or liver disease  · low blood pressure or dizziness when standing up  · Parkinson's disease  · prostate trouble  · seizures (convulsions)  · suicidal thoughts, plans, or attempt by you or a family member  · an unusual or allergic reaction to olanzapine, other medicines, foods, dyes, or preservatives  · pregnant or trying to get pregnant  · breast-feeding  How should I use this medicine?  Take this medicine by mouth. Swallow it with a drink of water. Follow the directions on the prescription label. Take your medicine at regular intervals. Do not take it more often than directed. Do not stop taking except on the advice of your doctor or health care professional.  A special MedGuide will be given to you by the pharmacist with each new prescription and refill. Be sure to read this information carefully each time.  Talk to your pediatrician regarding the use of this medicine in children. While this drug may be prescribed for children as young as 13 years for selected conditions, precautions do apply.  Overdosage: If you think you have taken too much of this medicine contact a poison control center or emergency room at once.  NOTE: This medicine is only for you. Do not share this medicine with others.  What if I miss a dose?  If you miss a dose, take it as soon as you can. If it is almost time for your next dose, take only that dose. Do not take double or extra doses.  What may interact with this medicine?  Do not take this medicine with any of the following medications:  · certain antibiotics like grepafloxacin and sparfloxacin  · certain phenothiazines like chlorpromazine, mesoridazine, and  thioridazine  · cisapride  · clozapine  · droperidol  · halofantrine  · levomethadyl  · pimozide  This medicine may also interact with the following medications:  · carbamazepine  · charcoal  · fluvoxamine  · levodopa and other medicines for Parkinson's disease  · medicines for diabetes  · medicines for high blood pressure  · medicines for mental depression, anxiety, other mood disorders, or sleeping problems  · omeprazole  · rifampin  · ritonavir  · tobacco from cigarettes  This list may not describe all possible interactions. Give your health care provider a list of all the medicines, herbs, non-prescription drugs, or dietary supplements you use. Also tell them if you smoke, drink alcohol, or use illegal drugs. Some items may interact with your medicine.  What should I watch for while using this medicine?  Visit your doctor or health care provider for regular checks on your progress. It may be several weeks before you see the full effects of this medicine. Notify your doctor or health care provider if your symptoms get worse, if you have new symptoms, if you are having an unusual effect from this medicine, or if you feel out of control, very discouraged or think you might harm yourself or others.  This medicine may cause serious skin reactions. They can happen weeks to months after starting the medicine. Contact your health care provider right away if you notice fevers or flu-like symptoms with a rash. The rash may be red or purple and then turn into blisters or peeling of the skin. Or, you might notice a red rash with swelling of the face, lips or lymph nodes in your neck or under your arms.  Do not suddenly stop taking this medicine. You may need to gradually reduce the dose. Ask your doctor or health care provider for advice.  You may get dizzy or drowsy. Do not drive, use machinery, or do anything that needs mental alertness until you know how this medicine affects you. Do not stand or sit up quickly,  especially if you are an older patient. This reduces the risk of dizzy or fainting spells.  Avoid alcoholic drinks. Alcohol can increase dizziness and drowsiness with olanzapine.  Do not treat yourself for colds, diarrhea or allergies without asking your doctor or health care provider for advice. Some ingredients can increase possible side effects.  Your mouth may get dry. Chewing sugarless gum or sucking hard candy, and drinking plenty of water will help.  This medicine can reduce the response of your body to heat or cold. Dress warm in cold weather and stay hydrated in hot weather. If possible, avoid extreme temperatures like saunas, hot tubs, very hot or cold showers, or activities that can cause dehydration such as vigorous exercise.  This medicine may increase blood sugar. Ask your health care provider if changes in diet or medicines are needed if you have diabetes.  If you smoke, tell your doctor if you notice this medicine is not working well for you. Talk to your doctor if you are a smoker or if you decide to stop smoking.  What side effects may I notice from receiving this medicine?  Side effects that you should report to your doctor or health care professional as soon as possible:  · allergic reactions like skin rash, itching or hives, swelling of the face, lips, or tongue  · breathing problems  · difficulty in speaking or swallowing  · fast heartbeat (palpitations)  · fever or chills, sore throat  · inability to control muscle movements in the face, hands, arms, or legs  · painful or prolonged erections  · rash, fever, and swollen lymph nodes  · redness, blistering, peeling or loosening of the skin, including inside the mouth  · restlessness or need to keep moving  · seizures (convulsions)  · signs and symptoms of high blood sugar such as being more thirsty or hungry or having to urinate more than normal. You may also feel very tired or have blurry vision.  · stiffness, spasms  · tremors or  trembling  Side effects that usually do not require medical attention (report to your doctor or health care professional if they continue or are bothersome):  · changes in sexual desire  · constipation  · drowsiness  · lowered blood pressure  This list may not describe all possible side effects. Call your doctor for medical advice about side effects. You may report side effects to FDA at 6-231-HXQ-6119.  Where should I keep my medicine?  Keep out of the reach of children.  Store at controlled room temperature between 15 and 30 degrees C (59 and 86 degrees F). Protect from light and moisture. Throw away any unused medicine after the expiration date.  NOTE: This sheet is a summary. It may not cover all possible information. If you have questions about this medicine, talk to your doctor, pharmacist, or health care provider.  © 2020 [employer]vier/Gold Standard (2020-03-25 10:39:02)    Depression / Suicide Risk    As you are discharged from this [facility] facility, it is important to learn how to keep safe from harming yourself.    Recognize the warning signs:  · Abrupt changes in personality, positive or negative- including increase in energy   · Giving away possessions  · Change in eating patterns- significant weight changes-  positive or negative  · Change in sleeping patterns- unable to sleep or sleeping all the time   · Unwillingness or inability to communicate  · Depression  · Unusual sadness, discouragement and loneliness  · Talk of wanting to die  · Neglect of personal appearance   · Rebelliousness- reckless behavior  · Withdrawal from people/activities they love  · Confusion- inability to concentrate     If you or a loved one observes any of these behaviors or has concerns about self-harm, here's what you can do:  · Talk about it- your feelings and reasons for harming yourself  · Remove any means that you might use to hurt yourself (examples: pills, rope, extension cords, firearm)  · Get professional help from  the community (Mental Health, Substance Abuse, psychological counseling)  · Do not be alone:Call your Safe Contact- someone whom you trust who will be there for you.  · Call your local CRISIS HOTLINE 126-5931 or 028-918-7328  · Call your local Children's Mobile Crisis Response Team [place] (888) 283-6997 or www.APS  · Call the toll free National Suicide Prevention Hotlines   · National Suicide Prevention Lifeline 045-289-HKRH (3813)  · National Hope Line Network 800-SUICIDE (212-8422)

## 2021-12-09 NOTE — PROGRESS NOTES
Pt Ax1, confused and HH. Family at bedside during shift. Pt c/o left hip pain, medicated per MAR. Incontinent b/b, purewick in place. LBM pta, bowel regimen given. Pt T/R to offload left hip. Safety maintained, WCTM

## 2021-12-09 NOTE — CARE PLAN
"The patient is Watcher - Medium risk of patient condition declining or worsening    Shift Goals  Clinical Goals: pain management, reorient, saftey  Patient Goals: per  \"I want to go home\"  Family Goals: comfort    Progress made toward(s) clinical / shift goals:     Problem: Pain - Standard  Goal: Alleviation of pain or a reduction in pain to the patient’s comfort goal  Outcome: Progressing     Problem: Fall Risk  Goal: Patient will remain free from falls  Outcome: Progressing     Problem: Skin Integrity  Goal: Skin integrity is maintained or improved  Outcome: Progressing       Patient is not progressing towards the following goals:      Problem: Knowledge Deficit - Standard  Goal: Patient and family/care givers will demonstrate understanding of plan of care, disease process/condition, diagnostic tests and medications  Outcome: Not Progressing     "

## 2021-12-09 NOTE — DISCHARGE PLANNING
Agency/Facility Name: [facility]  Spoke To: Nehal   Outcome: Per Nehal admission would like a Covid rapid before discharging.

## 2021-12-09 NOTE — PROGRESS NOTES
Orthopaedic PA Progress Note    Interval changes:Did well overnight, slow to mobilize. Family at bedside.    ROS - Patient denies any new issues. No chest pain, dyspnea, or fever.  Pain well controlled.    /62   Pulse 70   Temp 36.3 °C (97.4 °F) (Temporal)   Resp 16   Wt 70.1 kg (154 lb 8.7 oz)   SpO2 93%     Patient seen and examined  No acute distress  Breathing non labored  RRR  Surgical dressing is clean, dry, and intact. Patient clearly fires tibialis anterior, EHL, and gastrocnemius/soleus. Sensation is intact to light touch throughout superficial peroneal, deep peroneal, tibial, saphenous, and sural nerve distributions. Strong and palpable 2+ dorsalis pedis and posterior tibial pulses with capillary refill less than 2 seconds. No lower leg tenderness or discomfort.    Recent Labs     12/06/21  0044 12/07/21  0054   WBC 14.1* 12.4*   RBC 3.67* 3.34*   HEMOGLOBIN 12.5 11.4*   HEMATOCRIT 38.1 34.5*   .8* 103.3*   MCH 34.1* 34.1*   MCHC 32.8* 33.0*   RDW 48.4 49.4   PLATELETCT 159* 172   MPV 11.3 11.6     Active Hospital Problems    Diagnosis    • Pulmonary HTN (HCC) [I27.20]    • S/p left hip fracture [Z87.81]    • Vascular dementia without behavioral disturbance (HCC) [F01.50]    • Macrocytosis [D75.89]        Assessment/Plan:  POD#3 S/P :L hip jodi  Wt bearing status - AT  PT/OT-initiated  Wound care:dressing left in place  Drains - no  Churchill-no  Sutures/Staples out- 10-14 days post operatively  Antibiotics: complete  DVT Prophylaxis- TEDS/SCDs/Foot pumps.   Lovenox: Start 40 mg daily - may transition to ECASA 81 PO BID on DC  Future Procedures - none planned  Case Coordination for Discharge Planning - Disposition per Med/PT/OT  Clear for disposition (home/SNF/IRF) from Orthopaedic team standpoint.  Follow-Up: needs appointment with Dr. Jenkins at [facility] at 10-14 days post-op for re-evaluation, staple removal and radiographs.

## 2023-09-07 NOTE — NON-PROVIDER
History & Physical Note    Date of Admission: 11/22/20  Admission Status: Observation-Outpatient  [facility] Team: [facility] Red Team   Attending: MALI Briscoe M.D.   Senior Resident: Kel Jennings M.D.      Chief Complaint: Headache, left leg pain, confusion      History of Present Illness (HPI):   Sushma Bowden is a 85 y.o. female who was recently admitted 11/12 for cerebrovascular accident in the left parietal lobe, not treated with TPA or thrombectomy. She was given Plavix without addition of aspirin due to an area of hemorrhagic transformation within the infarct, and was discharged home 11/16 with plans for home health care. She was also treated with ceftriaxone while in the hospital for a UTI. She was then brought in by ambulance 11/22 with headache and confusion that began the night before. Per ED note, the patient's granddaughter called EMS after giving the patient a dose of Tramadol for her headache, after which she developed pain in the right side of her body.     Interval update: Patient admitted overnight. SBP has ranged from 133-190, but VS are otherwise stable. Patient complaining of left sided headache and vague left leg pain. She is not able to follow most commands and is only oriented to self. CT-head on admission was negative for acute infarct or hemorrhage.     Review of Systems:   Review of Systems   Unable to perform ROS: Mental status change   Musculoskeletal:        Positive for leg pain (L>R)   Neurological: Positive for headaches.       Past Medical History:   Past Medical History was reviewed.   has a past medical history of Hyperlipidemia and Hypertension.    Past Surgical History: Past Surgical History was reviewed   has no past surgical history on file.    Medications: Medications have been reviewed   Prior to Admission Medications   Prescriptions Last Dose Informant Patient Reported? Taking?   acetaminophen (TYLENOL) 500 MG Tab  Rx Bottle (For Med Information) Yes No   Sig: Take  [de-identified] : Sinus  Rhythm \par  -Left atrial enlargement. \par   -Anteroseptal infarct -age undetermined. \par   500-1,000 mg by mouth 2 times a day as needed for Moderate Pain.   atorvastatin (LIPITOR) 40 MG Tab   No No   Sig: Take 1 Tab by mouth every evening for 30 days.   clopidogrel (PLAVIX) 75 MG Tab   No No   Sig: Take 1 Tab by mouth every day for 21 days.   docusate calcium (SURFAK) 240 MG CAPS  Rx Bottle (For Med Information) No No   Sig: Take 1 Cap by mouth every day.   Patient not taking: Reported on 11/12/2020   escitalopram (LEXAPRO) 10 MG Tab  Rx Bottle (For Med Information) Yes No   Sig: Take 10 mg by mouth every day.   gabapentin (NEURONTIN) 300 MG Cap  Rx Bottle (For Med Information) Yes No   Sig: Take 300 mg by mouth every bedtime.   magnesium oxide (MAG-OX) 400 (241.3 Mg) MG Tab tablet  Rx Bottle (For Med Information) No No   Sig: Take 1 Tab by mouth every day.   Patient not taking: Reported on 11/12/2020   omeprazole (PRILOSEC) 20 MG CPDR  Rx Bottle (For Med Information) No No   Sig: Take 1 Cap by mouth every day. for gastritis   Patient not taking: Reported on 11/12/2020      Facility-Administered Medications: None        Allergies: Allergies have been reviewed   Allergies   Allergen Reactions   • Penicillins Unspecified     Unknown reaction  Tolerates cephalosporins       Family History:   family history is not on file.     Social History: limited by patient's altered mental status  Living situation:  Lives at home with son  Primary Care Provider: reviewed Clayton Kohli M.D.    Physical Exam:   Vitals:  Temp:  [36 °C (96.8 °F)-36.8 °C (98.3 °F)] 36.4 °C (97.6 °F)  Pulse:  [60-71] 62  Resp:  [15-21] 18  BP: (133-190)/() 134/56  SpO2:  [89 %-99 %] 93 %  Constitutional: Elderly woman, resting in bed in no acute distress.  HEENT: Atraumatic, normocephalic, pupils are equal round reactive to light, extraocular movements could not be assessed due to patient not following commands. Mouth shows dry, pink mucous membranes.  Neck: Supple, full range of motion.  Cardiovascular: Regular rate and rhythm. No  [de-identified] : 7/2/23 8 METs no ischemic ekg changes.  [de-identified] : 6/22/23 normal LV function.  murmur, rub, or gallop, 2+ distal pulses throughout  Thorax & Lungs: Clear to auscultation bilaterally, no wheezes, rales, or rhonchi.   GI: Soft, nontender, nondistended.  Skin: Warm, dry. No acute rash or lesions.  Extremities: No lower extremity edema. No tenderness to palpation in lower limbs.  Neurologic: Normal palate elevation, uvula midline. Could not assess remaining cranial nerves due to patient not following commands. LLE has 3/5 strength with hip flexion, RLE is 5/5.  Psychiatric: Pleasant, but otherwise unable to assess.      Labs:   Results for orders placed or performed during the hospital encounter of 11/22/20   CBC WITH DIFFERENTIAL   Result Value Ref Range    WBC 11.0 (H) 4.8 - 10.8 K/uL    RBC 4.10 (L) 4.20 - 5.40 M/uL    Hemoglobin 14.0 12.0 - 16.0 g/dL    Hematocrit 41.9 37.0 - 47.0 %    .2 (H) 81.4 - 97.8 fL    MCH 34.1 (H) 27.0 - 33.0 pg    MCHC 33.4 (L) 33.6 - 35.0 g/dL    RDW 46.8 35.9 - 50.0 fL    Platelet Count 251 164 - 446 K/uL    MPV 10.6 9.0 - 12.9 fL    Neutrophils-Polys 79.10 (H) 44.00 - 72.00 %    Lymphocytes 6.60 (L) 22.00 - 41.00 %    Monocytes 13.20 0.00 - 13.40 %    Eosinophils 0.10 0.00 - 6.90 %    Basophils 0.40 0.00 - 1.80 %    Immature Granulocytes 0.60 0.00 - 0.90 %    Nucleated RBC 0.00 /100 WBC    Neutrophils (Absolute) 8.69 (H) 2.00 - 7.15 K/uL    Lymphs (Absolute) 0.73 (L) 1.00 - 4.80 K/uL    Monos (Absolute) 1.45 (H) 0.00 - 0.85 K/uL    Eos (Absolute) 0.01 0.00 - 0.51 K/uL    Baso (Absolute) 0.04 0.00 - 0.12 K/uL    Immature Granulocytes (abs) 0.07 0.00 - 0.11 K/uL    NRBC (Absolute) 0.00 K/uL   COMP METABOLIC PANEL   Result Value Ref Range    Sodium 136 135 - 145 mmol/L    Potassium 3.6 3.6 - 5.5 mmol/L    Chloride 98 96 - 112 mmol/L    Co2 26 20 - 33 mmol/L    Anion Gap 12.0 7.0 - 16.0    Glucose 123 (H) 65 - 99 mg/dL    Bun 9 8 - 22 mg/dL    Creatinine 0.56 0.50 - 1.40 mg/dL    Calcium 9.4 8.5 - 10.5 mg/dL    AST(SGOT) 36 12 - 45 U/L    ALT(SGPT) 26 2 - 50  U/L    Alkaline Phosphatase 97 30 - 99 U/L    Total Bilirubin 0.9 0.1 - 1.5 mg/dL    Albumin 3.6 3.2 - 4.9 g/dL    Total Protein 7.2 6.0 - 8.2 g/dL    Globulin 3.6 (H) 1.9 - 3.5 g/dL    A-G Ratio 1.0 g/dL   HCG QUAL SERUM   Result Value Ref Range    Beta-Hcg Qualitative Serum Negative Negative   DIAGNOSTIC ALCOHOL   Result Value Ref Range    Diagnostic Alcohol <10.1 0.0 - 10.0 mg/dL   URINE DRUG SCREEN (TRIAGE)   Result Value Ref Range    Amphetamines Urine Negative Negative    Barbiturates Negative Negative    Benzodiazepines Negative Negative    Cocaine Metabolite Negative Negative    Methadone Negative Negative    Opiates Negative Negative    Oxycodone Negative Negative    Phencyclidine -Pcp Negative Negative    Propoxyphene Negative Negative    Cannabinoid Metab Positive (A) Negative   URINALYSIS    Specimen: Urine, Cath   Result Value Ref Range    Color Yellow     Character Clear     Specific Gravity 1.018 <1.035    Ph 6.5 5.0 - 8.0    Glucose Negative Negative mg/dL    Ketones 15 (A) Negative mg/dL    Protein Negative Negative mg/dL    Bilirubin Negative Negative    Urobilinogen, Urine 1.0 Negative    Nitrite Negative Negative    Leukocyte Esterase Negative Negative    Occult Blood Trace (A) Negative    Micro Urine Req Microscopic    COVID/SARS CoV-2 PCR    Specimen: Nasopharyngeal; Respirate   Result Value Ref Range    COVID Order Status Received    CoV-2, Flu A/B, And RSV by PCR   Result Value Ref Range    Influenza virus A RNA Negative Negative    Influenza virus B, PCR Negative Negative    RSV, PCR Negative Negative    SARS-CoV-2 by PCR NotDetected     SARS-CoV-2 Source Nasal Swab    ESTIMATED GFR   Result Value Ref Range    GFR If African American >60 >60 mL/min/1.73 m 2    GFR If Non African American >60 >60 mL/min/1.73 m 2   URINE MICROSCOPIC (W/UA)   Result Value Ref Range    WBC 0-2 /hpf    RBC 5-10 (A) /hpf    Bacteria Negative None /hpf    Epithelial Cells Negative /hpf    Hyaline Cast 0-2 /lpf    Blood Culture,Hold   Result Value Ref Range    Blood Culture Hold Collected    Blood Culture,Hold   Result Value Ref Range    Blood Culture Hold Collected    Hemoglobin A1C   Result Value Ref Range    Glycohemoglobin 5.1 0.0 - 5.6 %    Est Avg Glucose 100 mg/dL   Magnesium   Result Value Ref Range    Magnesium 1.9 1.5 - 2.5 mg/dL   ACCU-CHEK GLUCOSE   Result Value Ref Range    Glucose - Accu-Ck 128 (H) 65 - 99 mg/dL   EKG   Result Value Ref Range    Report       [facility] Emergency Dept[facility]    Test Date:  2020  Pt Name:    FABIANA LEMON               Department: ER  MRN:        [id number]                      Room:        03  Gender:     Female                       Technician: [id number]  :        [id number]                   Requested By:ER TRIAGE PROTOCOL  Order #:    [id number]                    Reading MD: Ramana Garcia MD    Measurements  Intervals                                Axis  Rate:       65                           P:          -14  MI:         168                          QRS:        -34  QRSD:       100                          T:          11  QT:         432  QTc:        450    Interpretive Statements  SINUS RHYTHM  LEFT AXIS DEVIATION  LVH WITH SECONDARY REPOLARIZATION ABNORMALITY  BASELINE WANDER IN LEAD(S) V6  Compared to ECG 2020 13:59:10  Atrial premature complex(es) no longer present  Electronically Signed On 2020 1:06:52 PST by Ramana Garcia MD           Imaging:   CT-HEAD W/O   Final Result      1. No CT evidence of acute infarct, hemorrhage or mass.   2. Moderate global parenchymal atrophy. Chronic small vessel ischemic changes.      DX-CHEST-PORTABLE (1 VIEW)   Final Result         1. Bibasilar atelectasis/scarring. No focal consolidation or pleural effusions.   2. Tortuous aorta with prominent mediastinum, which may be due to an ascending aortic aneurysm. This can be followed with a CTA chest.      MR-BRAIN-W/O    (Results Pending)        Previous Data  Review: reviewed    Assessment and Plan:    #Acute encephalopathy   -Patient intermittently answers questions but is only oriented to self, generally not able to follow commands.   -Baseline mental status unknown, no family at bedside today  -Recent CVA with infarcts noted on imaging in the left parietal lobe, right thalamus,  And left cerebellum; documented history of vascular dementia   -Unlikely 2/2 UTI, given treatment with ceftriaxone during last admission and UA yesterday unremarkable for infection  -Patient tested positive for cannabinoids in the ED, consider effects of marijuana as a cause of patient's confusion  -Also likely that prior infarcts and most recent infarct have resulted in this new baseline  -PT/OT unable to complete their evaluations due to patient mental status  -Pending neuro consult  -q4hr neuro checks  -Pending SNF referral, given family reported the patient is no longer independently capable of her ADLs    #CVA  -Patient admitted 11/12-11/16 for left parietal stroke, apparent receptive aphasia present  -MRI on 11/14 showed acute infarct in the left parietal lobe with hemorrhagic transformation, as well as older lacunar infarcts in the brainstem, right thalamus, and left cerebellum.  -Pending updated brain MRI, will assess for expansion of area of infarct/hemorrhage or signs of edema   -Pending swallow eval by SLP, patient NPO until their assessment  -Continue atorvastatin and Plavix     #Headache  -Patient's granddaughter gave her Tramadol for her headache prior to calling EMS; headache and delirium common side effects of this medication  -Consider NSAID therapy or Fioricet pending updated MRI-brain  -Treat with acetaminophen in the interim    #Hypertension  -SBP reached 190 overnight  -Permissive hypertension protocol x48 hours, will not treat below /  -Consider cardiology consult regarding management of aortic aneurysm, given patient's permissive HTN    #Dehydration  -Dry  mucous membranes on exam  -Ketonuria noted on UA, likely due to decreased oral intake since patient was discharged from hospital  -Give patient 5% dextrose if MRI-brain is negative for new infarct/hemorrhage; given patient is not oriented enough to provide a reliable social history for alcohol intake, also administer thiamine  -Continue LR infusion     #Macrocytosis   -.2, recent normal B12 levels and iron studies   -Normal TSH  -Alcohol use unlikely given negative alcohol level in the ED, but thiamine will be administered with dextrose   -Pending free T4 study  -Pending folate levels       Conchita Moura, Student 11/23/2020 1:11 PM

## (undated) DEVICE — TUBING CLEARLINK DUO-VENT - C-FLO (48EA/CA)

## (undated) DEVICE — GLOVE BIOGEL PI INDICATOR SZ 6.5 SURGICAL PF LF - (50/BX 4BX/CA)

## (undated) DEVICE — DRAPE U ORTHOPEDIC - (10/BX)

## (undated) DEVICE — GOWN SURGEONS X-LARGE - DISP. (30/CA)

## (undated) DEVICE — STAPLER SKIN DISP - (6/BX 10BX/CA) VISISTAT

## (undated) DEVICE — SODIUM CHL IRRIGATION 0.9% 1000ML (12EA/CA)

## (undated) DEVICE — SUTURE 1 VICRYL PLUS CTX - 8 X 18 INCH (12/BX)

## (undated) DEVICE — KIT ANESTHESIA W/CIRCUIT & 3/LT BAG W/FILTER (20EA/CA)

## (undated) DEVICE — SUTURE 2-0 VICRYL PLUS CTX - 8 X 18 INCH (12/BX)

## (undated) DEVICE — MASK ANESTHESIA ADULT  - (100/CA)

## (undated) DEVICE — LACTATED RINGERS INJ 1000 ML - (14EA/CA 60CA/PF)

## (undated) DEVICE — ELECTRODE DUAL RETURN W/ CORD - (50/PK)

## (undated) DEVICE — DRAPE IOBAN II INCISE 23X17 - (10EA/BX 4BX/CA)

## (undated) DEVICE — DRAPE HIP W/POCKET - (6/CA)

## (undated) DEVICE — CLEANER ELECTRO-SURGICAL TIP - (25/BX 4BX/CA)

## (undated) DEVICE — LENS/HOOD FOR SPACESUIT - (32/PK) PEEL AWAY FACE

## (undated) DEVICE — PAD LAP STERILE 18 X 18 - (5/PK 40PK/CA)

## (undated) DEVICE — HANDPIECE 10FT INTPLS SCT PLS IRRIGATION HAND CONTROL SET (6/PK)

## (undated) DEVICE — SENSOR SPO2 NEO LNCS ADHESIVE (20/BX) SEE USER NOTES

## (undated) DEVICE — PROTECTOR ULNA NERVE - (36PR/CA)

## (undated) DEVICE — GLOVE BIOGEL PI ORTHO SZ 8.5 PF LF (40/BX)

## (undated) DEVICE — TIP INTPLS HFLO ML ORFC BTRY - (12/CS)  FOR SURGILAV

## (undated) DEVICE — GOWN WARMING STANDARD FLEX - (30/CA)

## (undated) DEVICE — DRESSING POST OP BORDER 4 X 10 (5EA/BX)

## (undated) DEVICE — SUTURE 5 TI-CRON HOS-14 - (36/BX)

## (undated) DEVICE — SODIUM CHL. IRRIGATION 0.9% 3000ML (4EA/CA 65CA/PF)

## (undated) DEVICE — PACK TOTAL KNEE  (1/CA)

## (undated) DEVICE — DRESSING TRANSPARENT FILM TEGADERM 4 X 4.75" (50EA/BX)"

## (undated) DEVICE — GLOVE SIZE 7.5 SURGEON ACCELERATOR FREE GREEN (50PR/BX)

## (undated) DEVICE — GLOVE SZ 6.5 BIOGEL PI MICRO - PF LF (50PR/BX)

## (undated) DEVICE — SUCTION INSTRUMENT YANKAUER BULBOUS TIP W/O VENT (50EA/CA)

## (undated) DEVICE — CANISTER SUCTION 3000ML MECHANICAL FILTER AUTO SHUTOFF MEDI-VAC NONSTERILE LF DISP  (40EA/CA)

## (undated) DEVICE — NEPTUNE 4 PORT MANIFOLD - (20/PK)

## (undated) DEVICE — MIXER BONE CEMENT REVOLUTION - W/FEMORAL PRESSURIZER (6/CA)

## (undated) DEVICE — GLOVE BIOGEL ECLIPSE PF LATEX SIZE 8.5 (50PR/BX)

## (undated) DEVICE — GLOVE BIOGEL SZ 8 SURGICAL PF LTX - (50PR/BX 4BX/CA)

## (undated) DEVICE — BLADE SAGITTAL SAW DUAL CUT 75.0 X 25.0MM (1/EA)

## (undated) DEVICE — SUTURE GENERAL

## (undated) DEVICE — SET EXTENSION WITH 2 PORTS (48EA/CA) ***PART #2C8610 IS A SUBSTITUTE*****

## (undated) DEVICE — GLOVE BIOGEL PI ULTRATOUCH SZ 7.5 SURGICAL PF LF -(50/BX 4BX/CA)

## (undated) DEVICE — SET LEADWIRE 5 LEAD BEDSIDE DISPOSABLE ECG (1SET OF 5/EA)

## (undated) DEVICE — KIT HIP PREP IM ENCHANCE TOTAL (5EA/BX)

## (undated) DEVICE — GLOVE BIOGEL INDICATOR SZ 6.5 SURGICAL PF LTX - (50PR/BX 4BX/CA)

## (undated) DEVICE — GLOVE BIOGEL PI ORTHO SZ 7.5 PF LF (40PR/BX)

## (undated) DEVICE — DRAPE IOBAN II 23 IN X 33 IN - (10/BX)

## (undated) DEVICE — GLOVE BIOGEL PI ORTHO SZ 8 PF LF (40PR/BX)

## (undated) DEVICE — KIT EVACUATER 3 SPRING PVC LF 1/8 DRAIN SIZE (10EA/CA)"

## (undated) DEVICE — ELECTRODE 850 FOAM ADHESIVE - HYDROGEL RADIOTRNSPRNT (50/PK)

## (undated) DEVICE — HEAD HOLDER JUNIOR/ADULT